# Patient Record
Sex: FEMALE | Race: WHITE | Employment: FULL TIME | URBAN - METROPOLITAN AREA
[De-identification: names, ages, dates, MRNs, and addresses within clinical notes are randomized per-mention and may not be internally consistent; named-entity substitution may affect disease eponyms.]

---

## 2017-01-09 RX ORDER — LANCETS 30 GAUGE
1 EACH MISCELLANEOUS 3 TIMES DAILY
Qty: 100 EACH | Refills: 5 | Status: SHIPPED | OUTPATIENT
Start: 2017-01-09 | End: 2017-03-15 | Stop reason: SDUPTHER

## 2017-02-18 LAB — DIABETIC RETINOPATHY: POSITIVE

## 2017-03-15 ENCOUNTER — OFFICE VISIT (OUTPATIENT)
Dept: FAMILY MEDICINE CLINIC | Age: 47
End: 2017-03-15

## 2017-03-15 VITALS
HEART RATE: 82 BPM | WEIGHT: 293 LBS | TEMPERATURE: 97.8 F | OXYGEN SATURATION: 97 % | BODY MASS INDEX: 54.98 KG/M2 | SYSTOLIC BLOOD PRESSURE: 128 MMHG | DIASTOLIC BLOOD PRESSURE: 67 MMHG | RESPIRATION RATE: 20 BRPM

## 2017-03-15 DIAGNOSIS — J45.20 MILD INTERMITTENT ASTHMA WITHOUT COMPLICATION: ICD-10-CM

## 2017-03-15 DIAGNOSIS — E11.3299 DM TYPE 2 WITH DIABETIC BACKGROUND RETINOPATHY (HCC): ICD-10-CM

## 2017-03-15 DIAGNOSIS — F41.8 DEPRESSION WITH ANXIETY: ICD-10-CM

## 2017-03-15 DIAGNOSIS — E11.3299 TYPE 2 DIABETES MELLITUS WITH MILD NONPROLIFERATIVE RETINOPATHY WITHOUT MACULAR EDEMA, WITHOUT LONG-TERM CURRENT USE OF INSULIN, UNSPECIFIED LATERALITY (HCC): ICD-10-CM

## 2017-03-15 DIAGNOSIS — F41.8 ANXIETY ASSOCIATED WITH DEPRESSION: ICD-10-CM

## 2017-03-15 DIAGNOSIS — E78.00 PURE HYPERCHOLESTEROLEMIA: ICD-10-CM

## 2017-03-15 DIAGNOSIS — R63.2 BINGE EATING: ICD-10-CM

## 2017-03-15 DIAGNOSIS — B00.9 HERPES SIMPLEX: ICD-10-CM

## 2017-03-15 DIAGNOSIS — G47.30 SLEEP APNEA, UNSPECIFIED TYPE: ICD-10-CM

## 2017-03-15 DIAGNOSIS — E11.3299 DM TYPE 2 WITH DIABETIC BACKGROUND RETINOPATHY (HCC): Primary | ICD-10-CM

## 2017-03-15 LAB
A/G RATIO: 1.3 (ref 1.1–2.2)
ALBUMIN SERPL-MCNC: 4 G/DL (ref 3.4–5)
ALP BLD-CCNC: 79 U/L (ref 40–129)
ALT SERPL-CCNC: 20 U/L (ref 10–40)
ANION GAP SERPL CALCULATED.3IONS-SCNC: 17 MMOL/L (ref 3–16)
AST SERPL-CCNC: 14 U/L (ref 15–37)
BILIRUB SERPL-MCNC: <0.2 MG/DL (ref 0–1)
BUN BLDV-MCNC: 16 MG/DL (ref 7–20)
CALCIUM SERPL-MCNC: 9.3 MG/DL (ref 8.3–10.6)
CHLORIDE BLD-SCNC: 104 MMOL/L (ref 99–110)
CHOLESTEROL, TOTAL: 116 MG/DL (ref 0–199)
CO2: 23 MMOL/L (ref 21–32)
CREAT SERPL-MCNC: 0.9 MG/DL (ref 0.6–1.1)
CREATININE URINE POCT: NORMAL
CREATININE URINE: 234 MG/DL (ref 28–259)
GFR AFRICAN AMERICAN: >60
GFR NON-AFRICAN AMERICAN: >60
GLOBULIN: 3.2 G/DL
GLUCOSE BLD-MCNC: 64 MG/DL (ref 70–99)
HDLC SERPL-MCNC: 49 MG/DL (ref 40–60)
LDL CHOLESTEROL CALCULATED: 46 MG/DL
MICROALBUMIN UR-MCNC: <1.2 MG/DL
MICROALBUMIN/CREAT 24H UR: 50 MG/G{CREAT}
MICROALBUMIN/CREAT UR-RTO: NORMAL
MICROALBUMIN/CREAT UR-RTO: NORMAL MG/G (ref 0–30)
POTASSIUM SERPL-SCNC: 3.9 MMOL/L (ref 3.5–5.1)
SODIUM BLD-SCNC: 144 MMOL/L (ref 136–145)
TOTAL PROTEIN: 7.2 G/DL (ref 6.4–8.2)
TRIGL SERPL-MCNC: 105 MG/DL (ref 0–150)
TSH REFLEX: 3.85 UIU/ML (ref 0.27–4.2)
VLDLC SERPL CALC-MCNC: 21 MG/DL

## 2017-03-15 PROCEDURE — 99214 OFFICE O/P EST MOD 30 MIN: CPT | Performed by: FAMILY MEDICINE

## 2017-03-15 PROCEDURE — 82044 UR ALBUMIN SEMIQUANTITATIVE: CPT | Performed by: FAMILY MEDICINE

## 2017-03-15 RX ORDER — LISINOPRIL 10 MG/1
TABLET ORAL
Qty: 30 TABLET | Refills: 5 | Status: SHIPPED | OUTPATIENT
Start: 2017-03-15 | End: 2017-08-17 | Stop reason: SDUPTHER

## 2017-03-15 RX ORDER — NAPROXEN 500 MG/1
500 TABLET ORAL 2 TIMES DAILY WITH MEALS
Qty: 60 TABLET | Refills: 5 | Status: CANCELLED | OUTPATIENT
Start: 2017-03-15

## 2017-03-15 RX ORDER — MIRTAZAPINE 15 MG/1
7.5 TABLET, FILM COATED ORAL NIGHTLY
Qty: 15 TABLET | Refills: 5 | Status: SHIPPED | OUTPATIENT
Start: 2017-03-15 | End: 2017-08-17 | Stop reason: SDUPTHER

## 2017-03-15 RX ORDER — ALPRAZOLAM 0.25 MG/1
TABLET ORAL
Qty: 15 TABLET | Refills: 2 | Status: CANCELLED | OUTPATIENT
Start: 2017-03-15

## 2017-03-15 RX ORDER — ALBUTEROL SULFATE 90 UG/1
1-2 AEROSOL, METERED RESPIRATORY (INHALATION) EVERY 6 HOURS PRN
Qty: 1 INHALER | Refills: 5 | Status: SHIPPED | OUTPATIENT
Start: 2017-03-15 | End: 2017-08-17 | Stop reason: SDUPTHER

## 2017-03-15 RX ORDER — ATORVASTATIN CALCIUM 40 MG/1
TABLET, FILM COATED ORAL
Qty: 30 TABLET | Refills: 5 | Status: SHIPPED | OUTPATIENT
Start: 2017-03-15 | End: 2017-08-17 | Stop reason: SDUPTHER

## 2017-03-15 RX ORDER — CLONAZEPAM 0.5 MG/1
0.5 TABLET ORAL 2 TIMES DAILY PRN
Qty: 30 TABLET | Refills: 2 | Status: SHIPPED | OUTPATIENT
Start: 2017-03-15 | End: 2017-11-02 | Stop reason: SDUPTHER

## 2017-03-15 RX ORDER — FLUOXETINE HYDROCHLORIDE 40 MG/1
CAPSULE ORAL
Qty: 60 CAPSULE | Refills: 5 | Status: SHIPPED | OUTPATIENT
Start: 2017-03-15 | End: 2017-08-17 | Stop reason: SDUPTHER

## 2017-03-15 RX ORDER — BUPROPION HYDROCHLORIDE 150 MG/1
150 TABLET ORAL EVERY MORNING
Qty: 30 TABLET | Refills: 5 | Status: SHIPPED | OUTPATIENT
Start: 2017-03-15 | End: 2017-08-17 | Stop reason: SDUPTHER

## 2017-03-15 RX ORDER — LANCETS 30 GAUGE
1 EACH MISCELLANEOUS 3 TIMES DAILY
Qty: 100 EACH | Refills: 5 | Status: SHIPPED | OUTPATIENT
Start: 2017-03-15 | End: 2017-08-17 | Stop reason: SDUPTHER

## 2017-03-15 RX ORDER — CYCLOBENZAPRINE HCL 10 MG
TABLET ORAL
Qty: 30 TABLET | Refills: 5 | Status: SHIPPED | OUTPATIENT
Start: 2017-03-15 | End: 2017-08-17 | Stop reason: SDUPTHER

## 2017-03-15 RX ORDER — METFORMIN HYDROCHLORIDE 500 MG/1
500 TABLET, EXTENDED RELEASE ORAL 2 TIMES DAILY
Qty: 180 TABLET | Refills: 1 | Status: SHIPPED | OUTPATIENT
Start: 2017-03-15 | End: 2017-08-17 | Stop reason: SDUPTHER

## 2017-03-15 RX ORDER — VALACYCLOVIR HYDROCHLORIDE 500 MG/1
TABLET, FILM COATED ORAL
Qty: 12 TABLET | Refills: 2 | Status: SHIPPED | OUTPATIENT
Start: 2017-03-15 | End: 2019-09-16 | Stop reason: ALTCHOICE

## 2017-03-16 ENCOUNTER — TELEPHONE (OUTPATIENT)
Dept: FAMILY MEDICINE CLINIC | Age: 47
End: 2017-03-16

## 2017-03-16 LAB
ESTIMATED AVERAGE GLUCOSE: 168.6 MG/DL
HBA1C MFR BLD: 7.5 %

## 2017-03-22 ENCOUNTER — PATIENT MESSAGE (OUTPATIENT)
Dept: FAMILY MEDICINE CLINIC | Age: 47
End: 2017-03-22

## 2017-03-22 DIAGNOSIS — E11.3299 DM TYPE 2 WITH DIABETIC BACKGROUND RETINOPATHY (HCC): ICD-10-CM

## 2017-04-19 ENCOUNTER — OFFICE VISIT (OUTPATIENT)
Dept: FAMILY MEDICINE CLINIC | Age: 47
End: 2017-04-19

## 2017-04-19 VITALS
HEART RATE: 93 BPM | WEIGHT: 293 LBS | TEMPERATURE: 97.8 F | DIASTOLIC BLOOD PRESSURE: 69 MMHG | RESPIRATION RATE: 16 BRPM | BODY MASS INDEX: 55.51 KG/M2 | SYSTOLIC BLOOD PRESSURE: 126 MMHG | OXYGEN SATURATION: 96 %

## 2017-04-19 DIAGNOSIS — S39.012A LUMBAR STRAIN, INITIAL ENCOUNTER: ICD-10-CM

## 2017-04-19 DIAGNOSIS — R63.2 BINGE EATING: ICD-10-CM

## 2017-04-19 DIAGNOSIS — E11.3299 DM TYPE 2 WITH DIABETIC BACKGROUND RETINOPATHY (HCC): Primary | ICD-10-CM

## 2017-04-19 PROCEDURE — 99214 OFFICE O/P EST MOD 30 MIN: CPT | Performed by: FAMILY MEDICINE

## 2017-04-19 RX ORDER — LANCETS
EACH MISCELLANEOUS
COMMUNITY
End: 2018-08-07 | Stop reason: ALTCHOICE

## 2017-04-19 ASSESSMENT — PATIENT HEALTH QUESTIONNAIRE - PHQ9
SUM OF ALL RESPONSES TO PHQ9 QUESTIONS 1 & 2: 0
SUM OF ALL RESPONSES TO PHQ QUESTIONS 1-9: 0
2. FEELING DOWN, DEPRESSED OR HOPELESS: 0
1. LITTLE INTEREST OR PLEASURE IN DOING THINGS: 0

## 2017-05-17 ENCOUNTER — OFFICE VISIT (OUTPATIENT)
Dept: FAMILY MEDICINE CLINIC | Age: 47
End: 2017-05-17

## 2017-05-17 VITALS
DIASTOLIC BLOOD PRESSURE: 76 MMHG | HEART RATE: 90 BPM | WEIGHT: 293 LBS | TEMPERATURE: 98 F | SYSTOLIC BLOOD PRESSURE: 128 MMHG | RESPIRATION RATE: 18 BRPM | BODY MASS INDEX: 54.91 KG/M2

## 2017-05-17 DIAGNOSIS — R60.0 PEDAL EDEMA: ICD-10-CM

## 2017-05-17 DIAGNOSIS — E11.3299 DM TYPE 2 WITH DIABETIC BACKGROUND RETINOPATHY (HCC): ICD-10-CM

## 2017-05-17 DIAGNOSIS — R63.2 BINGE EATING: Primary | ICD-10-CM

## 2017-05-17 PROCEDURE — 99214 OFFICE O/P EST MOD 30 MIN: CPT | Performed by: FAMILY MEDICINE

## 2017-05-17 RX ORDER — TRIAMTERENE AND HYDROCHLOROTHIAZIDE 37.5; 25 MG/1; MG/1
1 CAPSULE ORAL DAILY PRN
Qty: 30 CAPSULE | Refills: 5 | Status: SHIPPED | OUTPATIENT
Start: 2017-05-17 | End: 2017-11-17 | Stop reason: SDUPTHER

## 2017-06-27 ENCOUNTER — TELEPHONE (OUTPATIENT)
Dept: SLEEP MEDICINE | Age: 47
End: 2017-06-27

## 2017-08-17 ENCOUNTER — TELEPHONE (OUTPATIENT)
Dept: FAMILY MEDICINE CLINIC | Age: 47
End: 2017-08-17

## 2017-08-17 ENCOUNTER — OFFICE VISIT (OUTPATIENT)
Dept: FAMILY MEDICINE CLINIC | Age: 47
End: 2017-08-17

## 2017-08-17 VITALS
BODY MASS INDEX: 48.82 KG/M2 | DIASTOLIC BLOOD PRESSURE: 54 MMHG | SYSTOLIC BLOOD PRESSURE: 122 MMHG | TEMPERATURE: 98.2 F | WEIGHT: 293 LBS | RESPIRATION RATE: 16 BRPM | HEIGHT: 65 IN | HEART RATE: 93 BPM

## 2017-08-17 DIAGNOSIS — R63.2 BINGE EATING: Primary | ICD-10-CM

## 2017-08-17 DIAGNOSIS — F41.8 ANXIETY ASSOCIATED WITH DEPRESSION: ICD-10-CM

## 2017-08-17 DIAGNOSIS — E11.3299 TYPE 2 DIABETES MELLITUS WITH MILD NONPROLIFERATIVE RETINOPATHY WITHOUT MACULAR EDEMA, WITHOUT LONG-TERM CURRENT USE OF INSULIN, UNSPECIFIED LATERALITY (HCC): ICD-10-CM

## 2017-08-17 DIAGNOSIS — F41.8 DEPRESSION WITH ANXIETY: ICD-10-CM

## 2017-08-17 DIAGNOSIS — E78.00 PURE HYPERCHOLESTEROLEMIA: ICD-10-CM

## 2017-08-17 DIAGNOSIS — J45.20 MILD INTERMITTENT ASTHMA WITHOUT COMPLICATION: ICD-10-CM

## 2017-08-17 DIAGNOSIS — M54.30 SCIATIC LEG PAIN: ICD-10-CM

## 2017-08-17 PROBLEM — S39.012A LUMBAR STRAIN: Status: RESOLVED | Noted: 2017-04-19 | Resolved: 2017-08-17

## 2017-08-17 LAB
A/G RATIO: 1.5 (ref 1.1–2.2)
ALBUMIN SERPL-MCNC: 4.1 G/DL (ref 3.4–5)
ALP BLD-CCNC: 71 U/L (ref 40–129)
ALT SERPL-CCNC: 16 U/L (ref 10–40)
ANION GAP SERPL CALCULATED.3IONS-SCNC: 15 MMOL/L (ref 3–16)
AST SERPL-CCNC: 11 U/L (ref 15–37)
BILIRUB SERPL-MCNC: 0.4 MG/DL (ref 0–1)
BUN BLDV-MCNC: 28 MG/DL (ref 7–20)
CALCIUM SERPL-MCNC: 9.2 MG/DL (ref 8.3–10.6)
CHLORIDE BLD-SCNC: 100 MMOL/L (ref 99–110)
CHOLESTEROL, TOTAL: 108 MG/DL (ref 0–199)
CO2: 21 MMOL/L (ref 21–32)
CREAT SERPL-MCNC: 1.4 MG/DL (ref 0.6–1.1)
ESTIMATED AVERAGE GLUCOSE: 165.7 MG/DL
GFR AFRICAN AMERICAN: 48
GFR NON-AFRICAN AMERICAN: 40
GLOBULIN: 2.7 G/DL
GLUCOSE BLD-MCNC: 90 MG/DL (ref 70–99)
HBA1C MFR BLD: 7.4 %
HDLC SERPL-MCNC: 48 MG/DL (ref 40–60)
LDL CHOLESTEROL CALCULATED: 43 MG/DL
POTASSIUM SERPL-SCNC: 4.3 MMOL/L (ref 3.5–5.1)
SODIUM BLD-SCNC: 136 MMOL/L (ref 136–145)
TOTAL PROTEIN: 6.8 G/DL (ref 6.4–8.2)
TRIGL SERPL-MCNC: 87 MG/DL (ref 0–150)
VLDLC SERPL CALC-MCNC: 17 MG/DL

## 2017-08-17 PROCEDURE — 99214 OFFICE O/P EST MOD 30 MIN: CPT | Performed by: FAMILY MEDICINE

## 2017-08-17 RX ORDER — LANCETS 30 GAUGE
1 EACH MISCELLANEOUS 3 TIMES DAILY
Qty: 100 EACH | Refills: 5 | Status: SHIPPED | OUTPATIENT
Start: 2017-08-17 | End: 2018-02-16 | Stop reason: SDUPTHER

## 2017-08-17 RX ORDER — ATORVASTATIN CALCIUM 40 MG/1
TABLET, FILM COATED ORAL
Qty: 30 TABLET | Refills: 5 | Status: SHIPPED | OUTPATIENT
Start: 2017-08-17 | End: 2018-02-16 | Stop reason: SDUPTHER

## 2017-08-17 RX ORDER — CYCLOBENZAPRINE HCL 10 MG
TABLET ORAL
Qty: 30 TABLET | Refills: 5 | Status: SHIPPED | OUTPATIENT
Start: 2017-08-17 | End: 2018-02-16 | Stop reason: SDUPTHER

## 2017-08-17 RX ORDER — METFORMIN HYDROCHLORIDE 500 MG/1
500 TABLET, EXTENDED RELEASE ORAL 2 TIMES DAILY
Qty: 180 TABLET | Refills: 1 | Status: SHIPPED | OUTPATIENT
Start: 2017-08-17 | End: 2018-02-16 | Stop reason: SDUPTHER

## 2017-08-17 RX ORDER — MIRTAZAPINE 15 MG/1
7.5 TABLET, FILM COATED ORAL NIGHTLY
Qty: 15 TABLET | Refills: 5 | Status: SHIPPED | OUTPATIENT
Start: 2017-08-17 | End: 2018-02-16 | Stop reason: SDUPTHER

## 2017-08-17 RX ORDER — METHYLPREDNISOLONE 4 MG/1
TABLET ORAL
Qty: 1 KIT | Refills: 0 | Status: SHIPPED | OUTPATIENT
Start: 2017-08-17 | End: 2017-08-23

## 2017-08-17 RX ORDER — LISINOPRIL 10 MG/1
TABLET ORAL
Qty: 30 TABLET | Refills: 5 | Status: SHIPPED | OUTPATIENT
Start: 2017-08-17 | End: 2018-02-16 | Stop reason: SDUPTHER

## 2017-08-17 RX ORDER — ALBUTEROL SULFATE 90 UG/1
1-2 AEROSOL, METERED RESPIRATORY (INHALATION) EVERY 6 HOURS PRN
Qty: 1 INHALER | Refills: 5 | Status: SHIPPED | OUTPATIENT
Start: 2017-08-17 | End: 2018-02-16 | Stop reason: SDUPTHER

## 2017-08-17 RX ORDER — FLUOXETINE HYDROCHLORIDE 40 MG/1
CAPSULE ORAL
Qty: 60 CAPSULE | Refills: 5 | Status: SHIPPED | OUTPATIENT
Start: 2017-08-17 | End: 2018-02-16 | Stop reason: SDUPTHER

## 2017-08-17 RX ORDER — BUPROPION HYDROCHLORIDE 150 MG/1
150 TABLET ORAL EVERY MORNING
Qty: 30 TABLET | Refills: 5 | Status: SHIPPED | OUTPATIENT
Start: 2017-08-17 | End: 2018-02-16 | Stop reason: SDUPTHER

## 2017-08-17 RX ORDER — CLONAZEPAM 0.5 MG/1
0.5 TABLET ORAL 2 TIMES DAILY PRN
Qty: 30 TABLET | Refills: 2 | Status: CANCELLED | OUTPATIENT
Start: 2017-08-17

## 2017-08-23 ENCOUNTER — OFFICE VISIT (OUTPATIENT)
Dept: PULMONOLOGY | Age: 47
End: 2017-08-23

## 2017-08-23 ENCOUNTER — TELEPHONE (OUTPATIENT)
Dept: FAMILY MEDICINE CLINIC | Age: 47
End: 2017-08-23

## 2017-08-23 VITALS
OXYGEN SATURATION: 97 % | HEART RATE: 96 BPM | HEIGHT: 66 IN | DIASTOLIC BLOOD PRESSURE: 60 MMHG | WEIGHT: 293 LBS | SYSTOLIC BLOOD PRESSURE: 112 MMHG | BODY MASS INDEX: 47.09 KG/M2

## 2017-08-23 DIAGNOSIS — G43.009 MIGRAINE WITHOUT AURA AND WITHOUT STATUS MIGRAINOSUS, NOT INTRACTABLE: Chronic | ICD-10-CM

## 2017-08-23 DIAGNOSIS — R06.83 SNORING: ICD-10-CM

## 2017-08-23 DIAGNOSIS — E11.3299 DM TYPE 2 WITH DIABETIC BACKGROUND RETINOPATHY (HCC): Chronic | ICD-10-CM

## 2017-08-23 DIAGNOSIS — E66.01 MORBID OBESITY, UNSPECIFIED OBESITY TYPE (HCC): Chronic | ICD-10-CM

## 2017-08-23 DIAGNOSIS — G47.10 HYPERSOMNIA: Primary | ICD-10-CM

## 2017-08-23 DIAGNOSIS — E28.2 PCOS (POLYCYSTIC OVARIAN SYNDROME): Chronic | ICD-10-CM

## 2017-08-23 DIAGNOSIS — F41.8 DEPRESSION WITH ANXIETY: Chronic | ICD-10-CM

## 2017-08-23 PROBLEM — R63.2 BINGE EATING: Chronic | Status: ACTIVE | Noted: 2017-03-15

## 2017-08-23 PROCEDURE — 99244 OFF/OP CNSLTJ NEW/EST MOD 40: CPT | Performed by: INTERNAL MEDICINE

## 2017-08-23 ASSESSMENT — SLEEP AND FATIGUE QUESTIONNAIRES
HOW LIKELY ARE YOU TO NOD OFF OR FALL ASLEEP WHILE SITTING QUIETLY AFTER LUNCH WITHOUT ALCOHOL: 2
HOW LIKELY ARE YOU TO NOD OFF OR FALL ASLEEP WHILE SITTING AND READING: 3
HOW LIKELY ARE YOU TO NOD OFF OR FALL ASLEEP WHILE SITTING INACTIVE IN A PUBLIC PLACE: 2
HOW LIKELY ARE YOU TO NOD OFF OR FALL ASLEEP IN A CAR, WHILE STOPPED FOR A FEW MINUTES IN TRAFFIC: 0
ESS TOTAL SCORE: 16
NECK CIRCUMFERENCE (INCHES): 18
HOW LIKELY ARE YOU TO NOD OFF OR FALL ASLEEP WHILE WATCHING TV: 3
HOW LIKELY ARE YOU TO NOD OFF OR FALL ASLEEP WHILE LYING DOWN TO REST IN THE AFTERNOON WHEN CIRCUMSTANCES PERMIT: 3
HOW LIKELY ARE YOU TO NOD OFF OR FALL ASLEEP WHILE SITTING AND TALKING TO SOMEONE: 0
HOW LIKELY ARE YOU TO NOD OFF OR FALL ASLEEP WHEN YOU ARE A PASSENGER IN A CAR FOR AN HOUR WITHOUT A BREAK: 3

## 2017-08-23 ASSESSMENT — ENCOUNTER SYMPTOMS
CHOKING: 0
ALLERGIC/IMMUNOLOGIC NEGATIVE: 1
APNEA: 1
RHINORRHEA: 0
VOMITING: 0
ABDOMINAL PAIN: 0
EYE PAIN: 0
PHOTOPHOBIA: 0
ABDOMINAL DISTENTION: 0
SHORTNESS OF BREATH: 0
NAUSEA: 0
CHEST TIGHTNESS: 0

## 2017-09-27 PROCEDURE — 95806 SLEEP STUDY UNATT&RESP EFFT: CPT | Performed by: INTERNAL MEDICINE

## 2017-09-28 ENCOUNTER — HOSPITAL ENCOUNTER (OUTPATIENT)
Dept: SLEEP MEDICINE | Age: 47
Discharge: HOME | End: 2017-09-30
Admitting: INTERNAL MEDICINE

## 2017-09-28 DIAGNOSIS — R06.83 SNORING: ICD-10-CM

## 2017-09-28 DIAGNOSIS — G47.10 HYPERSOMNIA: ICD-10-CM

## 2017-10-09 ENCOUNTER — TELEPHONE (OUTPATIENT)
Dept: SLEEP MEDICINE | Age: 47
End: 2017-10-09

## 2017-10-10 ENCOUNTER — TELEPHONE (OUTPATIENT)
Dept: SLEEP MEDICINE | Age: 47
End: 2017-10-10

## 2017-10-10 NOTE — TELEPHONE ENCOUNTER
Pt calling to review HST results. Pt is aware that her insurance does not allow for in lab study and agrees to trial APAP. Pt does not have DME preference and order to be sent to [address]. F/U scheduled and pr asked to bring unit.

## 2017-10-10 NOTE — TELEPHONE ENCOUNTER
Trying to return pt call with # left on messages -left message at that # and mobile # asking pt to call back

## 2017-11-02 DIAGNOSIS — F41.8 DEPRESSION WITH ANXIETY: ICD-10-CM

## 2017-11-02 DIAGNOSIS — F41.8 ANXIETY ASSOCIATED WITH DEPRESSION: ICD-10-CM

## 2017-11-03 RX ORDER — CLONAZEPAM 0.5 MG/1
TABLET ORAL
Qty: 30 TABLET | Refills: 0 | Status: SHIPPED | OUTPATIENT
Start: 2017-11-03 | End: 2018-08-07 | Stop reason: SDUPTHER

## 2017-11-17 ENCOUNTER — OFFICE VISIT (OUTPATIENT)
Dept: FAMILY MEDICINE CLINIC | Age: 47
End: 2017-11-17

## 2017-11-17 VITALS
DIASTOLIC BLOOD PRESSURE: 71 MMHG | SYSTOLIC BLOOD PRESSURE: 129 MMHG | HEART RATE: 76 BPM | BODY MASS INDEX: 52.13 KG/M2 | WEIGHT: 293 LBS | OXYGEN SATURATION: 99 % | TEMPERATURE: 97.4 F

## 2017-11-17 DIAGNOSIS — E78.00 PURE HYPERCHOLESTEROLEMIA: Chronic | ICD-10-CM

## 2017-11-17 DIAGNOSIS — E11.3293 TYPE 2 DIABETES MELLITUS WITH BOTH EYES AFFECTED BY MILD NONPROLIFERATIVE RETINOPATHY WITHOUT MACULAR EDEMA, WITHOUT LONG-TERM CURRENT USE OF INSULIN (HCC): Chronic | ICD-10-CM

## 2017-11-17 DIAGNOSIS — R63.2 BINGE EATING: Primary | Chronic | ICD-10-CM

## 2017-11-17 DIAGNOSIS — R60.0 PEDAL EDEMA: ICD-10-CM

## 2017-11-17 DIAGNOSIS — G47.33 OBSTRUCTIVE SLEEP APNEA SYNDROME: ICD-10-CM

## 2017-11-17 PROCEDURE — 99214 OFFICE O/P EST MOD 30 MIN: CPT | Performed by: FAMILY MEDICINE

## 2017-11-17 RX ORDER — TRIAMTERENE AND HYDROCHLOROTHIAZIDE 37.5; 25 MG/1; MG/1
1 CAPSULE ORAL DAILY
Qty: 30 CAPSULE | Refills: 5 | Status: ON HOLD | OUTPATIENT
Start: 2017-11-17 | End: 2018-08-02

## 2017-11-17 NOTE — PROGRESS NOTES
Subjective:      Patient ID: Preeti Cedeno is a 50 y.o. female. CELIA Umaña is here for follow up on DM<, binge eating, HTN, hyperlipidemia and HTN    She is compliant with C-PAP. Sleeping 6 hours. Tolerating Vyvanse. Is effective in helping her to eat less; thinks less about food and is binging much less. Does not interfere with sleep. No irritability or anxiety. Treatment Adherence:   Medication compliance:  compliant most of the time  Diet compliance:  improved. making better choices  Weight trend: decreasing  Current exercise: walks 3 time(s) per week  Barriers: none    Diabetes Mellitus Type 2: Current symptoms/problems include none. Home blood sugar records: fasting range: 100 to 110, postprandial range: 130s  Any episodes of hypoglycemia? no  Eye exam current (within one year): yes  Tobacco history: She  reports that she has never smoked. She has never used smokeless tobacco.   Daily Aspirin? Yes    Hypertension:  Home blood pressure monitoring: No.  She is adherent to a low sodium diet. Patient denies chest pain, shortness of breath, blurred vision, peripheral edema, palpitations and dry cough. Antihypertensive medication side effects: no medication side effects noted. Use of agents associated with hypertension: none. Hyperlipidemia:  No new myalgias or GI upset on atorvastatin (Lipitor).        Lab Results   Component Value Date    LABA1C 7.4 08/17/2017    LABA1C 7.5 03/15/2017    LABA1C 6.1 09/15/2016     Lab Results   Component Value Date    LABMICR <1.20 03/15/2017    CREATININE 1.4 (H) 08/17/2017     Lab Results   Component Value Date    ALT 16 08/17/2017    AST 11 (L) 08/17/2017     Lab Results   Component Value Date    CHOL 108 08/17/2017    TRIG 87 08/17/2017    HDL 48 08/17/2017    LDLCALC 43 08/17/2017             Patient Active Problem List   Diagnosis    Diabetes mellitus, type II ([facility]    Anxiety associated with depression    PCOS (polycystic ovarian syndrome)    Pure hypercholesterolemia    Common migraine    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 ([facility] [address]    Depression with anxiety    Obstructive sleep apnea syndrome    Herpes simplex    Binge eating    Pedal edema      Outpatient Prescriptions Marked as Taking for the 11/17/17 encounter (Office Visit) with Karn Mohs, MD   Medication Sig Dispense Refill    clonazePAM (KLONOPIN) 0.5 MG tablet TAKE ONE TABLET BY MOUTH TWICE A DAY AS NEEDED FOR ANXIETY 30 tablet 0    insulin glargine (LANTUS SOLOSTAR) 100 UNIT/ML injection pen INJECT 124 UNITS UNDER THE SKIN EVERY MORNING AND 80 UNITS EVERY EVENING 25 Pen 5    SURE COMFORT LANCETS 30G MISC 1 Units by Does not apply route 3 times daily 100 each 5    metFORMIN (GLUCOPHAGE XR) 500 MG extended release tablet Take 1 tablet by mouth 2 times daily 180 tablet 1    FLUoxetine (PROZAC) 40 MG capsule TAKE TWO CAPSULES BY MOUTH DAILY 60 capsule 5    lisinopril (PRINIVIL;ZESTRIL) 10 MG tablet TAKE ONE TABLET BY MOUTH DAILY 30 tablet 5    mirtazapine (REMERON) 15 MG tablet Take 0.5 tablets by mouth nightly 15 tablet 5    atorvastatin (LIPITOR) 40 MG tablet TAKE ONE TABLET BY MOUTH DAILY 30 tablet 5    buPROPion (WELLBUTRIN XL) 150 MG extended release tablet Take 1 tablet by mouth every morning 30 tablet 5    cyclobenzaprine (FLEXERIL) 10 MG tablet TAKE ONE TABLET BY MOUTH EVERY EVENING AS NEEDED FOR MUSCLE SPASMS 30 tablet 5    albuterol sulfate HFA (PROAIR HFA) 108 (90 Base) MCG/ACT inhaler Inhale 1-2 puffs into the lungs every 6 hours as needed for Wheezing or Shortness of Breath 1 Inhaler 5    Liraglutide (VICTOZA) 18 MG/3ML SOPN SC injection Inject 1.8 mg into the skin daily 3 Pen 5    triamterene-hydrochlorothiazide (DYAZIDE) 37.5-25 MG per capsule Take 1 capsule by mouth daily as needed (edema) 30 capsule 5    Accu-Chek Multiclix Lancets MISC by Does not apply route      valACYclovir (VALTREX) 500 MG tablet TAKE ONE TABLET BY MOUTH TWICE A DAY 12 tablet 2    glucose blood VI test strips (ACCU-CHEK RUIZ) strip 1 each by In Vitro route 3 times daily As needed. 300 each 3    Blood Glucose Monitoring Suppl [place]RIA 1 kit by Does not apply route 3 times daily 1 Device 0    fluticasone (FLONASE) 50 MCG/ACT nasal spray PLACE TWO SPRAYS IN EACH NOSTRIL ONCE DAILY 1 Bottle 12    Insulin Pen Needle (B-D UF III MINI PEN NEEDLES) 31G X 5 MM MISC 1 each by Does not apply route 2 times daily 100 each 12    Cholecalciferol (VITAMIN D) 2000 UNITS CAPS capsule Take 1 capsule by mouth daily. 90 capsule 1    aspirin 81 MG EC tablet Take 81 mg by mouth daily. PMH, PSH, SH, Problem list reviewed. Social History   Substance Use Topics    Smoking status: Never Smoker    Smokeless tobacco: Never Used    Alcohol use Yes      Comment: infrequent, few times a month     Allergies   Allergen Reactions    Effexor Xr [Venlafaxine Hcl Er]       Review of Systems    Objective:   Physical Exam  NAD   Skin is warm and dry. Well hydrated. No rash. Mood +, affect is normal.   The neck is supple and free of adenopathy or masses, the thyroid is normal without enlargement or nodules. Chest: clear with no wheezes or rales. No retractions, or use of accessory muscles noted. Cardiovascular: PMI is not displaced, and no thrill noted. Regular rate and rhythm with no rub, murmur or gallop. No peripheral edema. The abdomen is soft without tenderness, guarding, mass, rebound or organomegaly. Aorta, femoral, DP and PT pulses intact. Sensation normal to monofilament feet bilaterally. Feet in good repair, without significant callouses and without ulceration or deformity. Assessment:      1.  Binge eating  Lisdexamfetamine Dimesylate (VYVANSE) 60 MG CAPS    Lisdexamfetamine Dimesylate (VYVANSE) 60 MG CAPS    Lisdexamfetamine Dimesylate (VYVANSE) 60 MG CAPS  Controlled Substances Monitoring:     Documentation: Possible medication side effects, risk of tolerance and/or dependence, and alternative treatments discussed., No signs of potential drug abuse or diversion identified. Aj Alston MD)          2. 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 Pure hypercholesterolemia  At goal.    4. Pedal edema  triamterene-hydrochlorothiazide (DYAZIDE) 37.5-25 MG per capsule   5. Obstructive sleep apnea syndrome  Tolerating C-PAP. Encouraged to increase hours of sleep. Plan:      Side effects of current medications reviewed and questions answered. Follow up in 3 months or prn.

## 2017-12-14 DIAGNOSIS — E11.3299 DM TYPE 2 WITH DIABETIC BACKGROUND RETINOPATHY (HCC): ICD-10-CM

## 2017-12-14 RX ORDER — INSULIN GLARGINE 100 [IU]/ML
INJECTION, SOLUTION SUBCUTANEOUS
Qty: 60 PEN | Refills: 5 | Status: SHIPPED | OUTPATIENT
Start: 2017-12-14 | End: 2018-09-28 | Stop reason: ALTCHOICE

## 2018-01-03 ENCOUNTER — TELEPHONE (OUTPATIENT)
Dept: FAMILY MEDICINE CLINIC | Age: 48
End: 2018-01-03

## 2018-01-12 ENCOUNTER — OFFICE VISIT (OUTPATIENT)
Dept: SLEEP MEDICINE | Age: 48
End: 2018-01-12

## 2018-01-12 VITALS
WEIGHT: 293 LBS | OXYGEN SATURATION: 98 % | DIASTOLIC BLOOD PRESSURE: 60 MMHG | HEIGHT: 66 IN | HEART RATE: 98 BPM | BODY MASS INDEX: 47.09 KG/M2 | SYSTOLIC BLOOD PRESSURE: 107 MMHG

## 2018-01-12 DIAGNOSIS — G47.33 OBSTRUCTIVE SLEEP APNEA SYNDROME: Primary | ICD-10-CM

## 2018-01-12 DIAGNOSIS — G43.009 MIGRAINE WITHOUT AURA AND WITHOUT STATUS MIGRAINOSUS, NOT INTRACTABLE: Chronic | ICD-10-CM

## 2018-01-12 DIAGNOSIS — E28.2 PCOS (POLYCYSTIC OVARIAN SYNDROME): Chronic | ICD-10-CM

## 2018-01-12 DIAGNOSIS — E66.01 MORBID OBESITY, UNSPECIFIED OBESITY TYPE (HCC): Chronic | ICD-10-CM

## 2018-01-12 DIAGNOSIS — E11.3299 DM TYPE 2 WITH DIABETIC BACKGROUND RETINOPATHY (HCC): Chronic | ICD-10-CM

## 2018-01-12 DIAGNOSIS — F41.8 DEPRESSION WITH ANXIETY: Chronic | ICD-10-CM

## 2018-01-12 PROCEDURE — 99214 OFFICE O/P EST MOD 30 MIN: CPT | Performed by: NURSE PRACTITIONER

## 2018-01-12 ASSESSMENT — ENCOUNTER SYMPTOMS
SHORTNESS OF BREATH: 0
RHINORRHEA: 0
ABDOMINAL DISTENTION: 0
COUGH: 0
APNEA: 0
SINUS PRESSURE: 0
ABDOMINAL PAIN: 0

## 2018-01-12 ASSESSMENT — SLEEP AND FATIGUE QUESTIONNAIRES
HOW LIKELY ARE YOU TO NOD OFF OR FALL ASLEEP WHILE LYING DOWN TO REST IN THE AFTERNOON WHEN CIRCUMSTANCES PERMIT: 3
HOW LIKELY ARE YOU TO NOD OFF OR FALL ASLEEP WHILE SITTING AND TALKING TO SOMEONE: 1
ESS TOTAL SCORE: 17
HOW LIKELY ARE YOU TO NOD OFF OR FALL ASLEEP WHEN YOU ARE A PASSENGER IN A CAR FOR AN HOUR WITHOUT A BREAK: 3
HOW LIKELY ARE YOU TO NOD OFF OR FALL ASLEEP WHILE SITTING AND READING: 3
HOW LIKELY ARE YOU TO NOD OFF OR FALL ASLEEP IN A CAR, WHILE STOPPED FOR A FEW MINUTES IN TRAFFIC: 0
HOW LIKELY ARE YOU TO NOD OFF OR FALL ASLEEP WHILE WATCHING TV: 3
HOW LIKELY ARE YOU TO NOD OFF OR FALL ASLEEP WHILE SITTING QUIETLY AFTER LUNCH WITHOUT ALCOHOL: 2
HOW LIKELY ARE YOU TO NOD OFF OR FALL ASLEEP WHILE SITTING INACTIVE IN A PUBLIC PLACE: 2

## 2018-01-12 NOTE — PROGRESS NOTES
mouth every morning . 11/17/17  Yes Tod Ray MD   clonazePAM (KLONOPIN) 0.5 MG tablet TAKE ONE TABLET BY MOUTH TWICE A DAY AS NEEDED FOR ANXIETY 11/3/17  Yes Tod Ray MD   SURE COMFORT LANCETS 30G MISC 1 Units by Does not apply route 3 times daily 8/17/17  Yes Tod Ray MD   metFORMIN (GLUCOPHAGE XR) 500 MG extended release tablet Take 1 tablet by mouth 2 times daily 8/17/17  Yes Tod Ray MD   FLUoxetine (PROZAC) 40 MG capsule TAKE TWO CAPSULES BY MOUTH DAILY 8/17/17  Yes Tod Ray MD   lisinopril (PRINIVIL;ZESTRIL) 10 MG tablet TAKE ONE TABLET BY MOUTH DAILY 8/17/17  Yes Tod Ray MD   mirtazapine (REMERON) 15 MG tablet Take 0.5 tablets by mouth nightly 8/17/17 8/17/18 Yes Tod Ray MD   atorvastatin (LIPITOR) 40 MG tablet TAKE ONE TABLET BY MOUTH DAILY 8/17/17  Yes Tod Ray MD   buPROPion (WELLBUTRIN XL) 150 MG extended release tablet Take 1 tablet by mouth every morning 8/17/17  Yes Tod Ray MD   cyclobenzaprine (FLEXERIL) 10 MG tablet TAKE ONE TABLET BY MOUTH EVERY EVENING AS NEEDED FOR MUSCLE SPASMS 8/17/17  Yes Tod Ray MD   albuterol sulfate HFA (PROAIR HFA) 108 (90 Base) MCG/ACT inhaler Inhale 1-2 puffs into the lungs every 6 hours as needed for Wheezing or Shortness of Breath 8/17/17  Yes Tod Ray MD   Liraglutide (VICTOZA) 18 MG/3ML SOPN SC injection Inject 1.8 mg into the skin daily 8/17/17  Yes Tod Ray MD   Accu-Chek Multiclix Lancets MISC by Does not apply route   Yes Historical Provider, MD   valACYclovir (VALTREX) 500 MG tablet TAKE ONE TABLET BY MOUTH TWICE A DAY 3/15/17  Yes Tod Ray MD   glucose blood VI test strips (ACCU-CHEK RUIZ) strip 1 each by In Vitro route 3 times daily As needed.  3/15/17  Yes Tod Ray MD   Blood Glucose Monitoring Suppl ANDRIA 1 kit by Does not apply route 3 times daily 1/9/17  Yes Tod Ray MD   fluticasone Ramesh Moe) 50 MCG/ACT

## 2018-01-16 ENCOUNTER — OFFICE VISIT (OUTPATIENT)
Dept: FAMILY MEDICINE CLINIC | Age: 48
End: 2018-01-16

## 2018-01-16 VITALS
DIASTOLIC BLOOD PRESSURE: 68 MMHG | SYSTOLIC BLOOD PRESSURE: 109 MMHG | OXYGEN SATURATION: 96 % | HEART RATE: 87 BPM | RESPIRATION RATE: 20 BRPM

## 2018-01-16 DIAGNOSIS — J06.9 VIRAL URI: Primary | ICD-10-CM

## 2018-01-16 PROCEDURE — 99213 OFFICE O/P EST LOW 20 MIN: CPT | Performed by: FAMILY MEDICINE

## 2018-01-16 RX ORDER — GUAIFENESIN AND CODEINE PHOSPHATE 100; 10 MG/5ML; MG/5ML
5-10 SOLUTION ORAL 4 TIMES DAILY PRN
Qty: 118 ML | Refills: 0 | Status: SHIPPED | OUTPATIENT
Start: 2018-01-16 | End: 2018-01-23

## 2018-01-16 RX ORDER — AMOXICILLIN AND CLAVULANATE POTASSIUM 875; 125 MG/1; MG/1
1 TABLET, FILM COATED ORAL 2 TIMES DAILY
Qty: 20 TABLET | Refills: 0 | Status: SHIPPED | OUTPATIENT
Start: 2018-01-16 | End: 2018-01-26

## 2018-01-16 NOTE — PROGRESS NOTES
Subjective:      Patient ID: Orlando Rosado is a 50 y.o. female. HPI   Pradeep Mccain is here for evaluation for a URI    She complains of cough and congestion x one week. Clear nasal discharge. Cough is productive. No chest pain, dyspnea, fever, ST.  + left ear pain, sneezing.malaise, hoarse voice. No nausea, vomiting or diarrhea. Cough is getting worse. Rx; Sudafed helps a bit. FSBS: 100 - 110 fasting. Lunchtime 120s. Outpatient Prescriptions Marked as Taking for the 1/16/18 encounter (Office Visit) with Sydnie Paiz MD   Medication Sig Dispense Refill    LANTUS SOLOSTAR 100 UNIT/ML injection pen INJECT 120 UNITS UNDER THE SKIN EVERY MORNING AND 90 UNITS UNDER THE SKIN EVERY EVENING 60 pen 5    triamterene-hydrochlorothiazide (DYAZIDE) 37.5-25 MG per capsule Take 1 capsule by mouth daily 30 capsule 5    Lisdexamfetamine Dimesylate (VYVANSE) 60 MG CAPS Take 60 mg by mouth every morning . 30 capsule 0    Lisdexamfetamine Dimesylate (VYVANSE) 60 MG CAPS Take 60 mg by mouth every morning . 30 capsule 0    Lisdexamfetamine Dimesylate (VYVANSE) 60 MG CAPS Take 60 mg by mouth every morning .  30 capsule 0    clonazePAM (KLONOPIN) 0.5 MG tablet TAKE ONE TABLET BY MOUTH TWICE A DAY AS NEEDED FOR ANXIETY 30 tablet 0    SURE COMFORT LANCETS 30G MISC 1 Units by Does not apply route 3 times daily 100 each 5    metFORMIN (GLUCOPHAGE XR) 500 MG extended release tablet Take 1 tablet by mouth 2 times daily 180 tablet 1    FLUoxetine (PROZAC) 40 MG capsule TAKE TWO CAPSULES BY MOUTH DAILY 60 capsule 5    lisinopril (PRINIVIL;ZESTRIL) 10 MG tablet TAKE ONE TABLET BY MOUTH DAILY 30 tablet 5    mirtazapine (REMERON) 15 MG tablet Take 0.5 tablets by mouth nightly 15 tablet 5    atorvastatin (LIPITOR) 40 MG tablet TAKE ONE TABLET BY MOUTH DAILY 30 tablet 5    buPROPion (WELLBUTRIN XL) 150 MG extended release tablet Take 1 tablet by mouth every morning 30 tablet 5    albuterol sulfate HFA (PROAIR HFA) 108

## 2018-02-05 ENCOUNTER — TELEPHONE (OUTPATIENT)
Dept: SLEEP MEDICINE | Age: 48
End: 2018-02-05

## 2018-02-16 ENCOUNTER — OFFICE VISIT (OUTPATIENT)
Dept: FAMILY MEDICINE CLINIC | Age: 48
End: 2018-02-16

## 2018-02-16 VITALS
HEART RATE: 83 BPM | DIASTOLIC BLOOD PRESSURE: 46 MMHG | SYSTOLIC BLOOD PRESSURE: 115 MMHG | WEIGHT: 293 LBS | BODY MASS INDEX: 48.82 KG/M2 | TEMPERATURE: 97.4 F | HEIGHT: 65 IN

## 2018-02-16 DIAGNOSIS — E11.3299 TYPE 2 DIABETES MELLITUS WITH MILD NONPROLIFERATIVE RETINOPATHY WITHOUT MACULAR EDEMA, WITHOUT LONG-TERM CURRENT USE OF INSULIN, UNSPECIFIED LATERALITY (HCC): Primary | ICD-10-CM

## 2018-02-16 DIAGNOSIS — E11.3299 TYPE 2 DIABETES MELLITUS WITH MILD NONPROLIFERATIVE RETINOPATHY WITHOUT MACULAR EDEMA, WITHOUT LONG-TERM CURRENT USE OF INSULIN, UNSPECIFIED LATERALITY (HCC): ICD-10-CM

## 2018-02-16 DIAGNOSIS — R63.2 BINGE EATING: Chronic | ICD-10-CM

## 2018-02-16 DIAGNOSIS — G47.33 OBSTRUCTIVE SLEEP APNEA SYNDROME: ICD-10-CM

## 2018-02-16 DIAGNOSIS — E78.00 PURE HYPERCHOLESTEROLEMIA: ICD-10-CM

## 2018-02-16 DIAGNOSIS — E66.01 MORBID OBESITY WITH BMI OF 50.0-59.9, ADULT (HCC): ICD-10-CM

## 2018-02-16 DIAGNOSIS — Z80.8 FH: MELANOMA: ICD-10-CM

## 2018-02-16 DIAGNOSIS — J45.20 MILD INTERMITTENT ASTHMA WITHOUT COMPLICATION: ICD-10-CM

## 2018-02-16 DIAGNOSIS — F41.8 DEPRESSION WITH ANXIETY: ICD-10-CM

## 2018-02-16 DIAGNOSIS — F41.8 ANXIETY ASSOCIATED WITH DEPRESSION: ICD-10-CM

## 2018-02-16 LAB
A/G RATIO: 1.3 (ref 1.1–2.2)
ALBUMIN SERPL-MCNC: 4 G/DL (ref 3.4–5)
ALP BLD-CCNC: 80 U/L (ref 40–129)
ALT SERPL-CCNC: 20 U/L (ref 10–40)
ANION GAP SERPL CALCULATED.3IONS-SCNC: 16 MMOL/L (ref 3–16)
AST SERPL-CCNC: 13 U/L (ref 15–37)
BILIRUB SERPL-MCNC: 0.3 MG/DL (ref 0–1)
BUN BLDV-MCNC: 22 MG/DL (ref 7–20)
CALCIUM SERPL-MCNC: 9.5 MG/DL (ref 8.3–10.6)
CHLORIDE BLD-SCNC: 101 MMOL/L (ref 99–110)
CHOLESTEROL, TOTAL: 159 MG/DL (ref 0–199)
CO2: 24 MMOL/L (ref 21–32)
CREAT SERPL-MCNC: 1.3 MG/DL (ref 0.6–1.1)
ESTIMATED AVERAGE GLUCOSE: 159.9 MG/DL
GFR AFRICAN AMERICAN: 53
GFR NON-AFRICAN AMERICAN: 43
GLOBULIN: 3.1 G/DL
GLUCOSE BLD-MCNC: 111 MG/DL (ref 70–99)
HBA1C MFR BLD: 7.2 %
HDLC SERPL-MCNC: 49 MG/DL (ref 40–60)
LDL CHOLESTEROL CALCULATED: 84 MG/DL
POTASSIUM SERPL-SCNC: 4.1 MMOL/L (ref 3.5–5.1)
SODIUM BLD-SCNC: 141 MMOL/L (ref 136–145)
T4 FREE: 1.3 NG/DL (ref 0.9–1.8)
TOTAL PROTEIN: 7.1 G/DL (ref 6.4–8.2)
TRIGL SERPL-MCNC: 132 MG/DL (ref 0–150)
TSH REFLEX: 4.23 UIU/ML (ref 0.27–4.2)
VLDLC SERPL CALC-MCNC: 26 MG/DL

## 2018-02-16 PROCEDURE — 99214 OFFICE O/P EST MOD 30 MIN: CPT | Performed by: FAMILY MEDICINE

## 2018-02-16 RX ORDER — METFORMIN HYDROCHLORIDE 500 MG/1
500 TABLET, EXTENDED RELEASE ORAL 2 TIMES DAILY
Qty: 180 TABLET | Refills: 1 | Status: SHIPPED | OUTPATIENT
Start: 2018-02-16 | End: 2018-08-07 | Stop reason: ALTCHOICE

## 2018-02-16 RX ORDER — CYCLOBENZAPRINE HCL 10 MG
TABLET ORAL
Qty: 30 TABLET | Refills: 5 | Status: SHIPPED | OUTPATIENT
Start: 2018-02-16 | End: 2018-08-07 | Stop reason: ALTCHOICE

## 2018-02-16 RX ORDER — FLUOXETINE HYDROCHLORIDE 40 MG/1
CAPSULE ORAL
Qty: 60 CAPSULE | Refills: 5 | Status: SHIPPED | OUTPATIENT
Start: 2018-02-16 | End: 2018-10-29 | Stop reason: SDUPTHER

## 2018-02-16 RX ORDER — LISINOPRIL 10 MG/1
TABLET ORAL
Qty: 30 TABLET | Refills: 5 | Status: ON HOLD | OUTPATIENT
Start: 2018-02-16 | End: 2018-08-02

## 2018-02-16 RX ORDER — MIRTAZAPINE 15 MG/1
7.5 TABLET, FILM COATED ORAL NIGHTLY
Qty: 15 TABLET | Refills: 5 | Status: SHIPPED | OUTPATIENT
Start: 2018-02-16 | End: 2018-04-25 | Stop reason: ALTCHOICE

## 2018-02-16 RX ORDER — ALBUTEROL SULFATE 90 UG/1
1-2 AEROSOL, METERED RESPIRATORY (INHALATION) EVERY 6 HOURS PRN
Qty: 1 INHALER | Refills: 5 | Status: SHIPPED | OUTPATIENT
Start: 2018-02-16 | End: 2019-09-16

## 2018-02-16 RX ORDER — LANCETS 30 GAUGE
1 EACH MISCELLANEOUS 3 TIMES DAILY
Qty: 100 EACH | Refills: 5 | Status: SHIPPED | OUTPATIENT
Start: 2018-02-16 | End: 2020-06-08

## 2018-02-16 RX ORDER — BUPROPION HYDROCHLORIDE 150 MG/1
150 TABLET ORAL EVERY MORNING
Qty: 30 TABLET | Refills: 5 | Status: SHIPPED | OUTPATIENT
Start: 2018-02-16 | End: 2018-06-27 | Stop reason: ALTCHOICE

## 2018-02-16 RX ORDER — ATORVASTATIN CALCIUM 40 MG/1
TABLET, FILM COATED ORAL
Qty: 30 TABLET | Refills: 5 | Status: SHIPPED | OUTPATIENT
Start: 2018-02-16 | End: 2018-08-07 | Stop reason: SDUPTHER

## 2018-02-16 ASSESSMENT — ENCOUNTER SYMPTOMS
WHEEZING: 0
BACK PAIN: 1
CONSTIPATION: 0
SHORTNESS OF BREATH: 0
DIARRHEA: 0
COUGH: 0

## 2018-02-16 NOTE — PATIENT INSTRUCTIONS
Patient Education   Patient Education   Patient Education        Low Back Pain: Exercises  Your Care Instructions  Here are some examples of typical rehabilitation exercises for your condition. Start each exercise slowly. Ease off the exercise if you start to have pain. Your doctor or physical therapist will tell you when you can start these exercises and which ones will work best for you. How to do the exercises  Press-up    1. Lie on your stomach, supporting your body with your forearms. 2. Press your elbows down into the floor to raise your upper back. As you do this, relax your stomach muscles and allow your back to arch without using your back muscles. As your press up, do not let your hips or pelvis come off the floor. 3. Hold for 15 to 30 seconds, then relax. 4. Repeat 2 to 4 times. Alternate arm and leg (bird dog) exercise    Do this exercise slowly. Try to keep your body straight at all times, and do not let one hip drop lower than the other. 1. Start on the floor, on your hands and knees. 2. Tighten your belly muscles. 3. Raise one leg off the floor, and hold it straight out behind you. Be careful not to let your hip drop down, because that will twist your trunk. 4. Hold for about 6 seconds, then lower your leg and switch to the other leg. 5. Repeat 8 to 12 times on each leg. 6. Over time, work up to holding for 10 to 30 seconds each time. 7. If you feel stable and secure with your leg raised, try raising the opposite arm straight out in front of you at the same time. Knee-to-chest exercise    1. Lie on your back with your knees bent and your feet flat on the floor. 2. Bring one knee to your chest, keeping the other foot flat on the floor (or keeping the other leg straight, whichever feels better on your lower back). 3. Keep your lower back pressed to the floor. Hold for at least 15 to 30 seconds. 4. Relax, and lower the knee to the starting position. 5. Repeat with the other leg.  Repeat

## 2018-02-16 NOTE — PROGRESS NOTES
Subjective:      Patient ID: Juanita Richter is a 50 y.o. female. [facility]  Sedrick Sue is here for follow up for DM, HTN, hyperlipidemia, binge eating. Vyvanse has helped her be less hungry but is not making good food choices. She is still over eating in the evening. Treatment Adherence:   Medication compliance:  compliant most of the time  Diet compliance:  compliant most of the time  Weight trend: stable  Current exercise: no regular exercise  Barriers: lack of motivation    Diabetes Mellitus Type 2: Current symptoms/problems include none. Home blood sugar records: fasting range: 90 to 100. was 300 this am.  she thinks she forgot to take insulin last night when she set up her mediset, postprandial range: typically 125 to 130, occ 240  Any episodes of hypoglycemia? yes - 50 in the afternoon once when skipped a meal.  Carries snacks  Eye exam current (within one year): no  Tobacco history: She  reports that she has never smoked. She has never used smokeless tobacco.   Daily Aspirin? Yes    Hypertension:  Home blood pressure monitoring: No.  She is adherent to a low sodium diet. Patient denies chest pain, lightheadedness, peripheral edema, palpitations and dry cough. Antihypertensive medication side effects: no medication side effects noted. Use of agents associated with hypertension: none. Mild stable dyspnea on exertion. Is compliant with C-PAP. Hyperlipidemia:  No new myalgias or GI upset on atorvastatin (Lipitor).        Lab Results   Component Value Date    LABA1C 7.4 08/17/2017    LABA1C 7.5 03/15/2017    LABA1C 6.1 09/15/2016     Lab Results   Component Value Date    LABMICR <1.20 03/15/2017    CREATININE 1.4 (H) 08/17/2017     Lab Results   Component Value Date    ALT 16 08/17/2017    AST 11 (L) 08/17/2017     Lab Results   Component Value Date    CHOL 108 08/17/2017    TRIG 87 08/17/2017    HDL 48 08/17/2017    LDLCALC 43 08/17/2017            Health Maintenance   Topic Date Due    Diabetic retinal exam  02/18/2018    Diabetic foot exam  03/15/2018    Diabetic microalbuminuria test  03/15/2018    A1C test (Diabetic or Prediabetic)  08/17/2018    Lipid screen  08/17/2018    Potassium monitoring  08/17/2018    Creatinine monitoring  08/17/2018    Cervical cancer screen  12/19/2019    DTaP/Tdap/Td vaccine (2 - Td) 01/29/2023    Flu vaccine  Completed    Pneumococcal med risk  Completed    HIV screen  Completed     Social History   Substance Use Topics    Smoking status: Never Smoker    Smokeless tobacco: Never Used    Alcohol use Yes      Comment: infrequent, few times a month      Allergies   Allergen Reactions    Effexor Xr [Venlafaxine Hcl Er]       Patient Active Problem List   Diagnosis    Diabetes mellitus, type II ([address]    Anxiety associated with depression    PCOS (polycystic ovarian syndrome)    Pure hypercholesterolemia    Common migraine    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 ([address]    Depression with anxiety    Obstructive sleep apnea syndrome    Herpes simplex    Binge eating    Pedal edema      Past Medical History:   Diagnosis Date    Anxiety     CTS (carpal tunnel syndrome) 2/10/2015    Depression     Diabetes mellitus ([address]     ETD (eustachian tube dysfunction) 5/16/2014    Hyperlipidemia     Lumbar strain 4/19/2017    PCOS (polycystic ovarian syndrome)     TMJ syndrome      No past surgical history on file. Family History   Problem Relation Age of Onset    Cervical Cancer Mother     Diabetes Father     Stroke Father      25s    Hypertension Father     Coronary Art Dis Father     Other Father      CHIVO    Diabetes Sister     Diabetes Maternal Grandmother     Diabetes Paternal Grandmother         Review of Systems   Constitutional: Negative for fatigue and unexpected weight change. Eyes: Negative for visual disturbance. Respiratory: Negative for cough, shortness of breath and wheezing. Asthma asymptomatic. Comprehensive Metabolic Panel    Hemoglobin A1C     DIABETES FOOT EXAM    Ambulatory referral to Ophthalmology  Discussed diet, exercise and weight loss strategies    2. Binge eating  Lisdexamfetamine Dimesylate (VYVANSE) 60 MG CAPS    Lisdexamfetamine Dimesylate (VYVANSE) 60 MG CAPS    Lisdexamfetamine Dimesylate (VYVANSE) 60 MG CAPS'  Strategies addressed. 3. Depression with anxiety  FLUoxetine (PROZAC) 40 MG capsule    mirtazapine (REMERON) 15 MG tablet    buPROPion (WELLBUTRIN XL) 150 MG extended release tablet  Well controlled. 4. Anxiety associated with depression  lisinopril (PRINIVIL;ZESTRIL) 10 MG tablet  At goal    5. Pure hypercholesterolemia  atorvastatin (LIPITOR) 40 MG tablet    Comprehensive Metabolic Panel    TSH with Reflex    Lipid Panel  At goal   6. Mild intermittent asthma without complication  albuterol sulfate HFA (PROAIR HFA) 108 (90 Base) MCG/ACT inhaler   7. Morbid obesity with BMI of 50.0-59.9, adult [facility])  Lisa Mckeon MD   8. Obstructive sleep apnea syndrome     9. FH: melanoma  Kari Ortiz MD          Plan:      Side effects of current medications reviewed and questions answered. Follow up in 3 months or prn.

## 2018-02-16 NOTE — PROGRESS NOTES
BP Readings from Last 2 Encounters:   02/16/18 (!) 115/46   01/16/18 109/68     Hemoglobin A1C (%)   Date Value   08/17/2017 7.4     Microalbumin, Random Urine (mg/dL)   Date Value   03/15/2017 <1.20     LDL Calculated (mg/dL)   Date Value   08/17/2017 43              Tobacco use:  Patient  reports that she has never smoked. She has never used smokeless tobacco.    If Smoker - Cessation materials given? NA    Is Daily aspirin on medication list?:  Yes    Diabetic retinal exam done? Yes   If yes, document in Health Maintenance. Monofilament placed on counter? Yes    Shoes and socks removed? No    BP taken with correct size cuff? Yes   Repeated if > 130/80 NA     Microalbumin performed if applicable?   NA

## 2018-03-06 ENCOUNTER — TELEPHONE (OUTPATIENT)
Dept: BARIATRICS/WEIGHT MGMT | Age: 48
End: 2018-03-06

## 2018-03-06 NOTE — TELEPHONE ENCOUNTER
KOBE for patient to call back. Patient attended a seminar 2/22/18 with Dr Raul Hill. She DOES have BWLS coverage with [employer] ( No req diet)  Please schedule with Dr. Raul Hill. BMI Form scanned in media.  Thanks

## 2018-03-14 ENCOUNTER — TELEPHONE (OUTPATIENT)
Dept: BARIATRICS/WEIGHT MGMT | Age: 48
End: 2018-03-14

## 2018-03-14 NOTE — TELEPHONE ENCOUNTER
Called as a new pt courtesy call - spoke w patient. Did receive paperwork - told patient to have new pt paperwork completely filled out, insurance card, and id and to arrive 30min prior to appt time.  If they didn't have the paperwork filled out and arrive on time may be rescheduled

## 2018-03-19 LAB — DIABETIC RETINOPATHY: POSITIVE

## 2018-03-28 ENCOUNTER — OFFICE VISIT (OUTPATIENT)
Dept: BARIATRICS/WEIGHT MGMT | Age: 48
End: 2018-03-28

## 2018-03-28 VITALS
BODY MASS INDEX: 47.09 KG/M2 | HEART RATE: 84 BPM | HEIGHT: 66 IN | WEIGHT: 293 LBS | SYSTOLIC BLOOD PRESSURE: 114 MMHG | DIASTOLIC BLOOD PRESSURE: 61 MMHG

## 2018-03-28 DIAGNOSIS — I10 ESSENTIAL HYPERTENSION: ICD-10-CM

## 2018-03-28 DIAGNOSIS — E11.3299 DM TYPE 2 WITH DIABETIC BACKGROUND RETINOPATHY (HCC): ICD-10-CM

## 2018-03-28 DIAGNOSIS — E28.2 PCOS (POLYCYSTIC OVARIAN SYNDROME): ICD-10-CM

## 2018-03-28 DIAGNOSIS — E66.01 MORBID OBESITY WITH BMI OF 50.0-59.9, ADULT (HCC): Primary | ICD-10-CM

## 2018-03-28 DIAGNOSIS — R63.2 BINGE EATING: ICD-10-CM

## 2018-03-28 DIAGNOSIS — G47.33 OBSTRUCTIVE SLEEP APNEA SYNDROME: ICD-10-CM

## 2018-03-28 PROCEDURE — 99205 OFFICE O/P NEW HI 60 MIN: CPT | Performed by: SURGERY

## 2018-03-28 NOTE — PROGRESS NOTES
area.   Musculoskeletal: Normal range of motion. Patient exhibits no edema or tenderness. Lymphadenopathy:        Head (right side): No submental, no submandibular, no preauricular, no posterior auricular and no occipital adenopathy present. Head (left side): No submental, no submandibular, no preauricular, no posterior auricular and no occipital adenopathy present. Patient  has no cervical adenopathy. Right: No supraclavicular adenopathy present. Left: No supraclavicular adenopathy present. Neurological: Patient is alert and oriented to person, place, and time. Patient has normal strength. Coordination and gait normal. GCS eye subscore is 4. GCS verbal subscore is 5. GCS motor subscore is 6. Skin: Skin is warm and dry. No abrasion and no rash noted. Patient  is not diaphoretic. No cyanosis or erythema. Psychiatric: Patient has a normal mood and affect. speech is normal and behavior is normal. Cognition and memory are normal.             A/P  Michelet Cochran is a very pleasant 50 y.o. female with Obesity,  Body mass index is 53.39 kg/m². and multiple obesity related co-morbidities. Michelet Cochran is very motivated to lose weight and being more healthy. We discussed how her weight affects her overall health including:  Patient Active Problem List   Diagnosis    Diabetes mellitus, type II ([address]    Anxiety associated with depression    PCOS (polycystic ovarian syndrome)    Pure hypercholesterolemia    Common migraine    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 ([address]    Depression with anxiety    Obstructive sleep apnea syndrome    Herpes simplex    Binge eating    Pedal edema    Morbid obesity with BMI of 50.0-59.9, adult ([address]    FH: melanoma    Essential hypertension      The patient underwent 30 minutes of dietary counseling. I have reviewed, discussed and agree with the dietary plan.   Medical weight loss and different surgical options were discussed in details

## 2018-03-28 NOTE — PROGRESS NOTES
Harini Arreola is a 50 y.o. female with a date of birth of 1970. Vitals:    03/28/18 1006   BP: 114/61   Pulse: 84    BMI: Body mass index is 53.39 kg/m². Obesity Classification: Class III    Weight History: Wt Readings from Last 3 Encounters:   03/28/18 (!) 325 lb 12.8 oz (147.8 kg)   03/13/18 (!) 325 lb (147.4 kg)   02/16/18 (!) 325 lb (147.4 kg)       Patient's lowest adult weight was 200 lbs at age 21. Patient's highest adult weight was 325.8 lbs at age 50. Patient has participated in the following weight loss programs: Weight Watcher Anonymous, nutritional counseling with RD, and increased exercise. Patient has not participated in meal replacement/liquid diets. Patient has participated in weight loss medications. Patient is currently on Vyvanse for binge eating. Patient is not lactose intolerant. Patient does not have Advent/cultural food concerns. Patient does not have food allergies. Patient is able to tolerate artificial sweeteners. Patient dines out to a sit down restaurant 4 times per week. Patient dines out to a fast food restaurant 5 times per week. 24 hour recall/food frequency chart:  Breakfast: yes. McDonalds or Arbys or Frisch[employer] - Arbys sour dough sausage and egg sandwich; McDonalds 2 brkst burritos; [employer] grilled cheese sandwich. Drinks large diet soda. Snack: yes. Granola bar or fruit or nuts  Lunch: yes. Salad with lots of veggies with eggs, cheese or burger with no bun; sometimes doesn't eat lunch  Snack: yes. Cheese and crax or granola bar  Dinner: yes. No real organized dinner; she is in school. Pt orders Alfredo méndez - fried chix with salad and veg. [place] - stuffed mushrooms, ziti, salad. Snack: yes. Peanut butter sandwich or cheese and crax, chips  Drinks throughout the day: diet mtn dew - \"way too much\", diet coke, uns tea, water - 1 bottle  Do you drink alcohol? No.    Patient does meet the criteria for binge eating disorder.  Patient does activity as tolerated    PES Statement:  Overweight/Obesity related to lack of exercise, sedentary lifestyle, unhealthy eating habits, and unsuccessful diet attempts as evidenced by BMI. Body mass index is 53.39 kg/m². .    Will follow up as necessary.     Lou Bhakta

## 2018-04-11 DIAGNOSIS — E66.01 MORBID OBESITY WITH BMI OF 50.0-59.9, ADULT (HCC): ICD-10-CM

## 2018-04-11 LAB
FOLATE: 10.94 NG/ML (ref 4.78–24.2)
IRON SATURATION: 17 % (ref 15–50)
IRON: 55 UG/DL (ref 37–145)
TOTAL IRON BINDING CAPACITY: 331 UG/DL (ref 260–445)
VITAMIN B-12: 658 PG/ML (ref 211–911)
VITAMIN D 25-HYDROXY: 28.1 NG/ML

## 2018-04-13 ENCOUNTER — OFFICE VISIT (OUTPATIENT)
Dept: SLEEP MEDICINE | Age: 48
End: 2018-04-13

## 2018-04-13 VITALS
HEIGHT: 66 IN | DIASTOLIC BLOOD PRESSURE: 68 MMHG | OXYGEN SATURATION: 98 % | HEART RATE: 72 BPM | WEIGHT: 293 LBS | SYSTOLIC BLOOD PRESSURE: 110 MMHG | BODY MASS INDEX: 47.09 KG/M2

## 2018-04-13 DIAGNOSIS — G47.33 OBSTRUCTIVE SLEEP APNEA SYNDROME: Primary | Chronic | ICD-10-CM

## 2018-04-13 DIAGNOSIS — G43.009 MIGRAINE WITHOUT AURA AND WITHOUT STATUS MIGRAINOSUS, NOT INTRACTABLE: Chronic | ICD-10-CM

## 2018-04-13 DIAGNOSIS — F41.8 DEPRESSION WITH ANXIETY: Chronic | ICD-10-CM

## 2018-04-13 DIAGNOSIS — E66.01 MORBID OBESITY, UNSPECIFIED OBESITY TYPE (HCC): Chronic | ICD-10-CM

## 2018-04-13 DIAGNOSIS — E11.3299 DM TYPE 2 WITH DIABETIC BACKGROUND RETINOPATHY (HCC): Chronic | ICD-10-CM

## 2018-04-13 DIAGNOSIS — E28.2 PCOS (POLYCYSTIC OVARIAN SYNDROME): Chronic | ICD-10-CM

## 2018-04-13 PROCEDURE — 99214 OFFICE O/P EST MOD 30 MIN: CPT | Performed by: NURSE PRACTITIONER

## 2018-04-13 ASSESSMENT — SLEEP AND FATIGUE QUESTIONNAIRES
HOW LIKELY ARE YOU TO NOD OFF OR FALL ASLEEP WHILE SITTING AND READING: 3
HOW LIKELY ARE YOU TO NOD OFF OR FALL ASLEEP IN A CAR, WHILE STOPPED FOR A FEW MINUTES IN TRAFFIC: 0
HOW LIKELY ARE YOU TO NOD OFF OR FALL ASLEEP WHILE SITTING QUIETLY AFTER LUNCH WITHOUT ALCOHOL: 0
HOW LIKELY ARE YOU TO NOD OFF OR FALL ASLEEP WHILE SITTING AND TALKING TO SOMEONE: 0
HOW LIKELY ARE YOU TO NOD OFF OR FALL ASLEEP WHILE SITTING INACTIVE IN A PUBLIC PLACE: 0
HOW LIKELY ARE YOU TO NOD OFF OR FALL ASLEEP WHILE LYING DOWN TO REST IN THE AFTERNOON WHEN CIRCUMSTANCES PERMIT: 3
HOW LIKELY ARE YOU TO NOD OFF OR FALL ASLEEP WHILE WATCHING TV: 2
HOW LIKELY ARE YOU TO NOD OFF OR FALL ASLEEP WHEN YOU ARE A PASSENGER IN A CAR FOR AN HOUR WITHOUT A BREAK: 2
ESS TOTAL SCORE: 10

## 2018-04-13 ASSESSMENT — ENCOUNTER SYMPTOMS
SHORTNESS OF BREATH: 0
ABDOMINAL PAIN: 0
COUGH: 0
ABDOMINAL DISTENTION: 0
RHINORRHEA: 0
SINUS PRESSURE: 0
APNEA: 0

## 2018-04-22 ENCOUNTER — PATIENT MESSAGE (OUTPATIENT)
Dept: FAMILY MEDICINE CLINIC | Age: 48
End: 2018-04-22

## 2018-04-23 ENCOUNTER — TELEPHONE (OUTPATIENT)
Dept: BARIATRICS/WEIGHT MGMT | Age: 48
End: 2018-04-23

## 2018-04-23 ENCOUNTER — OFFICE VISIT (OUTPATIENT)
Dept: CARDIOLOGY CLINIC | Age: 48
End: 2018-04-23

## 2018-04-23 ENCOUNTER — HOSPITAL ENCOUNTER (OUTPATIENT)
Dept: NON INVASIVE DIAGNOSTICS | Age: 48
Discharge: HOME | End: 2018-04-23
Attending: INTERNAL MEDICINE | Admitting: INTERNAL MEDICINE

## 2018-04-23 VITALS
DIASTOLIC BLOOD PRESSURE: 74 MMHG | HEART RATE: 74 BPM | WEIGHT: 293 LBS | BODY MASS INDEX: 47.09 KG/M2 | HEIGHT: 66 IN | SYSTOLIC BLOOD PRESSURE: 124 MMHG

## 2018-04-23 DIAGNOSIS — Z01.810 ENCOUNTER FOR PREPROCEDURAL CARDIOVASCULAR EXAMINATION: ICD-10-CM

## 2018-04-23 DIAGNOSIS — R06.02 SOB (SHORTNESS OF BREATH) ON EXERTION: ICD-10-CM

## 2018-04-23 DIAGNOSIS — Z01.810 PRE-OPERATIVE CARDIOVASCULAR EXAMINATION: Primary | ICD-10-CM

## 2018-04-23 PROCEDURE — 99244 OFF/OP CNSLTJ NEW/EST MOD 40: CPT | Performed by: INTERNAL MEDICINE

## 2018-04-23 PROCEDURE — 93000 ELECTROCARDIOGRAM COMPLETE: CPT | Performed by: INTERNAL MEDICINE

## 2018-04-25 ENCOUNTER — OFFICE VISIT (OUTPATIENT)
Dept: BARIATRICS/WEIGHT MGMT | Age: 48
End: 2018-04-25

## 2018-04-25 VITALS
HEIGHT: 66 IN | DIASTOLIC BLOOD PRESSURE: 69 MMHG | BODY MASS INDEX: 47.09 KG/M2 | HEART RATE: 85 BPM | SYSTOLIC BLOOD PRESSURE: 121 MMHG | WEIGHT: 293 LBS

## 2018-04-25 DIAGNOSIS — E66.01 MORBID OBESITY WITH BMI OF 50.0-59.9, ADULT (HCC): Primary | ICD-10-CM

## 2018-04-25 DIAGNOSIS — R63.2 BINGE EATING: ICD-10-CM

## 2018-04-25 DIAGNOSIS — G47.33 OBSTRUCTIVE SLEEP APNEA SYNDROME: ICD-10-CM

## 2018-04-25 DIAGNOSIS — E28.2 PCOS (POLYCYSTIC OVARIAN SYNDROME): ICD-10-CM

## 2018-04-25 DIAGNOSIS — E11.3299 DM TYPE 2 WITH DIABETIC BACKGROUND RETINOPATHY (HCC): ICD-10-CM

## 2018-04-25 DIAGNOSIS — I10 ESSENTIAL HYPERTENSION: ICD-10-CM

## 2018-04-25 PROCEDURE — 99214 OFFICE O/P EST MOD 30 MIN: CPT | Performed by: SURGERY

## 2018-05-07 ENCOUNTER — TELEPHONE (OUTPATIENT)
Dept: SLEEP MEDICINE | Age: 48
End: 2018-05-07

## 2018-05-07 DIAGNOSIS — E66.01 MORBID OBESITY WITH BMI OF 50.0-59.9, ADULT (HCC): Primary | ICD-10-CM

## 2018-05-09 ENCOUNTER — TELEPHONE (OUTPATIENT)
Dept: OTHER | Facility: CLINIC | Age: 48
End: 2018-05-09

## 2018-05-11 DIAGNOSIS — E11.3299 DM TYPE 2 WITH DIABETIC BACKGROUND RETINOPATHY (HCC): ICD-10-CM

## 2018-05-14 ENCOUNTER — TELEPHONE (OUTPATIENT)
Dept: FAMILY MEDICINE CLINIC | Age: 48
End: 2018-05-14

## 2018-05-14 DIAGNOSIS — E11.3293 TYPE 2 DIABETES MELLITUS WITH BOTH EYES AFFECTED BY MILD NONPROLIFERATIVE RETINOPATHY WITHOUT MACULAR EDEMA, WITHOUT LONG-TERM CURRENT USE OF INSULIN (HCC): Primary | Chronic | ICD-10-CM

## 2018-05-14 RX ORDER — LANCETS 30 GAUGE
1 EACH MISCELLANEOUS 3 TIMES DAILY
Qty: 100 EACH | Refills: 12 | Status: SHIPPED | OUTPATIENT
Start: 2018-05-14 | End: 2018-08-07 | Stop reason: ALTCHOICE

## 2018-05-16 ENCOUNTER — OFFICE VISIT (OUTPATIENT)
Dept: FAMILY MEDICINE CLINIC | Age: 48
End: 2018-05-16

## 2018-05-16 VITALS
HEART RATE: 71 BPM | BODY MASS INDEX: 50.7 KG/M2 | TEMPERATURE: 96.9 F | SYSTOLIC BLOOD PRESSURE: 106 MMHG | DIASTOLIC BLOOD PRESSURE: 96 MMHG | RESPIRATION RATE: 16 BRPM | OXYGEN SATURATION: 100 % | WEIGHT: 293 LBS

## 2018-05-16 DIAGNOSIS — R63.2 BINGE EATING: Chronic | ICD-10-CM

## 2018-05-16 DIAGNOSIS — E11.3293 TYPE 2 DIABETES MELLITUS WITH BOTH EYES AFFECTED BY MILD NONPROLIFERATIVE RETINOPATHY WITHOUT MACULAR EDEMA, WITHOUT LONG-TERM CURRENT USE OF INSULIN (HCC): Primary | Chronic | ICD-10-CM

## 2018-05-16 DIAGNOSIS — E78.00 PURE HYPERCHOLESTEROLEMIA: Chronic | ICD-10-CM

## 2018-05-16 PROCEDURE — 99214 OFFICE O/P EST MOD 30 MIN: CPT | Performed by: FAMILY MEDICINE

## 2018-05-18 ENCOUNTER — OFFICE VISIT (OUTPATIENT)
Dept: BARIATRICS/WEIGHT MGMT | Age: 48
End: 2018-05-18

## 2018-05-18 DIAGNOSIS — Z71.89 INDIVIDUAL COUNSELING ENCOUNTER: Primary | ICD-10-CM

## 2018-05-18 PROCEDURE — 90834 PSYTX W PT 45 MINUTES: CPT | Performed by: SOCIAL WORKER

## 2018-05-22 ENCOUNTER — TELEPHONE (OUTPATIENT)
Dept: BARIATRICS/WEIGHT MGMT | Age: 48
End: 2018-05-22

## 2018-05-23 PROBLEM — Z01.810 PRE-OPERATIVE CARDIOVASCULAR EXAMINATION: Status: RESOLVED | Noted: 2018-04-23 | Resolved: 2018-05-23

## 2018-05-25 ENCOUNTER — HOSPITAL ENCOUNTER (OUTPATIENT)
Dept: ENDOSCOPY | Age: 48
Discharge: HOME | End: 2018-05-25
Attending: SURGERY | Admitting: SURGERY

## 2018-05-25 VITALS
HEIGHT: 66 IN | DIASTOLIC BLOOD PRESSURE: 74 MMHG | TEMPERATURE: 97.8 F | BODY MASS INDEX: 47.09 KG/M2 | OXYGEN SATURATION: 98 % | HEART RATE: 72 BPM | SYSTOLIC BLOOD PRESSURE: 124 MMHG | WEIGHT: 293 LBS | RESPIRATION RATE: 16 BRPM

## 2018-05-25 LAB
GLUCOSE BLD-MCNC: 91 MG/DL (ref 70–99)
GLUCOSE BLD-MCNC: 98 MG/DL (ref 70–99)
PERFORMED ON: NORMAL
PERFORMED ON: NORMAL
PREGNANCY, URINE: NEGATIVE

## 2018-05-25 PROCEDURE — 43239 EGD BIOPSY SINGLE/MULTIPLE: CPT | Performed by: SURGERY

## 2018-05-25 RX ORDER — OMEPRAZOLE 20 MG/1
20 CAPSULE, DELAYED RELEASE ORAL DAILY
Qty: 30 CAPSULE | Refills: 3 | Status: SHIPPED | OUTPATIENT
Start: 2018-05-25 | End: 2018-08-03 | Stop reason: SDUPTHER

## 2018-05-25 ASSESSMENT — PAIN - FUNCTIONAL ASSESSMENT: PAIN_FUNCTIONAL_ASSESSMENT: 0-10

## 2018-05-25 ASSESSMENT — PAIN SCALES - GENERAL
PAINLEVEL_OUTOF10: 0

## 2018-05-25 ASSESSMENT — LIFESTYLE VARIABLES: SMOKING_STATUS: 0

## 2018-05-30 ENCOUNTER — OFFICE VISIT (OUTPATIENT)
Dept: BARIATRICS/WEIGHT MGMT | Age: 48
End: 2018-05-30

## 2018-05-30 VITALS
SYSTOLIC BLOOD PRESSURE: 103 MMHG | WEIGHT: 293 LBS | HEART RATE: 76 BPM | DIASTOLIC BLOOD PRESSURE: 57 MMHG | BODY MASS INDEX: 47.09 KG/M2 | HEIGHT: 66 IN

## 2018-05-30 DIAGNOSIS — G47.33 OBSTRUCTIVE SLEEP APNEA SYNDROME: ICD-10-CM

## 2018-05-30 DIAGNOSIS — I10 ESSENTIAL HYPERTENSION: ICD-10-CM

## 2018-05-30 DIAGNOSIS — E66.01 MORBID OBESITY WITH BMI OF 50.0-59.9, ADULT (HCC): Primary | ICD-10-CM

## 2018-05-30 PROCEDURE — 99213 OFFICE O/P EST LOW 20 MIN: CPT | Performed by: SURGERY

## 2018-06-27 ENCOUNTER — OFFICE VISIT (OUTPATIENT)
Dept: BARIATRICS/WEIGHT MGMT | Age: 48
End: 2018-06-27

## 2018-06-27 VITALS
SYSTOLIC BLOOD PRESSURE: 104 MMHG | HEART RATE: 76 BPM | HEIGHT: 66 IN | DIASTOLIC BLOOD PRESSURE: 66 MMHG | BODY MASS INDEX: 47.09 KG/M2 | WEIGHT: 293 LBS

## 2018-06-27 DIAGNOSIS — G47.33 OBSTRUCTIVE SLEEP APNEA SYNDROME: ICD-10-CM

## 2018-06-27 DIAGNOSIS — I10 ESSENTIAL HYPERTENSION: ICD-10-CM

## 2018-06-27 DIAGNOSIS — E28.2 PCOS (POLYCYSTIC OVARIAN SYNDROME): ICD-10-CM

## 2018-06-27 DIAGNOSIS — E66.01 MORBID OBESITY WITH BMI OF 50.0-59.9, ADULT (HCC): Primary | ICD-10-CM

## 2018-06-27 DIAGNOSIS — E11.3299 DM TYPE 2 WITH DIABETIC BACKGROUND RETINOPATHY (HCC): ICD-10-CM

## 2018-06-27 PROCEDURE — 99213 OFFICE O/P EST LOW 20 MIN: CPT | Performed by: SURGERY

## 2018-06-27 NOTE — PROGRESS NOTES
Divina Hope gained 1.2 lbs over the past month. Pt is tracking intake on [employer]ingu[employer].  She is getting about 1500calories or less  Breakfast: pb and banana smoothie - 1 scoop whey protein, 1/4cup Jif protein powder, 1c silk yogurt, almond milk - 400 calories    Snack: piece of cheese and popcorn sometimes    Lunch: chix with peas and carrots OR Lean [employer]isine    Snack: cheese or protein bar - NV or Goodness Knows; May have smoothie with Alive Protein powder with 2 servings veg/fruit added    Dinner: chix or beef, veggie, baked potato with light butter    Snack: pb toast if blood sugar is low    Fluids: water and crystal lite    Is pt consuming smaller portions? yes she is mindful of portions    Is pt consuming at least 64 oz of fluids per day? yes crystal lite 60-80oz    Is pt consuming carbonated, caffeinated, or sugary beverages? no    Has pt sampled Unjury and/or Nectar protein? Yes she has    Exercise: [employer] working with  1x week; 45min weights, 30min cardio, walk in pool and stretch 30min - 2 to 3x week    Pt is still on Vyvanse.     Plan/Recommendations: cut down on brkst smoothie size by half    Handouts: none    Janna Ma

## 2018-07-02 ENCOUNTER — TELEPHONE (OUTPATIENT)
Dept: BARIATRICS/WEIGHT MGMT | Age: 48
End: 2018-07-02

## 2018-07-10 NOTE — TELEPHONE ENCOUNTER
[facility] approved sleeve and hh repair   auth # [id number]  Cpt [id number], [id number]  DOS 7/31/18 @ [facility] with Dr. Belkys Rome

## 2018-07-12 ENCOUNTER — OFFICE VISIT (OUTPATIENT)
Dept: FAMILY MEDICINE CLINIC | Age: 48
End: 2018-07-12

## 2018-07-12 ENCOUNTER — OFFICE VISIT (OUTPATIENT)
Dept: BARIATRICS/WEIGHT MGMT | Age: 48
End: 2018-07-12

## 2018-07-12 ENCOUNTER — HOSPITAL ENCOUNTER (OUTPATIENT)
Dept: MAMMOGRAPHY | Age: 48
Discharge: HOME | End: 2018-07-12
Attending: FAMILY MEDICINE | Admitting: FAMILY MEDICINE

## 2018-07-12 VITALS
SYSTOLIC BLOOD PRESSURE: 116 MMHG | WEIGHT: 292 LBS | HEIGHT: 66 IN | DIASTOLIC BLOOD PRESSURE: 66 MMHG | OXYGEN SATURATION: 98 % | RESPIRATION RATE: 16 BRPM | BODY MASS INDEX: 46.93 KG/M2 | HEART RATE: 76 BPM

## 2018-07-12 VITALS
TEMPERATURE: 97.6 F | DIASTOLIC BLOOD PRESSURE: 59 MMHG | HEART RATE: 73 BPM | RESPIRATION RATE: 14 BRPM | SYSTOLIC BLOOD PRESSURE: 117 MMHG | BODY MASS INDEX: 48.48 KG/M2 | HEIGHT: 65 IN | OXYGEN SATURATION: 98 % | WEIGHT: 291 LBS

## 2018-07-12 DIAGNOSIS — Z12.31 VISIT FOR SCREENING MAMMOGRAM: ICD-10-CM

## 2018-07-12 DIAGNOSIS — E11.3293 TYPE 2 DIABETES MELLITUS WITH BOTH EYES AFFECTED BY MILD NONPROLIFERATIVE RETINOPATHY WITHOUT MACULAR EDEMA, WITHOUT LONG-TERM CURRENT USE OF INSULIN (HCC): Chronic | ICD-10-CM

## 2018-07-12 DIAGNOSIS — E28.2 PCOS (POLYCYSTIC OVARIAN SYNDROME): Chronic | ICD-10-CM

## 2018-07-12 DIAGNOSIS — Z01.818 PREOP EXAMINATION: ICD-10-CM

## 2018-07-12 DIAGNOSIS — E66.01 MORBID OBESITY WITH BMI OF 50.0-59.9, ADULT (HCC): ICD-10-CM

## 2018-07-12 DIAGNOSIS — E66.01 MORBID OBESITY WITH BMI OF 45.0-49.9, ADULT (HCC): ICD-10-CM

## 2018-07-12 DIAGNOSIS — E11.3299 DM TYPE 2 WITH DIABETIC BACKGROUND RETINOPATHY (HCC): ICD-10-CM

## 2018-07-12 DIAGNOSIS — Z01.818 PREOP EXAMINATION: Primary | ICD-10-CM

## 2018-07-12 DIAGNOSIS — I10 ESSENTIAL HYPERTENSION: Primary | ICD-10-CM

## 2018-07-12 DIAGNOSIS — Z12.39 BREAST CANCER SCREENING: ICD-10-CM

## 2018-07-12 DIAGNOSIS — K21.00 HIATAL HERNIA WITH GERD AND ESOPHAGITIS: ICD-10-CM

## 2018-07-12 DIAGNOSIS — E78.00 PURE HYPERCHOLESTEROLEMIA: Chronic | ICD-10-CM

## 2018-07-12 DIAGNOSIS — I10 ESSENTIAL HYPERTENSION: ICD-10-CM

## 2018-07-12 DIAGNOSIS — Z01.818 PRE-OP EXAM: ICD-10-CM

## 2018-07-12 DIAGNOSIS — G47.33 OBSTRUCTIVE SLEEP APNEA SYNDROME: ICD-10-CM

## 2018-07-12 DIAGNOSIS — K44.9 HIATAL HERNIA WITH GERD AND ESOPHAGITIS: ICD-10-CM

## 2018-07-12 LAB
A/G RATIO: 1.5 (ref 1.1–2.2)
ALBUMIN SERPL-MCNC: 4.3 G/DL (ref 3.4–5)
ALP BLD-CCNC: 70 U/L (ref 40–129)
ALT SERPL-CCNC: 17 U/L (ref 10–40)
ANION GAP SERPL CALCULATED.3IONS-SCNC: 12 MMOL/L (ref 3–16)
AST SERPL-CCNC: 13 U/L (ref 15–37)
BASOPHILS ABSOLUTE: 0.1 K/UL (ref 0–0.2)
BASOPHILS RELATIVE PERCENT: 0.5 %
BILIRUB SERPL-MCNC: 0.5 MG/DL (ref 0–1)
BUN BLDV-MCNC: 26 MG/DL (ref 7–20)
CALCIUM SERPL-MCNC: 9.3 MG/DL (ref 8.3–10.6)
CHLORIDE BLD-SCNC: 102 MMOL/L (ref 99–110)
CHOLESTEROL, TOTAL: 87 MG/DL (ref 0–199)
CO2: 24 MMOL/L (ref 21–32)
CREAT SERPL-MCNC: 1.4 MG/DL (ref 0.6–1.1)
CREATININE URINE: 292.5 MG/DL (ref 28–259)
EOSINOPHILS ABSOLUTE: 0 K/UL (ref 0–0.6)
EOSINOPHILS RELATIVE PERCENT: 0.2 %
GFR AFRICAN AMERICAN: 48
GFR NON-AFRICAN AMERICAN: 40
GLOBULIN: 2.9 G/DL
GLUCOSE BLD-MCNC: 64 MG/DL (ref 70–99)
HCT VFR BLD CALC: 37.5 % (ref 36–48)
HDLC SERPL-MCNC: 45 MG/DL (ref 40–60)
HEMOGLOBIN: 12.7 G/DL (ref 12–16)
LDL CHOLESTEROL CALCULATED: 27 MG/DL
LYMPHOCYTES ABSOLUTE: 1.8 K/UL (ref 1–5.1)
LYMPHOCYTES RELATIVE PERCENT: 16.5 %
MCH RBC QN AUTO: 29.3 PG (ref 26–34)
MCHC RBC AUTO-ENTMCNC: 33.9 G/DL (ref 31–36)
MCV RBC AUTO: 86.5 FL (ref 80–100)
MICROALBUMIN UR-MCNC: <1.2 MG/DL
MICROALBUMIN/CREAT UR-RTO: ABNORMAL MG/G (ref 0–30)
MONOCYTES ABSOLUTE: 0.7 K/UL (ref 0–1.3)
MONOCYTES RELATIVE PERCENT: 6.3 %
NEUTROPHILS ABSOLUTE: 8.3 K/UL (ref 1.7–7.7)
NEUTROPHILS RELATIVE PERCENT: 76.5 %
PDW BLD-RTO: 15.9 % (ref 12.4–15.4)
PLATELET # BLD: 333 K/UL (ref 135–450)
PMV BLD AUTO: 8 FL (ref 5–10.5)
POTASSIUM SERPL-SCNC: 4 MMOL/L (ref 3.5–5.1)
RBC # BLD: 4.34 M/UL (ref 4–5.2)
SODIUM BLD-SCNC: 138 MMOL/L (ref 136–145)
TOTAL PROTEIN: 7.2 G/DL (ref 6.4–8.2)
TRIGL SERPL-MCNC: 76 MG/DL (ref 0–150)
TSH REFLEX: 2.31 UIU/ML (ref 0.27–4.2)
VLDLC SERPL CALC-MCNC: 15 MG/DL
WBC # BLD: 10.8 K/UL (ref 4–11)

## 2018-07-12 PROCEDURE — 93000 ELECTROCARDIOGRAM COMPLETE: CPT | Performed by: FAMILY MEDICINE

## 2018-07-12 PROCEDURE — 99215 OFFICE O/P EST HI 40 MIN: CPT | Performed by: FAMILY MEDICINE

## 2018-07-12 PROCEDURE — 99213 OFFICE O/P EST LOW 20 MIN: CPT | Performed by: NURSE PRACTITIONER

## 2018-07-12 ASSESSMENT — ENCOUNTER SYMPTOMS
COUGH: 0
ALLERGIC/IMMUNOLOGIC NEGATIVE: 1
DIARRHEA: 0
EYES NEGATIVE: 1
SHORTNESS OF BREATH: 0
GASTROINTESTINAL NEGATIVE: 1
NAUSEA: 0
RESPIRATORY NEGATIVE: 1
ABDOMINAL PAIN: 0

## 2018-07-12 ASSESSMENT — PATIENT HEALTH QUESTIONNAIRE - PHQ9
1. LITTLE INTEREST OR PLEASURE IN DOING THINGS: 0
SUM OF ALL RESPONSES TO PHQ9 QUESTIONS 1 & 2: 0
2. FEELING DOWN, DEPRESSED OR HOPELESS: 0
SUM OF ALL RESPONSES TO PHQ QUESTIONS 1-9: 0

## 2018-07-12 NOTE — PROGRESS NOTES
[facility]) Physicians   [employer]    Subjective:      Patient ID: Millie Miller is a 46 y.o. female    HPI    The patient is here for their bariatric surgery preoperative education group visit. She has made several attempts at weight loss in the past without success and now has been scheduled to have bariatric surgery on July 31, 2018. She is working to change her dietary behaviors and lose weight to improve comorbid conditions such as hypertension, diabetes mellitus, hyperlipidemia, obstructive sleep apnea, PCOS and GERD. Millie Miller is a very pleasant 46 y.o. female with Body mass index is 47.85 kg/m². Past Medical History:   Diagnosis Date    Anxiety     CTS (carpal tunnel syndrome) 2/10/2015    Depression     Diabetes mellitus ([address]     ETD (eustachian tube dysfunction) 5/16/2014    Herpes simplex     Hyperlipidemia     Lumbar strain 4/19/2017    PCOS (polycystic ovarian syndrome)     Sleep apnea     TMJ syndrome      Past Surgical History:   Procedure Laterality Date    OTHER SURGICAL HISTORY  05/25/2018    EGD     Family History   Problem Relation Age of Onset    Cervical Cancer Mother     Cancer Mother         cervical    Diabetes Father     Stroke Father         25s    Hypertension Father     Coronary Art Dis Father     Other Father         CHIVO    Cancer Father         melanoma    Glaucoma Father     Diabetes Sister     Diabetes Maternal Grandmother     Cancer Maternal Grandmother         melanoma    Arthritis Maternal Grandmother     Diabetes Paternal Grandmother     Stroke Paternal Grandmother      Social History   Substance Use Topics    Smoking status: Never Smoker    Smokeless tobacco: Never Used    Alcohol use Yes      Comment: infrequent, few times a month     I counseled the patient on the importance of not smoking and risks of ETOH.    Allergies   Allergen Reactions    Effexor Xr [Venlafaxine Hcl Er]      Vitals:    07/12/18 1305   BP: 116/66 Pulse: 76   Resp: 16   SpO2: 98%   Weight: 292 lb (132.5 kg)   Height: 5' 5.5\" (1.664 m)     Body mass index is 47.85 kg/m². Current Outpatient Prescriptions:     Multiple Vitamins-Iron (MULTI-VITAMIN/IRON) TABS, Take by mouth, Disp: , Rfl:     omeprazole (PRILOSEC) 20 MG delayed release capsule, Take 1 capsule by mouth daily, Disp: 30 capsule, Rfl: 3    [START ON 7/22/2018] Lisdexamfetamine Dimesylate (VYVANSE) 60 MG CAPS, Take 60 mg by mouth every morning for 30 days. ., Disp: 30 capsule, Rfl: 0    glucose blood VI test strips (ASCENSIA AUTODISC VI;ONE TOUCH ULTRA TEST VI) strip, 1 each by Does not apply route 3 times daily, Disp: 100 strip, Rfl: 12    Blood Glucose Monitoring Suppl (ACURA BLOOD GLUCOSE METER) w/Device KIT, Test TID Dispense One Touch per insurance ( I cannot order this brand on Epic). Thanks, Disp: 1 kit, Rfl: 0    Lancets MISC, 1 each by Does not apply route 3 times daily Test qd, Disp: 100 each, Rfl: 12    glucose blood VI test strips (ACCU-CHEK RUIZ) strip, 1 each by In Vitro route 3 times daily As needed. , Disp: 300 each, Rfl: 3    SURE COMFORT LANCETS 30G MISC, 1 Units by Does not apply route 3 times daily, Disp: 100 each, Rfl: 5    metFORMIN (GLUCOPHAGE XR) 500 MG extended release tablet, Take 1 tablet by mouth 2 times daily (Patient taking differently: Take 500 mg by mouth daily (with breakfast) ), Disp: 180 tablet, Rfl: 1    FLUoxetine (PROZAC) 40 MG capsule, TAKE TWO CAPSULES BY MOUTH DAILY, Disp: 60 capsule, Rfl: 5    lisinopril (PRINIVIL;ZESTRIL) 10 MG tablet, TAKE ONE TABLET BY MOUTH DAILY, Disp: 30 tablet, Rfl: 5    atorvastatin (LIPITOR) 40 MG tablet, TAKE ONE TABLET BY MOUTH DAILY, Disp: 30 tablet, Rfl: 5    cyclobenzaprine (FLEXERIL) 10 MG tablet, TAKE ONE TABLET BY MOUTH EVERY EVENING AS NEEDED FOR MUSCLE SPASMS, Disp: 30 tablet, Rfl: 5    albuterol sulfate HFA (PROAIR HFA) 108 (90 Base) MCG/ACT inhaler, Inhale 1-2 puffs into the lungs every 6 hours as needed reviewed all preoperative and postoperative diet instructions. Patient was given the opportunity to ask questions during the group visit and these questions were answered by myself and/or the dietitian.

## 2018-07-12 NOTE — PROGRESS NOTES
Preoperative Consultation      Bhaskar Reyes  YOB: 1970    Date of Service:  7/12/2018    There were no vitals filed for this visit. Wt Readings from Last 2 Encounters:   07/12/18 291 lb (132 kg)   06/27/18 (!) 308 lb (139.7 kg)     BP Readings from Last 3 Encounters:   07/12/18 (!) 117/59   06/27/18 104/66   05/30/18 (!) 103/57        Chief Complaint   Patient presents with   Royetta Perches Pre-op Exam     gastric sleeve, Dr. Maricarmen Hernández, [facility]     Allergies   Allergen Reactions    Effexor Xr [Venlafaxine Hcl Er]      Outpatient Prescriptions Marked as Taking for the 7/12/18 encounter (Office Visit) with Татьяна Mendes MD   Medication Sig Dispense Refill    Multiple Vitamins-Iron (MULTI-VITAMIN/IRON) TABS Take by mouth      omeprazole (PRILOSEC) 20 MG delayed release capsule Take 1 capsule by mouth daily 30 capsule 3    [START ON 7/22/2018] Lisdexamfetamine Dimesylate (VYVANSE) 60 MG CAPS Take 60 mg by mouth every morning for 30 days. . 30 capsule 0    Blood Glucose Monitoring Suppl (ACURA BLOOD GLUCOSE METER) w/Device KIT Test TID Dispense One Touch per insurance ( I cannot order this brand on Epic).   Thanks 1 kit 0    metFORMIN (GLUCOPHAGE XR) 500 MG extended release tablet Take 1 tablet by mouth 2 times daily (Patient taking differently: Take 500 mg by mouth daily (with breakfast) ) 180 tablet 1    FLUoxetine (PROZAC) 40 MG capsule TAKE TWO CAPSULES BY MOUTH DAILY 60 capsule 5    lisinopril (PRINIVIL;ZESTRIL) 10 MG tablet TAKE ONE TABLET BY MOUTH DAILY 30 tablet 5    atorvastatin (LIPITOR) 40 MG tablet TAKE ONE TABLET BY MOUTH DAILY 30 tablet 5    cyclobenzaprine (FLEXERIL) 10 MG tablet TAKE ONE TABLET BY MOUTH EVERY EVENING AS NEEDED FOR MUSCLE SPASMS 30 tablet 5    Liraglutide (VICTOZA) 18 MG/3ML SOPN SC injection Inject 1.8 mg into the skin daily 3 pen 5    triamterene-hydrochlorothiazide (DYAZIDE) 37.5-25 MG per capsule Take 1 capsule by mouth daily 30 capsule 5    clonazePAM (KLONOPIN) 0.5 MG  ETD (eustachian tube dysfunction) 5/16/2014    Herpes simplex     Hyperlipidemia     Lumbar strain 4/19/2017    PCOS (polycystic ovarian syndrome)     Sleep apnea     TMJ syndrome      Past Surgical History:   Procedure Laterality Date    OTHER SURGICAL HISTORY  05/25/2018    EGD     Family History   Problem Relation Age of Onset    Cervical Cancer Mother     Cancer Mother         cervical    Diabetes Father     Stroke Father         25s    Hypertension Father     Coronary Art Dis Father     Other Father         CHIVO    Cancer Father         melanoma    Glaucoma Father     Diabetes Sister     Diabetes Maternal Grandmother     Cancer Maternal Grandmother         melanoma    Arthritis Maternal Grandmother     Diabetes Paternal Grandmother     Stroke Paternal Grandmother      Social History     Social History    Marital status:      Spouse name: N/A    Number of children: N/A    Years of education: N/A     Occupational History    Not on file. Social History Main Topics    Smoking status: Never Smoker    Smokeless tobacco: Never Used    Alcohol use Yes      Comment: infrequent, few times a month    Drug use: No    Sexual activity: Not on file     Other Topics Concern    Not on file     Social History Narrative    No narrative on file       Review of Systems  A comprehensive review of systems was negative except for what was noted in the HPI. Patient denies any exertional chest pain, dyspnea, palpitations, syncope, orthopnea, edema or paroxysmal nocturnal dyspnea. FSBS 90 to 100 fasting and 130 pp. She decreased dose of lantus for hypoglycemia. No hypoglycemia since lowered dose. Physical Exam   Vitals:    07/12/18 0854   BP: (!) 117/59   Pulse: 73   Resp: 14   Temp: 97.6 °F (36.4 °C)   TempSrc: Oral   SpO2: 98%   Weight: 291 lb (132 kg)   Height: 5' 4.9\" (1.648 m)      Constitutional: She is oriented to person, place, and time.  She appears well-developed and

## 2018-07-12 NOTE — PROGRESS NOTES
Eugenia lost 16 lbs over 2 weeks. After dietary evaluation and education, patient is considered to be a good surgical candidate from a dietary perspective. Patient was educated on sleeve dietary protocol. Pre-operative and post-operative diet guidelines were discussed and written information was provided. Nectar and Unjury protein supplements were purchased. Vitamin regimen with Bariatric Fusion vitamins was explained, and patient purchased a 1 month supply at this visit. Importance of vitamin compliance was discussed and potential of vitamin deficiencies was reviewed. Encouraged continued physical activity. Patient signed agreement stating they are committed to lifelong follow up, smoking and alcohol cessation, vitamin compliance, and 2 week pre-operative dietary protocol.

## 2018-07-13 LAB
ESTIMATED AVERAGE GLUCOSE: 102.5 MG/DL
HBA1C MFR BLD: 5.2 %

## 2018-07-23 NOTE — PROGRESS NOTES
pen, INJECT 120 UNITS UNDER THE SKIN EVERY MORNING AND 90 UNITS UNDER THE SKIN EVERY EVENING (Patient taking differently: inject 120 units SQ QAM and 80 units QPM), Disp: 60 pen, Rfl: 5    triamterene-hydrochlorothiazide (DYAZIDE) 37.5-25 MG per capsule, Take 1 capsule by mouth daily, Disp: 30 capsule, Rfl: 5    clonazePAM (KLONOPIN) 0.5 MG tablet, TAKE ONE TABLET BY MOUTH TWICE A DAY AS NEEDED FOR ANXIETY, Disp: 30 tablet, Rfl: 0    Accu-Chek Multiclix Lancets MISC, by Does not apply route, Disp: , Rfl:     valACYclovir (VALTREX) 500 MG tablet, TAKE ONE TABLET BY MOUTH TWICE A DAY, Disp: 12 tablet, Rfl: 2    Blood Glucose Monitoring Suppl ANDRIA, 1 kit by Does not apply route 3 times daily, Disp: 1 Device, Rfl: 0    fluticasone (FLONASE) 50 MCG/ACT nasal spray, PLACE TWO SPRAYS IN EACH NOSTRIL ONCE DAILY, Disp: 1 Bottle, Rfl: 12    Insulin Pen Needle (B-D UF III MINI PEN NEEDLES) 31G X 5 MM MISC, 1 each by Does not apply route 2 times daily, Disp: 100 each, Rfl: 12    Cholecalciferol (VITAMIN D) 2000 UNITS CAPS capsule, Take 1 capsule by mouth daily. , Disp: 90 capsule, Rfl: 1    aspirin 81 MG EC tablet, Take 81 mg by mouth daily. , Disp: , Rfl:     naratriptan (AMERGE) 2.5 MG tablet, Take 1 tablet by mouth once as needed for Migraine 2.5 mg at onset of headache, may repeat in 4 hours if needed, Disp: 9 tablet, Rfl: 5      Review of Systems - History obtained from the patient  General ROS: negative  Psychological ROS: negative  Respiratory ROS: negative  Cardiovascular ROS: negative  Gastrointestinal ROS:negative  Genito-Urinary ROS: negative  Musculoskeletal ROS: negative   Skin ROS: negative    Physical Exam   Vitals Reviewed   Constitutional: Patient is oriented to person, place, and time. Patient appears well-developed and well-nourished. Patient is active and cooperative. Non-toxic appearance. No distress. Neck: Trachea normal and normal range of motion. No JVD present.    Pulmonary/Chest: Effort normal. No accessory muscle usage or stridor. No apnea. No respiratory distress. Cardiovascular: Normal rate and no JVD. Abdominal: Normal appearance. Patient exhibits no distension. Abdomen is soft, obese, non tender. Musculoskeletal: Normal range of motion. Patient exhibits no edema. Neurological: Patient is alert and oriented to person, place, and time. Patient has normal strength. GCS eye subscore is 4. GCS verbal subscore is 5. GCS motor subscore is 6. Skin: Skin is warm and dry. No abrasion and no rash noted. Patient is not diaphoretic. No cyanosis or erythema. Psychiatric: Patient has a normal mood and affect. Speech is normal and behavior is normal. Cognition and memory are normal.       A/P    Hu Signs is 46 y.o. female, Body mass index is 50.47 kg/m². pre surgery, has gained 1# since last visit. The patient underwent dietary counseling with registered dietician. I have reviewed, discussed and agree with the dietary plan. Patient is trying hard to keep good dietary and behavior modifications. Patient is monitoring portion sizes, food choices and liquid calories. Patient is trying to exercise regularly as much as possible.   We discussed how her weight affects her overall health including:  Patient Active Problem List   Diagnosis    Diabetes mellitus, type II ([address]    Anxiety associated with depression    PCOS (polycystic ovarian syndrome)    Pure hypercholesterolemia    Common migraine    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 ([address]    Depression with anxiety    Obstructive sleep apnea syndrome    Herpes simplex    Binge eating    Pedal edema    Morbid obesity with BMI of 50.0-59.9, adult ([address]    FH: melanoma    Essential hypertension    SOB (shortness of breath) on exertion    Chronic gastric ulcer without hemorrhage and without perforation    Hiatal hernia with GERD and esophagitis    Preop examination    Morbid obesity with BMI of 45.0-49.9, adult ([address]

## 2018-07-30 ENCOUNTER — ANESTHESIA EVENT (OUTPATIENT)
Dept: OPERATING ROOM | Age: 48
DRG: 621 | End: 2018-07-30
Payer: COMMERCIAL

## 2018-07-31 ENCOUNTER — ANESTHESIA (OUTPATIENT)
Dept: OPERATING ROOM | Age: 48
DRG: 621 | End: 2018-07-31
Payer: COMMERCIAL

## 2018-07-31 ENCOUNTER — HOSPITAL ENCOUNTER (INPATIENT)
Age: 48
LOS: 2 days | Discharge: HOME | DRG: 621 | End: 2018-08-02
Attending: SURGERY | Admitting: SURGERY
Payer: COMMERCIAL

## 2018-07-31 VITALS — TEMPERATURE: 97.7 F | DIASTOLIC BLOOD PRESSURE: 69 MMHG | OXYGEN SATURATION: 95 % | SYSTOLIC BLOOD PRESSURE: 134 MMHG

## 2018-07-31 DIAGNOSIS — G89.18 ACUTE POSTOPERATIVE PAIN: Primary | ICD-10-CM

## 2018-07-31 DIAGNOSIS — R60.0 PEDAL EDEMA: ICD-10-CM

## 2018-07-31 DIAGNOSIS — F41.8 ANXIETY ASSOCIATED WITH DEPRESSION: ICD-10-CM

## 2018-07-31 LAB
ABO/RH: NORMAL
ANTIBODY SCREEN: NORMAL
GLUCOSE BLD-MCNC: 105 MG/DL (ref 70–99)
GLUCOSE BLD-MCNC: 146 MG/DL (ref 70–99)
GLUCOSE BLD-MCNC: 196 MG/DL (ref 70–99)
GLUCOSE BLD-MCNC: 77 MG/DL (ref 70–99)
PERFORMED ON: ABNORMAL
PERFORMED ON: NORMAL
PREGNANCY, URINE: NEGATIVE

## 2018-07-31 PROCEDURE — 6360000002 HC RX W HCPCS: Performed by: SURGERY

## 2018-07-31 PROCEDURE — 0DB64Z3 EXCISION OF STOMACH, PERCUTANEOUS ENDOSCOPIC APPROACH, VERTICAL: ICD-10-PCS | Performed by: SURGERY

## 2018-07-31 PROCEDURE — 2580000003 HC RX 258: Performed by: ANESTHESIOLOGY

## 2018-07-31 PROCEDURE — 2500000003 HC RX 250 WO HCPCS: Performed by: SURGERY

## 2018-07-31 PROCEDURE — 2580000003 HC RX 258: Performed by: NURSE ANESTHETIST, CERTIFIED REGISTERED

## 2018-07-31 PROCEDURE — 94664 DEMO&/EVAL PT USE INHALER: CPT

## 2018-07-31 PROCEDURE — 86850 RBC ANTIBODY SCREEN: CPT

## 2018-07-31 PROCEDURE — 3600000019 HC SURGERY ROBOT ADDTL 15MIN: Performed by: SURGERY

## 2018-07-31 PROCEDURE — 94761 N-INVAS EAR/PLS OXIMETRY MLT: CPT

## 2018-07-31 PROCEDURE — 3700000001 HC ADD 15 MINUTES (ANESTHESIA): Performed by: SURGERY

## 2018-07-31 PROCEDURE — 2780000010 HC IMPLANT OTHER: Performed by: SURGERY

## 2018-07-31 PROCEDURE — 94770 HC ETCO2 MONITOR DAILY: CPT

## 2018-07-31 PROCEDURE — 86900 BLOOD TYPING SEROLOGIC ABO: CPT

## 2018-07-31 PROCEDURE — 6370000000 HC RX 637 (ALT 250 FOR IP): Performed by: SURGERY

## 2018-07-31 PROCEDURE — C9113 INJ PANTOPRAZOLE SODIUM, VIA: HCPCS | Performed by: SURGERY

## 2018-07-31 PROCEDURE — 43775 LAP SLEEVE GASTRECTOMY: CPT | Performed by: SURGERY

## 2018-07-31 PROCEDURE — 2580000003 HC RX 258: Performed by: SURGERY

## 2018-07-31 PROCEDURE — 6360000002 HC RX W HCPCS: Performed by: NURSE ANESTHETIST, CERTIFIED REGISTERED

## 2018-07-31 PROCEDURE — 86901 BLOOD TYPING SEROLOGIC RH(D): CPT

## 2018-07-31 PROCEDURE — 88307 TISSUE EXAM BY PATHOLOGIST: CPT

## 2018-07-31 PROCEDURE — 3600000009 HC SURGERY ROBOT BASE: Performed by: SURGERY

## 2018-07-31 PROCEDURE — 1200000000 HC SEMI PRIVATE

## 2018-07-31 PROCEDURE — L0625 LO FLEX L1-BELOW L5 PRE OTS: HCPCS | Performed by: SURGERY

## 2018-07-31 PROCEDURE — 2500000003 HC RX 250 WO HCPCS: Performed by: NURSE ANESTHETIST, CERTIFIED REGISTERED

## 2018-07-31 PROCEDURE — 3700000000 HC ANESTHESIA ATTENDED CARE: Performed by: SURGERY

## 2018-07-31 PROCEDURE — 7100000000 HC PACU RECOVERY - FIRST 15 MIN: Performed by: SURGERY

## 2018-07-31 PROCEDURE — 8E0W4CZ ROBOTIC ASSISTED PROCEDURE OF TRUNK REGION, PERCUTANEOUS ENDOSCOPIC APPROACH: ICD-10-PCS | Performed by: SURGERY

## 2018-07-31 PROCEDURE — 6370000000 HC RX 637 (ALT 250 FOR IP): Performed by: NURSE PRACTITIONER

## 2018-07-31 PROCEDURE — 2720000010 HC SURG SUPPLY STERILE: Performed by: SURGERY

## 2018-07-31 PROCEDURE — 2709999900 HC NON-CHARGEABLE SUPPLY: Performed by: SURGERY

## 2018-07-31 PROCEDURE — 7100000001 HC PACU RECOVERY - ADDTL 15 MIN: Performed by: SURGERY

## 2018-07-31 PROCEDURE — 2700000000 HC OXYGEN THERAPY PER DAY

## 2018-07-31 PROCEDURE — 84703 CHORIONIC GONADOTROPIN ASSAY: CPT

## 2018-07-31 PROCEDURE — S2900 ROBOTIC SURGICAL SYSTEM: HCPCS | Performed by: SURGERY

## 2018-07-31 PROCEDURE — 6360000002 HC RX W HCPCS: Performed by: ANESTHESIOLOGY

## 2018-07-31 DEVICE — RELOAD STPL L60MM H1.5-3.6MM REG TISS BLU GRIPPING SURF B: Type: IMPLANTABLE DEVICE | Site: ABDOMEN | Status: FUNCTIONAL

## 2018-07-31 DEVICE — RELOAD STPL H1.8-3.8MM REG THCK TISS G 6 ROW GRIPPING SURF: Type: IMPLANTABLE DEVICE | Site: ABDOMEN | Status: FUNCTIONAL

## 2018-07-31 DEVICE — RELOAD STPL H4.1X2MM DIA60MM THCK TISS GRN 6 ROW PWR GST B: Type: IMPLANTABLE DEVICE | Site: ABDOMEN | Status: FUNCTIONAL

## 2018-07-31 RX ORDER — SODIUM CHLORIDE, SODIUM LACTATE, POTASSIUM CHLORIDE, CALCIUM CHLORIDE 600; 310; 30; 20 MG/100ML; MG/100ML; MG/100ML; MG/100ML
INJECTION, SOLUTION INTRAVENOUS CONTINUOUS
Status: DISCONTINUED | OUTPATIENT
Start: 2018-07-31 | End: 2018-07-31

## 2018-07-31 RX ORDER — OXYCODONE HYDROCHLORIDE AND ACETAMINOPHEN 5; 325 MG/1; MG/1
1 TABLET ORAL ONCE
Status: DISCONTINUED | OUTPATIENT
Start: 2018-07-31 | End: 2018-07-31 | Stop reason: HOSPADM

## 2018-07-31 RX ORDER — MEPERIDINE HYDROCHLORIDE 25 MG/ML
12.5 INJECTION INTRAMUSCULAR; INTRAVENOUS; SUBCUTANEOUS EVERY 5 MIN PRN
Status: DISCONTINUED | OUTPATIENT
Start: 2018-07-31 | End: 2018-07-31 | Stop reason: HOSPADM

## 2018-07-31 RX ORDER — PROPOFOL 10 MG/ML
INJECTION, EMULSION INTRAVENOUS PRN
Status: DISCONTINUED | OUTPATIENT
Start: 2018-07-31 | End: 2018-07-31 | Stop reason: SDUPTHER

## 2018-07-31 RX ORDER — LORAZEPAM 2 MG/ML
INJECTION INTRAMUSCULAR PRN
Status: DISCONTINUED | OUTPATIENT
Start: 2018-07-31 | End: 2018-07-31 | Stop reason: SDUPTHER

## 2018-07-31 RX ORDER — LABETALOL HYDROCHLORIDE 5 MG/ML
5 INJECTION, SOLUTION INTRAVENOUS EVERY 10 MIN PRN
Status: DISCONTINUED | OUTPATIENT
Start: 2018-07-31 | End: 2018-07-31 | Stop reason: HOSPADM

## 2018-07-31 RX ORDER — HYDROMORPHONE HCL 110MG/55ML
PATIENT CONTROLLED ANALGESIA SYRINGE INTRAVENOUS PRN
Status: DISCONTINUED | OUTPATIENT
Start: 2018-07-31 | End: 2018-07-31 | Stop reason: SDUPTHER

## 2018-07-31 RX ORDER — GLYCOPYRROLATE 1 MG/5 ML
SYRINGE (ML) INTRAVENOUS PRN
Status: DISCONTINUED | OUTPATIENT
Start: 2018-07-31 | End: 2018-07-31 | Stop reason: SDUPTHER

## 2018-07-31 RX ORDER — ONDANSETRON 2 MG/ML
4 INJECTION INTRAMUSCULAR; INTRAVENOUS
Status: COMPLETED | OUTPATIENT
Start: 2018-07-31 | End: 2018-07-31

## 2018-07-31 RX ORDER — SODIUM CHLORIDE, SODIUM LACTATE, POTASSIUM CHLORIDE, CALCIUM CHLORIDE 600; 310; 30; 20 MG/100ML; MG/100ML; MG/100ML; MG/100ML
INJECTION, SOLUTION INTRAVENOUS CONTINUOUS
Status: DISCONTINUED | OUTPATIENT
Start: 2018-07-31 | End: 2018-08-02 | Stop reason: HOSPADM

## 2018-07-31 RX ORDER — PROMETHAZINE HYDROCHLORIDE 25 MG/ML
6.25 INJECTION, SOLUTION INTRAMUSCULAR; INTRAVENOUS
Status: DISCONTINUED | OUTPATIENT
Start: 2018-07-31 | End: 2018-07-31 | Stop reason: HOSPADM

## 2018-07-31 RX ORDER — DEXTROSE MONOHYDRATE 25 G/50ML
12.5 INJECTION, SOLUTION INTRAVENOUS PRN
Status: DISCONTINUED | OUTPATIENT
Start: 2018-07-31 | End: 2018-08-02 | Stop reason: HOSPADM

## 2018-07-31 RX ORDER — APREPITANT 40 MG/1
80 CAPSULE ORAL ONCE
Status: COMPLETED | OUTPATIENT
Start: 2018-07-31 | End: 2018-07-31

## 2018-07-31 RX ORDER — 0.9 % SODIUM CHLORIDE 0.9 %
10 VIAL (ML) INJECTION DAILY
Status: DISCONTINUED | OUTPATIENT
Start: 2018-07-31 | End: 2018-08-02 | Stop reason: HOSPADM

## 2018-07-31 RX ORDER — ONDANSETRON 2 MG/ML
4 INJECTION INTRAMUSCULAR; INTRAVENOUS EVERY 6 HOURS PRN
Status: DISCONTINUED | OUTPATIENT
Start: 2018-07-31 | End: 2018-08-02 | Stop reason: HOSPADM

## 2018-07-31 RX ORDER — LIDOCAINE HYDROCHLORIDE 20 MG/ML
INJECTION, SOLUTION EPIDURAL; INFILTRATION; INTRACAUDAL; PERINEURAL PRN
Status: DISCONTINUED | OUTPATIENT
Start: 2018-07-31 | End: 2018-07-31 | Stop reason: SDUPTHER

## 2018-07-31 RX ORDER — SUCCINYLCHOLINE/SOD CL,ISO/PF 100 MG/5ML
SYRINGE (ML) INTRAVENOUS PRN
Status: DISCONTINUED | OUTPATIENT
Start: 2018-07-31 | End: 2018-07-31 | Stop reason: SDUPTHER

## 2018-07-31 RX ORDER — SCOLOPAMINE TRANSDERMAL SYSTEM 1 MG/1
1 PATCH, EXTENDED RELEASE TRANSDERMAL ONCE
Status: DISCONTINUED | OUTPATIENT
Start: 2018-07-31 | End: 2018-08-02 | Stop reason: HOSPADM

## 2018-07-31 RX ORDER — HYDRALAZINE HYDROCHLORIDE 20 MG/ML
5 INJECTION INTRAMUSCULAR; INTRAVENOUS EVERY 10 MIN PRN
Status: DISCONTINUED | OUTPATIENT
Start: 2018-07-31 | End: 2018-07-31 | Stop reason: HOSPADM

## 2018-07-31 RX ORDER — METOCLOPRAMIDE HYDROCHLORIDE 5 MG/ML
10 INJECTION INTRAMUSCULAR; INTRAVENOUS EVERY 6 HOURS PRN
Status: DISCONTINUED | OUTPATIENT
Start: 2018-07-31 | End: 2018-08-02 | Stop reason: HOSPADM

## 2018-07-31 RX ORDER — FENTANYL CITRATE 50 UG/ML
INJECTION, SOLUTION INTRAMUSCULAR; INTRAVENOUS PRN
Status: DISCONTINUED | OUTPATIENT
Start: 2018-07-31 | End: 2018-07-31 | Stop reason: SDUPTHER

## 2018-07-31 RX ORDER — LIDOCAINE HYDROCHLORIDE 10 MG/ML
INJECTION, SOLUTION EPIDURAL; INFILTRATION; INTRACAUDAL; PERINEURAL PRN
Status: DISCONTINUED | OUTPATIENT
Start: 2018-07-31 | End: 2018-07-31 | Stop reason: HOSPADM

## 2018-07-31 RX ORDER — DIPHENHYDRAMINE HYDROCHLORIDE 50 MG/ML
12.5 INJECTION INTRAMUSCULAR; INTRAVENOUS
Status: DISCONTINUED | OUTPATIENT
Start: 2018-07-31 | End: 2018-07-31 | Stop reason: HOSPADM

## 2018-07-31 RX ORDER — DEXTROSE MONOHYDRATE 50 MG/ML
100 INJECTION, SOLUTION INTRAVENOUS PRN
Status: DISCONTINUED | OUTPATIENT
Start: 2018-07-31 | End: 2018-08-02 | Stop reason: HOSPADM

## 2018-07-31 RX ORDER — ONDANSETRON 2 MG/ML
INJECTION INTRAMUSCULAR; INTRAVENOUS PRN
Status: DISCONTINUED | OUTPATIENT
Start: 2018-07-31 | End: 2018-07-31 | Stop reason: SDUPTHER

## 2018-07-31 RX ORDER — SCOLOPAMINE TRANSDERMAL SYSTEM 1 MG/1
1 PATCH, EXTENDED RELEASE TRANSDERMAL
Status: DISCONTINUED | OUTPATIENT
Start: 2018-07-31 | End: 2018-08-02 | Stop reason: HOSPADM

## 2018-07-31 RX ORDER — SODIUM CHLORIDE, SODIUM LACTATE, POTASSIUM CHLORIDE, CALCIUM CHLORIDE 600; 310; 30; 20 MG/100ML; MG/100ML; MG/100ML; MG/100ML
INJECTION, SOLUTION INTRAVENOUS CONTINUOUS PRN
Status: DISCONTINUED | OUTPATIENT
Start: 2018-07-31 | End: 2018-07-31 | Stop reason: SDUPTHER

## 2018-07-31 RX ORDER — NALOXONE HYDROCHLORIDE 0.4 MG/ML
0.4 INJECTION, SOLUTION INTRAMUSCULAR; INTRAVENOUS; SUBCUTANEOUS PRN
Status: DISCONTINUED | OUTPATIENT
Start: 2018-07-31 | End: 2018-08-01

## 2018-07-31 RX ORDER — PANTOPRAZOLE SODIUM 40 MG/10ML
40 INJECTION, POWDER, LYOPHILIZED, FOR SOLUTION INTRAVENOUS DAILY
Status: DISCONTINUED | OUTPATIENT
Start: 2018-07-31 | End: 2018-08-02 | Stop reason: HOSPADM

## 2018-07-31 RX ORDER — FENTANYL CITRATE 50 UG/ML
25 INJECTION, SOLUTION INTRAMUSCULAR; INTRAVENOUS EVERY 5 MIN PRN
Status: DISCONTINUED | OUTPATIENT
Start: 2018-07-31 | End: 2018-07-31 | Stop reason: HOSPADM

## 2018-07-31 RX ORDER — ROCURONIUM BROMIDE 10 MG/ML
INJECTION, SOLUTION INTRAVENOUS PRN
Status: DISCONTINUED | OUTPATIENT
Start: 2018-07-31 | End: 2018-07-31 | Stop reason: SDUPTHER

## 2018-07-31 RX ORDER — NEOSTIGMINE METHYLSULFATE 5 MG/5 ML
SYRINGE (ML) INTRAVENOUS PRN
Status: DISCONTINUED | OUTPATIENT
Start: 2018-07-31 | End: 2018-07-31 | Stop reason: SDUPTHER

## 2018-07-31 RX ORDER — NICOTINE POLACRILEX 4 MG
15 LOZENGE BUCCAL PRN
Status: DISCONTINUED | OUTPATIENT
Start: 2018-07-31 | End: 2018-08-02 | Stop reason: HOSPADM

## 2018-07-31 RX ADMIN — SODIUM CHLORIDE, POTASSIUM CHLORIDE, SODIUM LACTATE AND CALCIUM CHLORIDE: 600; 310; 30; 20 INJECTION, SOLUTION INTRAVENOUS at 09:40

## 2018-07-31 RX ADMIN — APREPITANT 80 MG: 40 CAPSULE ORAL at 09:10

## 2018-07-31 RX ADMIN — LORAZEPAM 2 MG: 2 INJECTION INTRAMUSCULAR; INTRAVENOUS at 10:12

## 2018-07-31 RX ADMIN — HYDROMORPHONE HYDROCHLORIDE 0.5 MG: 2 INJECTION, SOLUTION INTRAMUSCULAR; INTRAVENOUS; SUBCUTANEOUS at 12:02

## 2018-07-31 RX ADMIN — ROCURONIUM BROMIDE 5 MG: 10 INJECTION, SOLUTION INTRAVENOUS at 10:21

## 2018-07-31 RX ADMIN — PANTOPRAZOLE SODIUM 40 MG: 40 INJECTION, POWDER, FOR SOLUTION INTRAVENOUS at 16:41

## 2018-07-31 RX ADMIN — ENOXAPARIN SODIUM 30 MG: 30 INJECTION SUBCUTANEOUS at 21:46

## 2018-07-31 RX ADMIN — FENTANYL CITRATE 100 MCG: 50 INJECTION INTRAMUSCULAR; INTRAVENOUS at 10:20

## 2018-07-31 RX ADMIN — Medication 4 MG: at 12:13

## 2018-07-31 RX ADMIN — ONDANSETRON 4 MG: 2 INJECTION INTRAMUSCULAR; INTRAVENOUS at 14:30

## 2018-07-31 RX ADMIN — LIDOCAINE HYDROCHLORIDE 50 MG: 20 INJECTION, SOLUTION EPIDURAL; INFILTRATION; INTRACAUDAL; PERINEURAL at 10:20

## 2018-07-31 RX ADMIN — SODIUM CHLORIDE, SODIUM LACTATE, POTASSIUM CHLORIDE, AND CALCIUM CHLORIDE: 600; 310; 30; 20 INJECTION, SOLUTION INTRAVENOUS at 10:20

## 2018-07-31 RX ADMIN — HYDROMORPHONE HYDROCHLORIDE 0.5 MG: 2 INJECTION, SOLUTION INTRAMUSCULAR; INTRAVENOUS; SUBCUTANEOUS at 12:12

## 2018-07-31 RX ADMIN — ROCURONIUM BROMIDE 10 MG: 10 INJECTION, SOLUTION INTRAVENOUS at 11:25

## 2018-07-31 RX ADMIN — FENTANYL CITRATE 50 MCG: 50 INJECTION INTRAMUSCULAR; INTRAVENOUS at 11:00

## 2018-07-31 RX ADMIN — Medication 10 ML: at 16:41

## 2018-07-31 RX ADMIN — PROPOFOL 250 MG: 10 INJECTION, EMULSION INTRAVENOUS at 10:20

## 2018-07-31 RX ADMIN — Medication 0.8 MG: at 12:13

## 2018-07-31 RX ADMIN — ENOXAPARIN SODIUM 40 MG: 40 INJECTION SUBCUTANEOUS at 09:08

## 2018-07-31 RX ADMIN — SODIUM CHLORIDE, POTASSIUM CHLORIDE, SODIUM LACTATE AND CALCIUM CHLORIDE: 600; 310; 30; 20 INJECTION, SOLUTION INTRAVENOUS at 16:40

## 2018-07-31 RX ADMIN — ONDANSETRON 4 MG: 2 INJECTION INTRAMUSCULAR; INTRAVENOUS at 12:07

## 2018-07-31 RX ADMIN — ROCURONIUM BROMIDE 45 MG: 10 INJECTION, SOLUTION INTRAVENOUS at 10:25

## 2018-07-31 RX ADMIN — INSULIN LISPRO 2 UNITS: 100 INJECTION, SOLUTION INTRAVENOUS; SUBCUTANEOUS at 17:50

## 2018-07-31 RX ADMIN — INSULIN LISPRO 1 UNITS: 100 INJECTION, SOLUTION INTRAVENOUS; SUBCUTANEOUS at 21:47

## 2018-07-31 RX ADMIN — SODIUM CHLORIDE, SODIUM LACTATE, POTASSIUM CHLORIDE, AND CALCIUM CHLORIDE: 600; 310; 30; 20 INJECTION, SOLUTION INTRAVENOUS at 10:45

## 2018-07-31 RX ADMIN — SODIUM CHLORIDE, POTASSIUM CHLORIDE, SODIUM LACTATE AND CALCIUM CHLORIDE: 600; 310; 30; 20 INJECTION, SOLUTION INTRAVENOUS at 09:28

## 2018-07-31 RX ADMIN — ONDANSETRON 4 MG: 2 INJECTION INTRAMUSCULAR; INTRAVENOUS at 20:24

## 2018-07-31 RX ADMIN — Medication 100 MG: at 10:21

## 2018-07-31 RX ADMIN — HYDROMORPHONE HYDROCHLORIDE 0.5 MG: 2 INJECTION, SOLUTION INTRAMUSCULAR; INTRAVENOUS; SUBCUTANEOUS at 11:57

## 2018-07-31 RX ADMIN — DEXTROSE MONOHYDRATE 3 G: 50 INJECTION, SOLUTION INTRAVENOUS at 10:31

## 2018-07-31 RX ADMIN — HYDROMORPHONE HYDROCHLORIDE: 10 INJECTION INTRAMUSCULAR; INTRAVENOUS; SUBCUTANEOUS at 13:23

## 2018-07-31 RX ADMIN — FENTANYL CITRATE 50 MCG: 50 INJECTION INTRAMUSCULAR; INTRAVENOUS at 10:30

## 2018-07-31 ASSESSMENT — PULMONARY FUNCTION TESTS
PIF_VALUE: 21
PIF_VALUE: 27
PIF_VALUE: 26
PIF_VALUE: 29
PIF_VALUE: 27
PIF_VALUE: 20
PIF_VALUE: 1
PIF_VALUE: 21
PIF_VALUE: 1
PIF_VALUE: 28
PIF_VALUE: 26
PIF_VALUE: 27
PIF_VALUE: 35
PIF_VALUE: 27
PIF_VALUE: 17
PIF_VALUE: 27
PIF_VALUE: 26
PIF_VALUE: 32
PIF_VALUE: 27
PIF_VALUE: 28
PIF_VALUE: 27
PIF_VALUE: 29
PIF_VALUE: 28
PIF_VALUE: 21
PIF_VALUE: 25
PIF_VALUE: 19
PIF_VALUE: 25
PIF_VALUE: 28
PIF_VALUE: 27
PIF_VALUE: 19
PIF_VALUE: 27
PIF_VALUE: 15
PIF_VALUE: 27
PIF_VALUE: 26
PIF_VALUE: 15
PIF_VALUE: 22
PIF_VALUE: 29
PIF_VALUE: 28
PIF_VALUE: 28
PIF_VALUE: 26
PIF_VALUE: 27
PIF_VALUE: 8
PIF_VALUE: 18
PIF_VALUE: 27
PIF_VALUE: 26
PIF_VALUE: 17
PIF_VALUE: 17
PIF_VALUE: 27
PIF_VALUE: 14
PIF_VALUE: 0
PIF_VALUE: 16
PIF_VALUE: 31
PIF_VALUE: 28
PIF_VALUE: 1
PIF_VALUE: 29
PIF_VALUE: 1
PIF_VALUE: 27
PIF_VALUE: 23
PIF_VALUE: 27
PIF_VALUE: 21
PIF_VALUE: 22
PIF_VALUE: 27
PIF_VALUE: 21
PIF_VALUE: 29
PIF_VALUE: 28
PIF_VALUE: 27
PIF_VALUE: 23
PIF_VALUE: 27
PIF_VALUE: 24
PIF_VALUE: 21
PIF_VALUE: 26
PIF_VALUE: 28
PIF_VALUE: 28
PIF_VALUE: 27
PIF_VALUE: 18
PIF_VALUE: 27
PIF_VALUE: 24
PIF_VALUE: 21
PIF_VALUE: 24
PIF_VALUE: 28
PIF_VALUE: 28
PIF_VALUE: 26
PIF_VALUE: 28
PIF_VALUE: 29
PIF_VALUE: 21
PIF_VALUE: 28
PIF_VALUE: 25
PIF_VALUE: 22
PIF_VALUE: 34
PIF_VALUE: 17
PIF_VALUE: 26
PIF_VALUE: 28
PIF_VALUE: 28
PIF_VALUE: 27
PIF_VALUE: 21
PIF_VALUE: 25
PIF_VALUE: 26
PIF_VALUE: 28
PIF_VALUE: 27
PIF_VALUE: 28
PIF_VALUE: 26
PIF_VALUE: 21
PIF_VALUE: 16
PIF_VALUE: 26
PIF_VALUE: 27
PIF_VALUE: 25
PIF_VALUE: 28
PIF_VALUE: 21
PIF_VALUE: 28
PIF_VALUE: 27
PIF_VALUE: 21
PIF_VALUE: 27
PIF_VALUE: 21
PIF_VALUE: 1
PIF_VALUE: 21
PIF_VALUE: 26
PIF_VALUE: 26
PIF_VALUE: 21
PIF_VALUE: 27
PIF_VALUE: 21
PIF_VALUE: 27
PIF_VALUE: 14
PIF_VALUE: 0
PIF_VALUE: 26
PIF_VALUE: 29
PIF_VALUE: 21
PIF_VALUE: 27

## 2018-07-31 ASSESSMENT — PAIN SCALES - GENERAL
PAINLEVEL_OUTOF10: 2
PAINLEVEL_OUTOF10: 3
PAINLEVEL_OUTOF10: 4
PAINLEVEL_OUTOF10: 0
PAINLEVEL_OUTOF10: 3
PAINLEVEL_OUTOF10: 2

## 2018-07-31 ASSESSMENT — PAIN DESCRIPTION - DESCRIPTORS
DESCRIPTORS: SORE
DESCRIPTORS: SORE

## 2018-07-31 ASSESSMENT — PAIN DESCRIPTION - ONSET
ONSET: ON-GOING
ONSET: ON-GOING

## 2018-07-31 ASSESSMENT — PAIN DESCRIPTION - PAIN TYPE
TYPE: SURGICAL PAIN
TYPE: SURGICAL PAIN

## 2018-07-31 ASSESSMENT — ENCOUNTER SYMPTOMS: SHORTNESS OF BREATH: 1

## 2018-07-31 ASSESSMENT — PAIN DESCRIPTION - LOCATION
LOCATION: ABDOMEN
LOCATION: ABDOMEN

## 2018-07-31 ASSESSMENT — PAIN DESCRIPTION - PROGRESSION
CLINICAL_PROGRESSION: NOT CHANGED
CLINICAL_PROGRESSION: NOT CHANGED

## 2018-07-31 ASSESSMENT — PAIN - FUNCTIONAL ASSESSMENT: PAIN_FUNCTIONAL_ASSESSMENT: 0-10

## 2018-07-31 NOTE — ANESTHESIA POSTPROCEDURE EVALUATION
Department of Anesthesiology  Postprocedure Note    Patient: Millie Miller  MRN: [id number]  YOB: 1970  Date of evaluation: 7/31/2018  Time:  1:36 PM     Procedure Summary     Date:  07/31/18 Room / Location:  [facility] OR 07 / [facility] OR    Anesthesia Start:  1012 Anesthesia Stop:  3839    Procedure:  ROBOTIC ASSISTED LAPAROSCOPIC SLEEVE GASTRECTOMY  (N/A ) Diagnosis:  (MORBID OBESITY)    Surgeon: Peyman Ewing DO Responsible Provider:  Dimas Muniz MD    Anesthesia Type:  general ASA Status:  3          Anesthesia Type: general    Mariposa Phase I: Mariposa Score: 8    Mariposa Phase II:      Last vitals: Reviewed and per EMR flowsheets. Anesthesia Post Evaluation    Patient location during evaluation: bedside  Patient participation: complete - patient participated  Level of consciousness: awake and alert  Pain scale: please refer to nursing notes.   Airway patency: patent  Nausea & Vomiting: no nausea and no vomiting  Complications: no  Cardiovascular status: hemodynamically unstable  Respiratory status: spontaneous ventilation  Hydration status: stable

## 2018-07-31 NOTE — ANESTHESIA PRE PROCEDURE
(LIPITOR) 40 MG tablet TAKE ONE TABLET BY MOUTH DAILY 2/16/18   Kathia Sandoval MD   cyclobenzaprine (FLEXERIL) 10 MG tablet TAKE ONE TABLET BY MOUTH EVERY EVENING AS NEEDED FOR MUSCLE SPASMS 2/16/18   Kathia Sandoval MD   albuterol sulfate HFA (PROAIR HFA) 108 (90 Base) MCG/ACT inhaler Inhale 1-2 puffs into the lungs every 6 hours as needed for Wheezing or Shortness of Breath 2/16/18   Kathia Sandoval MD   Liraglutide (VICTOZA) 18 MG/3ML SOPN SC injection Inject 1.8 mg into the skin daily 2/16/18   Kathia Sandoval MD   LANTUS SOLOSTAR 100 UNIT/ML injection pen INJECT 120 UNITS UNDER THE SKIN EVERY MORNING AND 90 Cali Angelina 57 EVENING  Patient taking differently: inject 120 units SQ QAM and 80 units QPM 12/14/17   Kathia Sandoval MD   triamterene-hydrochlorothiazide (DYAZIDE) 37.5-25 MG per capsule Take 1 capsule by mouth daily 11/17/17   Kathia Sandoval MD   clonazePAM (KLONOPIN) 0.5 MG tablet TAKE ONE TABLET BY MOUTH TWICE A DAY AS NEEDED FOR ANXIETY 11/3/17   Kathia Sandoval MD   Accu-Chek Multiclix Lancets MISC by Does not apply route    Historical Provider, MD   valACYclovir (VALTREX) 500 MG tablet TAKE ONE TABLET BY MOUTH TWICE A DAY 3/15/17   Kathia Sandoval MD   Blood Glucose Monitoring Suppl ANDRIA 1 kit by Does not apply route 3 times daily 1/9/17   Kathia Sandoval MD   naratriptan (AMERGE) 2.5 MG tablet Take 1 tablet by mouth once as needed for Migraine 2.5 mg at onset of headache, may repeat in 4 hours if needed 5/13/16 4/23/18  Kathia Sandoval MD   fluticasone [facility]) 50 MCG/ACT nasal spray PLACE TWO SPRAYS IN [facility] NOSTRIL ONCE DAILY 1/24/16   Kathia Sandoval MD   Insulin Pen Needle (B-D UF III MINI PEN NEEDLES) 31G X 5 MM MISC 1 each by Does not apply route 2 times daily 1/13/16   Kathia Sandoval MD   Cholecalciferol (VITAMIN D) 2000 UNITS CAPS capsule Take 1 capsule by mouth daily.  5/30/14   Kathia Sandoval MD   aspirin 81 MG EC tablet Take 81 mg by mouth daily. Historical Provider, MD       Current medications:    Current Facility-Administered Medications   Medication Dose Route Frequency Provider Last Rate Last Dose    lactated ringers infusion   Intravenous Continuous Rondal Lay, DO        scopolamine (TRANSDERM-SCOP) transdermal patch 1 patch  1 patch Transdermal Once Rondal Lay, DO        enoxaparin (LOVENOX) injection 40 mg  40 mg Subcutaneous Once Rondal Lay, DO        aprepitant (EMEND) capsule 80 mg  80 mg Oral Once Rondal Lay, DO        ceFAZolin (ANCEF) 3 g in dextrose 5 % 100 mL IVPB  3 g Intravenous Once Rondal Lay, DO        lactated ringers infusion   Intravenous Continuous Judith Jackson MD           Allergies:     Allergies   Allergen Reactions    Effexor Xr [Venlafaxine Hcl Er]        Problem List:    Patient Active Problem List   Diagnosis Code    Diabetes mellitus, type II ([facility] E11.9    Anxiety associated with depression F41.8    PCOS (polycystic ovarian syndrome) E28.2    Pure hypercholesterolemia E78.00    Common migraine G43.009    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 ([facility] Z30.1009    Depression with anxiety F41.8    Obstructive sleep apnea syndrome G47.33    Herpes simplex B00.9    Binge eating R63.2    Pedal edema R60.0    Morbid obesity with BMI of 50.0-59.9, adult ([facility]) E66.01, Z68.43    FH: melanoma Z80.8    Essential hypertension I10    SOB (shortness of breath) on exertion R06.02    Chronic gastric ulcer without hemorrhage and without perforation K25.7    Hiatal hernia with GERD and esophagitis K44.9, K21.0    Preop examination Z01.818    Morbid obesity with BMI of 45.0-49.9, adult ([facility] E66.01, Z68.42       Past Medical History:        Diagnosis Date    Anxiety     CTS (carpal tunnel syndrome) 2/10/2015    Depression     Diabetes mellitus ([facility]     ETD (eustachian tube dysfunction) 5/16/2014    Herpes simplex     Hyperlipidemia     Lumbar strain 4/19/2017    PCOS (polycystic

## 2018-07-31 NOTE — OP NOTE
Procedure Note    Indications: The patient was evaluated by our multidisciplinary team and was found to be a good candidate for weight loss surgery. Body mass index is 46.21 kg/m². Pre-operative Diagnosis:   Patient Active Problem List   Diagnosis    Diabetes mellitus, type II ([facility]    Anxiety associated with depression    PCOS (polycystic ovarian syndrome)    Pure hypercholesterolemia    Common migraine    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 ([facility]    Depression with anxiety    Obstructive sleep apnea syndrome    Herpes simplex    Binge eating    Pedal edema    Morbid obesity with BMI of 50.0-59.9, adult ([facility]    FH: melanoma    Essential hypertension    SOB (shortness of breath) on exertion    Chronic gastric ulcer without hemorrhage and without perforation    Hiatal hernia with GERD and esophagitis    Preop examination    Morbid obesity with BMI of 45.0-49.9, adult ([facility])         Post-operative Diagnosis:   Same      Procedure:    - Robotic assisted Laparoscopic Sleeve Gastrectomy. Surgeon: Dot Garcia DO    Anesthesia: General endotracheal anesthesia        Procedure Details   The patient was seen again in the Holding Room. The risks, benefits, complications, treatment options, and expected outcomes were discussed with the patient and/or family. The possibilities of reaction to medication, pulmonary aspiration, perforation of viscus, bleeding, recurrent infection, strictures, leaks, failure to lose weight, regaining weight,  the need for additional procedures, failure to diagnose a condition, and creating a complication requiring transfusion or operation were discussed. There was concurrence with the proposed plan and informed consent was obtained. The site of surgery was properly noted/marked. The patient was taken to Operating Room, identified as Divina Hope and the procedure verified as Laparoscopic Sleeve Gastrectomy.  A Time Out was held and the above information confirmed. The patient was placed in the supine position and general anesthesia was induced, along with placement of orogastric tube, Venodyne boots, and a Sanchez catheter. Lovenox SQ given pre-operatively. IV Antibiotics given pre-operatively. All pressure points were padded properly. A left upper quadrant incision was made and a veres needle was inserted after confirming with saline drop test.  After adequate pneumoperitoneum was obtained, a 5 mm trocar was inserted. This was followed by a endoscope which confirmed intra-abdominal placement and there was no injury on initial trocar placement. Additional ports were placed in the standard positions under direct vision. The abdomen was initially explored and noted no abnormalities were identified. A liver retractor was inserted through a small incision in the upper midline, lifted the liver upward, and was then secured to the OR table. The pylorus was identified and measurement was taken approximately 4 cm from the pylorus along the greater curvature of the stomach. The vessel sealer was used to take down the attachments and short gastric vessels along the greater curve of the stomach. This was continued until all attachments were taken down and continued to the gastro-esophageal junction. A 36 Burkinan dilator was placed along the lesser curvature and into the pylorus. A stapler was fired along the dialator to create an appropriate sized gastric sleeve pouch. Green/Gold firing followed by blue firings were used to create the sleeve. The staple line looked very healthy and no bleeding from staple line. The stomach was confirmed to be completely divided with uniform shape,  no twist in the sleeve and wide patent incisura. A 2-0 vicryl suture was used to over-sew and imbricate the sleeve staple line. The staple line was completely over-sewn over dilator.  The stomach distal to the staple line was clamped and methylene blue saline was

## 2018-07-31 NOTE — H&P
Beata Jayjay    [id number]    [facility] Same Day Surgery Update H & P  Department of General Surgery   Surgical Service   Physician Assistant Pre-operative History and Physical  Last H & P within the last 30 days. DIAGNOSIS:   MORBID OBESITY    PROCEDURE:  Robotic Assisted Laparoscopic Sleeve Gastrectomy. Laparoscopic Hiatal Hernia Repair-NA      HISTORY OF PRESENT ILLNESS:   Patient is morbid obese despite conventional weight loss attempts. Past Medical History:        Diagnosis Date    Anxiety     CTS (carpal tunnel syndrome) 2/10/2015    Depression     Diabetes mellitus ([address]     ETD (eustachian tube dysfunction) 5/16/2014    Herpes simplex     Hyperlipidemia     Lumbar strain 4/19/2017    PCOS (polycystic ovarian syndrome)     Sleep apnea     TMJ syndrome      Past Surgical History:        Procedure Laterality Date    ENDOSCOPY, COLON, DIAGNOSTIC      OTHER SURGICAL HISTORY  05/25/2018    EGD     Past Social History:  Social History     Social History    Marital status:      Spouse name: N/A    Number of children: N/A    Years of education: N/A     Social History Main Topics    Smoking status: Never Smoker    Smokeless tobacco: Never Used    Alcohol use Yes      Comment: infrequent, few times a month    Drug use: No    Sexual activity: Not Asked     Other Topics Concern    None     Social History Narrative    None         Medications Prior to Admission:      Prior to Admission medications    Medication Sig Start Date End Date Taking? Authorizing Provider   Multiple Vitamins-Iron (MULTI-VITAMIN/IRON) TABS Take by mouth    Historical Provider, MD   omeprazole (PRILOSEC) 20 MG delayed release capsule Take 1 capsule by mouth daily 5/25/18   Timo Kim DO   Lisdexamfetamine Dimesylate (VYVANSE) 60 MG CAPS Take 60 mg by mouth every morning for 30 days. . 7/22/18 8/21/18  Annemarie Bolton MD   glucose blood VI test strips (ASCENSIA AUTODISC VI;ONE TOUCH ULTRA TEST VI)

## 2018-08-01 ENCOUNTER — APPOINTMENT (OUTPATIENT)
Dept: GENERAL RADIOLOGY | Age: 48
DRG: 621 | End: 2018-08-01
Attending: SURGERY
Payer: COMMERCIAL

## 2018-08-01 ENCOUNTER — PREP FOR PROCEDURE (OUTPATIENT)
Dept: BARIATRICS/WEIGHT MGMT | Age: 48
End: 2018-08-01

## 2018-08-01 LAB
ANION GAP SERPL CALCULATED.3IONS-SCNC: 12 MMOL/L (ref 3–16)
BUN BLDV-MCNC: 12 MG/DL (ref 7–20)
CALCIUM SERPL-MCNC: 8.8 MG/DL (ref 8.3–10.6)
CHLORIDE BLD-SCNC: 102 MMOL/L (ref 99–110)
CO2: 22 MMOL/L (ref 21–32)
CREAT SERPL-MCNC: 1 MG/DL (ref 0.6–1.1)
GFR AFRICAN AMERICAN: >60
GFR NON-AFRICAN AMERICAN: 59
GLUCOSE BLD-MCNC: 134 MG/DL (ref 70–99)
GLUCOSE BLD-MCNC: 146 MG/DL (ref 70–99)
GLUCOSE BLD-MCNC: 150 MG/DL (ref 70–99)
GLUCOSE BLD-MCNC: 153 MG/DL (ref 70–99)
GLUCOSE BLD-MCNC: 155 MG/DL (ref 70–99)
HCT VFR BLD CALC: 33.2 % (ref 36–48)
HEMOGLOBIN: 10.7 G/DL (ref 12–16)
MCH RBC QN AUTO: 28.7 PG (ref 26–34)
MCHC RBC AUTO-ENTMCNC: 32.3 G/DL (ref 31–36)
MCV RBC AUTO: 88.9 FL (ref 80–100)
PDW BLD-RTO: 15.8 % (ref 12.4–15.4)
PERFORMED ON: ABNORMAL
PLATELET # BLD: 190 K/UL (ref 135–450)
PMV BLD AUTO: 8.4 FL (ref 5–10.5)
POTASSIUM REFLEX MAGNESIUM: 4.1 MMOL/L (ref 3.5–5.1)
RBC # BLD: 3.73 M/UL (ref 4–5.2)
SODIUM BLD-SCNC: 136 MMOL/L (ref 136–145)
WBC # BLD: 15.6 K/UL (ref 4–11)

## 2018-08-01 PROCEDURE — 2580000003 HC RX 258: Performed by: SURGERY

## 2018-08-01 PROCEDURE — C9113 INJ PANTOPRAZOLE SODIUM, VIA: HCPCS | Performed by: SURGERY

## 2018-08-01 PROCEDURE — 36415 COLL VENOUS BLD VENIPUNCTURE: CPT

## 2018-08-01 PROCEDURE — 94761 N-INVAS EAR/PLS OXIMETRY MLT: CPT

## 2018-08-01 PROCEDURE — 1200000000 HC SEMI PRIVATE

## 2018-08-01 PROCEDURE — 99024 POSTOP FOLLOW-UP VISIT: CPT | Performed by: SURGERY

## 2018-08-01 PROCEDURE — 80048 BASIC METABOLIC PNL TOTAL CA: CPT

## 2018-08-01 PROCEDURE — 2700000000 HC OXYGEN THERAPY PER DAY

## 2018-08-01 PROCEDURE — 74241 FL UGI W KUB: CPT

## 2018-08-01 PROCEDURE — 85027 COMPLETE CBC AUTOMATED: CPT

## 2018-08-01 PROCEDURE — 6360000002 HC RX W HCPCS: Performed by: SURGERY

## 2018-08-01 PROCEDURE — 94770 HC ETCO2 MONITOR DAILY: CPT

## 2018-08-01 RX ORDER — OXYCODONE HCL 5 MG/5 ML
5 SOLUTION, ORAL ORAL EVERY 4 HOURS PRN
Status: DISCONTINUED | OUTPATIENT
Start: 2018-08-01 | End: 2018-08-02 | Stop reason: HOSPADM

## 2018-08-01 RX ORDER — OXYCODONE HCL 5 MG/5 ML
10 SOLUTION, ORAL ORAL EVERY 4 HOURS PRN
Status: DISCONTINUED | OUTPATIENT
Start: 2018-08-01 | End: 2018-08-02 | Stop reason: HOSPADM

## 2018-08-01 RX ADMIN — ENOXAPARIN SODIUM 30 MG: 30 INJECTION SUBCUTANEOUS at 09:56

## 2018-08-01 RX ADMIN — SODIUM CHLORIDE, POTASSIUM CHLORIDE, SODIUM LACTATE AND CALCIUM CHLORIDE: 600; 310; 30; 20 INJECTION, SOLUTION INTRAVENOUS at 09:56

## 2018-08-01 RX ADMIN — Medication 10 ML: at 09:57

## 2018-08-01 RX ADMIN — ENOXAPARIN SODIUM 30 MG: 30 INJECTION SUBCUTANEOUS at 23:06

## 2018-08-01 RX ADMIN — PANTOPRAZOLE SODIUM 40 MG: 40 INJECTION, POWDER, FOR SOLUTION INTRAVENOUS at 09:57

## 2018-08-01 RX ADMIN — ONDANSETRON 4 MG: 2 INJECTION INTRAMUSCULAR; INTRAVENOUS at 06:31

## 2018-08-01 ASSESSMENT — PAIN DESCRIPTION - PAIN TYPE: TYPE: SURGICAL PAIN

## 2018-08-01 ASSESSMENT — PAIN DESCRIPTION - LOCATION: LOCATION: ABDOMEN

## 2018-08-01 ASSESSMENT — PAIN DESCRIPTION - DESCRIPTORS: DESCRIPTORS: ACHING;SORE

## 2018-08-01 ASSESSMENT — PAIN SCALES - GENERAL: PAINLEVEL_OUTOF10: 2

## 2018-08-01 ASSESSMENT — PAIN DESCRIPTION - PROGRESSION: CLINICAL_PROGRESSION: NOT CHANGED

## 2018-08-01 ASSESSMENT — PAIN DESCRIPTION - ORIENTATION: ORIENTATION: UPPER;MID

## 2018-08-01 ASSESSMENT — PAIN DESCRIPTION - ONSET: ONSET: ON-GOING

## 2018-08-02 VITALS
BODY MASS INDEX: 45.32 KG/M2 | TEMPERATURE: 98.9 F | DIASTOLIC BLOOD PRESSURE: 80 MMHG | WEIGHT: 282 LBS | OXYGEN SATURATION: 95 % | SYSTOLIC BLOOD PRESSURE: 148 MMHG | RESPIRATION RATE: 16 BRPM | HEIGHT: 66 IN | HEART RATE: 66 BPM

## 2018-08-02 LAB
GLUCOSE BLD-MCNC: 156 MG/DL (ref 70–99)
PERFORMED ON: ABNORMAL

## 2018-08-02 PROCEDURE — 94761 N-INVAS EAR/PLS OXIMETRY MLT: CPT

## 2018-08-02 PROCEDURE — 2700000000 HC OXYGEN THERAPY PER DAY

## 2018-08-02 PROCEDURE — 6360000002 HC RX W HCPCS: Performed by: SURGERY

## 2018-08-02 PROCEDURE — 94660 CPAP INITIATION&MGMT: CPT

## 2018-08-02 PROCEDURE — C9113 INJ PANTOPRAZOLE SODIUM, VIA: HCPCS | Performed by: SURGERY

## 2018-08-02 PROCEDURE — 2580000003 HC RX 258: Performed by: SURGERY

## 2018-08-02 RX ORDER — ONDANSETRON 4 MG/1
4 TABLET, FILM COATED ORAL EVERY 6 HOURS PRN
Qty: 30 TABLET | Refills: 0 | Status: SHIPPED | OUTPATIENT
Start: 2018-08-02 | End: 2018-08-07 | Stop reason: ALTCHOICE

## 2018-08-02 RX ORDER — PROCHLORPERAZINE MALEATE 5 MG/1
5 TABLET ORAL EVERY 6 HOURS PRN
Qty: 30 TABLET | Refills: 0 | Status: SHIPPED | OUTPATIENT
Start: 2018-08-02 | End: 2018-08-07 | Stop reason: ALTCHOICE

## 2018-08-02 RX ORDER — LISINOPRIL 5 MG/1
TABLET ORAL
Qty: 30 TABLET | Refills: 0 | Status: SHIPPED | OUTPATIENT
Start: 2018-08-02 | End: 2018-09-12

## 2018-08-02 RX ORDER — TRIAMTERENE AND HYDROCHLOROTHIAZIDE 37.5; 25 MG/1; MG/1
1 CAPSULE ORAL DAILY
Qty: 30 CAPSULE | Refills: 5 | Status: SHIPPED | OUTPATIENT
Start: 2018-08-02 | End: 2018-08-15

## 2018-08-02 RX ORDER — OXYCODONE HYDROCHLORIDE AND ACETAMINOPHEN 5; 325 MG/1; MG/1
1 TABLET ORAL EVERY 6 HOURS PRN
Qty: 28 TABLET | Refills: 0 | Status: SHIPPED | OUTPATIENT
Start: 2018-08-02 | End: 2018-08-09

## 2018-08-02 RX ORDER — HYOSCYAMINE SULFATE 0.12 MG/1
125 TABLET SUBLINGUAL EVERY 4 HOURS PRN
Qty: 30 EACH | Refills: 0 | Status: SHIPPED | OUTPATIENT
Start: 2018-08-02 | End: 2018-08-15

## 2018-08-02 RX ADMIN — ENOXAPARIN SODIUM 30 MG: 30 INJECTION SUBCUTANEOUS at 08:26

## 2018-08-02 RX ADMIN — PANTOPRAZOLE SODIUM 40 MG: 40 INJECTION, POWDER, FOR SOLUTION INTRAVENOUS at 08:26

## 2018-08-02 RX ADMIN — Medication 10 ML: at 08:27

## 2018-08-02 RX ADMIN — ONDANSETRON 4 MG: 2 INJECTION INTRAMUSCULAR; INTRAVENOUS at 08:34

## 2018-08-02 RX ADMIN — INSULIN LISPRO 2 UNITS: 100 INJECTION, SOLUTION INTRAVENOUS; SUBCUTANEOUS at 11:01

## 2018-08-02 NOTE — PROGRESS NOTES
PACU Transfer Note    Vitals:    07/31/18 1421   BP:    Pulse: 73   Resp: 14   Temp:    SpO2: 98%       In: 2384 [I.V.:2384]  Out: 330 [Urine:300]    Pain assessment:  VS stable. Report given to Chair RN at bedside- PCA handoff completed. Patient to room #5721 - no family in waiting area.  IS given with instruction  Pain Level: 3    Report given to Receiving unit RN.    7/31/2018 2:32 PM
Patient up in chair watching television. Up as tolerated, up to bathroom. Return to bed. Patient tolerated fine. VSS except elevated BP. Assessment complete. Call light in reach and all needs met at this time. Will continue to monitor.
Pt a/o x4 VSS. Pt on dilaudid pca pump but has not used it much. Pt rated pain a 2. Pt drowsy from anesthesia but able to respond. Pt walked in halls and tolerate it well and sat up to chair. Pt c/o some nausea after walking and PRN Zofran given with good relief. Sanchez in place and putting out adequate urine Will continue to monitor.
Pt arrived to floor, VSS. Surgical sites CDI. Pt drowsy and easily awakened. Sanchez draining clear/yellow urine. Bed is in low/locked position with wheels locked. Call light within reach. Will continue to monitor.
Sanchez removed at 8521.
[facility] / [facility] ([facility]) [address], [address]    Acknowledgment of Informed Consent for Surgical or Medical Procedure and Sedation  I agree to allow doctor(s) Inez Hall and his/her associates or assistants, including residents and/or other qualified medical practitioner to perform the following medical treatment or procedure and to administer or direct the administration of sedation as necessary:  Procedure(s): ROBOTIC ASSISTED LAPAROSCOPIC SLEEVE GASTRECTOMY , LAPAROSCOPIC [facility]  My doctor has explained the following regarding the proposed procedure:   the explanation of the procedure   the benefits of the procedure   the potential problems that might occur during recuperation   the risks and side effects of the procedure which could include but are not limited to severe blood loss, infection, stroke or death   the benefits, risks and side effect of alternative procedures including the consequences of declining this procedure or any alternative procedures   the likelihood of achieving satisfactory results. I acknowledge no guarantee or assurance has been made to me regarding the results. I understand that during the course of this treatment/procedure, unforeseen conditions can occur which require an additional or different procedure. I agree to allow my physician or assistants to perform such extension of the original procedure as they may find necessary. I understand that sedation will often result in temporary impairment of memory and fine motor skills and that sedation can occasionally progress to a state of deep sedation or general anesthesia. I understand the risks of anesthesia for surgery include, but are not limited to, sore throat, hoarseness, injury to face, mouth, or teeth; nausea; headache; injury to blood vessels or nerves; death, brain damage, or paralysis.     I understand that if I have a Limitation of Treatment order in effect
during my hospitalization, the order may or may not be in effect during this procedure. I give my doctor permission to give me blood or blood products. I understand that there are risks with receiving blood such as hepatitis, AIDS, fever, or allergic reaction. I acknowledge that the risks, benefits, and alternatives of this treatment have been explained to me and that no express or implied warranty has been given by the hospital, any blood bank, or any person or entity as to the blood or blood components transfused. At the discretion of my doctor, I agree to allow observers, equipment/product representatives and allow photographing, and/or televising of the procedure, provided my name or identity is maintained confidentially. I agree the hospital may dispose of or use for scientific or educational purposes any tissue, fluid, or body parts which may be removed.     ________________________________Date________Time______ am/pm  (Roby One)  Patient or Signature of Closest Relative or Legal Guardian    ________________________________Date________Time______am/pm      Page 1 of  1  Witness
may need brought in by your family after your arrival to your hospital room. 15. If you have a Living Will or Durable Power of , please bring a copy on the day of your procedure. 15. With your permission, one family member may accompany you while you are being prepared for surgery. Once you are ready, additional family members may join you. HOW WE KEEP YOU SAFE and WORK TO PREVENT SURGICAL SITE INFECTIONS:  1. Health care workers should always check your ID bracelet to verify your name and birth date. You will be asked many times to state your name, date of birth, and allergies. 2. Health care workers should always clean their hands with soap or alcohol gel before providing care to you. It is okay to ask anyone if they cleaned their hands before they touch you. 3. You will be actively involved in verifying the type of procedure you are having and ensuring the correct surgical site. This will be confirmed multiple times prior to your procedure. Do NOT barbara your surgery site UNLESS instructed to by your surgeon. 4. Do not shave or wax for 72 hours prior to procedure near your operative site. Shaving with a razor can irritate your skin and make it easier to develop an infection. On the day of your procedure, any hair that needs to be removed near the surgical site will be clipped by a healthcare worker using a special clippers designed to avoid skin irritation. 5. When you are in the operating room, your surgical site will be cleansed with a special soap, and in most cases, you will be given an antibiotic before the surgery begins. What to expect AFTER YOUR PROCEDURE:  1. Immediately following your procedure, your will be taken to the PACU for the first phase of your recovery. Your nurse will help you recover from any potential side effects of anesthesia, such as extreme drowsiness, changes in your vital signs or breathing patterns.  Nausea, headache, muscle aches, or sore throat

## 2018-08-02 NOTE — CARE COORDINATION
discharging home and is agreeable with CT calls. Follow Up  Future Appointments  Date Time Provider Vlad Fontaine   8/10/2018 7:30 AM MD RADHA Luna FP [facility]   8/15/2018 1:30 PM DO DI Worthington WT [facility]   8/20/2018 8:20 AM LAI Sorto CNP FF SLEEP MED [facility]       Health Maintenance  There are no preventive care reminders to display for this patient.     Day Addison RN

## 2018-08-03 ENCOUNTER — CARE COORDINATION (OUTPATIENT)
Dept: CASE MANAGEMENT | Age: 48
End: 2018-08-03

## 2018-08-03 DIAGNOSIS — G43.009 MIGRAINE WITHOUT AURA AND WITHOUT STATUS MIGRAINOSUS, NOT INTRACTABLE: Primary | Chronic | ICD-10-CM

## 2018-08-03 RX ORDER — OMEPRAZOLE 20 MG/1
20 CAPSULE, DELAYED RELEASE ORAL DAILY
Qty: 30 CAPSULE | Refills: 3 | Status: SHIPPED | OUTPATIENT
Start: 2018-08-03 | End: 2019-07-08 | Stop reason: ALTCHOICE

## 2018-08-03 NOTE — CARE COORDINATION
[facility] Transitions Initial Follow Up Call    Call within 2 business days of discharge: Yes    Patient: Stefanie Crook Patient : 1970   MRN: [id number]   Reason for Admission: GASTRECTOMY   Discharge Date: 18 RARS: Readmission Risk Score: 14     Spoke with: Noble Stewart 106: [facility] [employer] [employer]     Non-face-to-face services provided:  Obtained and reviewed discharge summary and/or continuity of care documents  Education of patient/family/caregiver/guardian to support self-management-   Assessment and support for treatment adherence and medication management-        Care Transitions 24 Hour Call    Do you have any ongoing symptoms?:  Yes  Patient-reported symptoms:  Nausea, Pain  Interventions for patient-reported symptoms:  Other (Comment: Continue taking meds as prescribed, keep / schedule appts, s/s that require med attn)  Do you have a copy of your discharge instructions?:  Yes  Do you have all of your prescriptions and are they filled?:  Yes  Have you been contacted by a [facility] Pharmacist?:  No  Have you scheduled your follow up appointment?:  Yes  Were you discharged with any Home Care or Post Acute Services:  No  Patient Home Equipment:  CPAP  Do you have support at home?:  Alone  Do you feel like you have everything you need to keep you well at home?:  Yes  Are you an active caregiver in your home?:  No  Care Transitions Interventions  No Identified Needs       Summary  CTC spoke to the Pt who denies fever, chills, vomiting, chest pain, SOB, and cough. Pt reports having nausea this morning and rates pain 2/10. Pt reports LBM was today and denies LE edema. Pt reports BS was 140 this morning. Pt states that abdominal wounds are not hot to the touch, red, swollen, or have any drainage. CTC and Pt reviewed meds and CTC completed the 1111f and Pt denies any questions or concerns.   CTC advised Pt of use urgent care or physicians 24 hr access line if assistance is needed after hours or

## 2018-08-06 PROBLEM — Z01.818 PREOP EXAMINATION: Status: RESOLVED | Noted: 2018-07-12 | Resolved: 2018-08-06

## 2018-08-06 PROBLEM — E66.01 MORBID OBESITY WITH BMI OF 50.0-59.9, ADULT (HCC): Status: RESOLVED | Noted: 2018-02-16 | Resolved: 2018-08-06

## 2018-08-07 ENCOUNTER — TELEPHONE (OUTPATIENT)
Dept: BARIATRICS/WEIGHT MGMT | Age: 48
End: 2018-08-07

## 2018-08-07 ENCOUNTER — OFFICE VISIT (OUTPATIENT)
Dept: FAMILY MEDICINE CLINIC | Age: 48
End: 2018-08-07

## 2018-08-07 VITALS
RESPIRATION RATE: 20 BRPM | TEMPERATURE: 97.1 F | SYSTOLIC BLOOD PRESSURE: 124 MMHG | WEIGHT: 280.6 LBS | HEART RATE: 57 BPM | BODY MASS INDEX: 45.98 KG/M2 | OXYGEN SATURATION: 98 % | DIASTOLIC BLOOD PRESSURE: 56 MMHG

## 2018-08-07 DIAGNOSIS — F41.8 DEPRESSION WITH ANXIETY: Chronic | ICD-10-CM

## 2018-08-07 DIAGNOSIS — I10 ESSENTIAL HYPERTENSION: ICD-10-CM

## 2018-08-07 DIAGNOSIS — E78.00 PURE HYPERCHOLESTEROLEMIA: Chronic | ICD-10-CM

## 2018-08-07 DIAGNOSIS — E66.01 MORBID OBESITY WITH BMI OF 45.0-49.9, ADULT (HCC): ICD-10-CM

## 2018-08-07 DIAGNOSIS — E11.3293 TYPE 2 DIABETES MELLITUS WITH BOTH EYES AFFECTED BY MILD NONPROLIFERATIVE RETINOPATHY WITHOUT MACULAR EDEMA, WITHOUT LONG-TERM CURRENT USE OF INSULIN (HCC): Chronic | ICD-10-CM

## 2018-08-07 DIAGNOSIS — Z98.84 S/P LAPAROSCOPIC SLEEVE GASTRECTOMY: Primary | ICD-10-CM

## 2018-08-07 DIAGNOSIS — E11.3299 DM TYPE 2 WITH DIABETIC BACKGROUND RETINOPATHY (HCC): Primary | ICD-10-CM

## 2018-08-07 PROCEDURE — 99214 OFFICE O/P EST MOD 30 MIN: CPT | Performed by: FAMILY MEDICINE

## 2018-08-07 RX ORDER — CLONAZEPAM 0.5 MG/1
TABLET ORAL
Qty: 10 TABLET | Refills: 1 | Status: SHIPPED | OUTPATIENT
Start: 2018-08-07 | End: 2018-09-28 | Stop reason: SDUPTHER

## 2018-08-07 RX ORDER — ATORVASTATIN CALCIUM 10 MG/1
TABLET, FILM COATED ORAL
Qty: 30 TABLET | Refills: 2
Start: 2018-08-07 | End: 2018-09-12

## 2018-08-07 NOTE — PROGRESS NOTES
Subjective:      Patient ID: Hermelindo Sellers 46 y.o. HPI  Yolanda Pean The primary encounter diagnosis was Herpes simplex. Diagnoses of Obstructive sleep apnea syndrome, Essential hypertension, Morbid obesity with BMI of 45.0-49.9, adult ([address] Depression with anxiety, 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 ([facility] Pure hypercholesterolemia, and Type 2 diabetes mellitus with both eyes affected by mild nonproliferative retinopathy without macular edema, without long-term current use of insulin ([facility] were also pertinent to this visit. She is one week s/p gastric sleeve. Recovering well. Tolerating diet. No nausea or vomiting    Not sleeping well the past week, sister passed away. Requests something to help for a week or so. Treatment Adherence:   Medication compliance:  compliant most of the time  Diet compliance:  compliant most of the time  Weight trend: decreasing  Current exercise: no regular exercise  Barriers: post-op. Will resume    Diabetes Mellitus Type 2: Current symptoms/problems include none. Home blood sugar records: fasting range: 120, postprandial range: 80 -90  Any episodes of hypoglycemia? yes - better with decreased dose of insulin. Eye exam current (within one year): yes  Tobacco history: She  reports that she has never smoked. She has never used smokeless tobacco.   Daily Aspirin? Yes    Hypertension:  Home blood pressure monitoring: No.  She is adherent to a low sodium diet. Patient denies chest pain, shortness of breath, blurred vision, peripheral edema and palpitations. Antihypertensive medication side effects: no medication side effects noted. Use of agents associated with hypertension: none. Hyperlipidemia:  No new myalgias or GI upset on atorvastatin (Lipitor).        Lab Results   Component Value Date    LABA1C 5.2 07/12/2018    LABA1C 7.2 02/16/2018    LABA1C 7.4 08/17/2017     Lab Results   Component Value Date    LABMICR <1.20 07/12/2018 TABLET BY MOUTH TWICE A DAY 12 tablet 2    fluticasone (FLONASE) 50 MCG/ACT nasal spray PLACE TWO SPRAYS IN EACH NOSTRIL ONCE DAILY 1 Bottle 12    Insulin Pen Needle (B-D UF III MINI PEN NEEDLES) 31G X 5 MM MISC 1 each by Does not apply route 2 times daily 100 each 12    aspirin 81 MG EC tablet Take 81 mg by mouth daily.             Allergies   Allergen Reactions    Effexor Xr [Venlafaxine Hcl Er]         Patient Active Problem List   Diagnosis    Diabetes mellitus, type II ([facility]    Anxiety associated with depression    PCOS (polycystic ovarian syndrome)    Pure hypercholesterolemia    Common migraine    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    Depression with anxiety    Obstructive sleep apnea syndrome    Herpes simplex    Binge eating    Pedal edema    FH: melanoma    Essential hypertension    SOB (shortness of breath) on exertion    Chronic gastric ulcer without hemorrhage and without perforation    Hiatal hernia with GERD and esophagitis    Morbid obesity with BMI of 45.0-49.9, adult ([facility])        Past Medical History:   Diagnosis Date    Anxiety     CTS (carpal tunnel syndrome) 2/10/2015    Depression     Diabetes mellitus ([facility]     ETD (eustachian tube dysfunction) 5/16/2014    Herpes simplex     Hyperlipidemia     Lumbar strain 4/19/2017    PCOS (polycystic ovarian syndrome)     Sleep apnea     TMJ syndrome        Past Surgical History:   Procedure Laterality Date    ENDOSCOPY, COLON, DIAGNOSTIC      OTHER SURGICAL HISTORY  05/25/2018    EGD    CT LAP,STOMACH,OTHER,W/O TUBE N/A 7/31/2018    ROBOTIC ASSISTED LAPAROSCOPIC SLEEVE GASTRECTOMY  performed by Jasson Booker DO at [facility] OR        Social History   Substance Use Topics    Smoking status: Never Smoker    Smokeless tobacco: Never Used    Alcohol use Yes      Comment: infrequent, few times a month        Family History   Problem Relation Age of Onset    Cervical Cancer Mother     Cancer Mother cervical    Diabetes Father     Stroke Father         25s    Hypertension Father     Coronary Art Dis Father     Other Father         CHIVO    Cancer Father         melanoma    Glaucoma Father     Diabetes Sister     Diabetes Maternal Grandmother     Cancer Maternal Grandmother         melanoma    Arthritis Maternal Grandmother     Diabetes Paternal Grandmother     Stroke Paternal Grandmother         Review of Systems  Review of Systems    Objective:   Physical Exam  NAD   Skin is warm and dry. Well hydrated. No rash. Mood +, affect is normal.   The neck is supple and free of adenopathy or masses, the thyroid is normal without enlargement or nodules. Chest: clear with no wheezes or rales. No retractions, or use of accessory muscles noted. Cardiovascular: PMI is not displaced, and no thrill noted. Regular rate and rhythm with no rub, murmur or gallop. No peripheral edema. The abdomen is soft without tenderness, guarding, mass, rebound or organomegaly. Aorta, femoral, DP and PT pulses intact. Sensation normal to monofilament feet bilaterally. Feet in good repair, without significant callouses and without ulceration or deformity. Assessment / Plan:        Diagnosis Orders   1. 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 ([facility]  Decrease insulin to 18 units, discussed gradual decrease as the sugars drop. She can send [employer] message if questions. 2. Essential hypertension  At goal.  Discussed stopping diuretic of orthostatic changes. 3. Morbid obesity with BMI of 45.0-49.9, adult ([address]  Improved. S/p gastric sleeve. Discussed diet, exercise and weight loss strategies    4. Depression with anxiety  Well controlled. PRN Klonopin - addressed short term use only. Controlled Substances Monitoring:     RX Monitoring 8/7/2018   Attestation The Prescription Monitoring Report for this patient was reviewed today.    Documentation Possible medication side effects, risk of tolerance/dependence & alternative treatments discussed. ;No signs of potential drug abuse or diversion identified. 5. Pure hypercholesterolemia  atorvastatin (LIPITOR) 10 MG tablet   6. Type 2 diabetes mellitus with both eyes affected by mild nonproliferative retinopathy without macular edema, without long-term current use of insulin ([facility])        Side effects of current medications reviewed and questions answered. Follow up in 3 months or prn.

## 2018-08-15 ENCOUNTER — CARE COORDINATION (OUTPATIENT)
Dept: CASE MANAGEMENT | Age: 48
End: 2018-08-15

## 2018-08-15 ENCOUNTER — OFFICE VISIT (OUTPATIENT)
Dept: BARIATRICS/WEIGHT MGMT | Age: 48
End: 2018-08-15

## 2018-08-15 VITALS
BODY MASS INDEX: 42.84 KG/M2 | WEIGHT: 261.4 LBS | SYSTOLIC BLOOD PRESSURE: 101 MMHG | DIASTOLIC BLOOD PRESSURE: 71 MMHG | HEART RATE: 92 BPM

## 2018-08-15 DIAGNOSIS — Z98.84 S/P LAPAROSCOPIC SLEEVE GASTRECTOMY: Primary | ICD-10-CM

## 2018-08-15 PROCEDURE — 99024 POSTOP FOLLOW-UP VISIT: CPT | Performed by: SURGERY

## 2018-08-15 NOTE — PROGRESS NOTES
Patient doing well  No N/V/F/C  Tolerating phase 2  Comfortable with phase 3  Incisions C/D/I    Return to work Monday     F/U in 6 weeks     Heather Ahumada, DO

## 2018-08-15 NOTE — PROGRESS NOTES
Dietary Assessment Note    Vitals:   Vitals:    08/15/18 1306   BP: 101/71   Pulse: 92   Weight: 261 lb 6.4 oz (118.6 kg)    Patient lost 30.6 lbs over the past 4 weeks. Total Weight Loss: 64.1lbs    Labs reviewed:  no lab studies available for review at time of visit    Protein intake: 60-80 grams/day     Fluid intake: <48 oz/day; getting about 40-48oz    Multivitamin/mineral intake: 3 Fusion per day    Calcium intake: none    Other: none    Exercise: No. How much: none organized yet. What kind: none organized yet    Nutrition Assessment: Pt's sister passed away 2 days after surgery; she has been travelling and relying more on protein shakes. Pt has been using yogurt, pudding, applesauce, peas and carrots pureed, chix and mashed potatoes. She has been getting 2-3 protein shakes per day and 2-3 2oz pureed food servings. Pt denies n/v, reflux. She does get a sense of fullness and is  liquids and solids. Food Intolerances/issues: none    Client Concerns: no real concern other than being not in a routine. Pt is aware of what she needs to do to get on track.     Goals: advance to soft and mushy at 3 weeks postop    Plan: follow up as needed      Himanshu Shoemaker

## 2018-09-10 ENCOUNTER — TELEPHONE (OUTPATIENT)
Dept: SLEEP MEDICINE | Age: 48
End: 2018-09-10

## 2018-09-10 NOTE — TELEPHONE ENCOUNTER
Returned call to patient, also had following note in Mychart from last week     . Joseluis Ybarra I need an appointment either very early  before 8:30 or late in the afternoon after 4:00. My machine is acting weird, it shuts off and makes really loud noise sometimes,  and two mornings I woke up with my gums bleeding on the top. I have stopped using the machine. .. Scheduled appt for Wednesday @ THE [facility] [place]. Not sure you need to call her back before appt to discuss the fact has stopped using machine. She can be reached @ [phone].

## 2018-09-11 ENCOUNTER — PATIENT MESSAGE (OUTPATIENT)
Dept: FAMILY MEDICINE CLINIC | Age: 48
End: 2018-09-11

## 2018-09-11 DIAGNOSIS — F41.8 DEPRESSION WITH ANXIETY: Primary | Chronic | ICD-10-CM

## 2018-09-12 ENCOUNTER — OFFICE VISIT (OUTPATIENT)
Dept: BARIATRICS/WEIGHT MGMT | Age: 48
End: 2018-09-12

## 2018-09-12 ENCOUNTER — PATIENT MESSAGE (OUTPATIENT)
Dept: FAMILY MEDICINE CLINIC | Age: 48
End: 2018-09-12

## 2018-09-12 ENCOUNTER — OFFICE VISIT (OUTPATIENT)
Dept: PULMONOLOGY | Age: 48
End: 2018-09-12

## 2018-09-12 VITALS
SYSTOLIC BLOOD PRESSURE: 110 MMHG | DIASTOLIC BLOOD PRESSURE: 72 MMHG | HEART RATE: 69 BPM | WEIGHT: 256 LBS | BODY MASS INDEX: 41.14 KG/M2 | OXYGEN SATURATION: 98 % | HEIGHT: 66 IN

## 2018-09-12 VITALS
HEART RATE: 62 BPM | HEIGHT: 66 IN | BODY MASS INDEX: 41.17 KG/M2 | SYSTOLIC BLOOD PRESSURE: 115 MMHG | DIASTOLIC BLOOD PRESSURE: 67 MMHG | WEIGHT: 256.2 LBS

## 2018-09-12 DIAGNOSIS — F41.8 DEPRESSION WITH ANXIETY: Chronic | ICD-10-CM

## 2018-09-12 DIAGNOSIS — Z98.84 S/P LAPAROSCOPIC SLEEVE GASTRECTOMY: Primary | ICD-10-CM

## 2018-09-12 DIAGNOSIS — E11.3299 DM TYPE 2 WITH DIABETIC BACKGROUND RETINOPATHY (HCC): Chronic | ICD-10-CM

## 2018-09-12 DIAGNOSIS — E66.01 MORBID OBESITY, UNSPECIFIED OBESITY TYPE (HCC): Chronic | ICD-10-CM

## 2018-09-12 DIAGNOSIS — G47.33 OBSTRUCTIVE SLEEP APNEA SYNDROME: Primary | Chronic | ICD-10-CM

## 2018-09-12 DIAGNOSIS — G43.009 MIGRAINE WITHOUT AURA AND WITHOUT STATUS MIGRAINOSUS, NOT INTRACTABLE: Chronic | ICD-10-CM

## 2018-09-12 PROCEDURE — 99214 OFFICE O/P EST MOD 30 MIN: CPT | Performed by: NURSE PRACTITIONER

## 2018-09-12 PROCEDURE — 99024 POSTOP FOLLOW-UP VISIT: CPT | Performed by: SURGERY

## 2018-09-12 RX ORDER — BUPROPION HYDROCHLORIDE 150 MG/1
150 TABLET ORAL EVERY MORNING
Qty: 30 TABLET | Refills: 3 | Status: SHIPPED | OUTPATIENT
Start: 2018-09-12 | End: 2019-04-01 | Stop reason: SDUPTHER

## 2018-09-12 ASSESSMENT — ENCOUNTER SYMPTOMS
RHINORRHEA: 0
SHORTNESS OF BREATH: 0
ABDOMINAL PAIN: 0
COUGH: 0
ABDOMINAL DISTENTION: 0
APNEA: 0
SINUS PRESSURE: 0

## 2018-09-12 ASSESSMENT — SLEEP AND FATIGUE QUESTIONNAIRES
HOW LIKELY ARE YOU TO NOD OFF OR FALL ASLEEP WHILE WATCHING TV: 2
HOW LIKELY ARE YOU TO NOD OFF OR FALL ASLEEP WHILE SITTING INACTIVE IN A PUBLIC PLACE: 0
HOW LIKELY ARE YOU TO NOD OFF OR FALL ASLEEP WHILE SITTING AND TALKING TO SOMEONE: 0
HOW LIKELY ARE YOU TO NOD OFF OR FALL ASLEEP WHILE LYING DOWN TO REST IN THE AFTERNOON WHEN CIRCUMSTANCES PERMIT: 3
HOW LIKELY ARE YOU TO NOD OFF OR FALL ASLEEP WHILE SITTING QUIETLY AFTER LUNCH WITHOUT ALCOHOL: 1
HOW LIKELY ARE YOU TO NOD OFF OR FALL ASLEEP WHILE SITTING AND READING: 1
ESS TOTAL SCORE: 7
HOW LIKELY ARE YOU TO NOD OFF OR FALL ASLEEP IN A CAR, WHILE STOPPED FOR A FEW MINUTES IN TRAFFIC: 0
HOW LIKELY ARE YOU TO NOD OFF OR FALL ASLEEP WHEN YOU ARE A PASSENGER IN A CAR FOR AN HOUR WITHOUT A BREAK: 0

## 2018-09-12 NOTE — PROGRESS NOTES
Anthony Mar MD, FAASM, [facility]  Alpesh Vega, MSN, RN, CNP     [facility]  [address] [facility] SLEEP [facility]  [address]  [address]  [place] [address]  Dept: [phone]  Dept Fax: [phone]      [facility]    Subjective:     Patient ID: Millie Miller is a 46 y.o. female. Chief Complaint   Patient presents with    Sleep Apnea       HPI:      Machine Present today:  Yes    Machine Modem/Download Info:  Compliance (hours/night): 6.5 hrs/night  Download AHI (/hour): 5.1 /HR  Average CPAP Pressure : 16.6 cmH2O           APAP - Settings  Pressure Min: 10 cmH2O  Pressure Max: 20 cmH2O                 Comfort Settings  Humidity Level (0-8): 3  Heated Tubing (Yes/No): No  Flex/EPR (0-3): 3 PAP Mask  Mask Type: Nasal mask  Mask Model: Wisp  Mask Size: SM     Eugene - Total score: 7    Follow-up :     Last Visit : April 2018      Patient reports the listed Co-morbidities are well controlled and stable at this time. Subjective Health Changes: Weight loss surgery with 75#'s weight loss     Follow-up :      Over Night Oximetry: [] Yes  [] No  [x] NA   Using O2: [] Yes  [] No  [x] NA   Patient is compliant with the machine  [x] Yes  [] No   Feeling rested when using the machine   [x] Yes  [] No     Pressure is comfortable with inspiration and expiration  [x] Yes  [] No     Noticed changes in pressure   [] Yes  [] No  [x] NA   Mask is fitting well  [x] Yes  [] No   Noting Mask Air Leak  [x] Yes  [] No   Having painful Aerophagia  [] Yes  [x] No   Nocturia   0  per night.    Having  HA upon waking  [] Yes  [x] No   Dry mouth upon waking   [] Yes  [x] No   Congestion upon waking   [] Yes  [x] No    Nose Bleeds  [] Yes  [x] No   Using Sleep Aides  Clonazepam 0.5mg PRN per PMD   Understands how to change humidification and/or tubing temperature for comfort while at home  [x] Yes  [] No     Difficulties falling asleep  [] Yes  [x] No Date Taking? Authorizing Provider   Multiple Vitamin (MVI, BARIATRIC ADVANTAGE MULTI-FORMULA, CHEW TAB) Take 1 tablet by mouth daily   Yes Historical Provider, MD   atorvastatin (LIPITOR) 10 MG tablet TAKE ONE TABLET BY MOUTH DAILY 8/7/18  Yes Arturo Easley MD   omeprazole (PRILOSEC) 20 MG delayed release capsule Take 1 capsule by mouth daily 8/3/18  Yes Peyman Ewing,    glucose blood VI test strips (ASCENSIA AUTODISC VI;ONE TOUCH ULTRA TEST VI) strip 1 each by Does not apply route 3 times daily 5/14/18  Yes Arturo Easley MD   Blood Glucose Monitoring Suppl [facility] BLOOD GLUCOSE METER) w/Device KIT Test TID Dispense One Touch per insurance ( I cannot order this brand on Epic). Thanks 5/14/18  Yes Arturo Easley MD   SURE COMFORT LANCETS 30G MISC 1 Units by Does not apply route 3 times daily 2/16/18  Yes Arturo Easley MD   FLUoxetine (PROZAC) 40 MG capsule TAKE TWO CAPSULES BY MOUTH DAILY 2/16/18  Yes Arturo Easley MD   albuterol sulfate HFA (PROAIR HFA) 108 (90 Base) MCG/ACT inhaler Inhale 1-2 puffs into the lungs every 6 hours as needed for Wheezing or Shortness of Breath 2/16/18  Yes Arturo Easley MD   Liraglutide (VICTOZA) 18 MG/3ML SOPN SC injection Inject 1.8 mg into the skin daily 2/16/18  Yes Arturo Easley MD   LANTUS SOLOSTAR 100 UNIT/ML injection pen INJECT 120 UNITS UNDER THE SKIN EVERY MORNING AND 90 Cali Angelina 57 EVENING  Patient taking differently: inject 20 units QAM 12/14/17  Yes Arturo Easley MD   valACYclovir (VALTREX) 500 MG tablet TAKE ONE TABLET BY MOUTH TWICE A DAY 3/15/17  Yes Arturo Easley MD   fluticasone (FLONASE) 50 MCG/ACT nasal spray PLACE TWO SPRAYS IN EACH NOSTRIL ONCE DAILY 1/24/16  Yes Arturo Easley MD   Insulin Pen Needle (B-D UF III MINI PEN NEEDLES) 31G X 5 MM MISC 1 each by Does not apply route 2 times daily 1/13/16  Yes Arturo Easley MD   aspirin 81 MG EC tablet Take 81 mg by mouth daily.      Yes Historical types were placed in this encounter. No orders of the defined types were placed in this encounter.       Robert Rodriguez MSN, RN, CNP

## 2018-09-12 NOTE — TELEPHONE ENCOUNTER
From: Stefanie Crook  To: Chandan Voss MD  Sent: 9/11/2018 10:15 PM EDT  Subject: Prescription Question    My depression has been worse and Im still not sleeping . I think I may have quit the bupropion and the miratrapizan to early.

## 2018-09-27 DIAGNOSIS — E11.3299 DM TYPE 2 WITH DIABETIC BACKGROUND RETINOPATHY (HCC): ICD-10-CM

## 2018-09-27 DIAGNOSIS — E78.00 PURE HYPERCHOLESTEROLEMIA: Chronic | ICD-10-CM

## 2018-09-27 LAB
A/G RATIO: 1.5 (ref 1.1–2.2)
ALBUMIN SERPL-MCNC: 3.8 G/DL (ref 3.4–5)
ALP BLD-CCNC: 70 U/L (ref 40–129)
ALT SERPL-CCNC: 13 U/L (ref 10–40)
ANION GAP SERPL CALCULATED.3IONS-SCNC: 12 MMOL/L (ref 3–16)
AST SERPL-CCNC: 10 U/L (ref 15–37)
BILIRUB SERPL-MCNC: 0.4 MG/DL (ref 0–1)
BUN BLDV-MCNC: 13 MG/DL (ref 7–20)
CALCIUM SERPL-MCNC: 9.1 MG/DL (ref 8.3–10.6)
CHLORIDE BLD-SCNC: 106 MMOL/L (ref 99–110)
CHOLESTEROL, TOTAL: 129 MG/DL (ref 0–199)
CO2: 25 MMOL/L (ref 21–32)
CREAT SERPL-MCNC: 1 MG/DL (ref 0.6–1.1)
ESTIMATED AVERAGE GLUCOSE: 114 MG/DL
GFR AFRICAN AMERICAN: >60
GFR NON-AFRICAN AMERICAN: 59
GLOBULIN: 2.6 G/DL
GLUCOSE BLD-MCNC: 106 MG/DL (ref 70–99)
HBA1C MFR BLD: 5.6 %
HDLC SERPL-MCNC: 36 MG/DL (ref 40–60)
LDL CHOLESTEROL CALCULATED: 71 MG/DL
POTASSIUM SERPL-SCNC: 4 MMOL/L (ref 3.5–5.1)
SODIUM BLD-SCNC: 143 MMOL/L (ref 136–145)
TOTAL PROTEIN: 6.4 G/DL (ref 6.4–8.2)
TRIGL SERPL-MCNC: 111 MG/DL (ref 0–150)
VLDLC SERPL CALC-MCNC: 22 MG/DL

## 2018-09-27 NOTE — PROGRESS NOTES
Exam  Vitals:    09/28/18 0814 09/28/18 0854   BP: (!) 155/79 132/78   Pulse: 64    Resp: 14    Temp: 97.2 °F (36.2 °C)    SpO2: 100%    Weight: 250 lb 3.2 oz (113.5 kg)         Wt Readings from Last 3 Encounters:   09/28/18 250 lb 3.2 oz (113.5 kg)   09/12/18 256 lb 3.2 oz (116.2 kg)   09/12/18 256 lb (116.1 kg)      NAD   Skin is warm and dry. Well hydrated. No rash. Mood +, affect is normal.   The neck is supple and free of adenopathy or masses, the thyroid is normal without enlargement or nodules. Chest: clear with no wheezes or rales. No retractions, or use of accessory muscles noted. Cardiovascular: PMI is not displaced, and no thrill noted. Regular rate and rhythm with no rub, murmur or gallop. No peripheral edema. The abdomen is soft without tenderness, guarding, mass, rebound or organomegaly. Aorta, femoral, DP and PT pulses intact. Feet in good repair, without significant callouses and without ulceration or deformity. Assessment / Plan:        Diagnosis Orders   1. Anxiety associated with depression  Well controlled. 2. Morbid obesity with BMI of 45.0-49.9, adult [facility])  Doing very well. Resume exercise as planned   3. Essential hypertension  At [facility]   4. 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 Depression with anxiety  clonazePAM (KLONOPIN) 0.5 MG tablet  Controlled Substances Monitoring:     RX Monitoring 9/28/2018   Attestation The Prescription Monitoring Report for this patient was reviewed today. Documentation Possible medication side effects, risk of tolerance/dependence & alternative treatments discussed. ;No signs of potential drug abuse or diversion identified. 6. Binge eating  Lisdexamfetamine Dimesylate (VYVANSE) 60 MG CAPS    Lisdexamfetamine Dimesylate (VYVANSE) 60 MG CAPS    Lisdexamfetamine Dimesylate (VYVANSE) 60 MG CAPS  Effective and well controlled.           Side effects of current medications reviewed and questions

## 2018-09-28 ENCOUNTER — OFFICE VISIT (OUTPATIENT)
Dept: FAMILY MEDICINE CLINIC | Age: 48
End: 2018-09-28
Payer: COMMERCIAL

## 2018-09-28 VITALS
OXYGEN SATURATION: 100 % | BODY MASS INDEX: 40.38 KG/M2 | RESPIRATION RATE: 14 BRPM | TEMPERATURE: 97.2 F | WEIGHT: 250.2 LBS | HEART RATE: 64 BPM | SYSTOLIC BLOOD PRESSURE: 132 MMHG | DIASTOLIC BLOOD PRESSURE: 78 MMHG

## 2018-09-28 DIAGNOSIS — E66.01 MORBID OBESITY WITH BMI OF 45.0-49.9, ADULT (HCC): ICD-10-CM

## 2018-09-28 DIAGNOSIS — F41.8 ANXIETY ASSOCIATED WITH DEPRESSION: Primary | Chronic | ICD-10-CM

## 2018-09-28 DIAGNOSIS — R63.2 BINGE EATING: Chronic | ICD-10-CM

## 2018-09-28 DIAGNOSIS — I10 ESSENTIAL HYPERTENSION: ICD-10-CM

## 2018-09-28 DIAGNOSIS — Z23 NEED FOR IMMUNIZATION AGAINST INFLUENZA: ICD-10-CM

## 2018-09-28 DIAGNOSIS — F41.8 DEPRESSION WITH ANXIETY: Chronic | ICD-10-CM

## 2018-09-28 DIAGNOSIS — E11.3299 DM TYPE 2 WITH DIABETIC BACKGROUND RETINOPATHY (HCC): ICD-10-CM

## 2018-09-28 PROCEDURE — 90471 IMMUNIZATION ADMIN: CPT | Performed by: FAMILY MEDICINE

## 2018-09-28 PROCEDURE — 99214 OFFICE O/P EST MOD 30 MIN: CPT | Performed by: FAMILY MEDICINE

## 2018-09-28 PROCEDURE — 90674 CCIIV4 VAC NO PRSV 0.5 ML IM: CPT | Performed by: FAMILY MEDICINE

## 2018-09-28 RX ORDER — CLONAZEPAM 0.5 MG/1
TABLET ORAL
Qty: 10 TABLET | Refills: 1 | Status: SHIPPED | OUTPATIENT
Start: 2018-09-28 | End: 2019-03-12 | Stop reason: SDUPTHER

## 2018-10-24 ENCOUNTER — OFFICE VISIT (OUTPATIENT)
Dept: BARIATRICS/WEIGHT MGMT | Age: 48
End: 2018-10-24

## 2018-10-24 VITALS
SYSTOLIC BLOOD PRESSURE: 119 MMHG | BODY MASS INDEX: 39.18 KG/M2 | WEIGHT: 243.8 LBS | HEART RATE: 65 BPM | DIASTOLIC BLOOD PRESSURE: 72 MMHG | HEIGHT: 66 IN

## 2018-10-24 DIAGNOSIS — Z98.84 S/P LAPAROSCOPIC SLEEVE GASTRECTOMY: Primary | ICD-10-CM

## 2018-10-24 PROCEDURE — 99024 POSTOP FOLLOW-UP VISIT: CPT | Performed by: SURGERY

## 2018-12-10 ENCOUNTER — HOSPITAL ENCOUNTER (EMERGENCY)
Age: 48
Discharge: HOME | End: 2018-12-10
Attending: EMERGENCY MEDICINE
Payer: COMMERCIAL

## 2018-12-10 VITALS
OXYGEN SATURATION: 100 % | BODY MASS INDEX: 37.35 KG/M2 | RESPIRATION RATE: 16 BRPM | WEIGHT: 232.4 LBS | HEART RATE: 72 BPM | DIASTOLIC BLOOD PRESSURE: 70 MMHG | TEMPERATURE: 97.6 F | HEIGHT: 66 IN | SYSTOLIC BLOOD PRESSURE: 128 MMHG

## 2018-12-10 DIAGNOSIS — L03.115 CELLULITIS OF RIGHT FOOT: Primary | ICD-10-CM

## 2018-12-10 PROCEDURE — 6370000000 HC RX 637 (ALT 250 FOR IP): Performed by: EMERGENCY MEDICINE

## 2018-12-10 PROCEDURE — 99283 EMERGENCY DEPT VISIT LOW MDM: CPT

## 2018-12-10 RX ORDER — SULFAMETHOXAZOLE AND TRIMETHOPRIM 800; 160 MG/1; MG/1
1 TABLET ORAL ONCE
Status: COMPLETED | OUTPATIENT
Start: 2018-12-10 | End: 2018-12-10

## 2018-12-10 RX ORDER — CEPHALEXIN 500 MG/1
500 CAPSULE ORAL ONCE
Status: COMPLETED | OUTPATIENT
Start: 2018-12-10 | End: 2018-12-10

## 2018-12-10 RX ORDER — SULFAMETHOXAZOLE AND TRIMETHOPRIM 800; 160 MG/1; MG/1
1 TABLET ORAL 2 TIMES DAILY
Qty: 14 TABLET | Refills: 0 | Status: SHIPPED | OUTPATIENT
Start: 2018-12-10 | End: 2018-12-17

## 2018-12-10 RX ORDER — CEPHALEXIN 250 MG/1
250 CAPSULE ORAL 4 TIMES DAILY
Qty: 28 CAPSULE | Refills: 0 | Status: SHIPPED | OUTPATIENT
Start: 2018-12-10 | End: 2018-12-17

## 2018-12-10 RX ADMIN — SULFAMETHOXAZOLE AND TRIMETHOPRIM 1 TABLET: 800; 160 TABLET ORAL at 17:23

## 2018-12-10 RX ADMIN — CEPHALEXIN 500 MG: 500 CAPSULE ORAL at 17:23

## 2018-12-10 ASSESSMENT — PAIN DESCRIPTION - ORIENTATION: ORIENTATION: RIGHT

## 2018-12-10 ASSESSMENT — PAIN SCALES - GENERAL: PAINLEVEL_OUTOF10: 6

## 2018-12-10 ASSESSMENT — PAIN DESCRIPTION - PAIN TYPE: TYPE: ACUTE PAIN

## 2018-12-10 ASSESSMENT — PAIN DESCRIPTION - LOCATION: LOCATION: FOOT

## 2018-12-11 ENCOUNTER — OFFICE VISIT (OUTPATIENT)
Dept: FAMILY MEDICINE CLINIC | Age: 48
End: 2018-12-11
Payer: COMMERCIAL

## 2018-12-11 VITALS
RESPIRATION RATE: 20 BRPM | DIASTOLIC BLOOD PRESSURE: 77 MMHG | TEMPERATURE: 96.8 F | OXYGEN SATURATION: 98 % | BODY MASS INDEX: 37.04 KG/M2 | WEIGHT: 229.5 LBS | SYSTOLIC BLOOD PRESSURE: 108 MMHG | HEART RATE: 75 BPM

## 2018-12-11 DIAGNOSIS — E11.3293 TYPE 2 DIABETES MELLITUS WITH BOTH EYES AFFECTED BY MILD NONPROLIFERATIVE RETINOPATHY WITHOUT MACULAR EDEMA, WITHOUT LONG-TERM CURRENT USE OF INSULIN (HCC): Chronic | ICD-10-CM

## 2018-12-11 DIAGNOSIS — Z12.11 COLON CANCER SCREENING: ICD-10-CM

## 2018-12-11 DIAGNOSIS — E78.00 PURE HYPERCHOLESTEROLEMIA: Chronic | ICD-10-CM

## 2018-12-11 DIAGNOSIS — L03.119 CELLULITIS OF FOOT: ICD-10-CM

## 2018-12-11 DIAGNOSIS — I10 ESSENTIAL HYPERTENSION: ICD-10-CM

## 2018-12-11 DIAGNOSIS — R63.2 BINGE EATING: Chronic | ICD-10-CM

## 2018-12-11 DIAGNOSIS — E74.39 GLUCOSE INTOLERANCE: ICD-10-CM

## 2018-12-11 DIAGNOSIS — E11.3299 DM TYPE 2 WITH DIABETIC BACKGROUND RETINOPATHY (HCC): Primary | ICD-10-CM

## 2018-12-11 PROCEDURE — 99214 OFFICE O/P EST MOD 30 MIN: CPT | Performed by: FAMILY MEDICINE

## 2018-12-11 NOTE — PROGRESS NOTES
Subjective:      Patient ID: Gretchen Roberts 46 y.o. 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 Diagnoses of Pure hypercholesterolemia, Binge eating, Type 2 diabetes mellitus with both eyes affected by mild nonproliferative retinopathy without macular edema, without long-term current use of insulin ([address] Essential hypertension, Colon cancer screening, and Glucose intolerance were also pertinent to this visit. Stress with sister dying from infection. She has not been eating as well, doing a bit of stress eating. No binging; the Vyvanse has been effective. Keeps medication secure. She was in the ER last night with swelling and redness in the right foot. Started Sunday am.  Is better this am; started on Keflex and Bactrim. Pain in the food is moderate. She took an OxyCodone left over from surgery at [facility] and helped her to sleep. Pain in the right trapezius muscle for a few months. Stretching helps. Changing positions helps. Would like a stand up desk. Treatment Adherence:   Medication compliance:  compliant most of the time  Diet compliance:  compliant most of the time  Weight trend: decreasing  Current exercise: walks 5 time(s) per week. Gym twice a week most weeks. Barriers: none    Diabetes Mellitus Type 2: Current symptoms/problems include none. Home blood sugar records: fasting range: 130 to 140, postprandial range: not testing  Any episodes of hypoglycemia? no  Eye exam current (within one year): yes  Tobacco history: She  reports that she has never smoked. She has never used smokeless tobacco.   Daily Aspirin? Yes    Hypertension:  Home blood pressure monitoring: No.  She is adherent to a low sodium diet. Patient denies chest pain, shortness of breath, blurred vision, peripheral edema and palpitations. Antihypertensive medication side effects: no medication side effects noted.   Use of agents associated 1 kit 0    SURE COMFORT LANCETS 30G MISC 1 Units by Does not apply route 3 times daily 100 each 5    albuterol sulfate HFA (PROAIR HFA) 108 (90 Base) MCG/ACT inhaler Inhale 1-2 puffs into the lungs every 6 hours as needed for Wheezing or Shortness of Breath 1 Inhaler 5    valACYclovir (VALTREX) 500 MG tablet TAKE ONE TABLET BY MOUTH TWICE A DAY 12 tablet 2    fluticasone (FLONASE) 50 MCG/ACT nasal spray PLACE TWO SPRAYS IN EACH NOSTRIL ONCE DAILY 1 Bottle 12    Insulin Pen Needle (B-D UF III MINI PEN NEEDLES) 31G X 5 MM MISC 1 each by Does not apply route 2 times daily 100 each 12    aspirin 81 MG EC tablet Take 81 mg by mouth daily.             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 ([facility]    Depression with anxiety    Obstructive sleep apnea syndrome    Herpes simplex    Binge eating    Pedal edema    FH: melanoma    Essential hypertension    SOB (shortness of breath) on exertion    Chronic gastric ulcer without hemorrhage and without perforation    Hiatal hernia with GERD and esophagitis    Morbid obesity with BMI of 45.0-49.9, adult ([facility] [address] [facility]        Past Medical History:   Diagnosis Date    Anxiety     CTS (carpal tunnel syndrome) 2/10/2015    Depression     Diabetes mellitus ([facility] [address] [facility]     ETD (eustachian tube dysfunction) 5/16/2014    Herpes simplex     Hyperlipidemia     Lumbar strain 4/19/2017    PCOS (polycystic ovarian syndrome)     Sleep apnea     TMJ syndrome        Past Surgical History:   Procedure Laterality Date    ENDOSCOPY, COLON, DIAGNOSTIC      OTHER SURGICAL HISTORY  05/25/2018    EGD    OR LAP,STOMACH,OTHER,W/O TUBE N/A 7/31/2018    ROBOTIC ASSISTED LAPAROSCOPIC SLEEVE GASTRECTOMY performed by Lynnann Kocher, DO at [facility] History   Substance Use Topics    Smoking status: Never Smoker    Smokeless tobacco: Never Used    Alcohol use Yes      Comment: infrequent, few times a month        Family History   Problem Relation Age of Onset    Cervical Cancer Mother     Cancer Mother         cervical    Diabetes Father     Stroke Father         25s    Hypertension Father     Coronary Art Dis Father     Other Father         CHIVO    Cancer Father         melanoma    Glaucoma Father     Diabetes Sister     Diabetes Maternal Grandmother     Cancer Maternal Grandmother         melanoma    Arthritis Maternal Grandmother     Diabetes Paternal Grandmother     Stroke Paternal Grandmother         Review of Systems  Review of Systems    Objective:   Physical Exam  NAD   Skin is warm and dry. Well hydrated. No rash. Mood +, affect is normal.   The neck is supple and free of adenopathy or masses, the thyroid is normal without enlargement or nodules. Chest: clear with no wheezes or rales. No retractions, or use of accessory muscles noted. Cardiovascular: PMI is not displaced, and no thrill noted. Regular rate and rhythm with no rub, murmur or gallop. No peripheral edema. The abdomen is soft without tenderness, guarding, mass, rebound or organomegaly. Aorta, femoral, DP and PT pulses intact. Feet in good repair, without significant callouses and without ulceration or deformity. Erythema and induration right lateral foot. Decreased in size from line drawn by ER. Assessment / Plan:        Diagnosis Orders   1. 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 Pure hypercholesterolemia     3. Binge eating  Lisdexamfetamine Dimesylate (VYVANSE) 60 MG CAPS    Lisdexamfetamine Dimesylate (VYVANSE) 60 MG CAPS  Continues to loose weight. Discussed diet, exercise and weight loss strategies    4.  Type 2 diabetes mellitus with both eyes

## 2019-01-29 ENCOUNTER — PATIENT MESSAGE (OUTPATIENT)
Dept: FAMILY MEDICINE CLINIC | Age: 49
End: 2019-01-29

## 2019-01-30 RX ORDER — LEVONORGESTREL AND ETHINYL ESTRADIOL 0.1-0.02MG
1 KIT ORAL DAILY
Qty: 1 PACKET | Refills: 2 | Status: SHIPPED | OUTPATIENT
Start: 2019-01-30 | End: 2019-03-12 | Stop reason: SDUPTHER

## 2019-02-14 ENCOUNTER — TELEPHONE (OUTPATIENT)
Dept: BARIATRICS/WEIGHT MGMT | Age: 49
End: 2019-02-14

## 2019-02-27 ENCOUNTER — OFFICE VISIT (OUTPATIENT)
Dept: BARIATRICS/WEIGHT MGMT | Age: 49
End: 2019-02-27
Payer: COMMERCIAL

## 2019-02-27 VITALS
SYSTOLIC BLOOD PRESSURE: 159 MMHG | HEIGHT: 66 IN | HEART RATE: 66 BPM | BODY MASS INDEX: 36.22 KG/M2 | WEIGHT: 225.4 LBS | DIASTOLIC BLOOD PRESSURE: 80 MMHG

## 2019-02-27 DIAGNOSIS — Z98.84 S/P LAPAROSCOPIC SLEEVE GASTRECTOMY: Primary | ICD-10-CM

## 2019-02-27 DIAGNOSIS — I10 ESSENTIAL HYPERTENSION: ICD-10-CM

## 2019-02-27 DIAGNOSIS — E66.01 SEVERE OBESITY (BMI 35.0-35.9 WITH COMORBIDITY) (HCC): ICD-10-CM

## 2019-02-27 PROCEDURE — 99213 OFFICE O/P EST LOW 20 MIN: CPT | Performed by: SURGERY

## 2019-03-12 DIAGNOSIS — F41.8 DEPRESSION WITH ANXIETY: Chronic | ICD-10-CM

## 2019-03-13 RX ORDER — LEVONORGESTREL AND ETHINYL ESTRADIOL 0.1-0.02MG
1 KIT ORAL DAILY
Qty: 1 PACKET | Refills: 3 | Status: SHIPPED | OUTPATIENT
Start: 2019-03-13 | End: 2019-04-01 | Stop reason: SINTOL

## 2019-03-13 RX ORDER — LEVONORGESTREL AND ETHINYL ESTRADIOL 0.1-0.02MG
1 KIT ORAL DAILY
Qty: 1 PACKET | Refills: 2 | OUTPATIENT
Start: 2019-03-13

## 2019-03-18 RX ORDER — CLONAZEPAM 0.5 MG/1
TABLET ORAL
Qty: 10 TABLET | Refills: 0 | Status: SHIPPED | OUTPATIENT
Start: 2019-03-18 | End: 2019-07-08 | Stop reason: SDUPTHER

## 2019-03-25 ENCOUNTER — PATIENT MESSAGE (OUTPATIENT)
Dept: FAMILY MEDICINE CLINIC | Age: 49
End: 2019-03-25

## 2019-03-25 DIAGNOSIS — E78.00 PURE HYPERCHOLESTEROLEMIA: Chronic | ICD-10-CM

## 2019-03-25 DIAGNOSIS — Z00.00 ROUTINE GENERAL MEDICAL EXAMINATION AT A HEALTH CARE FACILITY: Primary | ICD-10-CM

## 2019-03-25 DIAGNOSIS — Z20.2 POSSIBLE EXPOSURE TO STD: ICD-10-CM

## 2019-03-25 DIAGNOSIS — E74.39 GLUCOSE INTOLERANCE: ICD-10-CM

## 2019-03-27 DIAGNOSIS — Z00.00 ROUTINE GENERAL MEDICAL EXAMINATION AT A HEALTH CARE FACILITY: ICD-10-CM

## 2019-03-27 DIAGNOSIS — E78.00 PURE HYPERCHOLESTEROLEMIA: Chronic | ICD-10-CM

## 2019-03-27 DIAGNOSIS — Z20.2 POSSIBLE EXPOSURE TO STD: ICD-10-CM

## 2019-03-27 LAB
A/G RATIO: 1.5 (ref 1.1–2.2)
ALBUMIN SERPL-MCNC: 4.3 G/DL (ref 3.4–5)
ALP BLD-CCNC: 58 U/L (ref 40–129)
ALT SERPL-CCNC: 14 U/L (ref 10–40)
ANION GAP SERPL CALCULATED.3IONS-SCNC: 11 MMOL/L (ref 3–16)
AST SERPL-CCNC: 11 U/L (ref 15–37)
BILIRUB SERPL-MCNC: 0.6 MG/DL (ref 0–1)
BUN BLDV-MCNC: 20 MG/DL (ref 7–20)
CALCIUM SERPL-MCNC: 9 MG/DL (ref 8.3–10.6)
CHLORIDE BLD-SCNC: 104 MMOL/L (ref 99–110)
CHOLESTEROL, FASTING: 140 MG/DL (ref 0–199)
CO2: 26 MMOL/L (ref 21–32)
CREAT SERPL-MCNC: 1 MG/DL (ref 0.6–1.1)
ESTIMATED AVERAGE GLUCOSE: 162.8 MG/DL
GFR AFRICAN AMERICAN: >60
GFR NON-AFRICAN AMERICAN: 59
GLOBULIN: 2.9 G/DL
GLUCOSE FASTING: 175 MG/DL (ref 70–99)
HBA1C MFR BLD: 7.3 %
HDLC SERPL-MCNC: 47 MG/DL (ref 40–60)
LDL CHOLESTEROL CALCULATED: 71 MG/DL
POTASSIUM SERPL-SCNC: 4 MMOL/L (ref 3.5–5.1)
SODIUM BLD-SCNC: 141 MMOL/L (ref 136–145)
TOTAL PROTEIN: 7.2 G/DL (ref 6.4–8.2)
TRIGLYCERIDE, FASTING: 112 MG/DL (ref 0–150)
VLDLC SERPL CALC-MCNC: 22 MG/DL

## 2019-03-28 LAB
HIV AG/AB: NORMAL
HIV ANTIGEN: NORMAL
HIV-1 ANTIBODY: NORMAL
HIV-2 AB: NORMAL
TOTAL SYPHILLIS IGG/IGM: NORMAL

## 2019-03-29 LAB
C. TRACHOMATIS DNA ,URINE: NEGATIVE
N. GONORRHOEAE DNA, URINE: NEGATIVE

## 2019-03-31 LAB
HERPES TYPE 1/2 IGM COMBINED: 0.36 IV
HERPES TYPE I/II IGG COMBINED: >22.4 IV
HSV 1 GLYCOPROTEIN G AB IGG: 57.7 IV
HSV 2 GLYCOPROTEIN G AB IGG: 0.13 IV

## 2019-04-01 ENCOUNTER — OFFICE VISIT (OUTPATIENT)
Dept: FAMILY MEDICINE CLINIC | Age: 49
End: 2019-04-01
Payer: COMMERCIAL

## 2019-04-01 ENCOUNTER — TELEPHONE (OUTPATIENT)
Dept: FAMILY MEDICINE CLINIC | Age: 49
End: 2019-04-01

## 2019-04-01 VITALS
RESPIRATION RATE: 12 BRPM | SYSTOLIC BLOOD PRESSURE: 124 MMHG | TEMPERATURE: 98.4 F | BODY MASS INDEX: 36.79 KG/M2 | OXYGEN SATURATION: 100 % | WEIGHT: 224.5 LBS | DIASTOLIC BLOOD PRESSURE: 76 MMHG | HEART RATE: 63 BPM

## 2019-04-01 DIAGNOSIS — G47.61 PERIODIC LIMB MOVEMENT DISORDER: ICD-10-CM

## 2019-04-01 DIAGNOSIS — I10 ESSENTIAL HYPERTENSION: ICD-10-CM

## 2019-04-01 DIAGNOSIS — N92.0 MENORRHAGIA WITH REGULAR CYCLE: ICD-10-CM

## 2019-04-01 DIAGNOSIS — R63.2 BINGE EATING: Chronic | ICD-10-CM

## 2019-04-01 DIAGNOSIS — E78.00 PURE HYPERCHOLESTEROLEMIA: Chronic | ICD-10-CM

## 2019-04-01 DIAGNOSIS — F41.8 DEPRESSION WITH ANXIETY: ICD-10-CM

## 2019-04-01 DIAGNOSIS — E11.9 TYPE 2 DIABETES MELLITUS WITHOUT COMPLICATION, WITHOUT LONG-TERM CURRENT USE OF INSULIN (HCC): Primary | ICD-10-CM

## 2019-04-01 DIAGNOSIS — E66.01 MORBID OBESITY WITH BMI OF 45.0-49.9, ADULT (HCC): ICD-10-CM

## 2019-04-01 PROCEDURE — 99214 OFFICE O/P EST MOD 30 MIN: CPT | Performed by: FAMILY MEDICINE

## 2019-04-01 RX ORDER — FLUOXETINE HYDROCHLORIDE 40 MG/1
CAPSULE ORAL
Qty: 60 CAPSULE | Refills: 5 | Status: CANCELLED | OUTPATIENT
Start: 2019-04-01

## 2019-04-01 RX ORDER — LISDEXAMFETAMINE DIMESYLATE 60 MG/1
CAPSULE ORAL
COMMUNITY
End: 2019-10-08 | Stop reason: SDUPTHER

## 2019-04-01 RX ORDER — PRAMIPEXOLE DIHYDROCHLORIDE 0.12 MG/1
.125-.25 TABLET ORAL NIGHTLY
Qty: 60 TABLET | Refills: 5 | Status: SHIPPED | OUTPATIENT
Start: 2019-04-01 | End: 2019-05-22

## 2019-04-01 RX ORDER — FLUOXETINE HYDROCHLORIDE 20 MG/1
60 CAPSULE ORAL DAILY
Qty: 90 CAPSULE | Refills: 5 | Status: SHIPPED | OUTPATIENT
Start: 2019-04-01 | End: 2019-11-13 | Stop reason: SDUPTHER

## 2019-04-01 RX ORDER — VITAMIN B COMPLEX
1 CAPSULE ORAL DAILY
COMMUNITY

## 2019-04-01 RX ORDER — BUPROPION HYDROCHLORIDE 150 MG/1
150 TABLET ORAL EVERY MORNING
Qty: 30 TABLET | Refills: 5 | Status: SHIPPED | OUTPATIENT
Start: 2019-04-01 | End: 2019-10-08 | Stop reason: SDUPTHER

## 2019-04-01 ASSESSMENT — PATIENT HEALTH QUESTIONNAIRE - PHQ9
1. LITTLE INTEREST OR PLEASURE IN DOING THINGS: 0
SUM OF ALL RESPONSES TO PHQ QUESTIONS 1-9: 0
SUM OF ALL RESPONSES TO PHQ QUESTIONS 1-9: 0
SUM OF ALL RESPONSES TO PHQ9 QUESTIONS 1 & 2: 0
2. FEELING DOWN, DEPRESSED OR HOPELESS: 0

## 2019-04-01 NOTE — PROGRESS NOTES
Date    LABA1C 7.3 03/27/2019    LABA1C 5.6 09/27/2018    LABA1C 5.2 07/12/2018     Lab Results   Component Value Date    LABMICR <1.20 07/12/2018    CREATININE 1.0 03/27/2019     Lab Results   Component Value Date    ALT 14 03/27/2019    AST 11 (L) 03/27/2019     Lab Results   Component Value Date    CHOL 129 09/27/2018    TRIG 111 09/27/2018    HDL 47 03/27/2019    LDLCALC 71 03/27/2019         Outpatient Medications Marked as Taking for the 4/1/19 encounter (Office Visit) with Spring Robledo MD   Medication Sig Dispense Refill    b complex vitamins capsule Take 1 capsule by mouth daily      Lisdexamfetamine Dimesylate (VYVANSE) 60 MG CAPS Take by mouth.  clonazePAM (KLONOPIN) 0.5 MG tablet Take 1 tab po QHS prn insomnia. 10 tablet 0    levonorgestrel-ethinyl estradiol (AVIANE;ALESSE;LESSINA) 0.1-20 MG-MCG per tablet Take 1 tablet by mouth daily 1 packet 3    FLUoxetine (PROZAC) 40 MG capsule TAKE 2 CAPSULES BY MOUTH ONE TIME A DAY  60 capsule 5    buPROPion (WELLBUTRIN XL) 150 MG extended release tablet Take 1 tablet by mouth every morning 30 tablet 3    Multiple Vitamin (MVI, BARIATRIC ADVANTAGE MULTI-FORMULA, CHEW TAB) Take 1 tablet by mouth daily      glucose blood VI test strips (ASCENSIA AUTODISC VI;ONE TOUCH ULTRA TEST VI) strip 1 each by Does not apply route 3 times daily 100 strip 12    Blood Glucose Monitoring Suppl (ACURA BLOOD GLUCOSE METER) w/Device KIT Test TID Dispense One Touch per insurance ( I cannot order this brand on Epic). Thanks 1 kit 0    SURE COMFORT LANCETS 30G MISC 1 Units by Does not apply route 3 times daily 100 each 5    Insulin Pen Needle (B-D UF III MINI PEN NEEDLES) 31G X 5 MM MISC 1 each by Does not apply route 2 times daily 100 each 12    aspirin 81 MG EC tablet Take 81 mg by mouth daily.             Allergies   Allergen Reactions    Effexor Xr [Venlafaxine Hcl Er]         Patient Active Problem List   Diagnosis    Anxiety associated with depression    PCOS (polycystic ovarian syndrome)    Pure hypercholesterolemia    Common migraine    Type 2 diabetes mellitus without complication, without long-term current use of insulin (HCC)    Depression with anxiety    Obstructive sleep apnea syndrome    Herpes simplex    Binge eating    Pedal edema    FH: melanoma    Essential hypertension    SOB (shortness of breath) on exertion    Hiatal hernia with GERD and esophagitis    Morbid obesity with BMI of 45.0-49.9, adult (HCC)    Glucose intolerance        Past Medical History:   Diagnosis Date    Anxiety     Chronic gastric ulcer without hemorrhage and without perforation     CTS (carpal tunnel syndrome) 2/10/2015    Depression     Diabetes mellitus [facility]     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 ([facility] 1/29/2016    ETD (eustachian tube dysfunction) 5/16/2014    Herpes simplex     Hyperlipidemia     Lumbar strain 4/19/2017    PCOS (polycystic ovarian syndrome)     Sleep apnea     TMJ syndrome        Past Surgical History:   Procedure Laterality Date    ENDOSCOPY, COLON, DIAGNOSTIC      OTHER SURGICAL HISTORY  05/25/2018    EGD    [place] LAP,STOMACH,OTHER,W/O TUBE N/A 7/31/2018    ROBOTIC ASSISTED LAPAROSCOPIC SLEEVE GASTRECTOMY  performed by Avi Waite DO at [facility] OR        Social History     Tobacco Use    Smoking status: Never Smoker    Smokeless tobacco: Never Used   Substance Use Topics    Alcohol use: Yes     Comment: infrequent, few times a month    Drug use: No        Family History   Problem Relation Age of Onset    Cervical Cancer Mother     Cancer Mother         cervical    Diabetes Father     Stroke Father         25s    Hypertension Father     Coronary Art Dis Father     Other Father         CHIVO    Cancer Father         melanoma    Glaucoma Father     Diabetes Sister     Diabetes Maternal Grandmother     Cancer Maternal Grandmother         melanoma    Arthritis Maternal Grandmother     Diabetes Paternal Grandmother Side effects of current medications reviewed and questions answered. Follow up in 3 months or prn.

## 2019-04-02 ENCOUNTER — TELEPHONE (OUTPATIENT)
Dept: FAMILY MEDICINE CLINIC | Age: 49
End: 2019-04-02

## 2019-04-02 NOTE — TELEPHONE ENCOUNTER
[facility] [address] called to clarify the script that was sent over for fluoxetine 20 mg. The pt was previously taking it qid for a total of 80 mg daily and the script they received yesterday is tid for a total of 60 mg daily. They just want to make sure the correct dose was sent over. Please advise. Thanks.           [address], [address] [place] [address] [phone]

## 2019-04-09 NOTE — TELEPHONE ENCOUNTER
Received APPROVAL for Trulicity 5.11ZT/8.2XA pen-injectors through 04/09/2020. Please advise patient. Thank you.

## 2019-04-18 ENCOUNTER — PATIENT MESSAGE (OUTPATIENT)
Dept: FAMILY MEDICINE CLINIC | Age: 49
End: 2019-04-18

## 2019-04-18 LAB — DIABETIC RETINOPATHY: POSITIVE

## 2019-04-19 RX ORDER — CYCLOBENZAPRINE HCL 10 MG
5-10 TABLET ORAL NIGHTLY PRN
Qty: 30 TABLET | Refills: 5 | Status: SHIPPED | OUTPATIENT
Start: 2019-04-19 | End: 2019-04-29

## 2019-04-19 NOTE — TELEPHONE ENCOUNTER
From: Byron Mills  To: Jaguar Ayers MD  Sent: 4/18/2019 6:41 PM EDT  Subject: Prescription Question     it feels like I started grinding my teeth again. Could I get a prescription for the flexeril?

## 2019-05-07 ENCOUNTER — PATIENT MESSAGE (OUTPATIENT)
Dept: FAMILY MEDICINE CLINIC | Age: 49
End: 2019-05-07

## 2019-05-07 DIAGNOSIS — B37.9 YEAST INFECTION: Primary | ICD-10-CM

## 2019-05-07 RX ORDER — FLUCONAZOLE 150 MG/1
150 TABLET ORAL ONCE
Qty: 1 TABLET | Refills: 0 | Status: SHIPPED | OUTPATIENT
Start: 2019-05-07 | End: 2019-05-07

## 2019-05-07 NOTE — TELEPHONE ENCOUNTER
From: Leonidas Montgomery  To: Rocky Child MD  Sent: 5/7/2019 9:10 AM EDT  Subject: Visit Follow-Up Question    Those yeast infections you told me to watch for, happened. Can I get a prescription for whatever I need to take care of it.      Thanks,   Anthony Lino

## 2019-05-07 NOTE — TELEPHONE ENCOUNTER
Last ov 4. 1.19  Pt developed a possible yeast infection  White discharge, on the thicker side, itching  No pain or odor  Diflucan pending

## 2019-05-21 ENCOUNTER — PATIENT MESSAGE (OUTPATIENT)
Dept: FAMILY MEDICINE CLINIC | Age: 49
End: 2019-05-21

## 2019-05-21 DIAGNOSIS — G47.61 PERIODIC LIMB MOVEMENT DISORDER: ICD-10-CM

## 2019-05-21 NOTE — TELEPHONE ENCOUNTER
Last ov 4. 1.19  Pt states she is still jerking in sleep, but not kicking her boyfriend anymore  Asking if you should increase dose

## 2019-05-22 RX ORDER — PRAMIPEXOLE DIHYDROCHLORIDE 0.5 MG/1
.5-1 TABLET ORAL NIGHTLY
Qty: 60 TABLET | Refills: 5 | Status: SHIPPED | OUTPATIENT
Start: 2019-05-22 | End: 2020-01-09

## 2019-05-29 ENCOUNTER — PATIENT MESSAGE (OUTPATIENT)
Dept: FAMILY MEDICINE CLINIC | Age: 49
End: 2019-05-29

## 2019-05-29 DIAGNOSIS — E11.9 TYPE 2 DIABETES MELLITUS WITHOUT COMPLICATION, WITHOUT LONG-TERM CURRENT USE OF INSULIN (HCC): Primary | ICD-10-CM

## 2019-05-29 RX ORDER — BLOOD-GLUCOSE METER
1 EACH MISCELLANEOUS DAILY
Qty: 1 KIT | Refills: 0 | Status: SHIPPED | OUTPATIENT
Start: 2019-05-29 | End: 2020-06-08

## 2019-05-29 NOTE — TELEPHONE ENCOUNTER
From: Gorden Dubin  To: Kodi Gardner MD  Sent: 5/29/2019 10:25 AM EDT  Subject: Prescription Question    I lost my diabetes monitor, could you send a prescription for a OneTouch Verio Flex® meter to my pharmacy. I have a coupon and its covered on my insurance.        Thanks,   Kaleb Hatfield

## 2019-05-31 ENCOUNTER — PATIENT MESSAGE (OUTPATIENT)
Dept: FAMILY MEDICINE CLINIC | Age: 49
End: 2019-05-31

## 2019-05-31 NOTE — TELEPHONE ENCOUNTER
From: Johnna Garibay  To: Miya Espino MD  Sent: 5/31/2019 9:34 AM EDT  Subject: Prescription Question    Los Bentley, I got the monitor but not the strips, can you also send in a script for the OneTouch Verio Strips?  ----- Message -----  From: PURVI Coello Res  Sent: 5/29/2019 1:57 PM EDT  To: Johnna Garibay  Subject: RE: Prescription Question  Neisha Alford,    I forwarded your request to Dr. Geo Garza. You can call your pharmacy tomorrow to see when it is ready for . Los Bentley LPN      ----- Message -----   From: Johnna Garibay   Sent: 5/29/2019 10:25 AM EDT   To: Miya Espino MD  Subject: Prescription Question    I lost my diabetes monitor, could you send a prescription for a OneTouch Verio Flex® meter to my pharmacy. I have a coupon and its covered on my insurance.        Thanks,   Neisha Alford

## 2019-06-20 ENCOUNTER — OFFICE VISIT (OUTPATIENT)
Dept: FAMILY MEDICINE CLINIC | Age: 49
End: 2019-06-20
Payer: COMMERCIAL

## 2019-06-20 VITALS
BODY MASS INDEX: 37.2 KG/M2 | OXYGEN SATURATION: 99 % | SYSTOLIC BLOOD PRESSURE: 131 MMHG | TEMPERATURE: 98.9 F | DIASTOLIC BLOOD PRESSURE: 72 MMHG | RESPIRATION RATE: 14 BRPM | HEART RATE: 72 BPM | WEIGHT: 227 LBS

## 2019-06-20 DIAGNOSIS — E78.00 PURE HYPERCHOLESTEROLEMIA: Chronic | ICD-10-CM

## 2019-06-20 DIAGNOSIS — E11.9 TYPE 2 DIABETES MELLITUS WITHOUT COMPLICATION, WITHOUT LONG-TERM CURRENT USE OF INSULIN (HCC): ICD-10-CM

## 2019-06-20 DIAGNOSIS — R10.11 RUQ PAIN: ICD-10-CM

## 2019-06-20 DIAGNOSIS — R10.11 RUQ PAIN: Primary | ICD-10-CM

## 2019-06-20 DIAGNOSIS — E61.1 IRON DEFICIENCY: ICD-10-CM

## 2019-06-20 LAB
A/G RATIO: 1.6 (ref 1.1–2.2)
ALBUMIN SERPL-MCNC: 4.4 G/DL (ref 3.4–5)
ALP BLD-CCNC: 64 U/L (ref 40–129)
ALT SERPL-CCNC: 20 U/L (ref 10–40)
ANION GAP SERPL CALCULATED.3IONS-SCNC: 13 MMOL/L (ref 3–16)
AST SERPL-CCNC: 13 U/L (ref 15–37)
BASOPHILS ABSOLUTE: 0.1 K/UL (ref 0–0.2)
BASOPHILS RELATIVE PERCENT: 1 %
BILIRUB SERPL-MCNC: 0.3 MG/DL (ref 0–1)
BILIRUBIN, POC: NEGATIVE
BLOOD URINE, POC: NEGATIVE
BUN BLDV-MCNC: 20 MG/DL (ref 7–20)
CALCIUM SERPL-MCNC: 9.3 MG/DL (ref 8.3–10.6)
CHLORIDE BLD-SCNC: 104 MMOL/L (ref 99–110)
CHOLESTEROL, TOTAL: 135 MG/DL (ref 0–199)
CLARITY, POC: CLEAR
CO2: 26 MMOL/L (ref 21–32)
COLOR, POC: YELLOW
CREAT SERPL-MCNC: 1 MG/DL (ref 0.6–1.1)
EOSINOPHILS ABSOLUTE: 0 K/UL (ref 0–0.6)
EOSINOPHILS RELATIVE PERCENT: 0.4 %
FERRITIN: 58.7 NG/ML (ref 15–150)
GFR AFRICAN AMERICAN: >60
GFR NON-AFRICAN AMERICAN: 58
GLOBULIN: 2.8 G/DL
GLUCOSE BLD-MCNC: 151 MG/DL (ref 70–99)
GLUCOSE URINE, POC: NEGATIVE
HCT VFR BLD CALC: 41.3 % (ref 36–48)
HDLC SERPL-MCNC: 51 MG/DL (ref 40–60)
HEMOGLOBIN: 13.9 G/DL (ref 12–16)
IRON SATURATION: 23 % (ref 15–50)
IRON: 86 UG/DL (ref 37–145)
KETONES, POC: NEGATIVE
LDL CHOLESTEROL CALCULATED: 64 MG/DL
LEUKOCYTE EST, POC: NEGATIVE
LYMPHOCYTES ABSOLUTE: 2.4 K/UL (ref 1–5.1)
LYMPHOCYTES RELATIVE PERCENT: 27.6 %
MCH RBC QN AUTO: 30.7 PG (ref 26–34)
MCHC RBC AUTO-ENTMCNC: 33.7 G/DL (ref 31–36)
MCV RBC AUTO: 90.9 FL (ref 80–100)
MONOCYTES ABSOLUTE: 0.6 K/UL (ref 0–1.3)
MONOCYTES RELATIVE PERCENT: 7 %
NEUTROPHILS ABSOLUTE: 5.5 K/UL (ref 1.7–7.7)
NEUTROPHILS RELATIVE PERCENT: 64 %
NITRITE, POC: NEGATIVE
PDW BLD-RTO: 13.5 % (ref 12.4–15.4)
PH, POC: 6
PLATELET # BLD: 241 K/UL (ref 135–450)
PMV BLD AUTO: 8.4 FL (ref 5–10.5)
POTASSIUM SERPL-SCNC: 4.5 MMOL/L (ref 3.5–5.1)
PROTEIN, POC: NEGATIVE
RBC # BLD: 4.55 M/UL (ref 4–5.2)
SODIUM BLD-SCNC: 143 MMOL/L (ref 136–145)
SPECIFIC GRAVITY, POC: 1.01
TOTAL IRON BINDING CAPACITY: 374 UG/DL (ref 260–445)
TOTAL PROTEIN: 7.2 G/DL (ref 6.4–8.2)
TRIGL SERPL-MCNC: 102 MG/DL (ref 0–150)
TSH REFLEX: 2.24 UIU/ML (ref 0.27–4.2)
UROBILINOGEN, POC: 0.2
VLDLC SERPL CALC-MCNC: 20 MG/DL
WBC # BLD: 8.6 K/UL (ref 4–11)

## 2019-06-20 PROCEDURE — 99214 OFFICE O/P EST MOD 30 MIN: CPT | Performed by: FAMILY MEDICINE

## 2019-06-20 PROCEDURE — 81002 URINALYSIS NONAUTO W/O SCOPE: CPT | Performed by: FAMILY MEDICINE

## 2019-06-20 NOTE — PROGRESS NOTES
Subjective:      Patient ID: Rock Sorenson 46 y.o. female. is here for evaluation for RUQ pain. HPI    Eugenia complains of RUQ pain one week. Constant ache, sharp if lays over on her right side or if takes a deep breath. Radiates to the back at times. No fever, cough, dyspnea. She had diarrhea 2 xs a day a few days in the past week; is on her period and has diarrhea sometimes with menses. She denies melena, hematochezia, dysuria, hematuria. Appetite is find and she has not nausea. The pain is not worse with eating. No trauma or injury. She tried exercising in a warm pool, thinking that would work it out - did not help or hurt. Pain is 6/10  Has a known gall stone. Rx; Asprin helps a bit. FSBS - 107 - 120 fasting. Right after eating spaghetti it was 200    Would like to consider IUID. Menses are regular. Outpatient Medications Marked as Taking for the 6/20/19 encounter (Office Visit) with Pedro Ray MD   Medication Sig Dispense Refill    blood glucose test strips (ASCENSIA AUTODISC VI;ONE TOUCH ULTRA TEST VI) strip 1 each by In Vitro route daily As needed. 100 each 3    Blood Glucose Monitoring Suppl (ONETOUCH VERIO FLEX SYSTEM) w/Device KIT 1 Device by Does not apply route daily 1 kit 0    pramipexole (MIRAPEX) 0.5 MG tablet Take 1-2 tablets by mouth nightly 60 tablet 5    b complex vitamins capsule Take 1 capsule by mouth daily      Lisdexamfetamine Dimesylate (VYVANSE) 60 MG CAPS Take by mouth.  buPROPion (WELLBUTRIN XL) 150 MG extended release tablet Take 1 tablet by mouth every morning 30 tablet 5    Dulaglutide (TRULICITY) 7.32 HR/0.4VC SOPN Inject 0.5 mLs into the skin every 7 days 4 pen 5    norgestrel-ethinyl estradiol (LO/OVRAL) 0.3-30 MG-MCG per tablet Take 1 tablet by mouth daily 1 packet 12    FLUoxetine (PROZAC) 20 MG capsule Take 3 capsules by mouth daily 90 capsule 5    clonazePAM (KLONOPIN) 0.5 MG tablet Take 1 tab po QHS prn insomnia.  10 tablet 0    Multiple Vitamin (MVI, BARIATRIC ADVANTAGE MULTI-FORMULA, CHEW TAB) Take 1 tablet by mouth daily      glucose blood VI test strips (ASCENSIA AUTODISC VI;ONE TOUCH ULTRA TEST VI) strip 1 each by Does not apply route 3 times daily 100 strip 12    Blood Glucose Monitoring Suppl (ACURA BLOOD GLUCOSE METER) w/Device KIT Test TID Dispense One Touch per insurance ( I cannot order this brand on Epic). Thanks 1 kit 0    SURE COMFORT LANCETS 30G MISC 1 Units by Does not apply route 3 times daily 100 each 5    fluticasone (FLONASE) 50 MCG/ACT nasal spray PLACE TWO SPRAYS IN EACH NOSTRIL ONCE DAILY 1 Bottle 12    Insulin Pen Needle (B-D UF III MINI PEN NEEDLES) 31G X 5 MM MISC 1 each by Does not apply route 2 times daily 100 each 12    aspirin 81 MG EC tablet Take 81 mg by mouth daily.             Allergies   Allergen Reactions    Effexor Xr [Venlafaxine Hcl Er]        Patient Active Problem List   Diagnosis    Anxiety associated with depression    PCOS (polycystic ovarian syndrome)    Pure hypercholesterolemia    Common migraine    Type 2 diabetes mellitus without complication, without long-term current use of insulin (HCC)    Depression with anxiety    Obstructive sleep apnea syndrome    Herpes simplex    Binge eating    Pedal edema    FH: melanoma    Essential hypertension    SOB (shortness of breath) on exertion    Hiatal hernia with GERD and esophagitis    Morbid obesity with BMI of 45.0-49.9, adult (HCC)    Glucose intolerance       Past Medical History:   Diagnosis Date    Anxiety     Chronic gastric ulcer without hemorrhage and without perforation     CTS (carpal tunnel syndrome) 2/10/2015    Depression     Diabetes mellitus ([address]     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 ([facility] 1/29/2016    ETD (eustachian tube dysfunction) 5/16/2014    Herpes simplex     Hyperlipidemia     Lumbar strain 4/19/2017    PCOS (polycystic ovarian syndrome)     Sleep apnea     TMJ syndrome Past Surgical History:   Procedure Laterality Date    ENDOSCOPY, COLON, DIAGNOSTIC      OTHER SURGICAL HISTORY  05/25/2018    EGD    [place] LAP,STOMACH,OTHER,W/O TUBE N/A 7/31/2018    ROBOTIC ASSISTED LAPAROSCOPIC SLEEVE GASTRECTOMY  performed by Lucille Sandoval DO at [address] [place] [address] History   Problem Relation Age of Onset    Cervical Cancer Mother     Cancer Mother         cervical    Diabetes Father     Stroke Father         25s    Hypertension Father     Coronary Art Dis Father     Other Father         CHIVO    Cancer Father         melanoma    Glaucoma Father     Diabetes Sister     Diabetes Maternal Grandmother     Cancer Maternal Grandmother         melanoma    Arthritis Maternal Grandmother     Diabetes Paternal Grandmother     Stroke Paternal Grandmother        Social History     Tobacco Use    Smoking status: Never Smoker    Smokeless tobacco: Never Used   Substance Use Topics    Alcohol use: Yes     Comment: infrequent, few times a month    Drug use: No            Review of Systems  Review of Systems    Objective:   Physical Exam  Vitals:    06/20/19 1110   BP: 131/72   Pulse: 72   Resp: 14   Temp: 98.9 °F (37.2 °C)   TempSrc: Oral   SpO2: 99%   Weight: 227 lb (103 kg)       Physical Exam   NAD  No respiratory distress. Skin is warm and dry. Well hydrated, no rash. The neck is supple and free of adenopathy or masses, the thyroid is normal without enlargement or nodules. Chest is clear, no wheezing or rales. Normal symmetric air entry throughout both lung fields. No chest wall tenderness or deformity  Heart regular with normal rate, no murmer or gallop  The abdomen is soft without tenderness, guarding, mass, rebound or organomegaly. Bowel sounds are normal. No CVA tenderness or inguinal adenopathy noted. Aorta, femoral, DP and PT pulses intact. Assessment:       Diagnosis Orders   1.  RUQ pain  Comprehensive Metabolic Panel    CBC Auto Differential    US Abdomen Complete POCT Urinalysis no Micro  Clinically sounds muscular but no tenderness. Rule out liver or renal pathology, less likely gastric/colon  Can use heat, ice, tylenol 650 mg TID PRN. Follow up if worsening/new sxs pending evaluation. Due colonoscopy. She has information and agrees to schedule   2. Birth control  Shirley Mendosa MD, Gynecology, [facility]   3. Type 2 diabetes mellitus without complication, without long-term current use of insulin (HCC)  Hemoglobin A1C    POCT Urinalysis no Micro  Discussed diet, exercise and weight loss strategies    4. Pure hypercholesterolemia  TSH with Reflex    Lipid Panel  Tolerating statin. LDL goal < 100   5. Iron deficiency  Iron and TIBC    Ferritin          Plan:      Side effects of current medications reviewed and questions answered. Call or return to clinic prn if these symptoms worsen or fail to improve as anticipated.

## 2019-06-21 LAB
ESTIMATED AVERAGE GLUCOSE: 171.4 MG/DL
HBA1C MFR BLD: 7.6 %

## 2019-06-24 ENCOUNTER — HOSPITAL ENCOUNTER (OUTPATIENT)
Dept: ULTRASOUND IMAGING | Age: 49
Discharge: HOME | End: 2019-06-24
Payer: COMMERCIAL

## 2019-06-24 DIAGNOSIS — R10.11 RUQ PAIN: ICD-10-CM

## 2019-06-24 PROCEDURE — 76705 ECHO EXAM OF ABDOMEN: CPT

## 2019-07-06 PROBLEM — E74.39 GLUCOSE INTOLERANCE: Status: RESOLVED | Noted: 2018-12-11 | Resolved: 2019-07-06

## 2019-07-06 PROBLEM — R06.02 SOB (SHORTNESS OF BREATH) ON EXERTION: Status: RESOLVED | Noted: 2018-04-23 | Resolved: 2019-07-06

## 2019-07-06 NOTE — PROGRESS NOTES
Subjective:      Patient ID: Saray Hillman 44 y.o. HPI  Migdalia Mccray is here for follow up for DM, HTN and hyperlipidemia. The primary encounter diagnosis was Type 2 diabetes mellitus without complication, without long-term current use of insulin ([facility] [address] Diagnoses of Essential hypertension, Pure hypercholesterolemia, Obstructive sleep apnea syndrome, Binge eating, and Depression with anxiety were also pertinent to this visit.    vyvanse has been effective; has not been binging. Well tolerated. No palpitations, irritability or insomnia. Depression is well controlled. Takes Klonopin rarely if is overwhelmed. Takes it once or twice a month. Effective and well tolerated. Keeps medication secure. Was hiking over the weekend; slipped and fell on muddy rocks. .  Has pain in the left neck. Strained the neck. Tight. No radicular pain. Hurts to more head - asiya to extend. Aleve helps a bit. Also has abrasion right knee. Sore but no trouble walking. Treatment Adherence:   Medication compliance:  compliant most of the time  Diet compliance:  compliant most of the time  Weight trend: fluctuating  Current exercise: going to the gym once a week plus hiking and camping. Barriers: none    Diabetes Mellitus Type 2: Current s  ymptoms/problems include none. Home blood sugar records: fasting range: 130-140, postprandial range: 117 - 200. typical 120s     Any episodes of hypoglycemia? no  Eye exam current (within one year): yes  January. Will request report  Tobacco history: She  reports that she has never smoked. She has never used smokeless tobacco.   Daily Aspirin? Yes    Hypertension:  Home blood pressure monitoring: No.  She is adherent to a low sodium diet. Patient denies chest pain, shortness of breath, peripheral edema, palpitations and dry cough. Antihypertensive medication side effects: no medication side effects noted. Use of agents associated with hypertension: none. strips (ASCENSIA AUTODISC VI;ONE TOUCH ULTRA TEST VI) strip 1 each by Does not apply route 3 times daily 100 strip 12    Blood Glucose Monitoring Suppl (ACURA BLOOD GLUCOSE METER) w/Device KIT Test TID Dispense One Touch per insurance ( I cannot order this brand on Epic). Thanks 1 kit 0    SURE COMFORT LANCETS 30G MISC 1 Units by Does not apply route 3 times daily 100 each 5    albuterol sulfate HFA (PROAIR HFA) 108 (90 Base) MCG/ACT inhaler Inhale 1-2 puffs into the lungs every 6 hours as needed for Wheezing or Shortness of Breath 1 Inhaler 5    fluticasone (FLONASE) 50 MCG/ACT nasal spray PLACE TWO SPRAYS IN EACH NOSTRIL ONCE DAILY 1 Bottle 12    Insulin Pen Needle (B-D UF III MINI PEN NEEDLES) 31G X 5 MM MISC 1 each by Does not apply route 2 times daily 100 each 12    aspirin 81 MG EC tablet Take 81 mg by mouth daily.             Allergies   Allergen Reactions    Effexor Xr [Venlafaxine Hcl Er]         Patient Active Problem List   Diagnosis    Anxiety associated with depression    PCOS (polycystic ovarian syndrome)    Pure hypercholesterolemia    Common migraine    Type 2 diabetes mellitus without complication, without long-term current use of insulin (HCC)    Depression with anxiety    Obstructive sleep apnea syndrome    Herpes simplex    Binge eating    Pedal edema    FH: melanoma    Essential hypertension    Hiatal hernia with GERD and esophagitis    Morbid obesity with BMI of 45.0-49.9, adult [facility])        Past Medical History:   Diagnosis Date    Anxiety     Chronic gastric ulcer without hemorrhage and without perforation     CTS (carpal tunnel syndrome) 2/10/2015    Depression     Diabetes mellitus ([address]     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 ([address] 1/29/2016    ETD (eustachian tube dysfunction) 5/16/2014    Herpes simplex     Hyperlipidemia     Lumbar strain 4/19/2017    PCOS (polycystic ovarian syndrome)     Sleep apnea     TMJ syndrome        Past Surgical

## 2019-07-08 ENCOUNTER — OFFICE VISIT (OUTPATIENT)
Dept: FAMILY MEDICINE CLINIC | Age: 49
End: 2019-07-08
Payer: COMMERCIAL

## 2019-07-08 VITALS
BODY MASS INDEX: 37.12 KG/M2 | WEIGHT: 231 LBS | HEIGHT: 66 IN | TEMPERATURE: 96.7 F | RESPIRATION RATE: 12 BRPM | OXYGEN SATURATION: 99 % | DIASTOLIC BLOOD PRESSURE: 78 MMHG | SYSTOLIC BLOOD PRESSURE: 132 MMHG | HEART RATE: 86 BPM

## 2019-07-08 DIAGNOSIS — E78.00 PURE HYPERCHOLESTEROLEMIA: Chronic | ICD-10-CM

## 2019-07-08 DIAGNOSIS — E11.9 TYPE 2 DIABETES MELLITUS WITHOUT COMPLICATION, WITHOUT LONG-TERM CURRENT USE OF INSULIN (HCC): Primary | ICD-10-CM

## 2019-07-08 DIAGNOSIS — R63.2 BINGE EATING: Chronic | ICD-10-CM

## 2019-07-08 DIAGNOSIS — S80.02XA CONTUSION OF LEFT KNEE, INITIAL ENCOUNTER: ICD-10-CM

## 2019-07-08 DIAGNOSIS — G47.33 OBSTRUCTIVE SLEEP APNEA SYNDROME: ICD-10-CM

## 2019-07-08 DIAGNOSIS — F41.8 DEPRESSION WITH ANXIETY: Chronic | ICD-10-CM

## 2019-07-08 DIAGNOSIS — S16.1XXA STRAIN OF NECK MUSCLE, INITIAL ENCOUNTER: ICD-10-CM

## 2019-07-08 DIAGNOSIS — I10 ESSENTIAL HYPERTENSION: ICD-10-CM

## 2019-07-08 PROCEDURE — 99214 OFFICE O/P EST MOD 30 MIN: CPT | Performed by: FAMILY MEDICINE

## 2019-07-08 RX ORDER — ATORVASTATIN CALCIUM 40 MG/1
40 TABLET, FILM COATED ORAL DAILY
Qty: 90 TABLET | Refills: 1 | Status: SHIPPED | OUTPATIENT
Start: 2019-07-08 | End: 2020-01-21 | Stop reason: SDUPTHER

## 2019-07-08 RX ORDER — CLONAZEPAM 0.5 MG/1
TABLET ORAL
Qty: 10 TABLET | Refills: 0 | Status: SHIPPED | OUTPATIENT
Start: 2019-07-08 | End: 2020-01-21 | Stop reason: SDUPTHER

## 2019-07-15 ENCOUNTER — PATIENT MESSAGE (OUTPATIENT)
Dept: FAMILY MEDICINE CLINIC | Age: 49
End: 2019-07-15

## 2019-07-15 RX ORDER — FLUCONAZOLE 150 MG/1
150 TABLET ORAL ONCE
Qty: 1 TABLET | Refills: 1 | Status: SHIPPED | OUTPATIENT
Start: 2019-07-15 | End: 2019-07-15

## 2019-07-24 ENCOUNTER — OFFICE VISIT (OUTPATIENT)
Dept: BARIATRICS/WEIGHT MGMT | Age: 49
End: 2019-07-24
Payer: COMMERCIAL

## 2019-07-24 VITALS
DIASTOLIC BLOOD PRESSURE: 72 MMHG | HEART RATE: 84 BPM | BODY MASS INDEX: 37.48 KG/M2 | SYSTOLIC BLOOD PRESSURE: 134 MMHG | WEIGHT: 233.2 LBS | HEIGHT: 66 IN

## 2019-07-24 DIAGNOSIS — E66.01 SEVERE OBESITY (BMI 35.0-35.9 WITH COMORBIDITY) (HCC): Primary | ICD-10-CM

## 2019-07-24 DIAGNOSIS — Z98.84 S/P LAPAROSCOPIC SLEEVE GASTRECTOMY: ICD-10-CM

## 2019-07-24 PROCEDURE — 99213 OFFICE O/P EST LOW 20 MIN: CPT | Performed by: SURGERY

## 2019-07-24 NOTE — PROGRESS NOTES
Patient is oriented to person, place, and time. Patient appears well-developed and well-nourished. Patient is active and cooperative. Non-toxic appearance. No distress. Neck: Trachea normal and normal range of motion. No JVD present. Pulmonary/Chest: Effort normal. No accessory muscle usage or stridor. No apnea. No respiratory distress. Cardiovascular: Normal rate and no JVD. Abdominal: Normal appearance. Patient exhibits no distension. Abdomen is soft, obese, non tender. Musculoskeletal: Normal range of motion. Patient exhibits no edema. Neurological: Patient is alert and oriented to person, place, and time. Patient has normal strength. GCS eye subscore is 4. GCS verbal subscore is 5. GCS motor subscore is 6. Skin: Skin is warm and dry. No abrasion and no rash noted. Patient is not diaphoretic. No cyanosis or erythema. Psychiatric: Patient has a normal mood and affect. Speech is normal and behavior is normal. Cognition and memory are normal.         Roderick Morris is 44 y.o. female , now with Body mass index is 38.22 kg/m². s/p Sleeve gastrectomy, has gained 8 lbs since last visit, total of 92 lbs weight loss. The patient underwent dietary counseling with registered dietician. I have reviewed, discussed and agree with the dietary plan. Patient is trying hard to keep good dietary and behavior modifications. Patient is monitoring portion sizes, food choices and liquid calories. Patient is trying to exercise regularly. Patient pleased with the surgery outcomes. We discussed how her weight affects her overall health including:  Ivonne Page was seen today for bariatrics post op follow up. Diagnoses and all orders for this visit:    Severe obesity (BMI 35.0-35.9 with comorbidity) ([facility] [address]    S/P laparoscopic sleeve gastrectomy       and importance of weight loss to alleviate those co morbid conditions. I encouraged the patient to continue exercise and keeping healthy eating habits.  Also

## 2019-08-20 ENCOUNTER — PATIENT MESSAGE (OUTPATIENT)
Dept: FAMILY MEDICINE CLINIC | Age: 49
End: 2019-08-20

## 2019-08-20 DIAGNOSIS — N92.4 EXCESSIVE BLEEDING IN PREMENOPAUSAL PERIOD: Primary | ICD-10-CM

## 2019-08-22 ENCOUNTER — PATIENT MESSAGE (OUTPATIENT)
Dept: FAMILY MEDICINE CLINIC | Age: 49
End: 2019-08-22

## 2019-08-22 PROBLEM — N93.8 DUB (DYSFUNCTIONAL UTERINE BLEEDING): Status: ACTIVE | Noted: 2019-08-22

## 2019-08-23 RX ORDER — TRANEXAMIC ACID 650 1/1
1300 TABLET ORAL 3 TIMES DAILY
Qty: 30 TABLET | Refills: 0 | Status: ON HOLD | OUTPATIENT
Start: 2019-08-23 | End: 2020-06-12

## 2019-09-11 ENCOUNTER — OFFICE VISIT (OUTPATIENT)
Dept: GYNECOLOGY | Age: 49
End: 2019-09-11
Payer: COMMERCIAL

## 2019-09-11 VITALS
WEIGHT: 232.5 LBS | HEART RATE: 90 BPM | RESPIRATION RATE: 16 BRPM | HEIGHT: 66 IN | DIASTOLIC BLOOD PRESSURE: 75 MMHG | SYSTOLIC BLOOD PRESSURE: 137 MMHG | BODY MASS INDEX: 37.37 KG/M2 | OXYGEN SATURATION: 98 %

## 2019-09-11 DIAGNOSIS — Z01.419 WELL WOMAN EXAM WITH ROUTINE GYNECOLOGICAL EXAM: Primary | ICD-10-CM

## 2019-09-11 DIAGNOSIS — N92.1 MENOMETRORRHAGIA: ICD-10-CM

## 2019-09-11 DIAGNOSIS — N89.8 VAGINAL DISCHARGE: ICD-10-CM

## 2019-09-11 LAB
BACTERIA WET PREP: NORMAL
CLUE CELLS: NORMAL
EPITHELIAL CELLS WET PREP: NORMAL
RBC WET PREP: NORMAL
SOURCE WET PREP: NORMAL
TRICHOMONAS PREP: NORMAL
WBC WET PREP: NORMAL
YEAST WET PREP: NORMAL

## 2019-09-11 PROCEDURE — 99386 PREV VISIT NEW AGE 40-64: CPT | Performed by: OBSTETRICS & GYNECOLOGY

## 2019-09-11 RX ORDER — NORGESTREL AND ETHINYL ESTRADIOL 0.3-0.03MG
1 KIT ORAL DAILY
Status: ON HOLD | COMMUNITY
Start: 2019-08-29 | End: 2020-06-12

## 2019-09-11 RX ORDER — CYCLOBENZAPRINE HCL 10 MG
1 TABLET ORAL DAILY
COMMUNITY
Start: 2019-08-22 | End: 2019-11-15 | Stop reason: SDUPTHER

## 2019-09-11 ASSESSMENT — ENCOUNTER SYMPTOMS
WHEEZING: 0
COLOR CHANGE: 0
BLOOD IN STOOL: 0
CHEST TIGHTNESS: 0
PHOTOPHOBIA: 0
SHORTNESS OF BREATH: 0
VOMITING: 0
DIARRHEA: 0
TROUBLE SWALLOWING: 0
COUGH: 0
SORE THROAT: 0
BACK PAIN: 0
RECTAL PAIN: 0
ABDOMINAL DISTENTION: 0
ANAL BLEEDING: 0
ABDOMINAL PAIN: 0
NAUSEA: 0
APNEA: 0
CONSTIPATION: 0

## 2019-09-11 NOTE — PROGRESS NOTES
Narrative    Not on file     Allergies   Allergen Reactions    Effexor Xr [Venlafaxine Hcl Er]      Outpatient Medications Marked as Taking for the 9/11/19 encounter (Office Visit) with Leo Holliday MD   Medication Sig Dispense Refill    cyclobenzaprine (FLEXERIL) 10 MG tablet Take 1 tablet by mouth daily      ELINEST 0.3-30 MG-MCG per tablet Take 1 tablet by mouth daily      Multiple Vitamins-Minerals (BARIATRIC FUSION) CHEW Take 2 tablets by mouth daily      clonazePAM (KLONOPIN) 0.5 MG tablet Take 1 tab po QHS prn insomnia. 10 tablet 0    Lisdexamfetamine Dimesylate (VYVANSE) 60 MG CAPS Take 60 mg by mouth every morning for 30 days. 30 capsule 0    atorvastatin (LIPITOR) 40 MG tablet Take 1 tablet by mouth daily 90 tablet 1    Dulaglutide (TRULICITY) 1.5 IM/9.7CP SOPN Inject 1.5 mg into the skin once a week 12 pen 1    blood glucose test strips (ASCENSIA AUTODISC VI;ONE TOUCH ULTRA TEST VI) strip 1 each by In Vitro route daily As needed. 100 each 3    Blood Glucose Monitoring Suppl ([address]) w/Device KIT 1 Device by Does not apply route daily 1 kit 0    pramipexole (MIRAPEX) 0.5 MG tablet Take 1-2 tablets by mouth nightly 60 tablet 5    b complex vitamins capsule Take 1 capsule by mouth daily      buPROPion (WELLBUTRIN XL) 150 MG extended release tablet Take 1 tablet by mouth every morning 30 tablet 5    FLUoxetine (PROZAC) 20 MG capsule Take 3 capsules by mouth daily 90 capsule 5    Multiple Vitamin (MVI, BARIATRIC ADVANTAGE MULTI-FORMULA, CHEW TAB) Take 1 tablet by mouth daily      glucose blood VI test strips (ASCENSIA AUTODISC VI;ONE TOUCH ULTRA TEST VI) strip 1 each by Does not apply route 3 times daily 100 strip 12    Blood Glucose Monitoring Suppl (ACURA BLOOD GLUCOSE METER) w/Device KIT Test TID Dispense One Touch per insurance ( I cannot order this brand on Epic).   Thanks 1 kit 0    SURE COMFORT LANCETS 30G MISC 1 Units by Does not apply route 3 times daily Musculoskeletal: Negative for arthralgias, back pain, joint swelling and myalgias. Skin: Negative for color change and rash. Neurological: Negative for dizziness, seizures, syncope, light-headedness and headaches. Psychiatric/Behavioral: Negative for agitation, behavioral problems, confusion, decreased concentration, dysphoric mood, hallucinations, self-injury, sleep disturbance and suicidal ideas. The patient is not nervous/anxious and is not hyperactive. Physical Exam:  /75 (Site: Right Upper Arm, Position: Sitting, Cuff Size: Large Adult)   Pulse 90   Resp 16   Ht 5' 5.5\" (1.664 m)   Wt 232 lb 8 oz (105.5 kg)   LMP 09/04/2019 (Within Days)   SpO2 98%   Breastfeeding? No   BMI 38.10 kg/m²   BP Readings from Last 3 Encounters:   09/11/19 137/75   07/24/19 134/72   07/08/19 132/78     Body mass index is 38.1 kg/m². Physical Exam   Constitutional: She appears well-developed and well-nourished. She is cooperative. No distress. HENT:   Head: Normocephalic and atraumatic. Mouth/Throat: Oropharynx is clear and moist.   Eyes: Conjunctivae are normal. Right eye exhibits no discharge. Left eye exhibits no discharge. No scleral icterus. Neck: No thyromegaly present. Cardiovascular: Normal rate, regular rhythm and normal heart sounds. Pulmonary/Chest: Effort normal and breath sounds normal. No breast tenderness, discharge or bleeding. Abdominal: Soft. Bowel sounds are normal. She exhibits no distension and no mass. There is no tenderness. There is no rebound and no guarding. Genitourinary: Uterus normal. Rectal exam shows no external hemorrhoid and no mass. No breast tenderness, discharge or bleeding. There is no rash, tenderness, lesion or injury on the right labia. There is no rash, tenderness, lesion or injury on the left labia. Uterus is not deviated, not enlarged, not fixed and not tender. Cervix exhibits no motion tenderness, no discharge and no friability.  Right adnexum

## 2019-09-12 LAB
C TRACH DNA GENITAL QL NAA+PROBE: NEGATIVE
N. GONORRHOEAE DNA: NEGATIVE

## 2019-09-13 LAB
HPV COMMENT: ABNORMAL
HPV TYPE 16: NOT DETECTED
HPV TYPE 18: NOT DETECTED
HPVOH (OTHER TYPES): DETECTED

## 2019-09-16 ENCOUNTER — OFFICE VISIT (OUTPATIENT)
Dept: PULMONOLOGY | Age: 49
End: 2019-09-16
Payer: COMMERCIAL

## 2019-09-16 VITALS
WEIGHT: 232 LBS | HEIGHT: 66 IN | BODY MASS INDEX: 37.28 KG/M2 | SYSTOLIC BLOOD PRESSURE: 128 MMHG | OXYGEN SATURATION: 99 % | HEART RATE: 76 BPM | DIASTOLIC BLOOD PRESSURE: 78 MMHG

## 2019-09-16 DIAGNOSIS — E66.9 CLASS 2 OBESITY WITHOUT SERIOUS COMORBIDITY WITH BODY MASS INDEX (BMI) OF 38.0 TO 38.9 IN ADULT, UNSPECIFIED OBESITY TYPE: ICD-10-CM

## 2019-09-16 DIAGNOSIS — G47.33 OBSTRUCTIVE SLEEP APNEA SYNDROME: Primary | ICD-10-CM

## 2019-09-16 DIAGNOSIS — E11.9 TYPE 2 DIABETES MELLITUS WITHOUT COMPLICATION, WITHOUT LONG-TERM CURRENT USE OF INSULIN (HCC): ICD-10-CM

## 2019-09-16 DIAGNOSIS — K21.00 HIATAL HERNIA WITH GERD AND ESOPHAGITIS: ICD-10-CM

## 2019-09-16 DIAGNOSIS — I10 ESSENTIAL HYPERTENSION: ICD-10-CM

## 2019-09-16 DIAGNOSIS — K44.9 HIATAL HERNIA WITH GERD AND ESOPHAGITIS: ICD-10-CM

## 2019-09-16 PROBLEM — E66.812 CLASS 2 OBESITY WITHOUT SERIOUS COMORBIDITY WITH BODY MASS INDEX (BMI) OF 38.0 TO 38.9 IN ADULT: Status: ACTIVE | Noted: 2019-09-16

## 2019-09-16 PROCEDURE — 99214 OFFICE O/P EST MOD 30 MIN: CPT | Performed by: NURSE PRACTITIONER

## 2019-09-16 ASSESSMENT — ENCOUNTER SYMPTOMS
EYE PAIN: 0
ABDOMINAL PAIN: 0
COUGH: 0
SINUS PRESSURE: 0
ABDOMINAL DISTENTION: 0
RHINORRHEA: 0
EYE REDNESS: 0
SHORTNESS OF BREATH: 0
APNEA: 0

## 2019-09-16 ASSESSMENT — SLEEP AND FATIGUE QUESTIONNAIRES
HOW LIKELY ARE YOU TO NOD OFF OR FALL ASLEEP WHILE SITTING QUIETLY AFTER LUNCH WITHOUT ALCOHOL: 0
HOW LIKELY ARE YOU TO NOD OFF OR FALL ASLEEP WHILE SITTING AND READING: 2
HOW LIKELY ARE YOU TO NOD OFF OR FALL ASLEEP WHILE SITTING AND TALKING TO SOMEONE: 0
HOW LIKELY ARE YOU TO NOD OFF OR FALL ASLEEP IN A CAR, WHILE STOPPED FOR A FEW MINUTES IN TRAFFIC: 0
HOW LIKELY ARE YOU TO NOD OFF OR FALL ASLEEP WHILE SITTING INACTIVE IN A PUBLIC PLACE: 0
ESS TOTAL SCORE: 8
HOW LIKELY ARE YOU TO NOD OFF OR FALL ASLEEP WHILE LYING DOWN TO REST IN THE AFTERNOON WHEN CIRCUMSTANCES PERMIT: 2
HOW LIKELY ARE YOU TO NOD OFF OR FALL ASLEEP WHILE WATCHING TV: 2
HOW LIKELY ARE YOU TO NOD OFF OR FALL ASLEEP WHEN YOU ARE A PASSENGER IN A CAR FOR AN HOUR WITHOUT A BREAK: 2

## 2019-09-16 NOTE — PROGRESS NOTES
for comfort while at home  [x] Yes  [] No     Difficulties falling asleep  [] Yes  [x] No   Difficulties staying asleep  [] Yes  [x] No   Approximate time to bed  12am   Approximate wake time  6-9am   Taking Naps  no   If taking naps usual length    [x] NA   If taking naps using the machine  [] Yes  [] No  [x] NA   Drowsy when driving  [] Yes  [x] No     Does patient carry a DOT/CDL  [] Yes  [x] No     Does patient carry FAA/[employer] License   [] Yes  [x] No      Any concerns noted with the machine at this time  [] Yes  [x] No        Diagnosis Orders   1. Obstructive sleep apnea syndrome     2. Essential hypertension     3. Type 2 diabetes mellitus without complication, without long-term current use of insulin ([facility] [address]     4. Hiatal hernia with GERD and esophagitis     5. Class 2 obesity without serious comorbidity with body mass index (BMI) of 38.0 to 38.9 in adult, unspecified obesity type         The chronic medical conditions listed are directly related to the primary diagnosis listed above. The management of the primary diagnosis affects the secondary diagnosis and vice versa. Review of Systems   Constitutional: Negative for appetite change, chills, fatigue and fever. HENT: Negative for congestion, nosebleeds, rhinorrhea and sinus pressure. Eyes: Negative for pain and redness. Respiratory: Negative for apnea, cough and shortness of breath. Cardiovascular: Negative for chest pain and palpitations. Gastrointestinal: Negative for abdominal distention and abdominal pain. Neurological: Negative for dizziness and headaches. Psychiatric/Behavioral: Negative for sleep disturbance.        Social History     Socioeconomic History    Marital status:      Spouse name: Not on file    Number of children: Not on file    Years of education: Not on file    Highest education level: Not on file   Occupational History    Not on file   Social Needs    Financial resource strain: Not on file   Stupe[employer] 09/16/19 232 lb (105.2 kg)   09/11/19 232 lb 8 oz (105.5 kg)   07/24/19 233 lb 3.2 oz (105.8 kg)    Body mass index is 38.02 kg/m². BP HR SaO2   BP Readings from Last 3 Encounters:   09/16/19 128/78   09/11/19 137/75   07/24/19 134/72    Pulse Readings from Last 3 Encounters:   09/16/19 76   09/11/19 90   07/24/19 84    SpO2 Readings from Last 3 Encounters:   09/16/19 99%   09/11/19 98%   07/08/19 99%        Assessment/Plan:     Essential hypertension  Chronic- Stable. Cont meds per PCP and other physicians. Type 2 diabetes mellitus without complication, without long-term current use of insulin ([facility])  Chronic- Stable. Cont meds per PCP and other physicians. Hiatal hernia with GERD and esophagitis  Chronic- Stable. Cont meds per PCP and other physicians. Class 2 obesity without serious comorbidity with body mass index (BMI) of 38.0 to 38.9 in adult  Chronic-Stable. Encouraged her to work on weight loss through diet and exercise. Obstructive sleep apnea syndrome  Reviewed compliance download with pt. Supplies and parts as needed for her machine. These are medically necessary. Continue medications per her PCP and other physicians. Limit caffeine use after 3pm.  Encouraged her to work on weight loss through diet and exercise. Diagnoses of Essential hypertension, Type 2 diabetes mellitus without complication, without long-term current use of insulin ([address] Hiatal hernia with GERD and esophagitis, and Class 2 obesity without serious comorbidity with body mass index (BMI) of 38.0 to 38.9 in adult, unspecified obesity type were pertinent to this visit. The chronic medical conditions listed are directly related to the primary diagnosis listed above. The management of the primary diagnosis affects the secondary diagnosis and vice versa. The primary encounter diagnosis was Obstructive sleep apnea syndrome.  Diagnoses of Essential hypertension, Type 2 diabetes mellitus without

## 2019-09-18 ENCOUNTER — TELEPHONE (OUTPATIENT)
Dept: PRIMARY CARE CLINIC | Age: 49
End: 2019-09-18

## 2019-09-19 ENCOUNTER — HOSPITAL ENCOUNTER (OUTPATIENT)
Dept: ULTRASOUND IMAGING | Age: 49
Discharge: HOME | End: 2019-09-19
Payer: COMMERCIAL

## 2019-09-19 ENCOUNTER — TELEPHONE (OUTPATIENT)
Dept: GYNECOLOGY | Age: 49
End: 2019-09-19

## 2019-09-19 DIAGNOSIS — N92.1 MENOMETRORRHAGIA: ICD-10-CM

## 2019-09-19 PROCEDURE — 76830 TRANSVAGINAL US NON-OB: CPT

## 2019-09-19 PROCEDURE — 76856 US EXAM PELVIC COMPLETE: CPT

## 2019-09-20 ENCOUNTER — OFFICE VISIT (OUTPATIENT)
Dept: GYNECOLOGY | Age: 49
End: 2019-09-20
Payer: COMMERCIAL

## 2019-09-20 VITALS
BODY MASS INDEX: 36.96 KG/M2 | WEIGHT: 230 LBS | HEART RATE: 78 BPM | SYSTOLIC BLOOD PRESSURE: 143 MMHG | DIASTOLIC BLOOD PRESSURE: 78 MMHG | HEIGHT: 66 IN

## 2019-09-20 DIAGNOSIS — R87.810 ASCUS WITH POSITIVE HIGH RISK HPV CERVICAL: Primary | ICD-10-CM

## 2019-09-20 DIAGNOSIS — N88.8 CERVICAL MASS: ICD-10-CM

## 2019-09-20 DIAGNOSIS — R87.610 ASCUS WITH POSITIVE HIGH RISK HPV CERVICAL: Primary | ICD-10-CM

## 2019-09-20 LAB
CONTROL: NORMAL
PREGNANCY TEST URINE, POC: NEGATIVE

## 2019-09-20 PROCEDURE — 57454 BX/CURETT OF CERVIX W/SCOPE: CPT | Performed by: OBSTETRICS & GYNECOLOGY

## 2019-09-20 PROCEDURE — 99213 OFFICE O/P EST LOW 20 MIN: CPT | Performed by: OBSTETRICS & GYNECOLOGY

## 2019-09-20 PROCEDURE — 81025 URINE PREGNANCY TEST: CPT | Performed by: OBSTETRICS & GYNECOLOGY

## 2019-09-20 ASSESSMENT — ENCOUNTER SYMPTOMS
BLOOD IN STOOL: 0
NAUSEA: 0
VOMITING: 0
COLOR CHANGE: 0
APNEA: 0
ABDOMINAL PAIN: 0
COUGH: 0
CHEST TIGHTNESS: 0
PHOTOPHOBIA: 0
CONSTIPATION: 0
SHORTNESS OF BREATH: 0
RECTAL PAIN: 0
BACK PAIN: 0
ANAL BLEEDING: 0
DIARRHEA: 0
ABDOMINAL DISTENTION: 0
SORE THROAT: 0
TROUBLE SWALLOWING: 0
WHEEZING: 0

## 2019-09-20 NOTE — PROGRESS NOTES
for agitation, behavioral problems, confusion, decreased concentration, dysphoric mood, hallucinations, self-injury, sleep disturbance and suicidal ideas. The patient is not nervous/anxious and is not hyperactive. Physical Exam:  BP (!) 143/78   Pulse 78   Ht 5' 5.5\" (1.664 m)   Wt 230 lb (104.3 kg)   LMP 09/04/2019 (Within Days)   BMI 37.69 kg/m²   BP Readings from Last 3 Encounters:   09/20/19 (!) 143/78   09/16/19 128/78   09/11/19 137/75      Body mass index is 37.69 kg/m². Physical Exam   Constitutional: She is oriented to person, place, and time. She appears well-developed and well-nourished. HENT:   Head: Normocephalic and atraumatic. Neck: Normal range of motion. Neck supple. Cardiovascular: Normal rate. Pulmonary/Chest: No respiratory distress. Abdominal: Soft. Bowel sounds are normal. She exhibits no distension. There is no rebound and no guarding. Genitourinary: Vagina normal and uterus normal. There is no rash, tenderness or lesion on the right labia. There is no rash, tenderness or lesion on the left labia. Cervix exhibits no motion tenderness and no discharge. Right adnexum displays no mass and no tenderness. Left adnexum displays no mass and no tenderness. Neurological: She is alert and oriented to person, place, and time. Skin: Skin is warm. Psychiatric: She has a normal mood and affect. Her behavior is normal.     Colposcopy Procedure Note    Indications: Pap smear 0 months ago showed: ASCUS with POSITIVE high risk HPV. The prior pap showed no abnormalities. Procedure Details   The risks and benefits of the procedure and Verbal informed consent obtained. Speculum placed in vagina and excellent visualization of cervix achieved, cervix swabbed x 3 with acetic acid solution.  A probable endocervical mass seen     Findings:  Cervix: visible lesion(s) at 4 and 12  o'clock  Vaginal inspection: vaginal colposcopy performed   Vulvar colposcopy: vulvar colposcopy not

## 2019-09-27 ENCOUNTER — TELEPHONE (OUTPATIENT)
Dept: PRIMARY CARE CLINIC | Age: 49
End: 2019-09-27

## 2019-10-04 ENCOUNTER — OFFICE VISIT (OUTPATIENT)
Dept: GYNECOLOGY | Age: 49
End: 2019-10-04
Payer: COMMERCIAL

## 2019-10-04 VITALS
HEIGHT: 66 IN | DIASTOLIC BLOOD PRESSURE: 80 MMHG | HEART RATE: 72 BPM | BODY MASS INDEX: 38.06 KG/M2 | WEIGHT: 236.8 LBS | SYSTOLIC BLOOD PRESSURE: 142 MMHG

## 2019-10-04 DIAGNOSIS — N88.8 CERVICAL MASS: Primary | ICD-10-CM

## 2019-10-04 DIAGNOSIS — N83.201 CYST OF RIGHT OVARY: ICD-10-CM

## 2019-10-04 DIAGNOSIS — N92.1 MENOMETRORRHAGIA: ICD-10-CM

## 2019-10-04 DIAGNOSIS — E11.9 TYPE 2 DIABETES MELLITUS WITHOUT COMPLICATION, WITHOUT LONG-TERM CURRENT USE OF INSULIN (HCC): ICD-10-CM

## 2019-10-04 DIAGNOSIS — N87.0 DYSPLASIA OF CERVIX, LOW GRADE (CIN 1): ICD-10-CM

## 2019-10-04 DIAGNOSIS — E78.00 PURE HYPERCHOLESTEROLEMIA: Chronic | ICD-10-CM

## 2019-10-04 LAB
A/G RATIO: 1.8 (ref 1.1–2.2)
ALBUMIN SERPL-MCNC: 4.2 G/DL (ref 3.4–5)
ALP BLD-CCNC: 68 U/L (ref 40–129)
ALT SERPL-CCNC: 38 U/L (ref 10–40)
ANION GAP SERPL CALCULATED.3IONS-SCNC: 16 MMOL/L (ref 3–16)
AST SERPL-CCNC: 18 U/L (ref 15–37)
BILIRUB SERPL-MCNC: 0.4 MG/DL (ref 0–1)
BUN BLDV-MCNC: 21 MG/DL (ref 7–20)
CALCIUM SERPL-MCNC: 8.9 MG/DL (ref 8.3–10.6)
CHLORIDE BLD-SCNC: 102 MMOL/L (ref 99–110)
CHOLESTEROL, TOTAL: 98 MG/DL (ref 0–199)
CO2: 24 MMOL/L (ref 21–32)
CREAT SERPL-MCNC: 1 MG/DL (ref 0.6–1.1)
ESTIMATED AVERAGE GLUCOSE: 223.1 MG/DL
GFR AFRICAN AMERICAN: >60
GFR NON-AFRICAN AMERICAN: 58
GLOBULIN: 2.4 G/DL
GLUCOSE BLD-MCNC: 273 MG/DL (ref 70–99)
HBA1C MFR BLD: 9.4 %
HDLC SERPL-MCNC: 53 MG/DL (ref 40–60)
LDL CHOLESTEROL CALCULATED: 24 MG/DL
POTASSIUM SERPL-SCNC: 4.3 MMOL/L (ref 3.5–5.1)
SODIUM BLD-SCNC: 142 MMOL/L (ref 136–145)
TOTAL PROTEIN: 6.6 G/DL (ref 6.4–8.2)
TRIGL SERPL-MCNC: 103 MG/DL (ref 0–150)
TSH REFLEX: 3.29 UIU/ML (ref 0.27–4.2)
VLDLC SERPL CALC-MCNC: 21 MG/DL

## 2019-10-04 PROCEDURE — 99214 OFFICE O/P EST MOD 30 MIN: CPT | Performed by: OBSTETRICS & GYNECOLOGY

## 2019-10-04 ASSESSMENT — ENCOUNTER SYMPTOMS
DIARRHEA: 0
PHOTOPHOBIA: 0
TROUBLE SWALLOWING: 0
ANAL BLEEDING: 0
RECTAL PAIN: 0
ABDOMINAL PAIN: 0
NAUSEA: 0
WHEEZING: 0
SORE THROAT: 0
COLOR CHANGE: 0
BLOOD IN STOOL: 0
ABDOMINAL DISTENTION: 0
COUGH: 0
VOMITING: 0
APNEA: 0
BACK PAIN: 0
SHORTNESS OF BREATH: 0
CONSTIPATION: 0
CHEST TIGHTNESS: 0

## 2019-10-08 ENCOUNTER — TELEPHONE (OUTPATIENT)
Dept: FAMILY MEDICINE CLINIC | Age: 49
End: 2019-10-08

## 2019-10-08 ENCOUNTER — OFFICE VISIT (OUTPATIENT)
Dept: FAMILY MEDICINE CLINIC | Age: 49
End: 2019-10-08
Payer: COMMERCIAL

## 2019-10-08 VITALS
HEART RATE: 75 BPM | SYSTOLIC BLOOD PRESSURE: 128 MMHG | DIASTOLIC BLOOD PRESSURE: 74 MMHG | BODY MASS INDEX: 38.61 KG/M2 | WEIGHT: 235.6 LBS | RESPIRATION RATE: 12 BRPM | OXYGEN SATURATION: 99 %

## 2019-10-08 DIAGNOSIS — Z12.11 COLON CANCER SCREENING: ICD-10-CM

## 2019-10-08 DIAGNOSIS — E78.00 PURE HYPERCHOLESTEROLEMIA: Chronic | ICD-10-CM

## 2019-10-08 DIAGNOSIS — E66.9 CLASS 2 OBESITY WITHOUT SERIOUS COMORBIDITY WITH BODY MASS INDEX (BMI) OF 38.0 TO 38.9 IN ADULT, UNSPECIFIED OBESITY TYPE: ICD-10-CM

## 2019-10-08 DIAGNOSIS — I10 ESSENTIAL HYPERTENSION: ICD-10-CM

## 2019-10-08 DIAGNOSIS — F41.8 DEPRESSION WITH ANXIETY: ICD-10-CM

## 2019-10-08 DIAGNOSIS — Z23 NEED FOR INFLUENZA VACCINATION: Primary | ICD-10-CM

## 2019-10-08 DIAGNOSIS — R63.2 BINGE EATING: Chronic | ICD-10-CM

## 2019-10-08 DIAGNOSIS — E11.9 TYPE 2 DIABETES MELLITUS WITHOUT COMPLICATION, WITHOUT LONG-TERM CURRENT USE OF INSULIN (HCC): ICD-10-CM

## 2019-10-08 PROCEDURE — 90471 IMMUNIZATION ADMIN: CPT | Performed by: FAMILY MEDICINE

## 2019-10-08 PROCEDURE — 99214 OFFICE O/P EST MOD 30 MIN: CPT | Performed by: FAMILY MEDICINE

## 2019-10-08 PROCEDURE — 90686 IIV4 VACC NO PRSV 0.5 ML IM: CPT | Performed by: FAMILY MEDICINE

## 2019-10-08 RX ORDER — BUPROPION HYDROCHLORIDE 300 MG/1
300 TABLET ORAL EVERY MORNING
Qty: 90 TABLET | Refills: 1 | Status: SHIPPED | OUTPATIENT
Start: 2019-10-08 | End: 2020-04-08

## 2019-10-08 RX ORDER — FLUCONAZOLE 150 MG/1
150 TABLET ORAL ONCE
Qty: 1 TABLET | Refills: 2 | Status: SHIPPED | OUTPATIENT
Start: 2019-10-08 | End: 2019-10-08

## 2019-10-10 ENCOUNTER — HOSPITAL ENCOUNTER (OUTPATIENT)
Dept: MRI IMAGING | Age: 49
Discharge: HOME | End: 2019-10-10
Payer: COMMERCIAL

## 2019-10-10 ENCOUNTER — HOSPITAL ENCOUNTER (OUTPATIENT)
Dept: GENERAL RADIOLOGY | Age: 49
Discharge: HOME | End: 2019-10-10
Payer: COMMERCIAL

## 2019-10-10 DIAGNOSIS — T81.509A FOREIGN OBJECT LEFT IN BODY DURING PROCEDURE, INITIAL ENCOUNTER: ICD-10-CM

## 2019-10-10 DIAGNOSIS — N83.201 CYST OF RIGHT OVARY: ICD-10-CM

## 2019-10-10 DIAGNOSIS — N88.8 CERVICAL MASS: ICD-10-CM

## 2019-10-10 PROCEDURE — 72195 MRI PELVIS W/O DYE: CPT

## 2019-10-10 PROCEDURE — 70030 X-RAY EYE FOR FOREIGN BODY: CPT

## 2019-10-13 ENCOUNTER — PATIENT MESSAGE (OUTPATIENT)
Dept: FAMILY MEDICINE CLINIC | Age: 49
End: 2019-10-13

## 2019-10-13 DIAGNOSIS — R30.0 DYSURIA: Primary | ICD-10-CM

## 2019-10-14 DIAGNOSIS — R30.0 DYSURIA: ICD-10-CM

## 2019-10-14 LAB
BILIRUBIN URINE: NEGATIVE
BLOOD, URINE: NEGATIVE
CLARITY: CLEAR
COLOR: YELLOW
GLUCOSE URINE: >=1000 MG/DL
KETONES, URINE: NEGATIVE MG/DL
LEUKOCYTE ESTERASE, URINE: NEGATIVE
MICROSCOPIC EXAMINATION: ABNORMAL
NITRITE, URINE: NEGATIVE
PH UA: 6 (ref 5–8)
PROTEIN UA: NEGATIVE MG/DL
SPECIFIC GRAVITY UA: >1.03 (ref 1–1.03)
URINE REFLEX TO CULTURE: ABNORMAL
URINE TYPE: ABNORMAL
UROBILINOGEN, URINE: 0.2 E.U./DL

## 2019-10-30 ENCOUNTER — TELEPHONE (OUTPATIENT)
Dept: GYNECOLOGY | Age: 49
End: 2019-10-30

## 2019-11-15 ENCOUNTER — PATIENT MESSAGE (OUTPATIENT)
Dept: FAMILY MEDICINE CLINIC | Age: 49
End: 2019-11-15

## 2019-11-15 RX ORDER — CYCLOBENZAPRINE HCL 10 MG
10 TABLET ORAL DAILY
Qty: 30 TABLET | Refills: 1 | Status: SHIPPED | OUTPATIENT
Start: 2019-11-15 | End: 2020-01-15

## 2019-12-11 ENCOUNTER — TELEPHONE (OUTPATIENT)
Dept: ADMINISTRATIVE | Age: 49
End: 2019-12-11

## 2019-12-13 ENCOUNTER — OFFICE VISIT (OUTPATIENT)
Dept: GYNECOLOGY | Age: 49
End: 2019-12-13
Payer: COMMERCIAL

## 2019-12-13 VITALS
HEIGHT: 66 IN | BODY MASS INDEX: 37.03 KG/M2 | HEART RATE: 69 BPM | WEIGHT: 230.38 LBS | DIASTOLIC BLOOD PRESSURE: 80 MMHG | OXYGEN SATURATION: 99 % | SYSTOLIC BLOOD PRESSURE: 128 MMHG | RESPIRATION RATE: 16 BRPM

## 2019-12-13 DIAGNOSIS — Z30.430 ENCOUNTER FOR INTRAUTERINE DEVICE PLACEMENT: Primary | ICD-10-CM

## 2019-12-13 LAB
CONTROL: NORMAL
PREGNANCY TEST URINE, POC: NEGATIVE

## 2019-12-13 PROCEDURE — 58300 INSERT INTRAUTERINE DEVICE: CPT | Performed by: OBSTETRICS & GYNECOLOGY

## 2019-12-13 PROCEDURE — 81025 URINE PREGNANCY TEST: CPT | Performed by: OBSTETRICS & GYNECOLOGY

## 2019-12-13 ASSESSMENT — ENCOUNTER SYMPTOMS
PHOTOPHOBIA: 0
APNEA: 0
CONSTIPATION: 0
ABDOMINAL DISTENTION: 0
COLOR CHANGE: 0
WHEEZING: 0
BACK PAIN: 0
BLOOD IN STOOL: 0
SHORTNESS OF BREATH: 0
SORE THROAT: 0
NAUSEA: 0
COUGH: 0
ABDOMINAL PAIN: 0
TROUBLE SWALLOWING: 0
VOMITING: 0
RECTAL PAIN: 0
CHEST TIGHTNESS: 0
ANAL BLEEDING: 0
DIARRHEA: 0

## 2020-01-13 ENCOUNTER — OFFICE VISIT (OUTPATIENT)
Dept: GYNECOLOGY | Age: 50
End: 2020-01-13
Payer: COMMERCIAL

## 2020-01-13 VITALS
WEIGHT: 232.13 LBS | HEART RATE: 102 BPM | HEIGHT: 66 IN | OXYGEN SATURATION: 98 % | RESPIRATION RATE: 16 BRPM | SYSTOLIC BLOOD PRESSURE: 139 MMHG | BODY MASS INDEX: 37.31 KG/M2 | DIASTOLIC BLOOD PRESSURE: 77 MMHG

## 2020-01-13 LAB
CONTROL: NORMAL
PREGNANCY TEST URINE, POC: NEGATIVE

## 2020-01-13 PROCEDURE — 99213 OFFICE O/P EST LOW 20 MIN: CPT | Performed by: OBSTETRICS & GYNECOLOGY

## 2020-01-13 PROCEDURE — 81025 URINE PREGNANCY TEST: CPT | Performed by: OBSTETRICS & GYNECOLOGY

## 2020-01-13 ASSESSMENT — ENCOUNTER SYMPTOMS
COLOR CHANGE: 0
BLOOD IN STOOL: 0
NAUSEA: 0
RECTAL PAIN: 0
CONSTIPATION: 0
WHEEZING: 0
COUGH: 0
APNEA: 0
DIARRHEA: 0
ABDOMINAL PAIN: 0
SORE THROAT: 0
PHOTOPHOBIA: 0
ANAL BLEEDING: 0
VOMITING: 0
CHEST TIGHTNESS: 0
BACK PAIN: 0
ABDOMINAL DISTENTION: 0
SHORTNESS OF BREATH: 0
TROUBLE SWALLOWING: 0

## 2020-01-13 NOTE — PROGRESS NOTES
Eugenia Edwards  YOB: 1970    Date of Service:  1/13/2020    Chief Complaint:   David Encarnacion is a 44 y.o. [de-identified] female who presents for IUD check        HPI:  Patient is perimenopausal- Patient's last menstrual period was 01/11/2020 (exact date). .She is here for one month IUD check. Mirena placed 12/13/2019 and she is overall satisfied with it.  Started menses on 1/11/2020 and bleeding is light and brownish, had cramping for one day on 1/11/2020 but now resolved     Health Maintenance   Topic Date Due    Hepatitis B vaccine (1 of 3 - Risk 3-dose series) 06/27/1987    Shingles Vaccine (1 of 2) 06/27/2018    Colon cancer screen colonoscopy  06/27/2018    Diabetic microalbuminuria test  07/12/2019    A1C test (Diabetic or Prediabetic)  01/04/2020    Diabetic retinal exam  04/18/2020    Breast cancer screen  07/12/2020    Lipid screen  10/04/2020    Potassium monitoring  10/04/2020    Creatinine monitoring  10/04/2020    Diabetic foot exam  10/08/2020    DTaP/Tdap/Td vaccine (2 - Td) 01/29/2023    Cervical cancer screen  09/11/2024    Flu vaccine  Completed    Pneumococcal 0-64 years Vaccine  Completed    HIV screen  Completed       Past Medical History:   Diagnosis Date    Anxiety     Chronic gastric ulcer without hemorrhage and without perforation     CTS (carpal tunnel syndrome) 2/10/2015    Depression     Diabetes mellitus ([facility] [address]     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 ([facility] [address] 1/29/2016    ETD (eustachian tube dysfunction) 5/16/2014    Herpes simplex     Hyperlipidemia     Lumbar strain 4/19/2017    PCOS (polycystic ovarian syndrome)     Sleep apnea     TMJ syndrome      Past Surgical History:   Procedure Laterality Date    ENDOSCOPY, COLON, DIAGNOSTIC      OTHER SURGICAL HISTORY  05/25/2018    EGD    VA LAP,STOMACH,OTHER,W/O TUBE N/A 7/31/2018    ROBOTIC ASSISTED LAPAROSCOPIC SLEEVE GASTRECTOMY  performed by Thalia Frances DO at [address] Para Term  AB Living   0 0 0 0 0 0   SAB TAB Ectopic Molar Multiple Live Births   0 0 0 0 0 0     Social History     Socioeconomic History    Marital status:      Spouse name: Not on file    Number of children: Not on file    Years of education: Not on file    Highest education level: Not on file   Occupational History    Not on file   Social Needs    Financial resource strain: Not on file    Food insecurity:     Worry: Not on file     Inability: Not on file    Transportation needs:     Medical: Not on file     Non-medical: Not on file   Tobacco Use    Smoking status: Never Smoker    Smokeless tobacco: Never Used   Substance and Sexual Activity    Alcohol use: Yes     Comment: infrequent, few times a month    Drug use: No    Sexual activity: Yes     Partners: Male   Lifestyle    Physical activity:     Days per week: Not on file     Minutes per session: Not on file    Stress: Not on file   Relationships    Social connections:     Talks on phone: Not on file     Gets together: Not on file     Attends [employer] service: Not on file     Active member of club or organization: Not on file     Attends meetings of clubs or organizations: Not on file     Relationship status: Not on file    Intimate partner violence:     Fear of current or ex partner: Not on file     Emotionally abused: Not on file     Physically abused: Not on file     Forced sexual activity: Not on file   Other Topics Concern    Not on file   Social History Narrative    Not on file     Allergies   Allergen Reactions    Effexor Xr [Venlafaxine Hcl Er] Other (See Comments)     Didn't respond well to it.       Outpatient Medications Marked as Taking for the 20 encounter (Office Visit) with Arelis Bobo MD   Medication Sig Dispense Refill    pramipexole (MIRAPEX) 0.5 MG tablet TAKE ONE TO TWO TABLETS BY MOUTH EVERY EVENING 60 tablet 5    MIRENA, 52 MG, IUD 52 mg 1 Dose by Intrauterine route once      cervical    Diabetes Father     Stroke Father         25s    Hypertension Father     Coronary Art Dis Father     Other Father         CHIVO    Cancer Father         melanoma    Glaucoma Father     Diabetes Sister     Diabetes Maternal Grandmother     Cancer Maternal Grandmother         melanoma    Arthritis Maternal Grandmother     Diabetes Paternal Grandmother     Stroke Paternal Grandmother          Review ofSystems:  Review of Systems   Constitutional: Negative for appetite change, chills, fatigue, fever and unexpected weight change. HENT: Negative for ear pain, hearing loss, mouth sores, sore throat and trouble swallowing. Eyes: Negative for photophobia and visual disturbance. Respiratory: Negative for apnea, cough, chest tightness, shortness of breath and wheezing. Cardiovascular: Negative for chest pain, palpitations and leg swelling. Gastrointestinal: Negative for abdominal distention, abdominal pain, anal bleeding, blood in stool, constipation, diarrhea, nausea, rectal pain and vomiting. Endocrine: Negative for cold intolerance, heat intolerance, polydipsia, polyphagia and polyuria. Genitourinary: Negative for difficulty urinating, dyspareunia, dysuria, enuresis, frequency, genital sores, hematuria, menstrual problem, pelvic pain, urgency, vaginal bleeding, vaginal discharge and vaginal pain. Musculoskeletal: Negative for arthralgias, back pain, joint swelling and myalgias. Skin: Negative for color change and rash. Neurological: Negative for dizziness, seizures, syncope, light-headedness and headaches. Psychiatric/Behavioral: Negative for agitation, behavioral problems, confusion, decreased concentration, dysphoric mood, hallucinations, self-injury, sleep disturbance and suicidal ideas. The patient is not nervous/anxious and is not hyperactive.         Physical Exam:  /77 (Site: Right Upper Arm, Position: Sitting, Cuff Size: Large Adult)   Pulse 102   Resp 16   Ht

## 2020-01-14 ENCOUNTER — TELEPHONE (OUTPATIENT)
Dept: ORTHOPEDIC SURGERY | Age: 50
End: 2020-01-14

## 2020-01-14 NOTE — TELEPHONE ENCOUNTER
PA submitted via CM for Trulicity 2.6WV/9.6IF pen-injectors.   ([facility]) Rx #: [id number]    STATUS: PENDING

## 2020-01-15 DIAGNOSIS — E11.9 TYPE 2 DIABETES MELLITUS WITHOUT COMPLICATION, WITHOUT LONG-TERM CURRENT USE OF INSULIN (HCC): ICD-10-CM

## 2020-01-15 DIAGNOSIS — N92.0 MENORRHAGIA WITH REGULAR CYCLE: ICD-10-CM

## 2020-01-15 LAB
ANION GAP SERPL CALCULATED.3IONS-SCNC: 13 MMOL/L (ref 3–16)
BUN BLDV-MCNC: 20 MG/DL (ref 7–20)
CALCIUM SERPL-MCNC: 8.9 MG/DL (ref 8.3–10.6)
CHLORIDE BLD-SCNC: 104 MMOL/L (ref 99–110)
CO2: 23 MMOL/L (ref 21–32)
CREAT SERPL-MCNC: 1.1 MG/DL (ref 0.6–1.1)
CREATININE URINE: 269.7 MG/DL (ref 28–259)
GFR AFRICAN AMERICAN: >60
GFR NON-AFRICAN AMERICAN: 52
GLUCOSE BLD-MCNC: 146 MG/DL (ref 70–99)
HCT VFR BLD CALC: 41.8 % (ref 36–48)
HEMOGLOBIN: 14.1 G/DL (ref 12–16)
MCH RBC QN AUTO: 31.5 PG (ref 26–34)
MCHC RBC AUTO-ENTMCNC: 33.8 G/DL (ref 31–36)
MCV RBC AUTO: 93.3 FL (ref 80–100)
MICROALBUMIN UR-MCNC: <1.2 MG/DL
MICROALBUMIN/CREAT UR-RTO: ABNORMAL MG/G (ref 0–30)
PDW BLD-RTO: 13.7 % (ref 12.4–15.4)
PLATELET # BLD: 183 K/UL (ref 135–450)
PMV BLD AUTO: 8.7 FL (ref 5–10.5)
POTASSIUM SERPL-SCNC: 4.5 MMOL/L (ref 3.5–5.1)
RBC # BLD: 4.49 M/UL (ref 4–5.2)
SODIUM BLD-SCNC: 140 MMOL/L (ref 136–145)
WBC # BLD: 7.1 K/UL (ref 4–11)

## 2020-01-16 LAB
ESTIMATED AVERAGE GLUCOSE: 180 MG/DL
HBA1C MFR BLD: 7.9 %

## 2020-01-19 NOTE — PROGRESS NOTES
Subjective:      Patient ID: Salty Hillman 44 y.o. HPI  Eugenia Edwards The primary encounter diagnosis was Class 2 obesity without serious comorbidity with body mass index (BMI) of 38.0 to 38.9 in adult, unspecified obesity type. Diagnoses of Essential hypertension, Type 2 diabetes mellitus without complication, without long-term current use of insulin ([address] Binge eating, Depression with anxiety, Pure hypercholesterolemia, and Colon cancer screening were also pertinent to this visit. Had to cancel colonoscopy due to work commitment. Will reschedule. Would like to see derm; no specific concerns. Vyvanse has been effective in stopping binging. It works best if she takes it every other day. Well tolerated. No palpitations, irritability, insomnia. Takes Klonopin rarely - once or twice a month - if very anxious and no pharmaceutical strategies addressed. Keeps secure. No abuse or misuse. Treatment Adherence:   Medication compliance:  compliant most of the time  Diet compliance:  compliant most of the time  Has good days and bad days. Is cooking more. Weight trend: stable  Current exercise: walks 7 time(s) per week  Barriers: none    Diabetes Mellitus Type 2: Current symptoms/problems include none. Home blood sugar records: fasting range: has not checked for a few weeks. 160, postprandial range: 240  Any episodes of hypoglycemia? no  Eye exam current (within one year): yes  Tobacco history: She  reports that she has never smoked. She has never used smokeless tobacco.   Daily Aspirin? Yes    Hypertension:  Home blood pressure monitoring: No.  She is adherent to a low sodium diet. Patient denies chest pain, shortness of breath, blurred vision, peripheral edema, palpitations and dry cough. Antihypertensive medication side effects: no medication side effects noted. Use of agents associated with hypertension: none.      Hyperlipidemia:  No new myalgias or GI upset on atorvastatin (Lipitor). Lab Results   Component Value Date    LABA1C 7.9 01/15/2020    LABA1C 9.4 10/04/2019    LABA1C 7.6 06/20/2019     Lab Results   Component Value Date    LABMICR <1.20 01/15/2020    CREATININE 1.1 01/15/2020     Lab Results   Component Value Date    ALT 38 10/04/2019    AST 18 10/04/2019     Lab Results   Component Value Date    CHOL 98 10/04/2019    TRIG 103 10/04/2019    HDL 53 10/04/2019    LDLCALC 24 10/04/2019           Lab Results   Component Value Date     01/15/2020    K 4.5 01/15/2020    K 4.1 08/01/2018     01/15/2020    CO2 23 01/15/2020    BUN 20 01/15/2020    CREATININE 1.1 01/15/2020    GLUCOSE 146 01/15/2020    CALCIUM 8.9 01/15/2020       TSH   Date Value Ref Range Status   10/04/2019 3.29 0.27 - 4.20 uIU/mL Final   06/20/2019 2.24 0.27 - 4.20 uIU/mL Final   07/12/2018 2.31 0.27 - 4.20 uIU/mL Final   01/23/2013 4.45 0.35 - 5.5 uIU/ml Final   09/30/2011 3.49 0.35 - 5.5 uIU/ml Final        Outpatient Medications Marked as Taking for the 1/21/20 encounter (Office Visit) with Shabana Hernandez MD   Medication Sig Dispense Refill    Lisdexamfetamine Dimesylate (VYVANSE) 60 MG CAPS Take by mouth.  cyclobenzaprine (FLEXERIL) 10 MG tablet TAKE ONE TABLET BY MOUTH DAILY 30 tablet 2    pramipexole (MIRAPEX) 0.5 MG tablet TAKE ONE TO TWO TABLETS BY MOUTH EVERY EVENING 60 tablet 5    MIRENA, 52 MG, IUD 52 mg 1 Dose by Intrauterine route once      FLUoxetine (PROZAC) 20 MG capsule TAKE 3 CAPSULES BY MOUTH ONE TIME A DAY  270 capsule 2    buPROPion (WELLBUTRIN XL) 300 MG extended release tablet Take 1 tablet by mouth every morning 90 tablet 1    dapagliflozin (FARXIGA) 10 MG tablet Take 1 tablet by mouth every morning 90 tablet 1    Multiple Vitamins-Minerals (BARIATRIC FUSION) CHEW Take 2 tablets by mouth daily      clonazePAM (KLONOPIN) 0.5 MG tablet Take 1 tab po QHS prn insomnia.  10 tablet 0    atorvastatin (LIPITOR) 40 MG tablet Take 1 tablet by mouth Past Medical History:   Diagnosis Date    Anxiety     Chronic gastric ulcer without hemorrhage and without perforation     CTS (carpal tunnel syndrome) 2/10/2015    Depression     [facility]     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 ([facility] 1/29/2016    ETD (eustachian tube dysfunction) 5/16/2014    Herpes simplex     Hyperlipidemia     Lumbar strain 4/19/2017    PCOS (polycystic ovarian syndrome)     Sleep apnea     TMJ syndrome        Past Surgical History:   Procedure Laterality Date    ENDOSCOPY, COLON, DIAGNOSTIC      OTHER SURGICAL HISTORY  05/25/2018    EGD    [place] LAP,STOMACH,OTHER,W/O TUBE N/A 7/31/2018    ROBOTIC ASSISTED LAPAROSCOPIC SLEEVE GASTRECTOMY  performed [facility] Mel[facility]ola[facility]        Social History     Tobacco Use    Smoking status: Never Smoker    Smokeless tobacco: Never Used   Substance Use Topics    Alcohol use: Yes     Comment: infrequent, few times a month    Drug use: No        Family History   Problem Relation Age of Onset    Cervical Cancer Mother     Cancer Mother         cervical    Diabetes Father     Stroke Father         25s    Hypertension Father     Coronary Art Dis Father     Other Father         CHIVO    Cancer Father         melanoma    Glaucoma Father     Diabetes Sister     Diabetes Maternal Grandmother     Cancer Maternal Grandmother         melanoma    Arthritis Maternal Grandmother     Diabetes Paternal Grandmother     Stroke Paternal Grandmother         Review of Systems  Review of Systems  Right index finger gets stuck; notes knot in the palm. X few months.    Objective:   Physical Exam    Vitals:    01/21/20 0837 01/21/20 0910   BP: 132/66 124/76   Pulse: 85    Resp: 12    Temp: 97.7 °F (36.5 °C)    TempSrc: Oral    SpO2: 99%    Weight: 233 lb 3.2 oz (105.8 kg)       Wt Readings from Last 3 Encounters:   01/21/20 233 lb 3.2 oz (105.8 kg)   01/13/20 232 lb 2 oz (105.3 kg)   12/13/19 230 lb 6 oz (104.5 kg) NAD   Skin is warm and dry. Well hydrated. No rash. Mood +, affect is normal.   The neck is supple and free of adenopathy or masses, the thyroid is normal without enlargement or nodules. Chest: clear with no wheezes or rales. No retractions, or use of accessory muscles noted. Cardiovascular: PMI is not displaced, and no thrill noted. Regular rate and rhythm with no rub, murmur or gallop. No peripheral edema. The abdomen is soft without tenderness, guarding, mass, rebound or organomegaly. Aorta, femoral, DP and PT pulses intact. Sensation normal to monofilament feet bilaterally. Feet in good repair, without significant callouses and without ulceration or deformity. Mild thickening flex tendon right index. Full arom finger. Normal capillary refill  Assessment / Plan:        Diagnosis Orders   1. Class 2 obesity without serious comorbidity with body mass index (BMI) of 38.0 to 38.9 in adult, unspecified obesity type  Discussed diet, exercise and weight loss strategies    2. Essential hypertension     3. Type 2 diabetes mellitus without complication, without long-term current use of insulin (HCC)  Dulaglutide (TRULICITY) 1.5 XN/5.3LX SOPN    pioglitazone (ACTOS) 15 MG tablet    Hemoglobin J1Q    Basic Metabolic Panel  Add Actos  diffuse esophageal spasm    4. Binge eating  Lisdexamfetamine Dimesylate (VYVANSE) 60 MG CAPS    Lisdexamfetamine Dimesylate (VYVANSE) 60 MG CAPS    Lisdexamfetamine Dimesylate (VYVANSE) 60 MG CAPS  Well controlled. 5. Depression with anxiety  clonazePAM (KLONOPIN) 0.5 MG tablet   6. Pure hypercholesterolemia  atorvastatin (LIPITOR) 40 MG tablet  LDL at goal < 100   7. Colon cancer screening  BLAYNE - Ramiro Johnson MD, Gastroenterology, [facility]         Side effects of current medications reviewed and questions answered. Follow up in 3 months or prn.

## 2020-01-21 ENCOUNTER — PATIENT MESSAGE (OUTPATIENT)
Dept: FAMILY MEDICINE CLINIC | Age: 50
End: 2020-01-21

## 2020-01-21 ENCOUNTER — OFFICE VISIT (OUTPATIENT)
Dept: FAMILY MEDICINE CLINIC | Age: 50
End: 2020-01-21
Payer: COMMERCIAL

## 2020-01-21 VITALS
SYSTOLIC BLOOD PRESSURE: 124 MMHG | WEIGHT: 233.2 LBS | TEMPERATURE: 97.7 F | HEART RATE: 85 BPM | OXYGEN SATURATION: 99 % | BODY MASS INDEX: 38.22 KG/M2 | DIASTOLIC BLOOD PRESSURE: 76 MMHG | RESPIRATION RATE: 12 BRPM

## 2020-01-21 PROCEDURE — 99214 OFFICE O/P EST MOD 30 MIN: CPT | Performed by: FAMILY MEDICINE

## 2020-01-21 RX ORDER — CLONAZEPAM 0.5 MG/1
TABLET ORAL
Qty: 10 TABLET | Refills: 0 | Status: SHIPPED | OUTPATIENT
Start: 2020-01-21 | End: 2020-03-09

## 2020-01-21 RX ORDER — ATORVASTATIN CALCIUM 40 MG/1
40 TABLET, FILM COATED ORAL DAILY
Qty: 90 TABLET | Refills: 1 | Status: SHIPPED | OUTPATIENT
Start: 2020-01-21 | End: 2020-08-11 | Stop reason: SDUPTHER

## 2020-01-21 RX ORDER — PIOGLITAZONEHYDROCHLORIDE 15 MG/1
15 TABLET ORAL DAILY
Qty: 90 TABLET | Refills: 1 | Status: SHIPPED | OUTPATIENT
Start: 2020-01-21 | End: 2020-04-23

## 2020-01-21 NOTE — PATIENT INSTRUCTIONS
Dermatology:   Dr. Isauro Lamb    [address] [facility]  [phone]    Dr. Sara Rapp  [address] [facility]  [phone]

## 2020-01-22 ENCOUNTER — OFFICE VISIT (OUTPATIENT)
Dept: BARIATRICS/WEIGHT MGMT | Age: 50
End: 2020-01-22

## 2020-01-22 VITALS
HEIGHT: 66 IN | WEIGHT: 232.8 LBS | HEART RATE: 88 BPM | BODY MASS INDEX: 37.41 KG/M2 | SYSTOLIC BLOOD PRESSURE: 122 MMHG | DIASTOLIC BLOOD PRESSURE: 68 MMHG

## 2020-01-22 PROCEDURE — 99024 POSTOP FOLLOW-UP VISIT: CPT | Performed by: SURGERY

## 2020-01-22 NOTE — PATIENT INSTRUCTIONS
Patient received dietary handouts and education.     Goals:   - Continue current eating patterns including protein + produce with all meals and snacks  - Keep food journal to ensure all calorie + protein needs are met and carbs are consistent  - Increase structured exercise to 2-3 days/week for 30-60 minutes

## 2020-01-22 NOTE — TELEPHONE ENCOUNTER
From: Vicente Saunders  To: Katerine Siegel MD  Sent: 1/21/2020 6:13 PM EST  Subject: Prescription Question    I forgot to mention I needed a new diabetes monitor. I have a coupon for [employer] Guide or Guide Me. If you could send that to [facility] with a script for strips that would be awesome.

## 2020-01-25 NOTE — PROGRESS NOTES
Dietary Assessment Note    Vitals:   Vitals:    01/22/20 0859   BP: 122/68   Pulse: 88   Weight: 232 lb 12.8 oz (105.6 kg)   Height: 5' 5.5\" (1.664 m)    Patient lost 0.4 lbs over 6 months. Total Weight Loss: 92.47 lbs    Labs reviewed: labs reviewed, I note that Glucose 146 H, GFR 52 L    Protein intake: Patient not tracking - Will check labels when purchasing     Fluid intake: 48-64 oz/day water, green tea (unsweet) + OJ few times/week     Multivitamin/mineral intake: Fusion - how many/day: 4     Calcium intake: None     Other: Vitamin D3    Exercise: Yes. Walking 30 minutes daily     Nutrition Assessment: 1 year 6 months post-op visit. Breakfast: Oatmeal made w/ water (feels like she craves sugar in AM)    Snack: Cheese crackers OR walnuts    Lunch: Meatballs (beef/pork out) w/ sauce + cheese    Snack: Cheese crackers OR walnuts    Dinner: Ham + cheese + apple OR Charlie roast w/ potatoes + carrots    Snack: String cheese    Amount able to eat per sitting: 3-4 oz protein OR 1 cup protein+ side    Following 30/30/30 rule: Eating in 30 minutes. Waits 30 minutes between eating and drinking.     Food Intolerances/issues: broccoli and cauliflower cause gas, generally avoids/limits pasta + rice    Client Concerns: none    Goals:   - Continue current eating patterns including protein + produce with all meals and snacks  - Keep food journal to ensure all calorie + protein needs are met and carbs are consistent  - Increase structured exercise to 2-3 days/week for 30-60 minutes    Plan: Follow up at 2 years post op and as needed    Porch
Patient appears well-developed and well-nourished. Patient is active and cooperative. Non-toxic appearance. No distress. Neck: Trachea normal and normal range of motion. No JVD present. Pulmonary/Chest: Effort normal. No accessory muscle usage or stridor. No apnea. No respiratory distress. Cardiovascular: Normal rate and no JVD. Abdominal: Normal appearance. Patient exhibits no distension. Abdomen is soft, obese, non tender. Musculoskeletal: Normal range of motion. Patient exhibits no edema. Neurological: Patient is alert and oriented to person, place, and time. Patient has normal strength. GCS eye subscore is 4. GCS verbal subscore is 5. GCS motor subscore is 6. Skin: Skin is warm and dry. No abrasion and no rash noted. Patient is not diaphoretic. No cyanosis or erythema. Psychiatric: Patient has a normal mood and affect. Speech is normal and behavior is normal. Cognition and memory are normal.         Bernice Lay is 44 y.o. female , now with Body mass index is 38.15 kg/m². s/p Sleeve gastrectomy, has lost 0.4 lbs since last visit, total of 93 lbs weight loss. The patient underwent dietary counseling with registered dietician. I have reviewed, discussed and agree with the dietary plan. Patient is trying hard to keep good dietary and behavior modifications. Patient is monitoring portion sizes, food choices and liquid calories. Patient is trying to exercise regularly. Patient pleased with the surgery outcomes. We discussed how her weight affects her overall health including:  Shiraz Chen was seen today for bariatrics post op follow up. Diagnoses and all orders for this visit:    Severe obesity (BMI 35.0-35.9 with comorbidity) ([address]    S/P laparoscopic sleeve gastrectomy    Type 2 diabetes mellitus without complication, without long-term current use of insulin ([address]       and importance of weight loss to alleviate those co morbid conditions.    I encouraged the patient to continue

## 2020-01-31 NOTE — TELEPHONE ENCOUNTER
Per CMM>>>Message from Plan  This medication was previously approved on [id number] from 3/10/2019 to 4/9/2020    Please advise pt. Thanks.

## 2020-03-09 RX ORDER — CLONAZEPAM 0.5 MG/1
TABLET ORAL
Qty: 10 TABLET | Refills: 0 | Status: SHIPPED | OUTPATIENT
Start: 2020-03-09 | End: 2020-04-06

## 2020-03-10 ENCOUNTER — E-VISIT (OUTPATIENT)
Dept: FAMILY MEDICINE CLINIC | Age: 50
End: 2020-03-10
Payer: COMMERCIAL

## 2020-03-10 PROCEDURE — 99421 OL DIG E/M SVC 5-10 MIN: CPT | Performed by: FAMILY MEDICINE

## 2020-03-10 ASSESSMENT — LIFESTYLE VARIABLES: SMOKING_STATUS: NO, I'VE NEVER SMOKED

## 2020-03-11 ENCOUNTER — TELEPHONE (OUTPATIENT)
Dept: FAMILY MEDICINE CLINIC | Age: 50
End: 2020-03-11

## 2020-03-12 ENCOUNTER — OFFICE VISIT (OUTPATIENT)
Dept: FAMILY MEDICINE CLINIC | Age: 50
End: 2020-03-12
Payer: COMMERCIAL

## 2020-03-12 VITALS
WEIGHT: 241 LBS | BODY MASS INDEX: 39.49 KG/M2 | TEMPERATURE: 98.3 F | RESPIRATION RATE: 18 BRPM | HEART RATE: 73 BPM | OXYGEN SATURATION: 98 % | SYSTOLIC BLOOD PRESSURE: 114 MMHG | DIASTOLIC BLOOD PRESSURE: 76 MMHG

## 2020-03-12 PROCEDURE — 99213 OFFICE O/P EST LOW 20 MIN: CPT | Performed by: FAMILY MEDICINE

## 2020-03-12 RX ORDER — AMOXICILLIN AND CLAVULANATE POTASSIUM 875; 125 MG/1; MG/1
1 TABLET, FILM COATED ORAL 2 TIMES DAILY
Qty: 20 TABLET | Refills: 0 | Status: SHIPPED | OUTPATIENT
Start: 2020-03-12 | End: 2020-03-22

## 2020-03-12 RX ORDER — BENZONATATE 200 MG/1
200 CAPSULE ORAL 3 TIMES DAILY PRN
Qty: 20 CAPSULE | Refills: 0 | Status: SHIPPED | OUTPATIENT
Start: 2020-03-12 | End: 2020-03-19

## 2020-04-06 RX ORDER — CYCLOBENZAPRINE HCL 10 MG
TABLET ORAL
Qty: 90 TABLET | Refills: 1 | Status: SHIPPED | OUTPATIENT
Start: 2020-04-06 | End: 2020-08-17 | Stop reason: SDUPTHER

## 2020-04-06 RX ORDER — CLONAZEPAM 0.5 MG/1
0.5 TABLET ORAL NIGHTLY PRN
Qty: 10 TABLET | Refills: 0 | Status: SHIPPED | OUTPATIENT
Start: 2020-04-06 | End: 2020-08-17 | Stop reason: SDUPTHER

## 2020-04-21 NOTE — PROGRESS NOTES
diabetes mellitus, dyslipidemia, hypertension and obesity (BMI >= 30 kg/m2). Use of agents associated with hypertension: amphetamines. History of target organ damage: none. Hyperlipidemia:  No new myalgias or GI upset on atorvastatin (Lipitor). Medication compliance: compliant most of the time. Patient is  following a low fat, low cholesterol diet. She is  exercising regularly. Lab Results   Component Value Date    CHOL 98 10/04/2019    TRIG 103 10/04/2019    HDL 53 10/04/2019    LDLCALC 24 10/04/2019     Lab Results   Component Value Date    ALT 38 10/04/2019    AST 18 10/04/2019        Lab Results   Component Value Date     01/15/2020    K 4.5 01/15/2020    K 4.1 08/01/2018     01/15/2020    CO2 23 01/15/2020    BUN 20 01/15/2020    CREATININE 1.1 01/15/2020    GLUCOSE 146 01/15/2020    CALCIUM 8.9 01/15/2020       Lab Results   Component Value Date    LABA1C 7.9 01/15/2020     Lab Results   Component Value Date    .0 01/15/2020      TSH   Date Value Ref Range Status   10/04/2019 3.29 0.27 - 4.20 uIU/mL Final   06/20/2019 2.24 0.27 - 4.20 uIU/mL Final   07/12/2018 2.31 0.27 - 4.20 uIU/mL Final   01/23/2013 4.45 0.35 - 5.5 uIU/ml Final   09/30/2011 3.49 0.35 - 5.5 uIU/ml Final         Review of Systems   Constitutional: Negative for appetite change and unexpected weight change. Respiratory: Negative for cough and chest tightness. Cardiovascular: Negative for chest pain, palpitations and leg swelling. Endocrine: Negative for polydipsia, polyphagia and polyuria. Musculoskeletal:        Trigger finger right index finger getting worse. Neurological: Negative for dizziness and headaches. Psychiatric/Behavioral: Negative for dysphoric mood and sleep disturbance. The patient is not nervous/anxious. Med list reviewed and is accurate  Prior to Visit Medications    Medication Sig Taking?  Authorizing Provider   buPROPion (WELLBUTRIN XL) 300 MG extended release tablet TAKE ONE TABLET BY MOUTH EVERY MORNING  Rey Calderon MD   FARXIGA 10 MG tablet TAKE ONE TABLET BY MOUTH EVERY MORNING  Rey Calderon MD   clonazePAM (KLONOPIN) 0.5 MG tablet Take 1 tablet by mouth nightly as needed for Anxiety for up to 30 days. Rey Calderon MD   cyclobenzaprine (FLEXERIL) 10 MG tablet TAKE ONE TABLET BY MOUTH DAILY  Rey Calderon MD   Blood Glucose Monitoring Suppl [facility] BLOOD GLUCOSE METER) w/Device KIT 1 kit by Does not apply route daily  Rey Calderon MD   blood glucose test strips (ASCENSIA AUTODISC VI;ONE TOUCH ULTRA TEST VI) strip 1 each by Does not apply route daily  Rey Calderon MD   Lisdexamfetamine Dimesylate (VYVANSE) 60 MG CAPS Take by mouth. Historical Provider, MD   Lisdexamfetamine Dimesylate (VYVANSE) 60 MG CAPS Take 60 mg by mouth daily for 30 days. Rey Calderon MD   Lisdexamfetamine Dimesylate (VYVANSE) 60 MG CAPS Take 60 mg by mouth every morning for 30 days. Rey Calderon MD   Lisdexamfetamine Dimesylate (VYVANSE) 60 MG CAPS Take 1 tablet by mouth daily for 30 days.   Rey Calderon MD   atorvastatin (LIPITOR) 40 MG tablet Take 1 tablet by mouth daily  Rey Calderon MD   Dulaglutide (TRULICITY) 1.5 YA/2.8RD SOPN Inject 1.5 mg into the skin once a week  Rey Calderon MD   pioglitazone (ACTOS) 15 MG tablet Take 1 tablet by mouth daily  Rey Calderon MD   pramipexole (MIRAPEX) 0.5 MG tablet TAKE ONE TO TWO TABLETS BY MOUTH EVERY EVENING  Rey Calderon MD   MIRENA, 52 MG, IUD 52 mg 1 Dose by Intrauterine route once  Historical Provider, MD   FLUoxetine (PROZAC) 20 MG capsule TAKE 3 CAPSULES BY MOUTH ONE TIME A DAY   Rey Calderon MD   ELINEST 0.3-30 MG-MCG per tablet Take 1 tablet by mouth daily  Historical Provider, MD   tranexamic acid (LYSTEDA) 650 MG TABS tablet Take 2 tablets by mouth 3 times daily for 5 days  Rey Calderon MD   Multiple Vitamins-Minerals (BARIATRIC FUSION) CHEW Take 4 tablets by mouth daily Historical Provider, MD   blood glucose test strips (ASCENSIA AUTODISC VI;ONE TOUCH ULTRA TEST VI) strip 1 each by In Vitro route daily As needed. Aisha Stevens MD   Blood Glucose Monitoring Suppl ([address]) w/Device KIT 1 Device by Does not apply route daily  Aisha Stevens MD   b complex vitamins capsule Take 1 capsule by mouth daily  Historical Provider, MD   SURE COMFORT LANCETS 30G MISC 1 Units by Does not apply route 3 times daily  Patient not taking: Reported on 3/12/2020  Aihsa Stevens MD   fluticasone (FLONASE) 50 MCG/ACT nasal spray PLACE TWO SPRAYS IN [facility] NOSTRIL ONCE DAILY  Patient not taking: Reported on 3/12/2020  Aisha Stevens MD   Insulin Pen Needle (B-D UF III MINI PEN NEEDLES) 31G X 5 MM MISC 1 each by Does not apply route 2 times daily  Aisha Stevens MD   aspirin 81 MG EC tablet Take 81 mg by mouth daily.     Historical Provider, MD       Social History     Tobacco Use    Smoking status: Never Smoker    Smokeless tobacco: Never Used   Substance Use Topics    Alcohol use: Yes     Comment: infrequent, few times a month    Drug use: No        Allergies   Allergen Reactions    Effexor Xr [Venlafaxine Hcl Er] Other (See Comments)     Didn't respond well to it.    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 ([facility] 1/29/2016    ETD (eustachian tube dysfunction) 5/16/2014    Herpes simplex     Hyperlipidemia     Lumbar strain 4/19/2017    PCOS (polycystic ovarian syndrome)     Sleep apnea     TMJ syndrome    ,   Past Surgical History:   Procedure Laterality Date    ENDOSCOPY, COLON, DIAGNOSTIC      OTHER SURGICAL HISTORY  05/25/2018    EGD    [place] LAP,STOMACH,OTHER,W/O TUBE N/A 7/31/2018    ROBOTIC ASSISTED LAPAROSCOPIC SLEEVE GASTRECTOMY  performed by Nayely Brody DO at [address]   ,   Social History     Tobacco Use    Smoking status: Never Smoker    Smokeless tobacco: Never Used   Substance Use Topics    Alcohol use: Yes     Comment: infrequent, few times a month    Drug use: No   ,   Family History   Problem Relation Age of Onset    Cervical Cancer Mother     Diabetes Father     Stroke Father         25s    Hypertension Father     Coronary Art Dis Father     Other Father         CHIVO    Cancer Father         melanoma    Glaucoma Father     Diabetes Sister     Diabetes Maternal Grandmother     Cancer Maternal Grandmother         melanoma    Arthritis Maternal Grandmother     Diabetes Paternal Grandmother     Stroke Paternal Grandmother        PHYSICAL EXAMINATION:  [ INSTRUCTIONS:  \"[x]\" Indicates a positive item  \"[]\" Indicates a negative item  -- DELETE ALL ITEMS NOT EXAMINED]  Vital Signs: (As obtained by patient/caregiver or practitioner observation)    Blood pressure-  Heart rate-    Respiratory rate-    Temperature-  Pulse oximetry-   Wt Readings from Last 3 Encounters:   04/23/20 231 lb (104.8 kg)   03/12/20 241 lb (109.3 kg)   01/22/20 232 lb 12.8 oz (105.6 kg)     BP Readings from Last 3 Encounters:   03/12/20 114/76   01/22/20 122/68   01/21/20 124/76       Constitutional: [x] Appears well-developed and well-nourished [x] No apparent distress      [] Abnormal-   Mental status  [x] Alert and awake  [x] Oriented to person/place/time []Able to follow commands      Eyes:  EOM    []  Normal  [] Abnormal-  Sclera  [x]  Normal  [] Abnormal -         Discharge []  None visible  [] Abnormal -    HENT:   [x] Normocephalic, atraumatic.   [] Abnormal   [] Mouth/Throat: Mucous membranes are moist.       Neck: [x] No visualized mass     Pulmonary/Chest: [x] Respiratory effort normal.  [x] No visualized signs of difficulty breathing or respiratory distress        [] Abnormal-      Musculoskeletal:   [x] Normal gait with no signs

## 2020-04-23 ENCOUNTER — TELEMEDICINE (OUTPATIENT)
Dept: FAMILY MEDICINE CLINIC | Age: 50
End: 2020-04-23
Payer: COMMERCIAL

## 2020-04-23 VITALS — WEIGHT: 231 LBS | BODY MASS INDEX: 37.12 KG/M2 | HEART RATE: 89 BPM | HEIGHT: 66 IN

## 2020-04-23 PROCEDURE — 99214 OFFICE O/P EST MOD 30 MIN: CPT | Performed by: FAMILY MEDICINE

## 2020-04-23 PROCEDURE — 3051F HG A1C>EQUAL 7.0%<8.0%: CPT | Performed by: FAMILY MEDICINE

## 2020-04-23 RX ORDER — PIOGLITAZONEHYDROCHLORIDE 30 MG/1
30 TABLET ORAL DAILY
Qty: 90 TABLET | Refills: 1 | Status: SHIPPED | OUTPATIENT
Start: 2020-04-23 | End: 2020-06-09 | Stop reason: ALTCHOICE

## 2020-04-23 RX ORDER — LISDEXAMFETAMINE DIMESYLATE 60 MG/1
1 CAPSULE ORAL DAILY
Qty: 30 CAPSULE | Refills: 0 | Status: ON HOLD | OUTPATIENT
Start: 2020-06-22 | End: 2020-06-12

## 2020-04-23 RX ORDER — FLUOXETINE HYDROCHLORIDE 20 MG/1
CAPSULE ORAL
Qty: 270 CAPSULE | Refills: 2 | Status: SHIPPED | OUTPATIENT
Start: 2020-04-23 | End: 2020-08-17 | Stop reason: SDUPTHER

## 2020-04-23 RX ORDER — LISDEXAMFETAMINE DIMESYLATE 60 MG/1
60 CAPSULE ORAL EVERY MORNING
Qty: 30 CAPSULE | Refills: 0 | Status: SHIPPED | OUTPATIENT
Start: 2020-04-23 | End: 2020-10-13

## 2020-04-23 RX ORDER — LISDEXAMFETAMINE DIMESYLATE 60 MG/1
60 CAPSULE ORAL DAILY
Qty: 30 CAPSULE | Refills: 0 | Status: ON HOLD | OUTPATIENT
Start: 2020-05-23 | End: 2020-06-12

## 2020-04-23 ASSESSMENT — ENCOUNTER SYMPTOMS
CHEST TIGHTNESS: 0
COUGH: 0

## 2020-05-07 ENCOUNTER — OFFICE VISIT (OUTPATIENT)
Dept: ORTHOPEDIC SURGERY | Age: 50
End: 2020-05-07
Payer: COMMERCIAL

## 2020-05-07 PROCEDURE — 20550 NJX 1 TENDON SHEATH/LIGAMENT: CPT | Performed by: ORTHOPAEDIC SURGERY

## 2020-05-07 PROCEDURE — 99203 OFFICE O/P NEW LOW 30 MIN: CPT | Performed by: ORTHOPAEDIC SURGERY

## 2020-05-07 PROCEDURE — L3908 WHO COCK-UP NONMOLDE PRE OTS: HCPCS | Performed by: ORTHOPAEDIC SURGERY

## 2020-05-07 RX ORDER — TRIAMCINOLONE ACETONIDE 40 MG/ML
40 INJECTION, SUSPENSION INTRA-ARTICULAR; INTRAMUSCULAR ONCE
Status: COMPLETED | OUTPATIENT
Start: 2020-05-07 | End: 2020-05-07

## 2020-05-07 RX ADMIN — TRIAMCINOLONE ACETONIDE 40 MG: 40 INJECTION, SUSPENSION INTRA-ARTICULAR; INTRAMUSCULAR at 15:44

## 2020-05-07 RX ADMIN — TRIAMCINOLONE ACETONIDE 40 MG: 40 INJECTION, SUSPENSION INTRA-ARTICULAR; INTRAMUSCULAR at 15:43

## 2020-05-07 NOTE — PROGRESS NOTES
baseline range of motion, strength, and stability    Right hand/Wrist and digits:   Satisfactory range of motion bilaterally. There is tenderness over the radial wrist at the                                    right first dorsal extensor compartment and a                                      positive Finkelsteins test which reproduces symptoms. Globally nontender throughout the remainder of the wrist  No obvious discoloration, skin changes or swelling throughout the hand wrist or fingers  Appropriate finger tenodesis and cascade    Focal mild tenderness overlying A1 pulley of right index finger with no tenderness throughout the remainder of A1 pulleys  Moderate triggering with active flexion at right index finger, no triggering of all other digits including thumb      Neurological: neg provocation testing for carpal tunnel occluding direct carpal tunnel compression and Tinel's    Radiology:     X-rays obtained and reviewed in office:  No new images obtained today      DIAGNOSTIC TESTING: No images obtained at today's visit      IMPRESSION AND PLAN: 77-year-old female presenting with history of ongoing right thumb and wrist as well as right index finger discomfort with associated mechanical catching and locking  1. Right wrist and thumb de Quervain's tendinitis. I think this is amenable to conservative care as an initial line of treatment. I have recommended a short arm thumb spica brace and injection. The risks and benefits of steroid injection were discussed thoroughly. Risks discussed included infection, adverse drug reaction, increased pain post-injection, skin de-pigmentation, fatty atrophy, and persistent clinical symptoms despite injection. The injection was given after cleansing the skin with alcohol. The patients right wrist first dorsal compartment was prepped for steroid injection.  Using sterile technique, the right wrist first dorsal compartment was injected with a mixture of 0.8 ml of 40mg/ml Kenalog and 1 mL 1% lidocaine. Appropriate post injection instructions were given. The patient tolerated the injection well and a Band-aid was placed. 2.  Right index finger trigger finger: We discussed the pathology of this problem and treatment options including both conservative and surgical intervention. I would recommend as a first-line treatment corticosteroid injection as well for her trigger finger. She does understand the specific risk of transient hyperglycemia secondary to her diabetes mellitus and monitoring her symptoms closely and also contacting her primary care physician or physician managing her diabetes to discuss any new change in symptoms. The risks and benefits of steroid injection were discussed thoroughly. Risks discussed included infection, adverse drug reaction, increased pain post-injection, skin de-pigmentation, fatty atrophy, and persistent clinical symptoms despite injection. The injection was given after cleansing the skin with alcohol. The patients right index finger A1 pulley was prepped for steroid injection. Using sterile technique, the right index finger A1 was injected with a mixture of 0.5 ml of 40mg/ml Kenalog and 0.5 mL 1% lidocaine. Appropriate post injection instructions were given. The patient tolerated the injection well and a Band-aid was placed. We'll see the patient back in 4 weeks and if at that point she has had failure to improve with the interventions than surgical de Quervain's and or trigger finger release could be discussed.

## 2020-05-07 NOTE — PROGRESS NOTES
Injection administration details:  Date & Time: 5/7/20 3:44 PM  Site & Comments: Right Index Finger administered by Dr Vahid Montalvo 1%   CC# : 0.5  NDC: 8880-5237-78  Lot number: [id number]  EXP: 03/2021    Injection administration details:  Date & Time: 5/7/20 3:45 PM  Site & Comments: Right Wrist administered by Dr Vahid Montalvo 1%   CC# : 1.0  NDC: 2980-6888-84  Lot number: [id number]  EXP: 03/2021

## 2020-05-11 ENCOUNTER — TELEPHONE (OUTPATIENT)
Dept: ORTHOPEDIC SURGERY | Age: 50
End: 2020-05-11

## 2020-05-22 LAB — DIABETIC RETINOPATHY: POSITIVE

## 2020-05-26 ENCOUNTER — TELEMEDICINE (OUTPATIENT)
Dept: FAMILY MEDICINE CLINIC | Age: 50
End: 2020-05-26
Payer: COMMERCIAL

## 2020-05-26 ENCOUNTER — TELEPHONE (OUTPATIENT)
Dept: FAMILY MEDICINE CLINIC | Age: 50
End: 2020-05-26

## 2020-05-26 VITALS — BODY MASS INDEX: 36.8 KG/M2 | HEIGHT: 66 IN | WEIGHT: 229 LBS

## 2020-05-26 PROCEDURE — 99214 OFFICE O/P EST MOD 30 MIN: CPT | Performed by: FAMILY MEDICINE

## 2020-05-26 NOTE — TELEPHONE ENCOUNTER
Per HIPAA it is ok to leave message on patient voicemail. [facility] informing patient to call back to pre-chart before her 3p VV.

## 2020-05-26 NOTE — TELEPHONE ENCOUNTER
Varun Barron called today with a pulled back muscle. Was offered an appointment for ,June 2nd refused appointment. She said it was a little ways out. Patient would like Rey Calderon MD to call her with a sooner appointment time and date. [address] [place] [id number] [phone] - F [phone] pharmacy confirmed in [facility].     Patient was made aware of e-visits via [employer]

## 2020-06-01 ENCOUNTER — PATIENT MESSAGE (OUTPATIENT)
Dept: FAMILY MEDICINE CLINIC | Age: 50
End: 2020-06-01

## 2020-06-02 ENCOUNTER — TELEPHONE (OUTPATIENT)
Dept: FAMILY MEDICINE CLINIC | Age: 50
End: 2020-06-02

## 2020-06-02 NOTE — TELEPHONE ENCOUNTER
[facility] received a call from:    Name of Caller: Pt    Relationship to patient: Self    Organization name: N/A    Best contact number: [phone]      Reason for call: Pt called stating she in unable to work due to being unable to sit for a long period of time and needs a letter to be off work for a couple days until pain is better

## 2020-06-04 ENCOUNTER — TELEMEDICINE (OUTPATIENT)
Dept: FAMILY MEDICINE CLINIC | Age: 50
End: 2020-06-04
Payer: COMMERCIAL

## 2020-06-04 ENCOUNTER — OFFICE VISIT (OUTPATIENT)
Dept: ORTHOPEDIC SURGERY | Age: 50
End: 2020-06-04
Payer: COMMERCIAL

## 2020-06-04 ENCOUNTER — TELEPHONE (OUTPATIENT)
Dept: FAMILY MEDICINE CLINIC | Age: 50
End: 2020-06-04

## 2020-06-04 PROCEDURE — 3051F HG A1C>EQUAL 7.0%<8.0%: CPT | Performed by: FAMILY MEDICINE

## 2020-06-04 PROCEDURE — 99213 OFFICE O/P EST LOW 20 MIN: CPT | Performed by: FAMILY MEDICINE

## 2020-06-04 PROCEDURE — 99213 OFFICE O/P EST LOW 20 MIN: CPT | Performed by: ORTHOPAEDIC SURGERY

## 2020-06-04 NOTE — PROGRESS NOTES
Assessment: 47-year-old female presenting with history of right thumb and wrist pain as well as right index finger triggering  1. Right de Quervain's tenosynovitis  2. Right index finger trigger finger    Treatment Plan: The patient has had excellent benefit after corticosteroid injection and bracing for her de Quervain's tenosynovitis. She has had nearly 100% improvement and at this time I discussed with her monitoring her symptoms but no plans for any additional treatment  With regards to her index finger I discussed with her options for treatment today. We have attempted conservative management with injection and patient has persistent symptoms despite conservative management attempt. For this reason I had a discussion with the patient today including both operative and nonoperative intervention. I think that she is a candidate to consider right index finger trigger finger release  Risk benefits alternatives of surgery were discussed including but not limited to infection bleeding damage to tendon nerve or blood vessel need for additional procedures continued pain as well as risks of anesthesia including stroke heart attack blood clot death, scar sensitivity stiffness and recurrent triggering. After thorough discussion the patient is understanding would like proceed with surgical intervention  We will plan to begin the process of preoperative surgery scheduling and consent obtained in clinic today likely with local wide-awake procedure to be performed for right index finger trigger finger release  No follow-ups on file. History of Present Illness  Raquel Shaikh is a 44 y.o. female here today for a follow-up for her right index finger and thumb and wrist discomfort  She was last seen 1 month ago with symptoms consistent with right thumb and wrist de Quervain's tenosynovitis as well as right index finger trigger finger.   She received corticosteroid injection to her right first dorsal compartment

## 2020-06-04 NOTE — PROGRESS NOTES
30G MISC 1 Units by Does not apply route 3 times daily  Patient not taking: Reported on 3/12/2020  Luz Elena Sifuentes MD   fluticasone [facility]) 50 MCG/ACT nasal spray PLACE TWO SPRAYS IN EACH NOSTRIL ONCE DAILY  Luz Elena Sifuentes MD   Insulin Pen Needle (B-D UF III MINI PEN NEEDLES) 31G X 5 MM MISC 1 each by Does not apply route 2 times daily  Luz Elena Sifuentes MD   aspirin 81 MG EC tablet Take 81 mg by mouth daily. Historical Provider, MD       Allergies   Allergen Reactions    Effexor Xr [Venlafaxine Hcl Er] Other (See Comments)     Didn't respond well to it.         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   Topic Date Due    Hepatitis B vaccine (1 of 3 - Risk 3-dose series) 06/27/1987    Shingles Vaccine (1 of 2) 06/27/2018    Colon cancer screen colonoscopy  06/27/2018    Diabetic retinal exam  04/18/2020    Breast cancer screen  07/12/2020    Lipid screen  10/04/2020    Diabetic foot exam  10/08/2020    A1C test (Diabetic or Prediabetic)  01/15/2021    Diabetic microalbuminuria test  01/15/2021    Potassium monitoring  01/15/2021    Creatinine monitoring  01/15/2021    DTaP/Tdap/Td vaccine (2 - Td) 01/29/2023    Cervical cancer screen  09/11/2024    Flu vaccine  Completed    Pneumococcal 0-64 years Vaccine  Completed    HIV screen  Completed    Hepatitis A vaccine  Aged Out    Hib vaccine  Aged Out    Meningococcal (ACWY) vaccine  Aged Out       Family History   Problem Relation Age of Onset    Cervical Cancer Mother     Diabetes Father     Stroke Father         25s    Hypertension Father     Coronary Art Dis Father     Other Father         CHIVO    Cancer Father         melanoma    Glaucoma Father     Diabetes Sister     Diabetes Maternal Grandmother     Cancer Maternal Grandmother         melanoma    Arthritis Maternal Grandmother     Diabetes Paternal Grandmother     Stroke Paternal Grandmother        PHYSICAL EXAMINATION:    Vital Signs: (As obtained by patient/caregiver or practitioner observation)     Blood pressure=118/75    Heart rate= 80  Respiratory rate= 14 Temperature =98    Constitutional:  Appears well-developed and well-nourished. No apparent distress                              Mental status:  Alert and awake. Oriented to person/place/time. Able to follow commands       Eyes: EOM intact. Sclera-normal. No erythema of conjunctiva. No eye discharge. HENT: Normocephalic, atraumatic. Mouth/Throat: normal. Mucous membranes are moist.      External Ears: Normal       Neck: No visualized mass      Pulmonary/Chest:  Respiratory effort normal.  No visualized signs of difficulty breathing or respiratory distress         Musculoskeletal:   Normal gait with no signs of ataxia. Normal range of motion of neck. Neurological:         No Facial Asymmetry (Cranial nerve 7 motor function) (limited exam to video visit) .   No gaze palsy              Skin:                     No significant exanthematous lesions or discoloration noted on

## 2020-06-05 ENCOUNTER — TELEPHONE (OUTPATIENT)
Dept: ORTHOPEDIC SURGERY | Age: 50
End: 2020-06-05

## 2020-06-05 NOTE — PROGRESS NOTES
Intrauterine route once      Multiple Vitamins-Minerals (BARIATRIC FUSION) CHEW Take 4 tablets by mouth daily       b complex vitamins capsule Take 1 capsule by mouth daily      fluticasone (FLONASE) 50 MCG/ACT nasal spray PLACE TWO SPRAYS IN EACH NOSTRIL ONCE DAILY 1 Bottle 12    aspirin 81 MG EC tablet Take 81 mg by mouth daily. This patient presents to the office today for a preoperative consultation at the request of surgeon, Dr. Marty Seymour, who plans on performing right trigger finger release on June 12 at [place] [address].  The current problem began several  months ago, and symptoms have been worsening with time. Conservative therapy: N/A.     Planned anesthesia: Regional   Known anesthesia problems: None   Bleeding risk: No recent or remote history of abnormal bleeding  Personal or FH of DVT/PE: No    Patient objection to receiving blood products: No    Patient Active Problem List   Diagnosis    PCOS (polycystic ovarian syndrome)    Pure hypercholesterolemia    Common migraine    Type 2 diabetes mellitus without complication, without long-term current use of insulin ([address]    Depression with anxiety    Obstructive sleep apnea syndrome    Herpes simplex    Binge eating    Pedal edema    FH: melanoma    Essential hypertension    Hiatal hernia with GERD and esophagitis    DUB (dysfunctional uterine bleeding)    Class 2 obesity without serious comorbidity with body mass index (BMI) of 38.0 to 38.9 in adult       Past Medical History:   Diagnosis Date    Anxiety     Chronic gastric ulcer without hemorrhage and without perforation     CTS (carpal tunnel syndrome) 2/10/2015    Depression     Diabetes mellitus ([address]     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 Patient denies any exertional chest pain, dyspnea, palpitations, syncope, orthopnea, edema or paroxysmal nocturnal dyspnea. No polyuria, polydipsia, blurred vision. Has not been checking FSBS. Has not been eating well and weight is up with working from home. Physical Exam   Constitutional: She is oriented to person, place, and time. She appears well-developed and well-nourished. No distress. HENT:   Head: Normocephalic and atraumatic. Mouth/Throat: Uvula is midline, oropharynx is clear and moist and mucous membranes are normal.   Eyes: Conjunctivae and EOM are normal. Pupils are equal, round, and reactive to light. Neck: Trachea normal and normal range of motion. Neck supple. No JVD present. Carotid bruit is not present. No mass and no thyromegaly present. Cardiovascular: Normal rate, regular rhythm, normal heart sounds and intact distal pulses. Exam reveals no gallop and no friction rub. No murmur heard. Pulmonary/Chest: Effort normal and breath sounds normal. No respiratory distress. She has no wheezes. She has no rales. Abdominal: Soft. Normal aorta and bowel sounds are normal. She exhibits no distension and no mass. There is no hepatosplenomegaly. No tenderness. Musculoskeletal: She exhibits no edema and no tenderness. Neurological: She is alert and oriented to person, place, and time. She has normal strength. No cranial nerve deficit Coordination and gait normal.   Skin: Skin is warm and dry. No rash noted. No erythema. Psychiatric: She has a normal mood and affect.  Her behavior is normal.       Lab Review   Lab Results   Component Value Date     01/15/2020 renal insufficiency    Routine administration of higher-dose, long-acting metoprolol in beta-blocker-naïve patients on the day of surgery, and in the absence of dose titration is associated with an overall increase in mortality. Beta-blockers should be started days to weeks prior to surgery and titrated to pulse < 70.  4. Deep vein thrombosis prophylaxis: not indicated  5. No contraindications to planned surgery       Diagnosis Orders   1. Preop examination     2. Acquired trigger finger of right index finger     3. Type 2 diabetes mellitus without complication, without long-term current use of insulin (HCC)  Basic Metabolic Panel    Hemoglobin A1C    Dulaglutide (TRULICITY) 1.5 AF/2.0AX SOPN  Strategies to get back on track with diet, exercise and wt loss addressed   4. Obstructive sleep apnea syndrome  Compliant with CPAP   5. Essential hypertension  At goal < 130/80 off anti-hypertensive meds.

## 2020-06-08 ENCOUNTER — TELEPHONE (OUTPATIENT)
Dept: FAMILY MEDICINE CLINIC | Age: 50
End: 2020-06-08

## 2020-06-08 ENCOUNTER — OFFICE VISIT (OUTPATIENT)
Dept: FAMILY MEDICINE CLINIC | Age: 50
End: 2020-06-08
Payer: COMMERCIAL

## 2020-06-08 ENCOUNTER — OFFICE VISIT (OUTPATIENT)
Dept: PRIMARY CARE CLINIC | Age: 50
End: 2020-06-08

## 2020-06-08 VITALS
RESPIRATION RATE: 14 BRPM | WEIGHT: 247 LBS | HEIGHT: 65 IN | OXYGEN SATURATION: 97 % | BODY MASS INDEX: 41.15 KG/M2 | TEMPERATURE: 97.2 F | HEART RATE: 74 BPM | DIASTOLIC BLOOD PRESSURE: 71 MMHG | SYSTOLIC BLOOD PRESSURE: 127 MMHG

## 2020-06-08 DIAGNOSIS — E11.9 TYPE 2 DIABETES MELLITUS WITHOUT COMPLICATION, WITHOUT LONG-TERM CURRENT USE OF INSULIN (HCC): ICD-10-CM

## 2020-06-08 PROBLEM — E11.3293 MILD NONPROLIFERATIVE DIABETIC RETINOPATHY OF BOTH EYES WITHOUT MACULAR EDEMA ASSOCIATED WITH TYPE 2 DIABETES MELLITUS (HCC): Status: ACTIVE | Noted: 2020-06-08

## 2020-06-08 PROCEDURE — 99215 OFFICE O/P EST HI 40 MIN: CPT | Performed by: FAMILY MEDICINE

## 2020-06-08 PROCEDURE — 3051F HG A1C>EQUAL 7.0%<8.0%: CPT | Performed by: FAMILY MEDICINE

## 2020-06-08 RX ORDER — DULAGLUTIDE 1.5 MG/.5ML
1.5 INJECTION, SOLUTION SUBCUTANEOUS WEEKLY
Qty: 12 PEN | Refills: 1 | Status: SHIPPED | OUTPATIENT
Start: 2020-06-08 | End: 2020-08-17 | Stop reason: SDUPTHER

## 2020-06-08 NOTE — TELEPHONE ENCOUNTER
Please ask [facility] for copy of colonoscopy done a few months ago.   Also ask [facility] for copy eye exam.  Thanks

## 2020-06-09 ENCOUNTER — TELEPHONE (OUTPATIENT)
Dept: ORTHOPEDIC SURGERY | Age: 50
End: 2020-06-09

## 2020-06-09 LAB
ANION GAP SERPL CALCULATED.3IONS-SCNC: 11 MMOL/L (ref 3–16)
BUN BLDV-MCNC: 21 MG/DL (ref 7–20)
CALCIUM SERPL-MCNC: 8.5 MG/DL (ref 8.3–10.6)
CHLORIDE BLD-SCNC: 106 MMOL/L (ref 99–110)
CO2: 24 MMOL/L (ref 21–32)
CREAT SERPL-MCNC: 1 MG/DL (ref 0.6–1.1)
ESTIMATED AVERAGE GLUCOSE: 182.9 MG/DL
GFR AFRICAN AMERICAN: >60
GFR NON-AFRICAN AMERICAN: 58
GLUCOSE BLD-MCNC: 207 MG/DL (ref 70–99)
HBA1C MFR BLD: 8 %
POTASSIUM SERPL-SCNC: 4.2 MMOL/L (ref 3.5–5.1)
SODIUM BLD-SCNC: 141 MMOL/L (ref 136–145)

## 2020-06-09 RX ORDER — INSULIN GLARGINE 100 [IU]/ML
24 INJECTION, SOLUTION SUBCUTANEOUS NIGHTLY
Qty: 5 PEN | Refills: 2 | Status: SHIPPED | OUTPATIENT
Start: 2020-06-09 | End: 2020-08-17 | Stop reason: SDUPTHER

## 2020-06-09 RX ORDER — PEN NEEDLE, DIABETIC 31 GX5/16"
1 NEEDLE, DISPOSABLE MISCELLANEOUS DAILY
Qty: 100 EACH | Refills: 12 | Status: SHIPPED | OUTPATIENT
Start: 2020-06-09 | End: 2020-08-17 | Stop reason: SDUPTHER

## 2020-06-09 NOTE — TELEPHONE ENCOUNTER
Faxed medical records request to [address] at [phone].   John SR# [phone]  Will fax copy of last eye exam

## 2020-06-10 LAB
SARS-COV-2: NOT DETECTED
SOURCE: NORMAL

## 2020-06-12 ENCOUNTER — HOSPITAL ENCOUNTER (OUTPATIENT)
Age: 50
Setting detail: OUTPATIENT SURGERY
Discharge: HOME | End: 2020-06-12
Attending: ORTHOPAEDIC SURGERY | Admitting: ORTHOPAEDIC SURGERY
Payer: COMMERCIAL

## 2020-06-12 VITALS
WEIGHT: 245 LBS | TEMPERATURE: 97 F | HEIGHT: 64 IN | BODY MASS INDEX: 41.83 KG/M2 | HEART RATE: 68 BPM | OXYGEN SATURATION: 100 % | SYSTOLIC BLOOD PRESSURE: 128 MMHG | DIASTOLIC BLOOD PRESSURE: 64 MMHG | RESPIRATION RATE: 16 BRPM

## 2020-06-12 LAB — PREGNANCY, URINE: NEGATIVE

## 2020-06-12 PROCEDURE — 7100000010 HC PHASE II RECOVERY - FIRST 15 MIN: Performed by: ORTHOPAEDIC SURGERY

## 2020-06-12 PROCEDURE — 3600000004 HC SURGERY LEVEL 4 BASE: Performed by: ORTHOPAEDIC SURGERY

## 2020-06-12 PROCEDURE — 2580000003 HC RX 258: Performed by: ORTHOPAEDIC SURGERY

## 2020-06-12 PROCEDURE — 2500000003 HC RX 250 WO HCPCS: Performed by: ORTHOPAEDIC SURGERY

## 2020-06-12 PROCEDURE — 2709999900 HC NON-CHARGEABLE SUPPLY: Performed by: ORTHOPAEDIC SURGERY

## 2020-06-12 PROCEDURE — 7100000011 HC PHASE II RECOVERY - ADDTL 15 MIN: Performed by: ORTHOPAEDIC SURGERY

## 2020-06-12 PROCEDURE — 84703 CHORIONIC GONADOTROPIN ASSAY: CPT

## 2020-06-12 PROCEDURE — 3600000014 HC SURGERY LEVEL 4 ADDTL 15MIN: Performed by: ORTHOPAEDIC SURGERY

## 2020-06-12 RX ORDER — MAGNESIUM HYDROXIDE 1200 MG/15ML
LIQUID ORAL CONTINUOUS PRN
Status: COMPLETED | OUTPATIENT
Start: 2020-06-12 | End: 2020-06-12

## 2020-06-12 RX ORDER — HYDROCODONE BITARTRATE AND ACETAMINOPHEN 5; 325 MG/1; MG/1
1 TABLET ORAL EVERY 6 HOURS PRN
Qty: 12 TABLET | Refills: 0 | Status: SHIPPED | OUTPATIENT
Start: 2020-06-12 | End: 2020-06-15

## 2020-06-12 ASSESSMENT — PAIN - FUNCTIONAL ASSESSMENT: PAIN_FUNCTIONAL_ASSESSMENT: 0-10

## 2020-06-12 ASSESSMENT — PAIN SCALES - GENERAL: PAINLEVEL_OUTOF10: 0

## 2020-06-12 NOTE — H&P
I have reviewed the history and physical and examined the patient and find no relevant changes. I have reviewed with the patient and/or family the risks, benefits, and alternatives to the procedure.     Marjorie Macias MD  6/12/2020

## 2020-06-12 NOTE — H&P
America Bean    [id number]    [facility] Same Day Surgery Update H & P  Department of General Surgery   Surgical Service   Pre-operative History and Physical  Last H & P within the last 30 days. DIAGNOSIS:   Trigger finger, right ring finger [M65.341]    PROCEDURE:  NH INCISE FINGER TENDON SHEATH [46891] (RIGHT INDEX FINGER TRIGGER FINGER RELEASE)     HISTORY OF PRESENT ILLNESS:   Patient is a morbidly obese (Body mass index is 42.05 kg/m².), 44 y.o. female with righ tindex finger pain and triggering with extension. The symptoms have been recalcitrant to conservative treatment and the patient presents today for the above procedure. Covid 19:  Patient denies fever, chills, cough or known exposure to Covid-19.   Patient reports they have not been quarantined at home since Covid-19 test.      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 ([facility] [address] 1/29/2016    ETD (eustachian tube dysfunction) 5/16/2014    Herpes simplex     Hyperlipidemia     Lumbar strain 4/19/2017    PCOS (polycystic ovarian syndrome)     Sleep apnea     TMJ syndrome      Past Surgical History:        Procedure Laterality Date    ENDOSCOPY, COLON, DIAGNOSTIC      OTHER SURGICAL HISTORY  05/25/2018    EGD    NH LAP,STOMACH,OTHER,W/O TUBE N/A 7/31/2018    ROBOTIC ASSISTED LAPAROSCOPIC SLEEVE GASTRECTOMY  performed by Jose Newman DO at [facility] OR     Past Social History:  Social History     Socioeconomic History    Marital status:      Spouse name: Not on file    Number of children: Not on file    Years of education: Not on file    Highest education level: Not on file   Occupational History    Not on file   Social Needs    Financial resource strain: Not on file    Food insecurity     Worry: Not on file Inability: Not on file    Transportation needs     Medical: Not on file     Non-medical: Not on file   Tobacco Use    Smoking status: Never Smoker    Smokeless tobacco: Never Used   Substance and Sexual Activity    Alcohol use: Yes     Comment: infrequent, few times a month    Drug use: No    Sexual activity: Yes     Partners: Male   Lifestyle    Physical activity     Days per week: Not on file     Minutes per session: Not on file    Stress: Not on file   Relationships    Social connections     Talks on phone: Not on file     Gets together: Not on file     Attends Sabianist service: Not on file     Active member of club or organization: Not on file     Attends meetings of clubs or organizations: Not on file     Relationship status: Not on file    Intimate partner violence     Fear of current or ex partner: Not on file     Emotionally abused: Not on file     Physically abused: Not on file     Forced sexual activity: Not on file   Other Topics Concern    Not on file   Social History Narrative    Not on file         Medications Prior to Admission:      Prior to Admission medications    Medication Sig Start Date End Date Taking? Authorizing Provider   insulin glargine (LANTUS SOLOSTAR) 100 UNIT/ML injection pen Inject 24 Units into the skin nightly 6/9/20   Sophia Vasquez MD   Insulin Pen Needle (B-D UF III MINI PEN NEEDLES) 31G X 5 MM MISC 1 each by Does not apply route daily 6/9/20   Sophia Vasquez MD   Dulaglutide (TRULICITY) 1.5 XG/9.9NN SOPN Inject 1.5 mg into the skin once a week 6/8/20   Sophia Vasquez MD   Lisdexamfetamine Dimesylate (VYVANSE) 60 MG CAPS Take 1 tablet by mouth daily for 30 days. 6/22/20 7/22/20  Sophia Vasquez MD   FLUoxetine (PROZAC) 20 MG capsule TAKE 3 CAPSULES BY MOUTH ONE TIME A DAY 4/23/20   Sophia Vasquez MD   Lisdexamfetamine Dimesylate (VYVANSE) 60 MG CAPS Take 60 mg by mouth daily for 30 days.  5/23/20 6/22/20  Sophia Vasquez MD   Lisdexamfetamine Dimesylate (VYVANSE) 60 MG CAPS Take 60 mg by mouth every morning for 30 days. 4/23/20 6/8/20  Viky Payne MD   buPROPion (WELLBUTRIN XL) 300 MG extended release tablet TAKE ONE TABLET BY MOUTH EVERY MORNING 4/8/20   Viky Payne MD   FARXIGA 10 MG tablet TAKE ONE TABLET BY MOUTH EVERY MORNING 4/8/20   Viky Payne MD   clonazePAM (KLONOPIN) 0.5 MG tablet Take 1 tablet by mouth nightly as needed for Anxiety for up to 30 days. 4/6/20 6/8/20  Viky Payne MD   cyclobenzaprine (FLEXERIL) 10 MG tablet TAKE ONE TABLET BY MOUTH DAILY 4/6/20   Viky Payne MD   Lisdexamfetamine Dimesylate (VYVANSE) 60 MG CAPS Take by mouth. Historical Provider, MD   atorvastatin (LIPITOR) 40 MG tablet Take 1 tablet by mouth daily 1/21/20   Viky Payne MD   pramipexole (MIRAPEX) 0.5 MG tablet TAKE ONE TO TWO TABLETS BY MOUTH EVERY EVENING 1/9/20   Viky Payne MD   MIRENA, 46 MG, IUD 52 mg 1 Dose by Intrauterine route once 10/29/19   Historical Provider, MD   Multiple Vitamins-Minerals (BARIATRIC FUSION) CHEW Take 4 tablets by mouth daily     Historical Provider, MD   b complex vitamins capsule Take 1 capsule by mouth daily    Historical Provider, MD   fluticasone (FLONASE) 50 MCG/ACT nasal spray PLACE TWO SPRAYS IN [facility] NOSTRIL ONCE DAILY 1/24/16   Viky Payne MD   aspirin 81 MG EC tablet Take 81 mg by mouth daily.       Historical Provider, MD         Allergies:  Effexor xr [venlafaxine hcl er]    PHYSICAL EXAM:      /72   Pulse 69   Temp 97.7 °F (36.5 °C) (Oral)   Resp 14   Ht 5' 4\" (1.626 m)   Wt 245 lb (111.1 kg)   LMP 05/26/2020 (Within Days)   SpO2 97%   BMI 42.05 kg/m²      Airway:  Airway patent with no audible stridor    Heart:  Regular rate and rhythm, No murmur noted    Lungs:  No increased work of breathing, good air exchange, clear to auscultation bilaterally, no crackles or wheezing    Abdomen:  Soft, non-distended, non-tender, normal active bowel sounds, no

## 2020-06-12 NOTE — BRIEF OP NOTE
Brief Postoperative Note      Patient: Melony Jorge  YOB: 1970  MRN: [id number]    Date of Procedure: 6/12/2020    Pre-Op Diagnosis: Trigger index finger, right index finger [M65.341]    Post-Op Diagnosis: Same       Procedure(s):  RIGHT INDEX FINGER TRIGGER FINGER RELEASE    Surgeon(s):  Brad Frazier MD    Assistant:  Surgical Assistant: Zeke Oneill    Anesthesia: local WALANT    Estimated Blood Loss (mL): less than 5ml    Complications: None immediate apparent    Specimens:   none  Implants:  none    Drains: none    Findings: see fully dictated operative report    Electronically signed by Blake Mann MD on 6/12/2020 at 9:31 AM

## 2020-06-12 NOTE — PROGRESS NOTES
OR and Dr Sharron Das called and informed pt wants to be a straight local.  DR Sharron Das stated no need for IV
may not be in effect during this procedure. I give my doctor permission to give me blood or blood products. I understand that there are risks with receiving blood such as hepatitis, AIDS, fever, or allergic reaction. I acknowledge that the risks, benefits, and alternatives of this treatment have been explained to me and that no express or implied warranty has been given by the hospital, any blood bank, or any person or entity as to the blood or blood components transfused. At the discretion of my doctor, I agree to allow observers, equipment/product representatives and allow photographing, and/or televising of the procedure, provided my name or identity is maintained confidentially. I agree the hospital may dispose of or use for scientific or educational purposes any tissue, fluid, or body parts which may be removed.     ________________________________Date________Time______ am/pm  ([place] [facility])  Patient or Signature of Closest Relative or Legal Guardian    ________________________________Date________Time______am/pm      Page 1 of  1  Witness

## 2020-06-12 NOTE — OP NOTE
options and decided to monitor postoperatively the right long finger for now. .  No other abnormality was noted. The wound was copiously irrigated and then the skin was closed with interrupted nylon suture. all fingers were warm and well perfused. Soft sterile dressing was placed. Patient , tolerated the procedure well and was taken to the postanesthesia recovery unit in good condition. No complications during the case. Findings:         Intervention:  1% Lidocaine with epinephrine    Other Notes: Follow-up 7-10 days, wound inspection, likely suture removal.  Home exercise program for ROM, massage, progressive activities. Hand Therapy referral with modalities, if needed, or if patient desires.         Idania Dudley MD    Hand & Upper Extremity Surgery  [address] partner of [facility] ([facility])

## 2020-06-22 ENCOUNTER — OFFICE VISIT (OUTPATIENT)
Dept: ORTHOPEDIC SURGERY | Age: 50
End: 2020-06-22

## 2020-06-22 VITALS — BODY MASS INDEX: 41.82 KG/M2 | WEIGHT: 244.93 LBS | HEIGHT: 64 IN

## 2020-06-22 PROCEDURE — 99024 POSTOP FOLLOW-UP VISIT: CPT | Performed by: ORTHOPAEDIC SURGERY

## 2020-06-22 RX ORDER — CLINDAMYCIN HYDROCHLORIDE 300 MG/1
300 CAPSULE ORAL 3 TIMES DAILY
Qty: 15 CAPSULE | Refills: 0 | Status: SHIPPED | OUTPATIENT
Start: 2020-06-22 | End: 2020-06-27

## 2020-06-22 NOTE — PROGRESS NOTES
Chief Complaint:  Pain (Right Index Finger)      History of Present of Illness:  Ms. Carmel Burton is a 78-year-old female presenting as follow-up for right index finger trigger finger release  Date of procedure: 6/12/2020  Procedure: Right index finger trigger finger release  She is now 10 days status post surgery. She feels that she is doing well with no pain currently. No new numbness or tingling in her finger. She denies any fever chills or other constitutional symptoms. She states that the bandages did fall off in the interval since her postoperative course she has been having some drainage from the wound but no pain described  No other new complaints or symptoms described today    Examination:  Right index finger/right hand:  Mild swelling near the palm and wound that is not associated with any obvious fluctuance or erythema  Slightly macerated wound edges with sutures in place and a small amount of clear drainage from the wound with some superficial wound dehiscence  No pain throughout the flexor tendon sheath with palpation and full composite fist and full extension of all fingers  Gross sensation intact radial and ulnar aspect of fingertip  Brisk capillary refill in the fingertip  Active FDS FDP EDC interossei    Radiology:     X-rays obtained and reviewed in office:  No new images obtained today  No orders of the defined types were placed in this encounter. Impression:  Encounter Diagnosis   Name Primary?  Trigger index finger of right hand Yes         Treatment Plan:  I discussed with the patient today the appearance of her wound. We discussed signs of a possible early superficial wound infection and I will plan to prescribe her a course of oral antibiotics today  I discussed the importance of now keeping her wound protected and dry to see if we can get the remainder of her wound to heal.  We will monitor closely and plan for follow-up in 1 week.   She does have a history of diabetes mellitus and understands that she is a high risk for wound infection and delayed wound healing. Okay for gentle finger range of motion but again I would like her to avoid any gripping or shearing of her wound to maximize her chance of healing.

## 2020-06-29 ENCOUNTER — OFFICE VISIT (OUTPATIENT)
Dept: PRIMARY CARE CLINIC | Age: 50
End: 2020-06-29
Payer: COMMERCIAL

## 2020-06-29 ENCOUNTER — OFFICE VISIT (OUTPATIENT)
Dept: ORTHOPEDIC SURGERY | Age: 50
End: 2020-06-29

## 2020-06-29 VITALS — BODY MASS INDEX: 41.82 KG/M2 | HEIGHT: 64 IN | WEIGHT: 244.93 LBS

## 2020-06-29 PROCEDURE — 99024 POSTOP FOLLOW-UP VISIT: CPT | Performed by: ORTHOPAEDIC SURGERY

## 2020-06-29 PROCEDURE — 99211 OFF/OP EST MAY X REQ PHY/QHP: CPT | Performed by: NURSE PRACTITIONER

## 2020-06-29 NOTE — PATIENT INSTRUCTIONS
bags, handling, and disposing of trash. Wash hands after handling or disposing of trash.  Consider consulting with your local health department about trash disposal guidance if available. Information for Household Members and Caregivers of Someone who is Sick   Call ahead before visiting your doctor   Call ahead: If you have a medical appointment, call the healthcare provider and tell them that you have or may have COVID-19. This will help the healthcare provider's office take steps to keep other people from getting infected or exposed. Wear a facemask if you are sick   ; If you are sick: You should wear a facemask when you are around other people (e.g., sharing a room or vehicle) or pets and before you enter a healthcare provider's office. ; If you are caring for others: If the person who is sick is not able to wear a facemask (for example, because it causes trouble breathing), then people who live with the person who is sick should not stay in the same room with them, or they should wear a facemask if they enter a room with the person who is sick. Cover your coughs and sneezes   ; Cover: Cover your mouth and nose with a tissue when you cough or sneeze.   ; Dispose: Throw used tissues in a lined trash can.   ; Wash hands: Immediately wash your hands with soap and water for at least 20 seconds or, if soap and water are not available, clean your hands with an alcohol-based hand  that contains at least 60% alcohol. Clean your hands often   ;  Wash hands: Wash your hands often with soap and water for at least 20 seconds, especially after blowing your nose, coughing, or sneezing; going to the bathroom; and before eating or preparing food.   ; Hand : If soap and water are not readily available, use an alcohol-based hand  with at least 60% alcohol, covering all surfaces of your hands and rubbing them together until they feel dry.   ; Soap and water: Soap and water are the best option if

## 2020-06-30 ENCOUNTER — TELEPHONE (OUTPATIENT)
Dept: ORTHOPEDIC SURGERY | Age: 50
End: 2020-06-30

## 2020-06-30 NOTE — TELEPHONE ENCOUNTER
Auth: [employer]  Date: 7/1/2020  Reference # [id number]  Spoke with: Ngozi Ortiz  Type of SX: Outpatient  Location: [facility]  CPT 31190   SX area: Rt hand  Insurance: Kami

## 2020-07-01 ENCOUNTER — HOSPITAL ENCOUNTER (OUTPATIENT)
Age: 50
Setting detail: OUTPATIENT SURGERY
Discharge: HOME | End: 2020-07-01
Attending: ORTHOPAEDIC SURGERY | Admitting: ORTHOPAEDIC SURGERY
Payer: COMMERCIAL

## 2020-07-01 VITALS
DIASTOLIC BLOOD PRESSURE: 66 MMHG | SYSTOLIC BLOOD PRESSURE: 116 MMHG | RESPIRATION RATE: 16 BRPM | TEMPERATURE: 97.6 F | HEIGHT: 66 IN | HEART RATE: 69 BPM | BODY MASS INDEX: 39.21 KG/M2 | OXYGEN SATURATION: 98 % | WEIGHT: 244 LBS

## 2020-07-01 LAB
GLUCOSE BLD-MCNC: 112 MG/DL (ref 70–99)
HCG(URINE) PREGNANCY TEST: NEGATIVE
PERFORMED ON: ABNORMAL
SARS-COV-2: NOT DETECTED
SOURCE: NORMAL

## 2020-07-01 PROCEDURE — 87076 CULTURE ANAEROBE IDENT EACH: CPT

## 2020-07-01 PROCEDURE — 7100000011 HC PHASE II RECOVERY - ADDTL 15 MIN: Performed by: ORTHOPAEDIC SURGERY

## 2020-07-01 PROCEDURE — 87070 CULTURE OTHR SPECIMN AEROBIC: CPT

## 2020-07-01 PROCEDURE — 7100000010 HC PHASE II RECOVERY - FIRST 15 MIN: Performed by: ORTHOPAEDIC SURGERY

## 2020-07-01 PROCEDURE — 84703 CHORIONIC GONADOTROPIN ASSAY: CPT

## 2020-07-01 PROCEDURE — 87075 CULTR BACTERIA EXCEPT BLOOD: CPT

## 2020-07-01 PROCEDURE — 2709999900 HC NON-CHARGEABLE SUPPLY: Performed by: ORTHOPAEDIC SURGERY

## 2020-07-01 PROCEDURE — 87186 SC STD MICRODIL/AGAR DIL: CPT

## 2020-07-01 PROCEDURE — 6370000000 HC RX 637 (ALT 250 FOR IP): Performed by: ORTHOPAEDIC SURGERY

## 2020-07-01 PROCEDURE — 3600000015 HC SURGERY LEVEL 5 ADDTL 15MIN: Performed by: ORTHOPAEDIC SURGERY

## 2020-07-01 PROCEDURE — 3600000005 HC SURGERY LEVEL 5 BASE: Performed by: ORTHOPAEDIC SURGERY

## 2020-07-01 PROCEDURE — 87205 SMEAR GRAM STAIN: CPT

## 2020-07-01 PROCEDURE — 87077 CULTURE AEROBIC IDENTIFY: CPT

## 2020-07-01 RX ORDER — GINSENG 100 MG
CAPSULE ORAL
Status: COMPLETED | OUTPATIENT
Start: 2020-07-01 | End: 2020-07-01

## 2020-07-01 RX ORDER — CLINDAMYCIN HYDROCHLORIDE 300 MG/1
300 CAPSULE ORAL 3 TIMES DAILY
Qty: 21 CAPSULE | Refills: 0 | Status: SHIPPED | OUTPATIENT
Start: 2020-07-01 | End: 2020-07-08

## 2020-07-01 ASSESSMENT — PAIN - FUNCTIONAL ASSESSMENT: PAIN_FUNCTIONAL_ASSESSMENT: 0-10

## 2020-07-01 NOTE — OP NOTE
[facility]                     [address] [facility], [address] [facility]                                OPERATIVE REPORT    PATIENT NAME: Jayne Carney                    :        1970  MED REC NO:   [id number]                          ROOM:  ACCOUNT NO:   [de-identified]                           ADMIT DATE: 2020  PROVIDER:     Tonio Goldberg MD    DATE OF PROCEDURE:  2020    PREOPERATIVE DIAGNOSIS:  Surgical wound dehiscence with drainage from  right palm, right hand. POSTOPERATIVE DIAGNOSIS:  Surgical wound dehiscence with drainage from  right palm, right hand. OPERATION PERFORMED:  Incision and drainage with debridement of surgical  site wound with secondary closure of right hand. PRIMARY SURGEON:  Tonio Goldberg MD    ASSISTANT:  [facility] surgical assistant. ANESTHESIA:  Local, 1% lidocaine with epinephrine _____. ESTIMATED BLOOD LOSS:  Less than 5 mL. COMPLICATIONS:  None immediately apparent. SPECIMENS:  Fluid from wound sent for aerobic and anaerobic culture. IMPLANTS:  None. BRIEF HISTORY:  The patient is a 53-year-old female with a history of  trigger finger release of her right index finger performed approximately  three weeks ago. She had persistent drainage and evidence of wound  dehiscence at her first postoperative visit with persistent drainage on  followup visit. She was placed on oral antibiotics and continued to  have some clear drainage with wound dehiscence. She does have a history  of diabetes mellitus and I suspected delayed wound healing with possible  infection or early sinus formation and recommended that we perform a  formal debridement of her wound and exploration obtaining cultures with  irrigation and plan for closure. She understood the risks, benefits and  alternatives and elected to proceed. Consent was obtained prior to  surgery.     DESCRIPTION OF THE PROCEDURE:  The patient was seen in the

## 2020-07-01 NOTE — H&P
I have reviewed the history and physical and examined the patient and find no relevant changes. I have reviewed with the patient and/or family the risks, benefits, and alternatives to the procedure.     Scheduled Meds:  Continuous Infusions:  PRN Meds:.  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

## 2020-07-01 NOTE — PROGRESS NOTES
Teaching/ education completed for home care including pain management, wound care,activity,safety precautions and infection control. Patient  verbalized understanding.

## 2020-07-01 NOTE — PROGRESS NOTES
Received from OR phase 2 - awake,room air,right hand dressing/coban dry and intact ,elevated,ice pack denies Pain circulation good,vs.

## 2020-07-02 NOTE — RESULT ENCOUNTER NOTE
Your test for COVID-19, also known as novel coronavirus, came back negative. No virus was detected from the sample collected. Testing is not 100%. Until your symptoms are fully resolved, you may still be contagious. We recommend that you remain isolated for 7 days minimum or 72 hours after your symptoms have completely resolved, whichever is longer. If you were exposed to a known positive COIVID-19 patient, then you must remain isolated for 14 days. If you were tested for a pre-op, then you remain in isolated until your procedure. Continually monitor symptoms. Contact a medical provider if symptoms are worsening. If you have any additional questions, contact your PCP.     For additional information, please visit the Centers for Disease Control and Prevention [email or web address]cy

## 2020-07-04 LAB
ANAEROBIC CULTURE: ABNORMAL
CULTURE SURGICAL: ABNORMAL
ORGANISM: ABNORMAL

## 2020-07-06 ENCOUNTER — TELEPHONE (OUTPATIENT)
Dept: ORTHOPEDIC SURGERY | Age: 50
End: 2020-07-06

## 2020-07-06 NOTE — TELEPHONE ENCOUNTER
SPOKE WITH PATIENT -STATES SHE'S  BEEN VERY SORE SINCE SURGERY & THE SX SITE IS VERY HARD. STATES SHE REMOVED ALL HER BANDAGES BUT REWRAPPED HER HAND. NO FEVER REPORTED.  SCHEDULED AN APPT FOR TOMORROW 7/7 @ [facility] [place]

## 2020-07-07 ENCOUNTER — OFFICE VISIT (OUTPATIENT)
Dept: ORTHOPEDIC SURGERY | Age: 50
End: 2020-07-07

## 2020-07-07 PROCEDURE — 99024 POSTOP FOLLOW-UP VISIT: CPT | Performed by: ORTHOPAEDIC SURGERY

## 2020-07-07 NOTE — PROGRESS NOTES
Chief Complaint:  Finger Pain (r index )      History of Present of Illness:  Ms. Doron Roa is a 49-year-old female presenting as a repeat incision and drainage of right index finger after trigger finger release surgery and subsequent secondary closure  Date of most recent procedure: 7/1/2020  The patient did remove her dressing several days after the surgery as she was feeling sore and she has been having some more soreness and swelling. She is taking Aleve as needed for pain no fever chills or other constitutional symptoms  She has been taking antibiotic as prescribed    Examination:  General: Pleasant well-appearing no distress  Right hand/palm: Macerated incision with sutures in place and no obvious dehiscence  There is some moderate localized swelling with no active drainage  Mild swelling near the proximal phalanx and tenderness at the P1 segment of the flexor tendon sheath, minimal tenderness within the palm and no tenderness more proximally within the palm  Active finger flexion and extension including active FDS and FDP with no triggering  Gross sensation intact radial and ulnar aspect of fingertip with brisk capillary refill    Radiology:     X-rays obtained and reviewed in office:  No new images obtained today    No orders of the defined types were placed in this encounter. Impression:  Encounter Diagnosis   Name Primary?  Trigger index finger of right hand Yes         Treatment Plan:  Patient appears to have macerated skin around the wound. She did remove her dressing several days after the surgery because she felt that it was coming apart. I stressed to her the importance of keeping her wound clean and dry at this point as I think this will help with her chances of healing. Strict diabetes control also discussed with the patient today.   We will plan to adjust her antibiotic regimen according to culture results and I will discuss with infectious disease specialist to determine her best antibiotic regimen  I did discuss with her working on some gentle range of motion of the finger at this point which I think will be important to prevent adhesions but also protecting her hand avoiding any gripping or lifting or pressure on this area.   We will plan to see her back with close monitoring and follow-up in 1 week for repeat evaluation

## 2020-07-08 ENCOUNTER — TELEPHONE (OUTPATIENT)
Dept: ORTHOPEDIC SURGERY | Age: 50
End: 2020-07-08

## 2020-07-08 ENCOUNTER — TELEPHONE (OUTPATIENT)
Dept: INFECTIOUS DISEASES | Age: 50
End: 2020-07-08

## 2020-07-08 RX ORDER — AMOXICILLIN AND CLAVULANATE POTASSIUM 875; 125 MG/1; MG/1
1 TABLET, FILM COATED ORAL 2 TIMES DAILY
Qty: 28 TABLET | Refills: 0 | Status: SHIPPED | OUTPATIENT
Start: 2020-07-08 | End: 2020-07-22

## 2020-07-08 RX ORDER — CIPROFLOXACIN 500 MG/1
750 TABLET, FILM COATED ORAL 2 TIMES DAILY
Qty: 42 TABLET | Refills: 0 | Status: SHIPPED | OUTPATIENT
Start: 2020-07-08 | End: 2020-07-22

## 2020-07-10 ENCOUNTER — TELEPHONE (OUTPATIENT)
Dept: INFECTIOUS DISEASES | Age: 50
End: 2020-07-10

## 2020-07-10 RX ORDER — PRAMIPEXOLE DIHYDROCHLORIDE 0.5 MG/1
TABLET ORAL
Qty: 60 TABLET | Refills: 4 | Status: SHIPPED | OUTPATIENT
Start: 2020-07-10 | End: 2020-08-17 | Stop reason: SDUPTHER

## 2020-07-14 ENCOUNTER — OFFICE VISIT (OUTPATIENT)
Dept: ORTHOPEDIC SURGERY | Age: 50
End: 2020-07-14

## 2020-07-14 VITALS — HEIGHT: 66 IN | WEIGHT: 244 LBS | BODY MASS INDEX: 39.21 KG/M2

## 2020-07-14 PROCEDURE — 99024 POSTOP FOLLOW-UP VISIT: CPT | Performed by: ORTHOPAEDIC SURGERY

## 2020-07-14 NOTE — PROGRESS NOTES
Chief Complaint:  Pain (Right Index Finger)      History of Present of Illness:  Ms. Margret Srinivasan is a 57-year-old female presenting as a repeat incision and drainage of right index finger after trigger finger release surgery and subsequent secondary closure  Date of most recent procedure: 7/1/2020    2 weeks since her most recent incision and drainage. She did have some swelling and drainage at her last visit and antibiotics were prescribed after speaking with infectious disease specialist Dr. Manohar Thorpe. The patient noticed some increased drainage about 1 or 2 days after her last visit with me. She still has some soreness and swelling but states that she has had slight improvement overall. She has started to new antibiotics per infectious disease and has been taking these antibiotics as prescribed. No fever chills or other constitutional symptoms reported    Examination:  General: Pleasant well-appearing no distress  Right hand/palm:  Incision is dry with a small area of opening after suture removal centrally with some thick clear fluid draining from the area with no purulence  Moderate swelling with no obvious effusion and no erythema throughout the remainder of the finger or palm  Minimal swelling near the proximal phalanx and tenderness at the P1 segment of the flexor tendon sheath, minimal tenderness at the proximal phalanx with no tenderness throughout the remainder of the finger or palm  Active finger flexion and extension including active FDS and FDP with no triggering pulp to palm distance approximately 3 cm  Gross sensation intact radial and ulnar aspect of fingertip with brisk capillary refill    Radiology:     X-rays obtained and reviewed in office:  No new images obtained today    No orders of the defined types were placed in this encounter. Impression:  Encounter Diagnosis   Name Primary?     Trigger index finger of right hand Yes         Treatment Plan:  The patient appears to have some improvement in her wound since her last visit. I recommend that she continues with dry dressings to the area and keeping her wound clean and dry otherwise. We will give her a referral today to therapy to work on some edema control and finger range of motion strategies for tendon gliding. Continue with antibiotics per infectious disease.   Continue with close follow-up and see her back in 1 week for repeat evaluation of her wound

## 2020-07-16 RX ORDER — PIOGLITAZONEHYDROCHLORIDE 15 MG/1
TABLET ORAL
Qty: 90 TABLET | Refills: 0 | Status: SHIPPED | OUTPATIENT
Start: 2020-07-16 | End: 2020-11-23

## 2020-07-17 ENCOUNTER — HOSPITAL ENCOUNTER (OUTPATIENT)
Dept: OCCUPATIONAL THERAPY | Age: 50
Setting detail: THERAPIES SERIES
Discharge: HOME | End: 2020-07-17
Payer: COMMERCIAL

## 2020-07-17 PROCEDURE — 97110 THERAPEUTIC EXERCISES: CPT | Performed by: OCCUPATIONAL THERAPIST

## 2020-07-17 PROCEDURE — 97140 MANUAL THERAPY 1/> REGIONS: CPT | Performed by: OCCUPATIONAL THERAPIST

## 2020-07-17 PROCEDURE — 97165 OT EVAL LOW COMPLEX 30 MIN: CPT | Performed by: OCCUPATIONAL THERAPIST

## 2020-07-17 NOTE — FLOWSHEET NOTE
[facility], [address] [address]  Phone [phone]      Fax [phone]    Occupational Therapy Treatment Note/ Progress Report:     Date:  2020    Patient Name:  Derian Calderon    :  1970  MRN: [id number]    Medical/Treatment Diagnosis Information:  · Diagnosis: R IF trigger finger M65.321   · Treatment Diagnosis: R hand stiffness O18.772     Insurance/Certification information:  OT Insurance Information: BCBS Indiv deduct met   3970/4000 OOp met  $60 copay 60 OT/PT/ST  Physician Information:  Referring Practitioner: Cindi Barnett  Has the plan of care been signed (Y/N):        []  Yes  [x]  No       Visit # Insurance Allowable Auth Required   1 60 OT/PT/ST []  Yes []  No        Is this a Progress Report:     []  Yes  [x]  No      If Yes:  Date Range for reporting period:  Beginning  Ending    Progress report will be due (10 Rx or 30 days whichever is less):     Recertification will be due (POC Duration  / 90 days whichever is less):20    Date of Injury: several months ago  Date of Surgery: 20    Date of Patient follow up with Physician: 3-4 wks    RESTRICTIONS/PRECAUTIONS:     Latex Allergy:  [x]No      []Yes  Pacemaker:  [x] No       [] Yes     Preferred Language for Healthcare:   [x]English       []other:     Functional Scale: *57% (Quick DASH)   Date assessed:  2020      SUBJECTIVE:   Patient reported deficits/history of current problem: Started  catching and gradually got worse. Feels she is worse post surgery in regards to mobility  Had 1 prior injection      Pain Scale:5-6 /10  In evening      OBJECTIVE:       Date:  2020     Objective Measures/Tests:      ROM: See eval                       Strength:            Observations:        Other:                  MODALITIES:      Fluidotherapy (23807)      Estim (39250/26889)      Paraffin [phone] (45099) Iontophoresis (86701)      Hot Pack      Cold Pack            INTERVENTIONS:      Therapeutic Exercise (89499)      A/AAROM   MPflex  DIP flex  PIP flex  Hook'   composite fist  Straight fist  Finger ext   10 x 3 sec hold                       Therapeutic Activity (73335) Finger ext over pen  10x      Finger flex gripping 2\" diameter cylinder  10x                       Manual Therapy (49399) Retrograde massage         XL digi sleeve for edema during the day   XS glove at night           Neuromuscular Reeducation (75886)                  ADL Training (97801)                  HEP Training/Review See sheet(s)      Access Code: [email or web address]   URL: [email or web address]   Date: 07/17/2020   Prepared by:  [facility]     Exercises   Intrinsic+ (MP flexion, IP extension) - 10 reps - 3 sec hold - 4x daily - 7x weekly   Seated Finger DIP AROM - 10 reps - 3 sec hold - 4x daily - 7x weekly   Seated Finger PIP AROM - 10 reps - 3 sec hold - 4x daily - 7x weekly   Seated Single Digit Intrinsic Stretch - 10 reps - 3 sec hold - 4x daily - 7x weekly   Seated Finger Composite Flexion Extension - 10 reps - 1-2 sets - 5 hold - 3-4x daily - 7x weekly   Hand AROM Flat Fist - 10 reps - 3 sets - 1x daily - 7x weekly   Seated Single Finger Extension - 10 reps - 3-5 hold - 4x daily - 7x weekly   Seated Pen Hurdles - 10 reps - 3x daily - 7x weekly              Splinting      Lcode:      Orthotic Mgmt, Subsequent Enc (07655)      Orthotic Mgmt & Training (89773)            Other:                                Therapeutic Exercise & NMR:  [x] (64581) Provided verbal/tactile cueing for activities related to strengthening, flexibility, endurance, ROM  for improvements in scapular, scapulothoracic and UE control with self care, reaching, carrying, lifting, house/yardwork, driving/computer work.    [] (24523) Provided verbal/tactile cueing for activities related to improving balance, coordination, kinesthetic sense, posture, motor skill, proprioception  to assist with  scapular, scapulothoracic and UE control with self care, reaching, carrying, lifting, house/yardwork, driving/computer work.     Therapeutic Activities & NMR:    [x] (13712 or 47309) Provided verbal/tactile cueing for activities related to improving balance, coordination, kinesthetic sense, posture, motor skill, proprioception and motor activation to allow for proper function of scapular, scapulothoracic and UE control with self care, carrying, lifting, driving/computer work    Home Exercise Program:    [x] (22353) Reviewed/Progressed HEP activities related to strengthening, flexibility, endurance, ROM of scapular, scapulothoracic and UE control with self care, reaching, carrying, lifting, house/yardwork, driving/computer work  [] (37706) Reviewed/Progressed HEP activities related to improving balance, coordination, kinesthetic sense, posture, motor skill, proprioception of scapular, scapulothoracic and UE control with self care, reaching, carrying, lifting, house/yardwork, driving/computer work      Manual Treatments:  PROM / STM / Oscillations-Mobs:  G-I, II, III, IV (PA's, Inf., Post.)  [] (86498) Provided manual therapy to mobilize soft tissue/joints of cervical/CT, scapular GHJ and UE for the purpose of modulating pain, promoting relaxation,  increasing ROM, reducing/eliminating soft tissue swelling/inflammation/restriction, improving soft tissue extensibility and allowing for proper ROM for normal function with self care, reaching, carrying, lifting, house/yardwork, driving/computer work    ADL Training:  [] (00651) Provided self-care/home management training related to activities of daily living and compensatory training, and/or use of adaptive equipment        Charges:  Timed Code Treatment Minutes: 30   Total Treatment Minutes: 45        [x] EVAL (LOW) 85201 (typically 20 minutes face-to-face)    [] EVAL (MOD) 62663 (typically 30 minutes face-to-face)  [] EVAL (HIGH) 50402 (typically 45 minutes face-to-face)  [] OT Re-eval (74276)       [x] Paul [phone]) x      [] VUSVY(33559)  [] NMR (81375) x      [] Estim (attended) (39205)   [x] Manual ([phone]) x      [] US (07826)  [] TA (35943) x      [] Paraffin (27959)  [] ADL  (54923) x     [] Splint/L code:    [] Estim (unattended) [phone])  [] Fluidotherapy (24400)  [] Other:      ASSESSMENT:  See eval      Frequency/Duration:  1-2 days per week for 6 weeks 8/28/20        GOALS:  Patient stated goal: regain movement in R IF    []? Progressing: []? Met: []? Not Met: []? Adjusted     Therapist goals for Patient:   Short Term Goals: To be achieved in: 2 weeks  1. Independent in HEP and progression per patient tolerance, in order to prevent re-injury. []? Progressing: []? Met: []? Not Met: []? Adjusted   2. Patient will have a decrease in pain to facilitate improvement in movement, function, and ADLs as indicated by Functional Deficits. []? Progressing: []? Met: []? Not Met: []? Adjusted     Long Term Goals to be achieved in 6 weeks (through 8/28/20), including patient directed goals to address patient identified performance deficits:  1) Pt to be independent in graded HEP progression with a good level of effort and compliance. []? Progressing: []? Met: []? Not Met: []? Adjusted   2) Pt to report a score of </= 25 % on the Quick DASH disability questionnaire for increased performance with carrying, moving, and handling objects. []? Progressing: []? Met: []? Not Met: []? Adjusted   3) Pt will demonstrate increased ROM to R IF  Fingertip to [facility] </= 1.5 cm for improved independence with .writing and opening containers  []? Progressing: []? Met: []? Not Met: []? Adjusted   4) Pt will demonstrate increased strength to R  [id number]# of L for improved independence with opening containers and lifting.  []? Progressing: []? Met: []? Not Met: []?  Adjusted   5) Pt will have a decrease in pain to 2/10 in the evening to facilitate ADL's.  []?

## 2020-07-17 NOTE — PLAN OF CARE
[address] [place] [address]  Valerio Yusuf [address]  Phone [phone]      Fax [phone]          Occupational Therapy/Hand Therapy Certification  Dear Referring Practitioner: Fritz Groves,     We had the pleasure of evaluating the following patient for occupational therapy services at [address] [place] [address]. A summary of our findings can be found in the initial assessment below. This includes our plan of care. If you have any questions or concerns regarding these findings, please do not hesitate to contact me at the office phone number checked above. Thank you for the referral.     Physician Signature:_______________________________Date:__________________  By signing above (or electronic signature), therapists plan is approved by physician      Patient: Bartolome Maradiaga   : 1970   MRN: [id number]  Referring Physician: Referring Practitioner: Fritz Groves      Evaluation Date: 2020      Medical Diagnosis Information:  Diagnosis: R IF trigger finger M65.321    Treatment Diagnosis: R hand stiffness M25.641              Insurance information: OT Insurance Information: [employer]iba Anger deduct met   3970/4000 OOP met  $60 copay 60 OT/PT/ST      Date of Injury: Several months  Date of Surgery: 20    Date of Patient follow up with Physician: &    RESTRICTIONS/PRECAUTIONS:     Latex Allergy:  [x]No      []Yes  Pacemaker:  [x] No       [] Yes     Preferred Language for Healthcare:   [x]English       []other:     Functional Scale: 57% (Quick DASH)   Date assessed:  2020    SUBJECTIVE: Patient reported deficits/history of current problem: Started  catching and gradually got worse.   Feels she is worse post surgery in regards to mobility  Had 1 prior injection  Pain Scale: 1-2/10  [x]Constant      []Intermittent    [x]other:5-6/10 in evening  Pain Location:  R IF and volar palm  Easing factors: IF    Palpation: pain with palpation throughout R IF and prox palm    Functional Mobility/Transfers/Gait:  [x] Independent - no significant gait deviations  [] Assistance needed   [] Assistive device used: Falls Risk Assessment (30 days):   [] Falls Risk assessed and no intervention required. [] Falls Risk assessed and Patient requires intervention due to being higher risk   TUG score (>12s at risk):     [] Falls education provided, including      Review Of Systems (ROS): [x]Performed Review of systems (Integumentary, CardioPulmonary, Neurological) by intake and observation. Intake form has been scanned into medical record. Patient has been instructed to contact their primary care physician regarding ROS issues if not already being addressed at this time. ASSESSMENT:   This patient presents with signs and symptoms consistent with the medical diagnosis provided by the referring physician. Impairments (physical, cognitive and/or psychosocial):  [x] Decreased mobility  [x] Weakness    [] Hypersensitivity   [x] Pain/tenderness   [x] Edema/swelling   [] Decreased coordination (fine/gross motor)   [] Impaired body mechanics  [] Sensory loss  [] Loss of balance   [] Other:      Performance Deficits (to be addressed in plan of care):   [] Bathing    [] Household Tasks    [] Dressing    [] Self Feeding   [] Grooming    [] Work/Education   [] Functional Mobility  [] Sleeping/Rest   [] Toileting/Hygiene   [] Recreational Activities   [] Driving    [] Community/Social Participation   [x] Other: Lifting, opening twist lids , writing    Rehab Potential:   [] Excellent [x] Good [] Fair  [] Poor     Barriers affecting rehab potential:  []Age    []Lack of Motivation   []Co-Morbidities  []Cognitive Function  []Environmental/home/work barriers  []Other:     Tolerance of evaluation/treatment:    [] Excellent [] Good [] Fair  [] Poor    PLAN OF CARE:  Interventions:   [x] Therapeutic Exercise [x] Therapeutic Activity    [x] Activities of Daily Living [x] Neuromuscular Re-education      [x] Patient Education  [x] Manual Therapy      [x] Modalities as needed, and not otherwise contraindicated, including: ultrasound,paraffin,moist heat/cold pack, electrical stimulation, contrast bath, iontophoresis  [] Splinting    Frequency/Duration:  1-2 days per week for 6 weeks 8/28/20      GOALS:  Patient stated goal: regain movement in R IF    [] Progressing: [] Met: [] Not Met: [] Adjusted    Therapist goals for Patient:   Short Term Goals: To be achieved in: 2 weeks  1. Independent in HEP and progression per patient tolerance, in order to prevent re-injury. [] Progressing: [] Met: [] Not Met: [] Adjusted   2. Patient will have a decrease in pain to facilitate improvement in movement, function, and ADLs as indicated by Functional Deficits. [] Progressing: [] Met: [] Not Met: [] Adjusted    Long Term Goals to be achieved in 6 weeks (through 8/28/20), including patient directed goals to address patient identified performance deficits:  1) Pt to be independent in graded HEP progression with a good level of effort and compliance. [] Progressing: [] Met: [] Not Met: [] Adjusted   2) Pt to report a score of </= 25 % on the Quick DASH disability questionnaire for increased performance with carrying, moving, and handling objects. [] Progressing: [] Met: [] Not Met: [] Adjusted   3) Pt will demonstrate increased ROM to R IF  Fingertip to [facility] </= 1.5 cm for improved independence with .writing and opening containers  [] Progressing: [] Met: [] Not Met: [] Adjusted   4) Pt will demonstrate increased strength to R  [id number]# of L for improved independence with opening containers and lifting.   [] Progressing: [] Met: [] Not Met: [] Adjusted   5) Pt will have a decrease in pain to 2/10 in the evening to facilitate ADL's.  [] Progressing: [] Met: [] Not Met: [] Adjusted        OCCUPATIONAL THERAPY EVALUATION COMPLEXITY JUSTIFICATION:    [x] An occupational profile and medical/therapy history, which includes:   [x] a brief history including medical and/or therapy records relating to the     presenting problem   [] an expanded review of medical and/or therapy records and additional review     of physical, cognitive or psychosocial history related to current functional    performance   [] an extensive additional review of review of medical and/or therapy records and physical, cognitive, or psychosocial history related to current    functional performance    [x] An assessment that identifies performance deficits (relating to physical, cognitive, or psychosocial skills) that result in activity limitations and/or participation restrictions:   [x] 1-3 performance deficits   [] 3-5 performance deficits   [] 5 or more performance deficits    [x] Clinical decision making of:   [x] low complexity, including analysis of occupational profile, data analysis from problem focused assessment, and consideration of a limited number of treatment options. No comorbidities affect occupational performance. No task modifications or assistance needed to complete evaluation. [] moderate complexity, including analysis of occupational profile, data analysis from detailed assessment and consideration of several treatment options. Comorbidities that affect occupational performance may be present. Minimal to moderate task modifications or assistance needed to complete assessment. [] high complexity, including analysis of occupational profile, analysis of data from comprehensive assessment and consideration of multiple treatment options. Multiple comorbidities present that affect occupational performance. Significant task modifications or assistance needed to complete assessment.     Evaluation Code:  [x] Low Complexity EVAL 65096 (typically 30 minutes face to face)  [] Mod Complexity EVAL 07502 (typically 45 minutes face to face)  [] High Complexity EVAL 79576 (typically 60 minutes face to face)    Electronically signed by:   Jorge Rubio OTR/L [facility]  OT [id number] (4) completely dependent

## 2020-07-21 ENCOUNTER — OFFICE VISIT (OUTPATIENT)
Dept: ORTHOPEDIC SURGERY | Age: 50
End: 2020-07-21

## 2020-07-21 ENCOUNTER — HOSPITAL ENCOUNTER (OUTPATIENT)
Dept: OCCUPATIONAL THERAPY | Age: 50
Setting detail: THERAPIES SERIES
Discharge: HOME | End: 2020-07-21
Payer: COMMERCIAL

## 2020-07-21 VITALS — TEMPERATURE: 97.5 F | BODY MASS INDEX: 39.21 KG/M2 | HEIGHT: 66 IN | WEIGHT: 244 LBS

## 2020-07-21 PROCEDURE — 97140 MANUAL THERAPY 1/> REGIONS: CPT | Performed by: OCCUPATIONAL THERAPIST

## 2020-07-21 PROCEDURE — G0283 ELEC STIM OTHER THAN WOUND: HCPCS | Performed by: OCCUPATIONAL THERAPIST

## 2020-07-21 PROCEDURE — 99024 POSTOP FOLLOW-UP VISIT: CPT | Performed by: ORTHOPAEDIC SURGERY

## 2020-07-21 PROCEDURE — 97110 THERAPEUTIC EXERCISES: CPT | Performed by: OCCUPATIONAL THERAPIST

## 2020-07-21 NOTE — PROGRESS NOTES
Chief Complaint:  Pain (RIGHT FINGER)      History of Present of Illness:  Ms. Eugenia Parker a 77-year-old female presenting as a repeat incision and drainage of right index finger after trigger finger release surgery and subsequent secondary closure  Date of most recent procedure: 7/1/2020    She is now 3 weeks since her most recent incision and drainage for her finger. The patient feels she is continued to have some burning sensation in her finger and palm. She denies any fever chills or other constitutional symptoms. She has been continuing to take antibiotics as prescribed and recommended by infectious disease. Examination:  General: Pleasant well-appearing no distress  Right hand/palm:  Dry incision, no evidence of drainage, localized surrounding swelling without induration or erythema  Minimal swelling throughout the remainder of the finger and no swelling throughout the remainder of the palm or forearm  Minimal swelling near the proximal phalanx and tenderness at the P1 segment of the flexor tendon sheath, minimal tenderness at the proximal phalanx with no tenderness throughout the remainder of the finger or palm  Active finger flexion and extension including active FDS and FDP with no triggering pulp to palm distance approximately 3 cm  Gross sensation intact radial and ulnar aspect of fingertip with brisk capillary refill    Radiology:     X-rays obtained and reviewed in office:  No new x-rays obtained today    No orders of the defined types were placed in this encounter. Impression:  No diagnosis found. Treatment Plan: We will plan to continue with finger range of motion exercises as tolerated. I would like the patient to continue with therapy to work on this is much as possible. She does have swelling and some scar tissue that is limiting her range of motion overall. It seems that her infection has been controlled with antibiotics and after the incision and drainage for now.   Tendon function remains intact. Strategy of compression and scar management hopefully will help to continue to improve some of the swelling locally.   I will plan to see her back in 2 weeks for repeat evaluation

## 2020-07-21 NOTE — FLOWSHEET NOTE
DP[facility]  IF   5.5 cm   5 cm          Strength:            Observations: Other:      IF  P1  PIP  P2 R IF  8  7.5  6.4   R  IF   8  7.5  6.8          MODALITIES:      Fluidotherapy (47406)      Estim (06323/23660)  Tens  10'    Paraffin (44969)      US (88177)      Iontophoresis (46215)      Hot Pack      Cold Pack            INTERVENTIONS:      Therapeutic Exercise (49069)      A/AAROM   MPflex  DIP flex  PIP flex  Hook'   composite fist  Straight fist  Finger ext   10 x 3 sec hold MPflex  DIP flex  PIP flex  Hook'   composite fist  Straight fist  10x ea                      Therapeutic Activity (05252) Finger ext over pen  10x Grasp /release rice  8'     Finger flex gripping 2\" diameter cylinder  10x Finger flex gripping 2\" diameter cylinder  10x2                      Manual Therapy (17797) Retrograde massage    Retrograde massage      XL digi sleeve for edema during the day   XS glove at night k tape to hand and finger for edema      Edema glove S due to XS too small    Neuromuscular Reeducation (68603)                  ADL Training ([phone])                  HEP Training/Review See sheet(s)      Access Code: [email or web address]   URL: [email or web address]   Date: 07/17/2020   Prepared by:  [facility]     Exercises   Intrinsic+ (MP flexion, IP extension) - 10 reps - 3 sec hold - 4x daily - 7x weekly   Seated Finger DIP AROM - 10 reps - 3 sec hold - 4x daily - 7x weekly   Seated Finger PIP AROM - 10 reps - 3 sec hold - 4x daily - 7x weekly   Seated Single Digit Intrinsic Stretch - 10 reps - 3 sec hold - 4x daily - 7x weekly   Seated Finger Composite Flexion Extension - 10 reps - 1-2 sets - 5 hold - 3-4x daily - 7x weekly   Hand AROM Flat Fist - 10 reps - 3 sets - 1x daily - 7x weekly   Seated Single Finger Extension - 10 reps - 3-5 hold - 4x daily - 7x weekly   Seated Pen Hurdles - 10 reps - 3x daily - 7x weekly              Splinting      Lcode:      Orthotic Mgmt, Subsequent Enc (31728)      Orthotic Mgmt & Training (85015)            Other:                                Therapeutic Exercise & NMR:  [x] (56964) Provided verbal/tactile cueing for activities related to strengthening, flexibility, endurance, ROM  for improvements in scapular, scapulothoracic and UE control with self care, reaching, carrying, lifting, house/yardwork, driving/computer work. [x] (96995) Provided verbal/tactile cueing for activities related to improving balance, coordination, kinesthetic sense, posture, motor skill, proprioception  to assist with  scapular, scapulothoracic and UE control with self care, reaching, carrying, lifting, house/yardwork, driving/computer work.     Therapeutic Activities & NMR:    [x] (27068 or 71867) Provided verbal/tactile cueing for activities related to improving balance, coordination, kinesthetic sense, posture, motor skill, proprioception and motor activation to allow for proper function of scapular, scapulothoracic and UE control with self care, carrying, lifting, driving/computer work    Home Exercise Program:    [x] (73413) Reviewed/Progressed HEP activities related to strengthening, flexibility, endurance, ROM of scapular, scapulothoracic and UE control with self care, reaching, carrying, lifting, house/yardwork, driving/computer work  [] (98980) Reviewed/Progressed HEP activities related to improving balance, coordination, kinesthetic sense, posture, motor skill, proprioception of scapular, scapulothoracic and UE control with self care, reaching, carrying, lifting, house/yardwork, driving/computer work      Manual Treatments:  PROM / STM / Oscillations-Mobs:  G-I, II, III, IV (PA's, Inf., Post.)  [] (39162) Provided manual therapy to mobilize soft tissue/joints of cervical/CT, scapular GHJ and UE for the purpose of modulating pain, promoting relaxation,  increasing ROM, reducing/eliminating soft tissue swelling/inflammation/restriction, improving soft tissue extensibility and allowing for proper ROM for normal function with self care, reaching, carrying, lifting, house/yardwork, driving/computer work    ADL Training:  [] (62527) Provided self-care/home management training related to activities of daily living and compensatory training, and/or use of adaptive equipment        Charges:  Timed Code Treatment Minutes: 45   Total Treatment Minutes: 45        [] EVAL (LOW) 03185 (typically 20 minutes face-to-face)    [] EVAL (MOD) 90227 (typically 30 minutes face-to-face)  [] EVAL (HIGH) 23836 (typically 45 minutes face-to-face)  [] OT Re-eval (50467)       [x] Paul [phone]) x   1   [] CQQHW(01847)  [] NMR (10650) x      [] Estim (attended) (70486)   [x] Manual ([id number]) x 1     [] US (46896)  [] TA (98396) x      [] Paraffin (48970)  [] ADL  ([phone]) x     [] Splint/L code:    [x] Estim (unattended) ([id number])  [] Fluidotherapy (56566)  [] Other:      ASSESSMENT:  Pt very painful and swollen. Usage of rice for grasp/release and desensitization seemed beneficial      Frequency/Duration:  1-2 days per week for 6 weeks 8/28/20        GOALS:  Patient stated goal: regain movement in R IF    [x]? Progressing: []? Met: []? Not Met: []? Adjusted     Therapist goals for Patient:   Short Term Goals: To be achieved in: 2 weeks  1. Independent in HEP and progression per patient tolerance, in order to prevent re-injury. [x]? Progressing: []? Met: []? Not Met: []? Adjusted   2. Patient will have a decrease in pain to facilitate improvement in movement, function, and ADLs as indicated by Functional Deficits. []? Progressing: []? Met: [x]? Not Met: []? Adjusted     Long Term Goals to be achieved in 6 weeks (through 8/28/20), including patient directed goals to address patient identified performance deficits:  1) Pt to be independent in graded HEP progression with a good level of effort and compliance. [x]? Progressing: []? Met: []? Not Met: []?  Adjusted   2) Pt to report a score of </= 25 % on the Quick DASH disability questionnaire for increased performance with carrying, moving, and handling objects. []? Progressing: []? Met: [x]? Not Met: []? Adjusted   3) Pt will demonstrate increased ROM to R IF  Fingertip to [facility] </= 1.5 cm for improved independence with .writing and opening containers  [x]? Progressing: []? Met: []? Not Met: []? Adjusted   4) Pt will demonstrate increased strength to R  [id number]# of L for improved independence with opening containers and lifting.  []? Progressing: []? Met: [x]? Not Met: []? Adjusted   5) Pt will have a decrease in pain to 2/10 in the evening to facilitate ADL's.  []? Progressing: []? Met: [x]? Not Met: []? Adjusted            Overall Progression Towards Functional Goals/Treatment Progress Update:  [] Patient is progressing as expected towards functional goals listed. [x] Progression is slowed due to complexities/impairments listed. Pain and edema  [] Progression has been slowed due to co-morbidities. [] Plan just implemented, too soon to assess goals progression <30 days  [] Goals require adjustment due to lack of progress  [] Patient is not progressing as expected and requires additional follow up with physician  [] All goals are met  [] Other:     Prognosis for POC: [x] Good [] Fair  [] Poor    Patient requires continued skilled intervention: [x] Yes  [] No    Treatment/Activity Tolerance:  [x] Patient able to complete treatment  [] Patient limited by fatigue  [] Patient limited by pain    [] Patient limited by other medical complications  [] Other:                  PLAN: Progress AROM and function as tolerated while controlling pain and edema  [x] Continue per plan of care [] Alter current plan (see comments above)  [] Plan of care initiated [] Hold pending MD visit [] Discharge      Electronically signed by:   Jim Underwood OTR/L T  OT [id number]    Note: If patient does not return for scheduled/ recommended follow up visits, this note will serve as a discharge from care along with most recent update on progress.

## 2020-07-22 ENCOUNTER — TELEMEDICINE (OUTPATIENT)
Dept: BARIATRICS/WEIGHT MGMT | Age: 50
End: 2020-07-22
Payer: COMMERCIAL

## 2020-07-22 PROCEDURE — 99213 OFFICE O/P EST LOW 20 MIN: CPT | Performed by: SURGERY

## 2020-07-22 NOTE — PROGRESS NOTES
[facility] ([facility]) Physicians   [employer]  Daniel Donald DO       TELEHEALTH EVALUATION -- Audio/Visual (During FYN-71 public health emergency)     Chief Complaint   Patient presents with    Weight Management           HPI:    Due to the COVID-19 pandemic restrictions on close contact interactions as recommended by CDC and health authorities, the patient's visit was conducted via telemedicine in lieu of a face to face visit. Patient verbally consented and agreed to proceed. Griselda Gutting is a very pleasant 44 y.o.  female  And multiple medical problems who is presenting for bariatric follow up care. Kuldip Medina is s/p laparoscopic sleeve gastrectomy by me. Comes today to the clinic without any complaints. Patient denies any nausea, vomiting, fevers, chills, shortness of breath, chest pain, constipation or urinary symptoms. Denies any heartburn nor dysphagia. Kuldip Medina is feeling very well, and is very active. Patient is very pleased with the weight loss and resolution of co-morbid conditions.         Past Medical History:   Diagnosis Date    Anxiety     Chronic gastric ulcer without hemorrhage and without perforation     CTS (carpal tunnel syndrome) 2/10/2015    Depression     Diabetes mellitus ([address]     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 ([address] 1/29/2016    ETD (eustachian tube dysfunction) 5/16/2014    Herpes simplex     Lumbar strain 4/19/2017    PCOS (polycystic ovarian syndrome)     Sleep apnea     TMJ syndrome      Past Surgical History:   Procedure Laterality Date    ENDOSCOPY, COLON, DIAGNOSTIC      FINGER TRIGGER RELEASE Right 6/12/2020    RIGHT INDEX FINGER TRIGGER FINGER RELEASE performed by Abbie Cerda MD at [address] [facility] HAND SURGERY Right 7/1/2020    INCISION AND DRAINAGE OF RIGHT HAND WOUND; SECONDARY CLOSURE OF WOUND performed by Abbie Cerda MD at [address] [facility] [place]  05/25/2018    EGD    VA LAP,STOMACH,OTHER,W/O TUBE N/A 7/31/2018 ROBOTIC ASSISTED LAPAROSCOPIC SLEEVE GASTRECTOMY  performed by Oumou Arzate DO at [address] [facility] History   Problem Relation Age of Onset    Cervical Cancer Mother     Diabetes Father     Stroke Father         25s    Hypertension Father     Coronary Art Dis Father     Other Father         CHIVO    Cancer Father         melanoma    Glaucoma Father     Diabetes Sister     Diabetes Maternal Grandmother     Cancer Maternal Grandmother         melanoma    Arthritis Maternal Grandmother     Diabetes Paternal Grandmother     Stroke Paternal Grandmother      Social History     Tobacco Use    Smoking status: Never Smoker    Smokeless tobacco: Never Used   Substance Use Topics    Alcohol use: Yes     Comment: infrequent, few times a month     I counseled the patient on the importance of not smoking and risks of ETOH. Allergies   Allergen Reactions    Effexor Xr [Venlafaxine Hcl Er] Other (See Comments)     Didn't respond well to it. There were no vitals filed for this visit.       Lab Results   Component Value Date    WBC 7.1 01/15/2020    RBC 4.49 01/15/2020    HGB 14.1 01/15/2020    HCT 41.8 01/15/2020    MCV 93.3 01/15/2020    MCH 31.5 01/15/2020    MCHC 33.8 01/15/2020    MPV 8.7 01/15/2020    NEUTOPHILPCT 64.0 06/20/2019    LYMPHOPCT 27.6 06/20/2019    MONOPCT 7.0 06/20/2019    EOSRELPCT 0.4 06/20/2019    BASOPCT 1.0 06/20/2019    NEUTROABS 5.5 06/20/2019    LYMPHSABS 2.4 06/20/2019    MONOSABS 0.6 06/20/2019    EOSABS 0.0 06/20/2019     Lab Results   Component Value Date     06/08/2020    K 4.2 06/08/2020    K 4.1 08/01/2018     06/08/2020    CO2 24 06/08/2020    ANIONGAP 11 06/08/2020    GLUCOSE 207 06/08/2020    BUN 21 06/08/2020    CREATININE 1.0 06/08/2020    LABGLOM 58 06/08/2020    GFRAA >60 06/08/2020    GFRAA >60 01/23/2013    CALCIUM 8.5 06/08/2020    PROT 6.6 10/04/2019    PROT 7.1 01/23/2013    LABALBU 4.2 10/04/2019    AGRATIO 1.8 10/04/2019    BILITOT 0.4 10/04/2019 ALKPHOS 68 10/04/2019    ALT 38 10/04/2019    AST 18 10/04/2019    GLOB 2.4 10/04/2019     Lab Results   Component Value Date    CHOL 98 10/04/2019    TRIG 103 10/04/2019    HDL 53 10/04/2019    HDL 44 06/07/2012    LDLCALC 24 10/04/2019    LABVLDL 21 10/04/2019     Lab Results   Component Value Date    TSHREFLEX 3.29 10/04/2019     Lab Results   Component Value Date    IRON 86 06/20/2019    TIBC 374 06/20/2019    LABIRON 23 06/20/2019     Lab Results   Component Value Date    WDXTJRKJ10 658 04/11/2018    FOLATE 10.94 04/11/2018     Lab Results   Component Value Date    VITD25 28.1 04/11/2018     Lab Results   Component Value Date    LABA1C 8.0 06/08/2020    .9 06/08/2020         Current Outpatient Medications:     pioglitazone (ACTOS) 15 MG tablet, TAKE ONE TABLET BY MOUTH DAILY, Disp: 90 tablet, Rfl: 0    pramipexole (MIRAPEX) 0.5 MG tablet, TAKE ONE TO TWO TABLETS BY MOUTH EVERY EVENING, Disp: 60 tablet, Rfl: 4    FARXIGA 10 MG tablet, TAKE ONE TABLET BY MOUTH EVERY MORNING, Disp: 90 tablet, Rfl: 1    buPROPion (WELLBUTRIN XL) 300 MG extended release tablet, TAKE ONE TABLET BY MOUTH EVERY MORNING, Disp: 90 tablet, Rfl: 1    amoxicillin-clavulanate (AUGMENTIN) 875-125 MG per tablet, Take 1 tablet by mouth 2 times daily for 14 days, Disp: 28 tablet, Rfl: 0    ciprofloxacin (CIPRO) 500 MG tablet, Take 1.5 tablets by mouth 2 times daily for 14 days, Disp: 42 tablet, Rfl: 0    insulin glargine (LANTUS SOLOSTAR) 100 UNIT/ML injection pen, Inject 24 Units into the skin nightly, Disp: 5 pen, Rfl: 2    Insulin Pen Needle (B-D UF III MINI PEN NEEDLES) 31G X 5 MM MISC, 1 each by Does not apply route daily, Disp: 100 each, Rfl: 12    Dulaglutide (TRULICITY) 1.5 VF/8.8KI SOPN, Inject 1.5 mg into the skin once a week, Disp: 12 pen, Rfl: 1    FLUoxetine (PROZAC) 20 MG capsule, TAKE 3 CAPSULES BY MOUTH ONE TIME A DAY, Disp: 270 capsule, Rfl: 2    Lisdexamfetamine Dimesylate (VYVANSE) 60 MG CAPS, Take 60 mg by mouth every morning for 30 days. , Disp: 30 capsule, Rfl: 0    clonazePAM (KLONOPIN) 0.5 MG tablet, Take 1 tablet by mouth nightly as needed for Anxiety for up to 30 days. , Disp: 10 tablet, Rfl: 0    cyclobenzaprine (FLEXERIL) 10 MG tablet, TAKE ONE TABLET BY MOUTH DAILY, Disp: 90 tablet, Rfl: 1    Lisdexamfetamine Dimesylate (VYVANSE) 60 MG CAPS, Take by mouth., Disp: , Rfl:     atorvastatin (LIPITOR) 40 MG tablet, Take 1 tablet by mouth daily, Disp: 90 tablet, Rfl: 1    MIRENA, 52 MG, IUD 52 mg, 1 Dose by Intrauterine route once, Disp: , Rfl:     Multiple Vitamins-Minerals (BARIATRIC FUSION) CHEW, Take 4 tablets by mouth daily , Disp: , Rfl:     b complex vitamins capsule, Take 1 capsule by mouth daily, Disp: , Rfl:     fluticasone (FLONASE) 50 MCG/ACT nasal spray, PLACE TWO SPRAYS IN EACH NOSTRIL ONCE DAILY, Disp: 1 Bottle, Rfl: 12    aspirin 81 MG EC tablet, Take 81 mg by mouth daily. , Disp: , Rfl:       Review of Systems - History obtained from the patient  General ROS: negative  Psychological ROS: negative  Ophthalmic ROS: negative  Neurological ROS: negative  ENT ROS: negative  Allergy and Immunology ROS: negative  Hematological and Lymphatic ROS: negative  Endocrine ROS: negative  Breast ROS: negative  Respiratory ROS: negative  Cardiovascular ROS: negative  Gastrointestinal ROS:negative  Genito-Urinary ROS: negative  Musculoskeletal ROS: negative   Skin ROS: negative    Physical Exam   Deferred     A/P:    Dianne Diaz was seen today for weight management. Diagnoses and all orders for this visit:    Severe obesity (BMI 35.0-35.9 with comorbidity) ([facility]    S/P laparoscopic sleeve gastrectomy          Mary Aguilar is 44 y.o. female , now with There is no height or weight on file to calculate BMI. s/p Sleeve gastrectomy, has gained 12 lbs since last visit, total of 81 lbs weight loss. The patient underwent dietary counseling with myself &/or registered dietician.  I have reviewed, discussed and

## 2020-07-22 NOTE — PROGRESS NOTES
Dietary Assessment Note    Vitals: There were no vitals filed for this visit. Patient gained 12 lbs over 6 months. Total Weight Loss: 81#    Labs reviewed: no lab studies available for review at time of visit    Protein intake: Patient not tracking     Fluid intake: 48-64 oz/day    Multivitamin/mineral intake: Generic - how many/day: 1    Calcium intake: None    Other: None    Exercise: Yes. Walking daily for about 30 minutes (during lunch break). Some hiking on the weekend. Nutrition Assessment: 2 year post-op visit. Feels she has fallen off the wagon and didn't manage quarantine very well with stress eating. Pt had to start taking insulin again and feels this was an eye opener. Breakfast: 8:30am NV protein bar    Snack: 11am NV protein bar    Lunch: None    Snack: 3pm smoothie - pineapple, protein powder scoop, water    Dinner: 1 cup edamame spaghetti (didn't like it so threw it away and had cheese and crackers)    Snack: protein bar    30-30-30: following  Amount at a time: 1-1.5 cups    Food Intolerances/issues: none    Client Concerns: weight regain    Goals: Focus on protein with veggies  Get more variety with protein sources   Restart using MFP - 1200 calories and 60-80g protein  Start 2 MVI +2 Ca  *Sent 9 inch plate, MVI recs and frozen meal options      Plan: Follow up in 3 months    Due to the COVID-19 restrictions on close contact interactions the patient's visit was conducted via telephone in lisbet of a face to face visit. The patient is here through telemedicine for their post op visit.     Patricia Nails

## 2020-07-28 ENCOUNTER — APPOINTMENT (OUTPATIENT)
Dept: OCCUPATIONAL THERAPY | Age: 50
End: 2020-07-28
Payer: COMMERCIAL

## 2020-08-05 ENCOUNTER — HOSPITAL ENCOUNTER (OUTPATIENT)
Dept: OCCUPATIONAL THERAPY | Age: 50
Setting detail: THERAPIES SERIES
Discharge: HOME | End: 2020-08-05
Payer: COMMERCIAL

## 2020-08-05 ENCOUNTER — OFFICE VISIT (OUTPATIENT)
Dept: ORTHOPEDIC SURGERY | Age: 50
End: 2020-08-05

## 2020-08-05 VITALS — HEIGHT: 66 IN | TEMPERATURE: 98.1 F | WEIGHT: 244 LBS | BODY MASS INDEX: 39.21 KG/M2

## 2020-08-05 PROCEDURE — 99024 POSTOP FOLLOW-UP VISIT: CPT | Performed by: ORTHOPAEDIC SURGERY

## 2020-08-05 PROCEDURE — 97110 THERAPEUTIC EXERCISES: CPT | Performed by: OCCUPATIONAL THERAPIST

## 2020-08-05 PROCEDURE — 97140 MANUAL THERAPY 1/> REGIONS: CPT | Performed by: OCCUPATIONAL THERAPIST

## 2020-08-05 NOTE — FLOWSHEET NOTE
[facility]  [address] [place] [address]  Phone [phone]      Fax [phone]    Occupational Therapy Treatment Note/ Progress Report:     Date:  2020    Patient Name:  Iona Hill    :  1970  MRN: [id number]    Medical/Treatment Diagnosis Information:  · Diagnosis: R IF trigger finger M65.321   · Treatment Diagnosis: R hand stiffness W43.743     Insurance/Certification information:  OT Insurance Information: BCBS Indiv deduct met   3970/4000 OOp met  $60 copay 60 OT/PT/ST  Physician Information:  Referring Practitioner: Sosa Jovel  Has the plan of care been signed (Y/N):        []  Yes  [x]  No       Visit # Insurance Allowable Auth Required   3 60 OT/PT/ST []  Yes []  No        Is this a Progress Report:     []  Yes  [x]  No      If Yes:  Date Range for reporting period:  Beginning  Ending    Progress report will be due (10 Rx or 30 days whichever is less):     Recertification will be due (POC Duration  / 90 days whichever is less):20    Date of Injury: several months ago  Date of Surgery: 20    Date of Patient follow up with Physician: 3-4 wks    RESTRICTIONS/PRECAUTIONS:     Latex Allergy:  [x]No      []Yes  Pacemaker:  [x] No       [] Yes     Preferred Language for Healthcare:   [x]English       []other:     Functional Scale: 57% (Quick DASH)   Date assessed:  2020      SUBJECTIVE:Difficulty with doorknobs and jars. Still swollen. Improved ability to type reported. States she forgot about using rice at home  Patient reported deficits/history of current problem: Started  catching and gradually got worse.   Feels she is worse post surgery in regards to mobility  Had 1 prior injection      Pain Scale:5-6 /10  In evening    Current : 1-2/10  Mainly with pressure to volar L index MP      OBJECTIVE:       Date:  2020   Objective Measures/Tests:      ROM: See eval     IF MP  PIP  DIP R  5/50  20/45  0/15    L  80  103  80 R  0/50  5/60  0/30 R  0/55  5/65  0/35    PROM  /65  /85  /45   Digits: tips to DPFC  IF   5.5 cm   5 cm   4.5 cm         Strength:            Observations: Other:      IF  P1  PIP  P2 R IF  8  7.5  6.4   R  IF   8  7.5  6.8 R  IF  7.8  7.5  6.5         MODALITIES:      Fluidotherapy (27054)      Estim (90657/39928)  Tens  10'    Paraffin (04741)      US (52284)      Iontophoresis (22119)      Hot Pack      Cold Pack            INTERVENTIONS:      Therapeutic Exercise (64368)      A/AAROM   MPflex  DIP flex  PIP flex  Hook'   composite fist  Straight fist  Finger ext   10 x 3 sec hold MPflex  DIP flex  PIP flex  Hook'   composite fist  Straight fist  10x ea MPflex  DIP flex  PIP flex  Hook'   composite fist  Straight fist  10x ea      Yellow foam squeeze  10 x2  Issued for home use               Therapeutic Activity (82451) Finger ext over pen  10x Grasp /release rice  8'     Finger flex gripping 2\" diameter cylinder  10x Finger flex gripping 2\" diameter cylinder  10x2 Finger flex gripping 1\" diameter cylinder  10x2                     Manual Therapy (38244) Retrograde massage    Retrograde massage  Retrograde massage     XL digi sleeve for edema during the day   XS glove at night k tape to hand and finger for edema XL digisleeve issued for IF    Elastomer insert fabricated to be worn over IF volar MP at night with edema glove     Edema glove S due to XS too small    Neuromuscular Reeducation (81148)                  ADL Training ([phone])                  HEP Training/Review See sheet(s)      Access Code: [email or web address]   URL: [email or web address]   Date: 07/17/2020   Prepared by:  [facility]     Exercises   Intrinsic+ (MP flexion, IP extension) - 10 reps - 3 sec hold - 4x daily - 7x weekly   Seated Finger DIP AROM - 10 reps - 3 sec hold - 4x daily - 7x weekly   Seated Finger PIP AROM - 10 reps - 3 sec hold - 4x daily - 7x weekly Seated Single Digit Intrinsic Stretch - 10 reps - 3 sec hold - 4x daily - 7x weekly   Seated Finger Composite Flexion Extension - 10 reps - 1-2 sets - 5 hold - 3-4x daily - 7x weekly   Hand AROM Flat Fist - 10 reps - 3 sets - 1x daily - 7x weekly   Seated Single Finger Extension - 10 reps - 3-5 hold - 4x daily - 7x weekly   Seated Pen Hurdles - 10 reps - 3x daily - 7x weekly              Splinting   Flex loop full hand and IP  Pt to use at home 3-4 x's per day for 10 minutes   Lcode:      Orthotic Mgmt, Subsequent Enc (47334)      Orthotic Mgmt & Training (14438)            Other:                                Therapeutic Exercise & NMR:  [x] (98830) Provided verbal/tactile cueing for activities related to strengthening, flexibility, endurance, ROM  for improvements in scapular, scapulothoracic and UE control with self care, reaching, carrying, lifting, house/yardwork, driving/computer work. [x] (72703) Provided verbal/tactile cueing for activities related to improving balance, coordination, kinesthetic sense, posture, motor skill, proprioception  to assist with  scapular, scapulothoracic and UE control with self care, reaching, carrying, lifting, house/yardwork, driving/computer work.     Therapeutic Activities & NMR:    [x] (36313 or 24182) Provided verbal/tactile cueing for activities related to improving balance, coordination, kinesthetic sense, posture, motor skill, proprioception and motor activation to allow for proper function of scapular, scapulothoracic and UE control with self care, carrying, lifting, driving/computer work    Home Exercise Program:    [x] (15258) Reviewed/Progressed HEP activities related to strengthening, flexibility, endurance, ROM of scapular, scapulothoracic and UE control with self care, reaching, carrying, lifting, house/yardwork, driving/computer work  [] (41886) Reviewed/Progressed HEP activities related to improving balance, coordination, kinesthetic sense, posture, motor skill, proprioception of scapular, scapulothoracic and UE control with self care, reaching, carrying, lifting, house/yardwork, driving/computer work      Manual Treatments:  PROM / STM / Oscillations-Mobs:  G-I, II, III, IV (PA's, Inf., Post.)  [x] (89357) Provided manual therapy to mobilize soft tissue/joints of cervical/CT, scapular GHJ and UE for the purpose of modulating pain, promoting relaxation,  increasing ROM, reducing/eliminating soft tissue swelling/inflammation/restriction, improving soft tissue extensibility and allowing for proper ROM for normal function with self care, reaching, carrying, lifting, house/yardwork, driving/computer work    ADL Training:  [] (04856) Provided self-care/home management training related to activities of daily living and compensatory training, and/or use of adaptive equipment        Charges:  Timed Code Treatment Minutes: 46   Total Treatment Minutes: 46        [] EVAL (LOW) 61566 (typically 20 minutes face-to-face)    [] EVAL (MOD) 20591 (typically 30 minutes face-to-face)  [] EVAL (HIGH) 43616 (typically 45 minutes face-to-face)  [] OT Re-eval (47880)       [x] Paul [phone]) x   2   [] KMPUG(58196)  [] NMR (40225) x      [] Estim (attended) (64037)   [x] Manual ([id number]) x 1     [] US (80153)  [] TA (96177) x      [] Paraffin (93539)  [] ADL  ([phone]) x     [] Splint/L code:    [] Estim (unattended) (22 [phone])  [] Fluidotherapy (35291)  [] Other:      ASSESSMENT:  Swelling remains an issue. Added flex loops to assist with increasing PROM    Frequency/Duration:  1-2 days per week for 6 weeks 8/28/20        GOALS:  Patient stated goal: regain movement in R IF    [x]? Progressing: []? Met: []? Not Met: []? Adjusted     Therapist goals for Patient:   Short Term Goals: To be achieved in: 2 weeks  1. Independent in HEP and progression per patient tolerance, in order to prevent re-injury. [x]? Progressing: []? Met: []? Not Met: []? Adjusted   2.  Patient will have a decrease in pain limited by pain    [] Patient limited by other medical complications  [] Other:                  PLAN: Progress AROM and function as tolerated while controlling pain and edema  [x] Continue per plan of care [] Alter current plan (see comments above)  [] Plan of care initiated [] Hold pending MD visit [] Discharge      Electronically signed by: Marquez Day OTR/L CHT  OT [id number]    Note: If patient does not return for scheduled/ recommended follow up visits, this note will serve as a discharge from care along with most recent update on progress.

## 2020-08-10 PROBLEM — N93.8 DUB (DYSFUNCTIONAL UTERINE BLEEDING): Status: RESOLVED | Noted: 2019-08-22 | Resolved: 2020-08-10

## 2020-08-11 ENCOUNTER — TELEMEDICINE (OUTPATIENT)
Dept: FAMILY MEDICINE CLINIC | Age: 50
End: 2020-08-11
Payer: COMMERCIAL

## 2020-08-11 PROCEDURE — 99214 OFFICE O/P EST MOD 30 MIN: CPT | Performed by: FAMILY MEDICINE

## 2020-08-11 PROCEDURE — 3052F HG A1C>EQUAL 8.0%<EQUAL 9.0%: CPT | Performed by: FAMILY MEDICINE

## 2020-08-11 RX ORDER — ATORVASTATIN CALCIUM 40 MG/1
40 TABLET, FILM COATED ORAL DAILY
Qty: 90 TABLET | Refills: 1 | Status: SHIPPED | OUTPATIENT
Start: 2020-08-11 | End: 2020-10-27

## 2020-08-11 RX ORDER — LANOLIN ALCOHOL/MO/W.PET/CERES
1000 CREAM (GRAM) TOPICAL DAILY
COMMUNITY

## 2020-08-11 RX ORDER — LISDEXAMFETAMINE DIMESYLATE 60 MG/1
60 CAPSULE ORAL EVERY MORNING
Qty: 30 CAPSULE | Refills: 0 | Status: CANCELLED | OUTPATIENT
Start: 2020-10-10 | End: 2020-11-09

## 2020-08-11 RX ORDER — LISDEXAMFETAMINE DIMESYLATE 60 MG/1
60 CAPSULE ORAL DAILY
Qty: 30 CAPSULE | Refills: 0 | Status: CANCELLED | OUTPATIENT
Start: 2020-09-10 | End: 2020-10-10

## 2020-08-11 RX ORDER — CLONAZEPAM 0.5 MG/1
0.5 TABLET ORAL NIGHTLY PRN
Qty: 10 TABLET | Refills: 0 | Status: CANCELLED | OUTPATIENT
Start: 2020-08-11 | End: 2020-09-10

## 2020-08-11 RX ORDER — LISDEXAMFETAMINE DIMESYLATE 60 MG/1
1 CAPSULE ORAL DAILY
Qty: 90 CAPSULE | Refills: 0 | Status: SHIPPED | OUTPATIENT
Start: 2020-08-11 | End: 2020-10-13 | Stop reason: SDUPTHER

## 2020-08-11 NOTE — PROGRESS NOTES
2020    TELEHEALTH EVALUATION -- Audio/Visual (During FWYJW-93 public health emergency)    HPI:    Angeles Oliva (:  1970) has requested an audio/video evaluation for the following concern(s):    The encounter diagnosis was Type 2 diabetes mellitus with both eyes affected by mild nonproliferative retinopathy without macular edema, with long-term current use of insulin ([address] and blister on her finger, breast cancer screening. Diabetes Mellitus Type II, Follow-up: Patient here for follow-up of Type 2 diabetes mellitus. Current symptoms/problems include none and have been stable. Symptoms have been present for several years. Known diabetic complications: retinopathy  Cardiovascular risk factors: diabetes mellitus, dyslipidemia, hypertension and obesity (BMI >= 30 kg/m2)  Current diabetic medications include lantus and trulicity. Eye exam current (within one year): yes  Weight trend: stable  Prior visit with dietician: no  Current diet: in general, a \"healthy\" diet    Current exercise: aerobics and resistnace training    Current monitoring regimen: home blood tests - 1 times daily  Home blood sugar records: fasting range: 110 - 120  Any episodes of hypoglycemia? no    Is She on ACE inhibitor or angiotensin II receptor blocker? Not Indicated     Vyvanse has been effective in preventing binge eating. She is having trouble sleeping but this is not related to medication. Started after her cat . Her cat always slept on top of her. Has some stress. Working on trying to get hand healed after surgery. Is improving slowly. She eating pretty well. Going to the gym 3 times a week. She takes Klonopin infrequently if is overwhelmed and cannot calm down.   She does not need refill      Lab Results   Component Value Date     2020    K 4.2 2020     2020    CO2 24 2020    BUN 21 (H) 2020    CREATININE 1.0 2020    GLUCOSE 207 (H) 2020 CALCIUM 8.5 06/08/2020    PROT 6.6 10/04/2019    LABALBU 4.2 10/04/2019    BILITOT 0.4 10/04/2019    ALKPHOS 68 10/04/2019    AST 18 10/04/2019    ALT 38 10/04/2019    LABGLOM 58 (A) 06/08/2020    GFRAA >60 06/08/2020    AGRATIO 1.8 10/04/2019    GLOB 2.4 10/04/2019        Lab Results   Component Value Date    LABA1C 8.0 06/08/2020     Lab Results   Component Value Date    .9 06/08/2020     Lab Results   Component Value Date    CHOL 98 10/04/2019    CHOL 135 06/20/2019    CHOL 129 09/27/2018     Lab Results   Component Value Date    TRIG 103 10/04/2019    TRIG 102 06/20/2019    TRIG 111 09/27/2018     Lab Results   Component Value Date    HDL 53 10/04/2019    HDL 51 06/20/2019    HDL 47 03/27/2019     Lab Results   Component Value Date    LDLCALC 24 10/04/2019    LDLCALC 64 06/20/2019    LDLCALC 71 03/27/2019     Lab Results   Component Value Date    LABVLDL 21 10/04/2019    LABVLDL 20 06/20/2019    LABVLDL 22 03/27/2019     No results found for: CHOLHDLRATIO      Review of Systems    Outpatient Medications Marked as Taking for the 8/11/20 encounter (Telemedicine) with Tab Polanco MD   Medication Sig Dispense Refill    vitamin B-12 (CYANOCOBALAMIN) 1000 MCG tablet Take 1,000 mcg by mouth daily      pioglitazone (ACTOS) 15 MG tablet TAKE ONE TABLET BY MOUTH DAILY 90 tablet 0    pramipexole (MIRAPEX) 0.5 MG tablet TAKE ONE TO TWO TABLETS BY MOUTH EVERY EVENING 60 tablet 4    FARXIGA 10 MG tablet TAKE ONE TABLET BY MOUTH EVERY MORNING 90 tablet 1    buPROPion (WELLBUTRIN XL) 300 MG extended release tablet TAKE ONE TABLET BY MOUTH EVERY MORNING 90 tablet 1    insulin glargine (LANTUS SOLOSTAR) 100 UNIT/ML injection pen Inject 24 Units into the skin nightly 5 pen 2    Insulin Pen Needle (B-D UF III MINI PEN NEEDLES) 31G X 5 MM MISC 1 each by Does not apply route daily 100 each 12    Dulaglutide (TRULICITY) 1.5 HV/0.3MF SOPN Inject 1.5 mg into the skin once a week 12 pen 1    FLUoxetine (PROZAC) 20 MG capsule TAKE 3 CAPSULES BY MOUTH ONE TIME A  capsule 2    Lisdexamfetamine Dimesylate (VYVANSE) 60 MG CAPS Take 60 mg by mouth every morning for 30 days. 30 capsule 0    clonazePAM (KLONOPIN) 0.5 MG tablet Take 1 tablet by mouth nightly as needed for Anxiety for up to 30 days. 10 tablet 0    cyclobenzaprine (FLEXERIL) 10 MG tablet TAKE ONE TABLET BY MOUTH DAILY 90 tablet 1    Lisdexamfetamine Dimesylate (VYVANSE) 60 MG CAPS Take by mouth.  atorvastatin (LIPITOR) 40 MG tablet Take 1 tablet by mouth daily 90 tablet 1    MIRENA, 52 MG, IUD 52 mg 1 Dose by Intrauterine route once      Multiple Vitamins-Minerals (BARIATRIC FUSION) CHEW Take 4 tablets by mouth daily       b complex vitamins capsule Take 1 capsule by mouth daily      fluticasone (FLONASE) 50 MCG/ACT nasal spray PLACE TWO SPRAYS IN EACH NOSTRIL ONCE DAILY 1 Bottle 12    aspirin 81 MG EC tablet Take 81 mg by mouth daily.             Social History     Tobacco Use    Smoking status: Never Smoker    Smokeless tobacco: Never Used   Substance Use Topics    Alcohol use: Yes     Comment: infrequent, few times a month    Drug use: No        Allergies   Allergen Reactions    Effexor Xr [Venlafaxine Hcl Er] Other (See Comments)     Didn't respond well to it.    ,   Past Medical History:   Diagnosis Date    Anxiety     Chronic gastric ulcer without hemorrhage and without perforation     CTS (carpal tunnel syndrome) 2/10/2015    Depression     [facility]     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 ([facility] 1/29/2016    DUB (dysfunctional uterine bleeding) 8/22/2019    ETD (eustachian tube dysfunction) 5/16/2014    Herpes simplex     Lumbar strain 4/19/2017    PCOS (polycystic ovarian syndrome)     Sleep apnea     TMJ syndrome    ,   Family History   Problem Relation Age of Onset    Cervical Cancer Mother     Diabetes Father     Stroke Father         25s    Hypertension Father     Coronary Art Dis Father  Other Father         CHIVO    Cancer Father         melanoma    Glaucoma Father     Diabetes Sister     Diabetes Maternal Grandmother     Cancer Maternal Grandmother         melanoma    Arthritis Maternal Grandmother     Diabetes Paternal Grandmother     Stroke Paternal Grandmother        PHYSICAL EXAMINATION:  [ INSTRUCTIONS:  \"[x]\" Indicates a positive item  \"[]\" Indicates a negative item  -- DELETE ALL ITEMS NOT EXAMINED]  Vital Signs: (As obtained by patient/caregiver or practitioner observation)    Blood pressure-  Heart rate-    Respiratory rate-    Temperature-  Pulse oximetry-   Wt 242 lbs    Wt Readings from Last 3 Encounters:   08/05/20 244 lb (110.7 kg)   07/21/20 244 lb (110.7 kg)   07/14/20 244 lb (110.7 kg)     Temp Readings from Last 3 Encounters:   08/05/20 98.1 °F (36.7 °C)   07/21/20 97.5 °F (36.4 °C)   07/01/20 97.6 °F (36.4 °C) (Temporal)     BP Readings from Last 3 Encounters:   07/01/20 116/66   06/12/20 128/64   06/08/20 127/71     Pulse Readings from Last 3 Encounters:   07/01/20 69   06/12/20 68   06/08/20 74      Constitutional: [x] Appears well-developed and well-nourished [x] No apparent distress      [] Abnormal-   Mental status  [x] Alert and awake  [x] Oriented to person/place/time []Able to follow commands      Eyes:  EOM    [x]  Normal  [] Abnormal-  Sclera  [x]  Normal  [] Abnormal -         Discharge []  None visible  [] Abnormal -    HENT:   [x] Normocephalic, atraumatic.   [] Abnormal   [] Mouth/Throat: Mucous membranes are moist.       Neck: [x] No visualized mass     Pulmonary/Chest: [x] Respiratory effort normal.  [x] No visualized signs of difficulty breathing or respiratory distress        [] Abnormal-      Musculoskeletal:   [x] Normal gait with no signs of ataxia         [] Normal range of motion of neck        [] Abnormal-       Neurological:        [x] No Facial Asymmetry (Cranial nerve 7 motor function) (limited exam to video visit)          [] No gaze palsy [] Abnormal-         Skin:        [x] No significant exanthematous lesions or discoloration noted on facial skin         [] Abnormal-            Psychiatric:       [x] Normal Affect [] No Hallucinations        [] Abnormal-     Other pertinent observable physical exam findings-     ASSESSMENT/PLAN:  1. Type 2 diabetes mellitus with both eyes affected by mild nonproliferative retinopathy without macular edema, with long-term current use of insulin (HCC)  Improved control by hx. Discussed diet, exercise and weight loss strategies   Check labs. Continue current meds  - Basic Metabolic Panel; Future  - Hemoglobin A1C; Future    2. Breast cancer screening    - [facility] DIGITAL SCREEN W OR WO CAD BILATERAL; Future    3. Pure hypercholesterolemia  LDL at goal < 100. Tolerating statin  - atorvastatin (LIPITOR) 40 MG tablet; Take 1 tablet by mouth daily  Dispense: 90 tablet; Refill: 1    4. Depression with anxiety  Worsening with stress. Continue Prozac and Wellbutrin. Refer to Dr. Charli Galvez for counseling. Consider increasing Prozac to 80 mg     5. Binge eating  Controlled. Discussed diet, exercise and weight loss strategies   - Lisdexamfetamine Dimesylate (VYVANSE) 60 MG CAPS; Take 1 tablet by mouth daily for 30 days. Dispense: 90 capsule; Refill: 0    Side effects of current medications reviewed and questions answered. Follow up in 3 months or prn. No follow-ups on file. Angeles Oliva is a 44 y.o. female being evaluated by a Virtual Visit (video visit) encounter to address concerns as mentioned above. A caregiver was present when appropriate. Due to this being a TeleHealth encounter ([facility]), evaluation of the following organ systems was limited: Vitals/Constitutional/EENT/Resp/CV/GI//MS/Neuro/Skin/Heme-Lymph-Imm.   Pursuant to the emergency declaration under the [address] [place] [address] and Response Supplemental Appropriations Act, this Virtual Visit was conducted with patient's (and/or legal guardian's) consent, to reduce the patient's risk of exposure to COVID-19 and provide necessary medical care. The patient (and/or legal guardian) has also been advised to contact this office for worsening conditions or problems, and seek emergency medical treatment and/or call 911 if deemed necessary. Patient identification was verified at the start of the visit: Yes    Total time spent on this encounter: Not billed by time    Services were provided through a video synchronous discussion virtually to substitute for in-person clinic visit. Patient and provider were located at their individual homes. --Saul White MD on 8/10/2020 at 9:19 PM    An electronic signature was used to authenticate this note.

## 2020-08-13 ENCOUNTER — HOSPITAL ENCOUNTER (OUTPATIENT)
Dept: OCCUPATIONAL THERAPY | Age: 50
Setting detail: THERAPIES SERIES
Discharge: HOME | End: 2020-08-13
Payer: COMMERCIAL

## 2020-08-13 PROCEDURE — 97110 THERAPEUTIC EXERCISES: CPT | Performed by: OCCUPATIONAL THERAPIST

## 2020-08-13 PROCEDURE — 97140 MANUAL THERAPY 1/> REGIONS: CPT | Performed by: OCCUPATIONAL THERAPIST

## 2020-08-13 NOTE — FLOWSHEET NOTE
[facility] [employer]  [facility] [place] [address]  Phone [phone]      Fax [phone]    Occupational Therapy Treatment Note/ Progress Report:     Date:  2020    Patient Name:  Dewey Isidro    :  1970  MRN: [id number]    Medical/Treatment Diagnosis Information:  · Diagnosis: R IF trigger finger M65.321   · Treatment Diagnosis: R hand stiffness H73.082     Insurance/Certification information:  OT Insurance Information: BCBS Indiv deduct met   3970/4000 OOp met  $60 copay 60 OT/PT/ST  Physician Information:  Referring Practitioner: Yojana Ashby  Has the plan of care been signed (Y/N):        []  Yes  [x]  No       Visit # Insurance Allowable Auth Required   4 60 OT/PT/ST []  Yes []  No        Is this a Progress Report:     []  Yes  [x]  No      If Yes:  Date Range for reporting period:  Beginning  Ending    Progress report will be due (10 Rx or 30 days whichever is less):     Recertification will be due (POC Duration  / 90 days whichever is less):20    Date of Injury: several months ago  Date of Surgery: 20    Date of Patient follow up with Physician: 3-4 wks    RESTRICTIONS/PRECAUTIONS:     Latex Allergy:  [x]No      []Yes  Pacemaker:  [x] No       [] Yes     Preferred Language for Healthcare:   [x]English       []other:     Functional Scale: 57% (Quick DASH)   Date assessed:  2020      SUBJECTIVE: Doing better with eating. Patient reported deficits/history of current problem: Started  catching and gradually got worse.   Feels she is worse post surgery in regards to mobility  Had 1 prior injection      Pain Scale:1-2/10  In evening    Current : 1/10  Mainly with pressure to volar L index MP      OBJECTIVE:       Date:  2020    Objective Measures/Tests:       ROM: See eval      IF MP  PIP  DIP R  5/50  20/45  0/15    L  80  103  80 R  0/50  5/60  0/30 daily - 7x weekly   Seated Finger DIP AROM - 10 reps - 3 sec hold - 4x daily - 7x weekly   Seated Finger PIP AROM - 10 reps - 3 sec hold - 4x daily - 7x weekly   Seated Single Digit Intrinsic Stretch - 10 reps - 3 sec hold - 4x daily - 7x weekly   Seated Finger Composite Flexion Extension - 10 reps - 1-2 sets - 5 hold - 3-4x daily - 7x weekly   Hand AROM Flat Fist - 10 reps - 3 sets - 1x daily - 7x weekly   Seated Single Finger Extension - 10 reps - 3-5 hold - 4x daily - 7x weekly   Seated Pen Hurdles - 10 reps - 3x daily - 7x weekly                Splinting   Flex loop full hand and IP  Pt to use at home 3-4 x's per day for 10 minutes    Lcode:       Orthotic Mgmt, Subsequent Enc (12379)       Orthotic Mgmt & Training (93106)              Other:                                     Therapeutic Exercise & NMR:  [x] (56144) Provided verbal/tactile cueing for activities related to strengthening, flexibility, endurance, ROM  for improvements in scapular, scapulothoracic and UE control with self care, reaching, carrying, lifting, house/yardwork, driving/computer work.    [] (96455) Provided verbal/tactile cueing for activities related to improving balance, coordination, kinesthetic sense, posture, motor skill, proprioception  to assist with  scapular, scapulothoracic and UE control with self care, reaching, carrying, lifting, house/yardwork, driving/computer work.     Therapeutic Activities & NMR:    [x] (96227 or 98470) Provided verbal/tactile cueing for activities related to improving balance, coordination, kinesthetic sense, posture, motor skill, proprioception and motor activation to allow for proper function of scapular, scapulothoracic and UE control with self care, carrying, lifting, driving/computer work    Home Exercise Program:    [x] (90933) Reviewed/Progressed HEP activities related to strengthening, flexibility, endurance, ROM of scapular, scapulothoracic and UE control with self care, reaching, carrying, lifting, house/yardwork, driving/computer work  [] (50761) Reviewed/Progressed HEP activities related to improving balance, coordination, kinesthetic sense, posture, motor skill, proprioception of scapular, scapulothoracic and UE control with self care, reaching, carrying, lifting, house/yardwork, driving/computer work      Manual Treatments:  PROM / STM / Oscillations-Mobs:  G-I, II, III, IV (PA's, Inf., Post.)  [x] (14831) Provided manual therapy to mobilize soft tissue/joints of cervical/CT, scapular GHJ and UE for the purpose of modulating pain, promoting relaxation,  increasing ROM, reducing/eliminating soft tissue swelling/inflammation/restriction, improving soft tissue extensibility and allowing for proper ROM for normal function with self care, reaching, carrying, lifting, house/yardwork, driving/computer work    ADL Training:  [] (52120) Provided self-care/home management training related to activities of daily living and compensatory training, and/or use of adaptive equipment        Charges:  Timed Code Treatment Minutes: 40   Total Treatment Minutes: 40        [] EVAL (LOW) 66007 (typically 20 minutes face-to-face)    [] EVAL (MOD) 76931 (typically 30 minutes face-to-face)  [] EVAL (HIGH) [phone] (typically 45 minutes face-to-face)  [] OT Re-eval (18351)       [x] Paul [phone] x   2   [] ERZBR(11452)  [] NMR (09384) x      [] Estim (attended) (77549)   [x] Manual ([address] [facility]) x 1     [] US (18745)  [] TA (14641) x      [] Paraffin (67317)  [] ADL  ([phone]) x     [] Splint/L code:    [] Estim (unattended) [phone])  [] Fluidotherapy (97579)  [] Other:      ASSESSMENT:  Swelling remains an issue. Added flex loops to assist with increasing PROM    Frequency/Duration:  1-2 days per week for 6 weeks 8/28/20        GOALS:  Patient stated goal: regain movement in R IF    [x]? Progressing: []? Met: []? Not Met: []? Adjusted     Therapist goals for Patient:   Short Term Goals: To be achieved in: 2 weeks  1.  Independent in requires continued skilled intervention: [x] Yes  [] No    Treatment/Activity Tolerance:  [x] Patient able to complete treatment  [] Patient limited by fatigue  [] Patient limited by pain    [] Patient limited by other medical complications  [] Other:                  PLAN: Progress AROM and function as tolerated while controlling pain and edema  [x] Continue per plan of care [] Alter current plan (see comments above)  [] Plan of care initiated [] Hold pending MD visit [] Discharge      Electronically signed by:  Pj DANIEL/L  [id number]    Note: If patient does not return for scheduled/ recommended follow up visits, this note will serve as a discharge from care along with most recent update on progress.

## 2020-08-19 ENCOUNTER — HOSPITAL ENCOUNTER (OUTPATIENT)
Dept: OCCUPATIONAL THERAPY | Age: 50
Setting detail: THERAPIES SERIES
Discharge: HOME | End: 2020-08-19
Payer: COMMERCIAL

## 2020-08-19 ENCOUNTER — OFFICE VISIT (OUTPATIENT)
Dept: ORTHOPEDIC SURGERY | Age: 50
End: 2020-08-19

## 2020-08-19 VITALS — BODY MASS INDEX: 39.21 KG/M2 | WEIGHT: 244 LBS | HEIGHT: 66 IN

## 2020-08-19 PROCEDURE — 99024 POSTOP FOLLOW-UP VISIT: CPT | Performed by: ORTHOPAEDIC SURGERY

## 2020-08-19 PROCEDURE — 97110 THERAPEUTIC EXERCISES: CPT | Performed by: OCCUPATIONAL THERAPIST

## 2020-08-19 PROCEDURE — 97140 MANUAL THERAPY 1/> REGIONS: CPT | Performed by: OCCUPATIONAL THERAPIST

## 2020-08-19 RX ORDER — CLONAZEPAM 0.5 MG/1
0.5 TABLET ORAL NIGHTLY PRN
Qty: 10 TABLET | Refills: 0 | Status: SHIPPED | OUTPATIENT
Start: 2020-08-19 | End: 2020-10-27

## 2020-08-19 RX ORDER — CYCLOBENZAPRINE HCL 10 MG
10 TABLET ORAL 3 TIMES DAILY PRN
Qty: 90 TABLET | Refills: 2 | Status: SHIPPED | OUTPATIENT
Start: 2020-08-19 | End: 2021-01-21

## 2020-08-19 RX ORDER — PEN NEEDLE, DIABETIC 31 GX5/16"
1 NEEDLE, DISPOSABLE MISCELLANEOUS DAILY
Qty: 100 EACH | Refills: 12 | Status: SHIPPED | OUTPATIENT
Start: 2020-08-19 | End: 2021-10-11

## 2020-08-19 RX ORDER — DULAGLUTIDE 1.5 MG/.5ML
1.5 INJECTION, SOLUTION SUBCUTANEOUS WEEKLY
Qty: 12 PEN | Refills: 1 | Status: SHIPPED | OUTPATIENT
Start: 2020-08-19 | End: 2020-12-14

## 2020-08-19 RX ORDER — BUPROPION HYDROCHLORIDE 300 MG/1
300 TABLET ORAL EVERY MORNING
Qty: 90 TABLET | Refills: 3 | Status: SHIPPED | OUTPATIENT
Start: 2020-08-19 | End: 2021-01-28 | Stop reason: SDUPTHER

## 2020-08-19 RX ORDER — PRAMIPEXOLE DIHYDROCHLORIDE 0.5 MG/1
1 TABLET ORAL NIGHTLY
Qty: 180 TABLET | Refills: 1 | Status: SHIPPED | OUTPATIENT
Start: 2020-08-19 | End: 2020-12-18

## 2020-08-19 RX ORDER — INSULIN GLARGINE 100 [IU]/ML
24 INJECTION, SOLUTION SUBCUTANEOUS NIGHTLY
Qty: 15 PEN | Refills: 1 | Status: SHIPPED | OUTPATIENT
Start: 2020-08-19 | End: 2021-01-21

## 2020-08-19 RX ORDER — FLUOXETINE HYDROCHLORIDE 20 MG/1
CAPSULE ORAL
Qty: 270 CAPSULE | Refills: 2 | Status: SHIPPED | OUTPATIENT
Start: 2020-08-19 | End: 2021-01-13 | Stop reason: ALTCHOICE

## 2020-08-19 NOTE — FLOWSHEET NOTE
[facility] [employer]  [address] [place] [address]  Phone [phone]      Fax [phone]    Occupational Therapy Treatment Note/ Progress Report:     Date:  2020    Patient Name:  Griselda Gutting    :  1970  MRN: [id number]    Medical/Treatment Diagnosis Information:  · Diagnosis: R IF trigger finger M65.321   · Treatment Diagnosis: R hand stiffness Y80.683     Insurance/Certification information:  OT Insurance Information: BCBS Indiv deduct met   3970/4000 OOp met  $60 copay 60 OT/PT/ST  Physician Information:  Referring Practitioner: Jairon Packer  Has the plan of care been signed (Y/N):        []  Yes  [x]  No       Visit # Insurance Allowable Auth Required   4 60 OT/PT/ST []  Yes []  No        Is this a Progress Report:     [x]  Yes  []  No      If Yes:  Date Range for reporting period:  Beginning  Ending    Progress report will be due (10 Rx or 30 days whichever is less):     Recertification will be due (POC Duration  / 90 days whichever is less):20    Date of Injury: several months ago  Date of Surgery: 20    Date of Patient follow up with Physician: 3-4 wks    RESTRICTIONS/PRECAUTIONS:     Latex Allergy:  [x]No      []Yes  Pacemaker:  [x] No       [] Yes     Preferred Language for Healthcare:   [x]English       []other:     Functional Scale:    7%   ( initial 57%) (Quick DASH)   Date assessed:  2020      SUBJECTIVE: Reports she is able to write, type , and  bars at gym. Still unable to make a full fist with IF. Volar MP edema of IF has decreased     Doing better with eating. Patient reported deficits/history of current problem: Started  catching and gradually got worse.   Feels she is worse post surgery in regards to mobility  Had 1 prior injection      Pain Scale:1-2/10  In evening    Current :0/10        OBJECTIVE:       Date:  2020   Objective Measures/Tests:    Progress note   ROM: See eval      IF MP  PIP  DIP R  5/50  20/45  0/15    L  80  103  80 R  0/55  5/65  0/35    PROM  /65  /85  /45 R   0/65  0/70  0/39 R  67  70  35   Digits: tips to DPFC  IF   5.5 cm   4.5 cm    PRE 4 cm    Post 3 cm              Strength:   :   R: 30#  L:  40#    No pain           Observations: Other:       IF  P1  PIP  P2 R IF  8  7.5  6.4   R  IF  7.8  7.5  6.5            MODALITIES:       Fluidotherapy (95579)       Estim (40270/25103)       Paraffin (82985)       US (51584)       Iontophoresis (05104)       Hot Pack       Cold Pack              INTERVENTIONS:       Therapeutic Exercise (74389)       A/AAROM   MPflex  DIP flex  PIP flex  Hook'   composite fist  Straight fist  Finger ext   10 x 3 sec hold MPflex  DIP flex  PIP flex  Hook'   composite fist  Straight fist  10x ea     Hook and composite fist x 20 each  AAROM  Hook to pen  Straight fist to highlighter  10 x 2 ea    yellow  Putty flatten with IP flex holding pen    Straight fist holding highlighter      Indiv finger flex with yellow putty     Yellow foam squeeze  10 x2  Issued for home use Putty squeeze (with glove) x 4'       Yellow digiflex x 20            Therapeutic Activity (80565) Finger ext over pen  10x  Rolling flex bar x 4'     Finger flex gripping 2\" diameter cylinder  10x Finger flex gripping 1\" diameter cylinder  10x2                          Manual Therapy (16944) Retrograde massage    Retrograde massage  Ret mass     PROM isolated joints and comp.  PROM isolated joints and comp    Place and hold IP flex and straight fist      Mini massager  4'    XL digi sleeve for edema during the day   XS glove at night XL digisleeve issued for IF    Elastomer insert fabricated to be worn over IF volar MP at night with edema glove            Neuromuscular Reeducation (81950)                     ADL Training (54485)                     HEP Training/Review See sheet(s)       Access Code: [email or web address]   URL: [email or web address]   Date: 07/17/2020   Prepared by: [facility]     Exercises   Intrinsic+ (MP flexion, IP extension) - 10 reps - 3 sec hold - 4x daily - 7x weekly   Seated Finger DIP AROM - 10 reps - 3 sec hold - 4x daily - 7x weekly   Seated Finger PIP AROM - 10 reps - 3 sec hold - 4x daily - 7x weekly   Seated Single Digit Intrinsic Stretch - 10 reps - 3 sec hold - 4x daily - 7x weekly   Seated Finger Composite Flexion Extension - 10 reps - 1-2 sets - 5 hold - 3-4x daily - 7x weekly   Hand AROM Flat Fist - 10 reps - 3 sets - 1x daily - 7x weekly   Seated Single Finger Extension - 10 reps - 3-5 hold - 4x daily - 7x weekly   Seated Pen Hurdles - 10 reps - 3x daily - 7x weekly                Splinting  Flex loop full hand and IP  Pt to use at home 3-4 x's per day for 10 minutes  Reports she is using flex loops   Lcode:       Orthotic Mgmt, Subsequent Enc (16634)       Orthotic Mgmt & Training (66519)              Other:                                     Therapeutic Exercise & NMR:  [x] (66254) Provided verbal/tactile cueing for activities related to strengthening, flexibility, endurance, ROM  for improvements in scapular, scapulothoracic and UE control with self care, reaching, carrying, lifting, house/yardwork, driving/computer work.    [] (52634) Provided verbal/tactile cueing for activities related to improving balance, coordination, kinesthetic sense, posture, motor skill, proprioception  to assist with  scapular, scapulothoracic and UE control with self care, reaching, carrying, lifting, house/yardwork, driving/computer work.     Therapeutic Activities & NMR:    [x] (19667 or 57416) Provided verbal/tactile cueing for activities related to improving balance, coordination, kinesthetic sense, posture, motor skill, proprioception and motor activation to allow for proper function of scapular, scapulothoracic and UE control with self care, carrying, lifting, driving/computer work    Home Exercise Program:    [x] (09271) Reviewed/Progressed HEP activities related to strengthening, flexibility, endurance, ROM of scapular, scapulothoracic and UE control with self care, reaching, carrying, lifting, house/yardwork, driving/computer work  [] (31455) Reviewed/Progressed HEP activities related to improving balance, coordination, kinesthetic sense, posture, motor skill, proprioception of scapular, scapulothoracic and UE control with self care, reaching, carrying, lifting, house/yardwork, driving/computer work      Manual Treatments:  PROM / STM / Oscillations-Mobs:  G-I, II, III, IV (PA's, Inf., Post.)  [x] (42367) Provided manual therapy to mobilize soft tissue/joints of cervical/CT, scapular GHJ and UE for the purpose of modulating pain, promoting relaxation,  increasing ROM, reducing/eliminating soft tissue swelling/inflammation/restriction, improving soft tissue extensibility and allowing for proper ROM for normal function with self care, reaching, carrying, lifting, house/yardwork, driving/computer work    ADL Training:  [] (73110) Provided self-care/home management training related to activities of daily living and compensatory training, and/or use of adaptive equipment        Charges:  Timed Code Treatment Minutes: 45   Total Treatment Minutes: 45        [] EVAL (LOW) 59555 (typically 20 minutes face-to-face)    [] EVAL (MOD) 21423 (typically 30 minutes face-to-face)  [] EVAL (HIGH) 89362 (typically 45 minutes face-to-face)  [] OT Re-eval (01000)       [x] Paul [phone]) x   2   [] YYFRV(36530)  [] NMR (87688) x      [] Estim (attended) (78437)   [x] Manual (43254 [facility]) x 1     [] US (24721)  [] TA (00933) x      [] Paraffin (68375)  [] ADL  (93330) x     [] Splint/L code:    [] Estim (unattended) ([id number])  [] Fluidotherapy (36298)  [] Other:      ASSESSMENT:  AROM Of IF remains and issue. May benefit from NMES .     Frequency/Duration:  1-2 days per week for 6 weeks 8/28/20        GOALS:  Patient stated goal: regain movement in R IF    [x]? Progressing: []? Met: []? Not Met: []? Adjusted     Therapist goals for Patient:   Short Term Goals: To be achieved in: 2 weeks  1. Independent in HEP and progression per patient tolerance, in order to prevent re-injury. [x]? Progressing: []? Met: []? Not Met: []? Adjusted   2. Patient will have a decrease in pain to facilitate improvement in movement, function, and ADLs as indicated by Functional Deficits. []? Progressing: [x]? Met: []? Not Met: []? Adjusted     Long Term Goals to be achieved in 6 weeks (through 8/28/20), including patient directed goals to address patient identified performance deficits:  1) Pt to be independent in graded HEP progression with a good level of effort and compliance. [x]? Progressing: []? Met: []? Not Met: []? Adjusted   2) Pt to report a score of </= 25 % on the Quick DASH disability questionnaire for increased performance with carrying, moving, and handling objects. []? Progressing: [x]? Met: []? Not Met: []? Adjusted   3) Pt will demonstrate increased ROM to R IF  Fingertip to [facility] </= 1.5 cm for improved independence with .writing and opening containers  [x]? Progressing: []? Met: []? Not Met: []? Adjusted   4) Pt will demonstrate increased strength to R  [id number]# of L for improved independence with opening containers and lifting.  []? Progressing: [x]? Met: []? Not Met: []? Adjusted   5) Pt will have a decrease in pain to 2/10 in the evening to facilitate ADL's.  []? Progressing: [x]? Met: []? Not Met: []? Adjusted            Overall Progression Towards Functional Goals/Treatment Progress Update:  [] Patient is progressing as expected towards functional goals listed. [x] Progression is slowed due to complexities/impairments listed. Pain and edema  [] Progression has been slowed due to co-morbidities.   [] Plan just implemented, too soon to assess goals progression <30 days  [] Goals require adjustment due to lack of progress  [] Patient is not progressing as expected and requires additional follow up with physician  [] All goals are met  [x] Other: Goals 2,4,5 met    Prognosis for POC: [x] Good [] Fair  [] Poor    Patient requires continued skilled intervention: [x] Yes  [] No    Treatment/Activity Tolerance:  [x] Patient able to complete treatment  [] Patient limited by fatigue  [] Patient limited by pain    [] Patient limited by other medical complications  [] Other:                  PLAN: Progress AROM and function  while controlling pain and edema  [x] Continue per plan of care [] Alter current plan (see comments above)  [] Plan of care initiated [] Hold pending MD visit [] Discharge      Electronically signed by: Joyce Worrell OTR/L KYARA  OT [id number]    Note: If patient does not return for scheduled/ recommended follow up visits, this note will serve as a discharge from care along with most recent update on progress.

## 2020-08-19 NOTE — PROGRESS NOTES
Chief Complaint:  Finger Pain (Right Index finger and long finger)      History of Present of Illness:  Ms. Sindy Welsh a 57-year-old female presenting as a repeat incision and drainage of right index finger after trigger finger release surgery and subsequent secondary closure  Date of most recent procedure: 7/1/2020    She presents today now almost 6 weeks since her recent incision and drainage. The patient states that she is having some triggering of her right long finger that she has noticed more frequently. She continues to work with therapy on motion for her index finger as well but has continued to have some stiffness compared with preop. With regards to her swelling and discomfort, she has minimal discomfort with day-to-day activities. She does have improvement in the swelling and range of motion overall.   No fever chills or other constitutional symptoms and no new skin changes    Examination:  General: Pleasant well-appearing no distress     Right hand/palm:  Dry incision, no evidence of drainage, localized surrounding swelling with faint hyperemia circumferentially around the incision that is soft and mobile and nontender  No tenderness along the remainder of the flexor tendon sheath or within the palm, less swelling compared with prior exam 2 weeks ago  There is no obvious fluctuance with palpation  Minimal swelling throughout the remainder of the finger and no swelling throughout the remainder of the hand or palm   Active finger flexion and extension including active FDS and FDP with no triggering pulp to palm distance approximately 2 to 3 cm with no triggering and full extension of the finger, there is triggering of the long finger with motion flexion and extension mild with mild discomfort along the long finger A1 pulley and flexor tendon sheath  Gross sensation intact radial and ulnar aspect of fingertip with brisk capillary refill    Radiology:     X-rays obtained and reviewed in office:  No new images obtained today  No orders of the defined types were placed in this encounter. Impression:  Encounter Diagnosis   Name Primary?  Trigger index finger of right hand Yes         Treatment Plan:  I discussed with the patient today her progress. She continues to be persistent and work with therapy gaining progressive motion. It appears that her tendon is gliding smoothly and her main difficulty is achieving a full composite fist secondary to the swelling still near her MP joints and palm area. However there does appear to be some gradual improvement. I am unsure of the exact cause but we discussed the possibility of an indolent infection but no signs of any progressive infection or flexor tenosynovitis today. Overall we discussed important aspects today including continued work on range of motion and activities, edema control strategies as well as diabetes management. We discussed the possibility of reexploration but the patient would much prefer secondary to her momentum with progress to avoid this if possible and I think that this would be reasonable given that there does not appear to be any obvious progressive infection and actually the swelling continues to improve.   I do recommend close monitoring of her progress and seeing her back in 3 weeks to reevaluate

## 2020-08-27 ENCOUNTER — HOSPITAL ENCOUNTER (OUTPATIENT)
Dept: OCCUPATIONAL THERAPY | Age: 50
Setting detail: THERAPIES SERIES
Discharge: HOME | End: 2020-08-27
Payer: COMMERCIAL

## 2020-08-27 PROCEDURE — 97110 THERAPEUTIC EXERCISES: CPT | Performed by: OCCUPATIONAL THERAPIST

## 2020-08-27 PROCEDURE — 97140 MANUAL THERAPY 1/> REGIONS: CPT | Performed by: OCCUPATIONAL THERAPIST

## 2020-08-27 NOTE — FLOWSHEET NOTE
[facility] [employer]  [address] [facility] [address] [facility] [address]  Phone [phone]      Fax [phone]    Occupational Therapy Treatment Note/ Progress Report:     Date:  2020    Patient Name:  Tami Robb    :  1970  MRN: [id number]    Medical/Treatment Diagnosis Information:  · Diagnosis: R IF trigger finger M65.321   · Treatment Diagnosis: R hand stiffness Z25.068     Insurance/Certification information:  OT Insurance Information: BCBS Indiv deduct met   3970/4000 OOp met  $60 copay 60 OT/PT/ST  Physician Information:  Referring Practitioner: Haritha Ovalles  Has the plan of care been signed (Y/N):        []  Yes  [x]  No       Visit # Insurance Allowable Auth Required   5 60 OT/PT/ST []  Yes []  No        Is this a Progress Report:     [x]  Yes  []  No      If Yes:  Date Range for reporting period:  Beginning  Ending    Progress report will be due (10 Rx or 30 days whichever is less):     Recertification will be due (POC Duration  / 90 days whichever is less):20    Date of Injury: several months ago  Date of Surgery: 20    Date of Patient follow up with Physician: 3-4 wks    RESTRICTIONS/PRECAUTIONS:     Latex Allergy:  [x]No      []Yes  Pacemaker:  [x] No       [] Yes     Preferred Language for Healthcare:   [x]English       []other:     Functional Scale:    7%   ( initial 57%) (Quick DASH)   Date assessed:  2020      SUBJECTIVE: Reports    Significant decreased MP edema . Pt reprs      she is able to write, type , and  bars at gym. Still unable to make a full fist with IF. Volar MP edema of IF has decreased     Doing better with eating. Patient reported deficits/history of current problem: Started  catching and gradually got worse.   Feels she is worse post surgery in regards to mobility  Had 1 prior injection      Pain Scale:1-2/10  In evening    Current :0/10        OBJECTIVE:       Date: 7/17/2020 8/5/20 8/13/20 8/19/20 8/27/20   Objective Measures/Tests:    Progress note    ROM: See eval       IF MP  PIP  DIP R  5/50  20/45  0/15    L  80  103  80 R  0/55  5/65  0/35    PROM  /65  /85  /45 R   0/65  0/70  0/39 R  67  70  35 R  75  75  35   Digits: tips to DPFC  IF   5.5 cm   4.5 cm    PRE 4 cm    Post 3 cm       Pre 3 cm      Post  2.5 cm           Strength:   :   R: 30#  L:  40#    No pain    R 40#    L 60#           Observations: Other:        IF  P1  PIP  P2 R IF  8  7.5  6.4   R  IF  7.8  7.5  6.5              MODALITIES:        Fluidotherapy (85912)        Estim (36409/30723)        Paraffin (36008)        US (77243)        Iontophoresis (30999)        Hot Pack        Cold Pack                INTERVENTIONS:        Therapeutic Exercise (67046)        A/AAROM   MPflex  DIP flex  PIP flex  Hook'   composite fist  Straight fist  Finger ext   10 x 3 sec hold MPflex  DIP flex  PIP flex  Hook'   composite fist  Straight fist  10x ea     Hook and composite fist x 20 each  AAROM  Hook to pen  Straight fist to highlighter  10 x 2 ea    yellow  Putty flatten with IP flex holding pen    Straight fist holding highlighter      Indiv finger flex with yellow putty AAROM    MP ext with IP flex  10x    Hook  10x      Yellow putty  Indiv finger flex with yellow putty    Putty flatten with IP flex holding pen     Yellow foam squeeze  10 x2  Issued for home use Putty squeeze (with glove) x 4'  Tea infuser  20 x      Yellow digiflex x 20   puttyciser  Yellow putty  lg knob   CW/CCW   10 x 2 ea direction        Green clip  IP flex 10x2   Therapeutic Activity (90037) Finger ext over pen  10x  Rolling flex bar x 4'      Finger flex gripping 2\" diameter cylinder  10x Finger flex gripping 1\" diameter cylinder  10x2                              Manual Therapy (91925) Retrograde massage    Retrograde massage  Ret mass     PROM isolated joints and comp.  PROM isolated joints and comp    Place and hold IP proprioception  to assist with  scapular, scapulothoracic and UE control with self care, reaching, carrying, lifting, house/yardwork, driving/computer work.     Therapeutic Activities & NMR:    [x] (54868 or 38556) Provided verbal/tactile cueing for activities related to improving balance, coordination, kinesthetic sense, posture, motor skill, proprioception and motor activation to allow for proper function of scapular, scapulothoracic and UE control with self care, carrying, lifting, driving/computer work    Home Exercise Program:    [x] (90802) Reviewed/Progressed HEP activities related to strengthening, flexibility, endurance, ROM of scapular, scapulothoracic and UE control with self care, reaching, carrying, lifting, house/yardwork, driving/computer work  [] (11432) Reviewed/Progressed HEP activities related to improving balance, coordination, kinesthetic sense, posture, motor skill, proprioception of scapular, scapulothoracic and UE control with self care, reaching, carrying, lifting, house/yardwork, driving/computer work      Manual Treatments:  PROM / STM / Oscillations-Mobs:  G-I, II, III, IV (PA's, Inf., Post.)  [x] (12560) Provided manual therapy to mobilize soft tissue/joints of cervical/CT, scapular GHJ and UE for the purpose of modulating pain, promoting relaxation,  increasing ROM, reducing/eliminating soft tissue swelling/inflammation/restriction, improving soft tissue extensibility and allowing for proper ROM for normal function with self care, reaching, carrying, lifting, house/yardwork, driving/computer work    ADL Training:  [] (28535) Provided self-care/home management training related to activities of daily living and compensatory training, and/or use of adaptive equipment        Charges:  Timed Code Treatment Minutes: 44   Total Treatment Minutes: 44        [] EVAL (LOW) 63755 (typically 20 minutes face-to-face)    [] EVAL (MOD) 09947 (typically 30 minutes face-to-face)  [] EVAL (HIGH) 04631 (typically 45 minutes face-to-face)  [] OT Re-eval (67346)       [x] Paul [phone]) x   2   [] PYOMJ(78326)  [] NMR (21095) x      [] Estim (attended) (28685)   [x] Manual ([phone]) x 1     [] US (06432)  [] TA (42931) x      [] Paraffin (44125)  [] ADL  (54593) x     [] Splint/L code:    [] Estim (unattended) ([phone])  [] Fluidotherapy (81859)  [] Other:      ASSESSMENT:  Pt cont to make gradual progress with AROM . Volar R IF MP edema has significantly decreased. AROM Of IF remains and issue. May benefit from NMES . Frequency/Duration:  1-2 days per week for 6 weeks 8/28/20        GOALS:  Patient stated goal: regain movement in R IF    [x]? Progressing: []? Met: []? Not Met: []? Adjusted     Therapist goals for Patient:   Short Term Goals: To be achieved in: 2 weeks  1. Independent in HEP and progression per patient tolerance, in order to prevent re-injury. [x]? Progressing: []? Met: []? Not Met: []? Adjusted   2. Patient will have a decrease in pain to facilitate improvement in movement, function, and ADLs as indicated by Functional Deficits. []? Progressing: [x]? Met: []? Not Met: []? Adjusted     Long Term Goals to be achieved in 6 weeks (through 8/28/20), including patient directed goals to address patient identified performance deficits:  1) Pt to be independent in graded HEP progression with a good level of effort and compliance. [x]? Progressing: []? Met: []? Not Met: []? Adjusted   2) Pt to report a score of </= 25 % on the Quick DASH disability questionnaire for increased performance with carrying, moving, and handling objects. []? Progressing: [x]? Met: []? Not Met: []? Adjusted   3) Pt will demonstrate increased ROM to R IF  Fingertip to [facility] </= 1.5 cm for improved independence with .writing and opening containers  [x]? Progressing: []? Met: []? Not Met: []?  Adjusted   4) Pt will demonstrate increased strength to R  [id number]# of L for improved independence with opening containers and lifting.  []? Progressing: [x]? Met: []? Not Met: []? Adjusted   5) Pt will have a decrease in pain to 2/10 in the evening to facilitate ADL's.  []? Progressing: [x]? Met: []? Not Met: []? Adjusted            Overall Progression Towards Functional Goals/Treatment Progress Update:  [] Patient is progressing as expected towards functional goals listed. [x] Progression is slowed due to complexities/impairments listed. Pain and edema  [] Progression has been slowed due to co-morbidities. [] Plan just implemented, too soon to assess goals progression <30 days  [] Goals require adjustment due to lack of progress  [] Patient is not progressing as expected and requires additional follow up with physician  [] All goals are met  [x] Other: Goals 2,4,5 met    Prognosis for POC: [x] Good [] Fair  [] Poor    Patient requires continued skilled intervention: [x] Yes  [] No    Treatment/Activity Tolerance:  [x] Patient able to complete treatment  [] Patient limited by fatigue  [] Patient limited by pain    [] Patient limited by other medical complications  [] Other:                  PLAN: Progress AROM and strength  while controlling pain and edema  [x] Continue per plan of care [] Alter current plan (see comments above)  [] Plan of care initiated [] Hold pending MD visit [] Discharge      Electronically signed by: Doreen DANIEL/ARY EDOUARD  OT [id number]    Note: If patient does not return for scheduled/ recommended follow up visits, this note will serve as a discharge from care along with most recent update on progress.

## 2020-09-03 ENCOUNTER — HOSPITAL ENCOUNTER (OUTPATIENT)
Dept: OCCUPATIONAL THERAPY | Age: 50
Setting detail: THERAPIES SERIES
Discharge: HOME | End: 2020-09-03
Payer: COMMERCIAL

## 2020-09-03 PROCEDURE — 97140 MANUAL THERAPY 1/> REGIONS: CPT | Performed by: OCCUPATIONAL THERAPIST

## 2020-09-03 PROCEDURE — 97110 THERAPEUTIC EXERCISES: CPT | Performed by: OCCUPATIONAL THERAPIST

## 2020-09-03 NOTE — FLOWSHEET NOTE
Occupational Therapy Re-Certification Plan of Care/MD UPDATE      Dear Referring Practitioner: Yojana Ashby,    We had the pleasure of treating the following patient for occupational therapy services at [address] [place] [address]. A summary of our findings can be found in the updated assessment below. This includes our plan of care. If you have any questions or concerns regarding these findings, please do not hesitate to contact me at the office phone number checked above.   Thank you for the referral.     Physician Signature:________________________________Date:__________________  By signing above (or electronic signature), therapists plan is approved by physician    Date Range Of Visits: 20 - 9/3/20  Total Visits to Date: 6  Overall Response to Treatment:   [x]Patient is responding well to treatment and improvement is noted with regards to goals   []Patient should continue to improve in reasonable time if they continue HEP   []Patient has plateaued and is no longer responding to skilled OT intervention    []Patient is getting worse and would benefit from return to referring MD   []Patient unable to adhere to initial POC   []Other:                  [facility]  [address] [facility] [address] [facility] [address]  Phone [phone]      Fax [phone]    Occupational Therapy Treatment Note/ Progress Report:     Date:  9/3/2020    Patient Name:  Dewey Isidro    :  1970  MRN: [id number]    Medical/Treatment Diagnosis Information:  · Diagnosis: R IF trigger finger M65.321   · Treatment Diagnosis: R hand stiffness A22.335     Insurance/Certification information:  OT Insurance Information: BCBS Indiv deduct met   3970/4000 OOp met  $60 copay 60 OT/PT/ST  Physician Information:  Referring Practitioner: Yojana Ashby  Has the plan of care been signed (Y/N):        []  Yes  [x]  No       Visit # Insurance Allowable Auth Required   6 60 OT/PT/ST []  Yes []  No        Is this a Progress Report:     [x]  Yes  []  No      If Yes:  Date Range for reporting period:  Beginning  Ending    Progress report will be due (10 Rx or 30 days whichever is less): 4/45/80    Recertification will be due (POC Duration  / 90 days whichever is less):10/1/20    Date of Injury: several months ago  Date of Surgery: 7/1/20    Date of Patient follow up with Physician: 3-4 wks    RESTRICTIONS/PRECAUTIONS:     Latex Allergy:  [x]No      []Yes  Pacemaker:  [x] No       [] Yes     Preferred Language for Healthcare:   [x]English       []other:     Functional Scale:    7%   ( initial 57%) (Quick DASH)   Date assessed:  8/19/2020                                     0%      9/3/20    SUBJECTIVE: Reports    Reports she cont to improve. R LF is triggering. she is able to write, type , and  bars at gym. Still unable to make a full fist with IF. Volar MP edema of IF has decreased       Patient reported deficits/history of current problem: Started  catching and gradually got worse. Feels she is worse post surgery in regards to mobility  Had 1 prior injection      Pain Scale:1-2/10  In evening    Current :0/10        OBJECTIVE:       Date:  7/17/2020 8/13/20  8/19/20 8/27/20 9/3/20   Objective Measures/Tests:   Progress note  Recert   ROM: See eval       IF MP  PIP  DIP R  5/50  20/45  0/15    L  80  103  80 R   0/65  0/70  0/39 R  67  70  35 R  75  75  35 R  70  70  40   Digits: tips to DPFC  IF   5.5 cm    PRE 4 cm    Post 3 cm       Pre 3 cm      Post  2.5 cm   Pre 2.75      Post 2.0           Strength:  :   R: 30#  L:  40#    No pain   gripII   R 40#    L 60#  II  R 45#        Tip pinch  R  10  L  10   Observations:          Other:        IF  P1  PIP  P2 R IF  8  7.5  6.4      R IF  P1  7.5  PIP 7  P2 6    L  6.2  6  5.2           MODALITIES:        Fluidotherapy (72624)        Estim (53835/80128)        Paraffin (54269)        US (26200) Iontophoresis (49373)        Hot Pack        Cold Pack                INTERVENTIONS:        Therapeutic Exercise (20685)        A/AAROM   MPflex  DIP flex  PIP flex  Hook'   composite fist  Straight fist  Finger ext   10 x 3 sec hold     Hook and composite fist x 20 each  AAROM  Hook to pen  Straight fist to highlighter  10 x 2 ea    yellow  Putty flatten with IP flex holding pen    Straight fist holding highlighter      Indiv finger flex with yellow putty AAROM    MP ext with IP flex  10x    Hook  10x      Yellow putty  Indiv finger flex with yellow putty    Putty flatten with IP flex holding pen AAROM    MP ext with IP flex  10x    Hook  10x       Putty squeeze (with glove) x 4'  Tea infuser  20 x Tea infuser  20 x     Yellow digiflex x 20   puttyciser  Yellow putty  lg knob   CW/CCW   10 x 2 ea direction        Green clip  IP flex 10x2    Therapeutic Activity (85919) Finger ext over pen  10x Rolling flex bar x 4'       Finger flex gripping 2\" diameter cylinder  10x                               Manual Therapy (22663) Retrograde massage    Ret mass     PROM isolated joints and comp. PROM isolated joints and comp    Place and hold IP flex and straight fist      Mini massager  4' PROM isolated joints and composite    Place and hold IP flex and straight fist  Hawks  to palm and IF Place and hold IP flex and straight fist  Hawks  to palm and IF    XL digi sleeve for edema during the day   XS glove at night               Neuromuscular Reeducation (27760)                        ADL Training (42622)                        HEP Training/Review See sheet(s)        Access Code: [email or web address]   URL: [email or web address]   Date: 07/17/2020   Prepared by:  [facility]     Exercises   Intrinsic+ (MP flexion, IP extension) - 10 reps - 3 sec hold - 4x daily - 7x weekly   Seated Finger DIP AROM - 10 reps - 3 sec hold - 4x daily - 7x weekly   Seated Finger PIP AROM - 10 reps - 3 sec hold - 4x daily - 7x weekly of scapular, scapulothoracic and UE control with self care, reaching, carrying, lifting, house/yardwork, driving/computer work      Manual Treatments:  PROM / STM / Oscillations-Mobs:  G-I, II, III, IV (PA's, Inf., Post.)  [x] (72283) Provided manual therapy to mobilize soft tissue/joints of cervical/CT, scapular GHJ and UE for the purpose of modulating pain, promoting relaxation,  increasing ROM, reducing/eliminating soft tissue swelling/inflammation/restriction, improving soft tissue extensibility and allowing for proper ROM for normal function with self care, reaching, carrying, lifting, house/yardwork, driving/computer work    ADL Training:  [] (16297) Provided self-care/home management training related to activities of daily living and compensatory training, and/or use of adaptive equipment        Charges:  Timed Code Treatment Minutes: 45   Total Treatment Minutes: 45        [] EVAL (LOW) 74645 (typically 20 minutes face-to-face)    [] EVAL (MOD) 74583 (typically 30 minutes face-to-face)  [] EVAL (HIGH) 90965 (typically 45 minutes face-to-face)  [] OT Re-eval (99681)       [x] Paul [phone]) x   2   [] IAVAU(46708)  [] NMR (64867) x      [] Estim (attended) (52253)   [x] Manual ([id number]) x 1     [] US (04054)  [] TA (25607) x      [] Paraffin (39227)  [] ADL  ([phone]) x     [] Splint/L code:    [] Estim (unattended) ([id number] [phone])  [] Fluidotherapy (51177)  [] Other:      ASSESSMENT:  All but AROM goals met. ROM cont to improve gradually. Frequency/Duration:  1 days per week for 4 weeks 10/1/20/20        GOALS:  Patient stated goal: regain movement in R IF    [x]? Progressing: []? Met: []? Not Met: []? Adjusted     Therapist goals for Patient:   Short Term Goals: To be achieved in: 2 weeks  1. Independent in HEP and progression per patient tolerance, in order to prevent re-injury. [x]? Progressing: []? Met: []? Not Met: []? Adjusted   2.  Patient will have a decrease in pain to facilitate improvement in movement, function, and ADLs as indicated by Functional Deficits. []? Progressing: [x]? Met: []? Not Met: []? Adjusted     Long Term Goals to be achieved in 4 weeks (through 10/1/20/20), including patient directed goals to address patient identified performance deficits:  1) Pt to be independent in graded HEP progression with a good level of effort and compliance. [x]? Progressing: []? Met: []? Not Met: []? Adjusted   2) Pt to report a score of </= 25 % on the Quick DASH disability questionnaire for increased performance with carrying, moving, and handling objects. []? Progressing: [x]? Met: []? Not Met: []? Adjusted   3) Pt will demonstrate increased ROM to R IF  Fingertip to [facility] </= 1.5 cm for improved independence with .writing and opening containers  [x]? Progressing: []? Met: []? Not Met: []? Adjusted   4) Pt will demonstrate increased strength to R  [id number]# of L for improved independence with opening containers and lifting.  []? Progressing: [x]? Met: []? Not Met: []? Adjusted   5) Pt will have a decrease in pain to 2/10 in the evening to facilitate ADL's.  []? Progressing: [x]? Met: []? Not Met: []? Adjusted            Overall Progression Towards Functional Goals/Treatment Progress Update:  [x] Patient is progressing as expected towards functional goals listed. [] Progression is slowed due to complexities/impairments listed. Pain and edema  [] Progression has been slowed due to co-morbidities.   [] Plan just implemented, too soon to assess goals progression <30 days  [] Goals require adjustment due to lack of progress  [] Patient is not progressing as expected and requires additional follow up with physician  [] All goals are met  [x] Other: Goals 2,4,5 met    Prognosis for POC: [x] Good [] Fair  [] Poor    Patient requires continued skilled intervention: [x] Yes  [] No    Treatment/Activity Tolerance:  [x] Patient able to complete treatment  [] Patient limited by fatigue  [] Patient limited by pain    []

## 2020-09-09 ENCOUNTER — HOSPITAL ENCOUNTER (OUTPATIENT)
Dept: OCCUPATIONAL THERAPY | Age: 50
Setting detail: THERAPIES SERIES
Discharge: HOME | End: 2020-09-09
Payer: COMMERCIAL

## 2020-09-09 ENCOUNTER — OFFICE VISIT (OUTPATIENT)
Dept: ORTHOPEDIC SURGERY | Age: 50
End: 2020-09-09
Payer: COMMERCIAL

## 2020-09-09 PROCEDURE — 97110 THERAPEUTIC EXERCISES: CPT | Performed by: OCCUPATIONAL THERAPIST

## 2020-09-09 PROCEDURE — 99024 POSTOP FOLLOW-UP VISIT: CPT | Performed by: ORTHOPAEDIC SURGERY

## 2020-09-09 PROCEDURE — 99213 OFFICE O/P EST LOW 20 MIN: CPT | Performed by: ORTHOPAEDIC SURGERY

## 2020-09-09 PROCEDURE — 97140 MANUAL THERAPY 1/> REGIONS: CPT | Performed by: OCCUPATIONAL THERAPIST

## 2020-09-09 PROCEDURE — 20550 NJX 1 TENDON SHEATH/LIGAMENT: CPT | Performed by: ORTHOPAEDIC SURGERY

## 2020-09-09 RX ORDER — LIDOCAINE HYDROCHLORIDE 10 MG/ML
20 INJECTION, SOLUTION INFILTRATION; PERINEURAL ONCE
Status: COMPLETED | OUTPATIENT
Start: 2020-09-09 | End: 2020-09-09

## 2020-09-09 RX ORDER — TRIAMCINOLONE ACETONIDE 40 MG/ML
20 INJECTION, SUSPENSION INTRA-ARTICULAR; INTRAMUSCULAR ONCE
Status: COMPLETED | OUTPATIENT
Start: 2020-09-09 | End: 2020-09-09

## 2020-09-09 RX ADMIN — LIDOCAINE HYDROCHLORIDE 20 ML: 10 INJECTION, SOLUTION INFILTRATION; PERINEURAL at 08:25

## 2020-09-09 RX ADMIN — TRIAMCINOLONE ACETONIDE 20 MG: 40 INJECTION, SUSPENSION INTRA-ARTICULAR; INTRAMUSCULAR at 08:26

## 2020-09-09 NOTE — FLOWSHEET NOTE
Scale:1-2/10  In evening    Current :0/10        OBJECTIVE:       Date:  7/17/2020 8/13/20  8/19/20 8/27/20 9/3/20 9/9/20   Objective Measures/Tests:   Progress note  Recert    ROM: See eval        IF MP  PIP  DIP R  5/50  20/45  0/15    L  80  103  80 R   0/65  0/70  0/39 R  67  70  35 R  75  75  35 R  70  70  40    Digits: tips to DPFC  IF   5.5 cm    PRE 4 cm    Post 3 cm       Pre 3 cm      Post  2.5 cm   Pre 2.75      Post 2.0     PRE 3            Strength:  :   R: 30#  L:  40#    No pain   gripII   R 40#    L 60#  II  R 45#         Tip pinch  R  10  L  10    Observations:           Other:         IF  P1  PIP  P2 R IF  8  7.5  6.4      R IF  P1  7.5  PIP 7  P2 6    L  6.2  6  5.2             MODALITIES:         Fluidotherapy (43599)         Estim (47301/49314)         Paraffin (00714)         US (81180)         Iontophoresis (45588)         Hot Pack         Cold Pack                  INTERVENTIONS:         Therapeutic Exercise (96317)         A/AAROM   MPflex  DIP flex  PIP flex  Hook'   composite fist  Straight fist  Finger ext   10 x 3 sec hold     Hook and composite fist x 20 each  AAROM  Hook to pen  Straight fist to highlighter  10 x 2 ea    yellow  Putty flatten with IP flex holding pen    Straight fist holding highlighter      Indiv finger flex with yellow putty AAROM    MP ext with IP flex  10x    Hook  10x      Yellow putty  Indiv finger flex with yellow putty    Putty flatten with IP flex holding pen AAROM    MP ext with IP flex  10x    Hook  10x   AAROM    MP ext with IP flex  10x    Hook  10x2       Putty squeeze (with glove) x 4'  Tea infuser  20 x Tea infuser  20 x Tea infuser  20 x     Yellow digiflex x 20   puttyciser  Yellow putty  lg knob   CW/CCW   10 x 2 ea direction  rubberband  Finger flex  finger ext  10x2 ea       Green clip  IP flex 10x2     Therapeutic Activity (21936) Finger ext over pen  10x Rolling flex bar x 4'        Finger flex gripping 2\" diameter cylinder  10x Manual Therapy (81154) Retrograde massage    Ret mass     PROM isolated joints and comp. PROM isolated joints and comp    Place and hold IP flex and straight fist      Mini massager  4' PROM isolated joints and composite    Place and hold IP flex and straight fist  Hawks  to palm and IF  Place and hold IP flex    Hawks  to palm and IF    XL digi sleeve for edema during the day   XS glove at night                 Neuromuscular Reeducation (55978)                           ADL Training (71867)                           HEP Training/Review See sheet(s)         Access Code: [email or web address]   URL: [employer] [place]/   Date: 07/17/2020   Prepared by: [facility]     Exercises   Intrinsic+ (MP flexion, IP extension) - 10 reps - 3 sec hold - 4x daily - 7x weekly   Seated Finger DIP AROM - 10 reps - 3 sec hold - 4x daily - 7x weekly   Seated Finger PIP AROM - 10 reps - 3 sec hold - 4x daily - 7x weekly   Seated Single Digit Intrinsic Stretch - 10 reps - 3 sec hold - 4x daily - 7x weekly   Seated Finger Composite Flexion Extension - 10 reps - 1-2 sets - 5 hold - 3-4x daily - 7x weekly   Hand AROM Flat Fist - 10 reps - 3 sets - 1x daily - 7x weekly   Seated Single Finger Extension - 10 reps - 3-5 hold - 4x daily - 7x weekly   Seated Pen Hurdles - 10 reps - 3x daily - 7x weekly                    Splinting   Reports she is using flex loops      Lcode:         Orthotic Mgmt, Subsequent Enc (36495)         Orthotic Mgmt & Training (74438)                  Other:                                               Therapeutic Exercise & NMR:  [x] (03033) Provided verbal/tactile cueing for activities related to strengthening, flexibility, endurance, ROM  for improvements in scapular, scapulothoracic and UE control with self care, reaching, carrying, lifting, house/yardwork, driving/computer work.     [x] (42240) Provided verbal/tactile cueing for activities related to improving balance, (typically 30 minutes face-to-face)  [] EVAL (HIGH) 72674 (typically 45 minutes face-to-face)  [] OT Re-eval (65737)       [x] Paul [phone]) x  1   [] GCFVB(83913)  [] NMR (99252) x      [] Estim (attended) (30622)   [x] Manual ([id number]) x 1     [] US (17771)  [] TA (47893) x      [] Paraffin (10262)  [] ADL  (77807) x     [] Splint/L code:    [] Estim (unattended) (99464)  [] Fluidotherapy (11461)  [] Other:      ASSESSMENT:    9/9/20  Cont to lack full flex of R index  Pt cont to make gradual progress with AROM . Volar R IF MP edema has significantly decreased. AROM Of IF remains and issue. May benefit from NMES . Frequency/Duration:  1-2 days per week for 6 weeks 8/28/20        GOALS:  Patient stated goal: regain movement in R IF    [x]? Progressing: []? Met: []? Not Met: []? Adjusted     Therapist goals for Patient:   Short Term Goals: To be achieved in: 2 weeks  1. Independent in HEP and progression per patient tolerance, in order to prevent re-injury. [x]? Progressing: []? Met: []? Not Met: []? Adjusted   2. Patient will have a decrease in pain to facilitate improvement in movement, function, and ADLs as indicated by Functional Deficits. []? Progressing: [x]? Met: []? Not Met: []? Adjusted     Long Term Goals to be achieved in 6 weeks (through 8/28/20), including patient directed goals to address patient identified performance deficits:  1) Pt to be independent in graded HEP progression with a good level of effort and compliance. [x]? Progressing: []? Met: []? Not Met: []? Adjusted   2) Pt to report a score of </= 25 % on the Quick DASH disability questionnaire for increased performance with carrying, moving, and handling objects. []? Progressing: [x]? Met: []? Not Met: []? Adjusted   3) Pt will demonstrate increased ROM to R IF  Fingertip to [facility] </= 1.5 cm for improved independence with .writing and opening containers  [x]? Progressing: []? Met: []? Not Met: []?  Adjusted   4) Pt will

## 2020-09-09 NOTE — FLOWSHEET NOTE
[facility], [employer]  [facility] [place] [address]  Phone [phone]      Fax [phone]    Occupational Therapy Treatment Note/ Progress Report:     Date:  2020    Patient Name:  Bar Bashir    :  1970  MRN: [id number]    Medical/Treatment Diagnosis Information:  · Diagnosis: R IF trigger finger M65.321   · Treatment Diagnosis: R hand stiffness K39.915     Insurance/Certification information:  OT Insurance Information: BCBS Indiv deduct met   3970/4000 OOp met  $60 copay 60 OT/PT/ST  Physician Information:  Referring Practitioner: Sandy Khan  Has the plan of care been signed (Y/N):        []  Yes  [x]  No       Visit # Insurance Allowable Auth Required   7 60 OT/PT/ST []  Yes []  No        Is this a Progress Report:     []  Yes  [x]  No      If Yes:  Date Range for reporting period:  Beginning  Ending    Progress report will be due (10 Rx or 30 days whichever is less): 75    Recertification will be due (POC Duration  / 90 days whichever is less):10/1/20    Date of Injury: several months ago  Date of Surgery: 20    Date of Patient follow up with Physician: 3-4 wks    RESTRICTIONS/PRECAUTIONS:     Latex Allergy:  [x]No      []Yes  Pacemaker:  [x] No       [] Yes     Preferred Language for Healthcare:   [x]English       []other:     Functional Scale:    7%   ( initial 57%) (Quick DASH)   Date assessed:  2020                                     0%      9/3/20    SUBJECTIVE: Reports    Reports she cont to improve. R LF is triggering. she is able to write, type , and  bars at gym. Still unable to make a full fist with IF. Volar MP edema of IF has decreased     Doing better with eating. Patient reported deficits/history of current problem: Started  catching and gradually got worse.   Feels she is worse post surgery in regards to mobility  Had 1 prior injection      Pain comp. PROM isolated joints and comp    Place and hold IP flex and straight fist      Mini massager  4' PROM isolated joints and composite    Place and hold IP flex and straight fist  Hawks  to palm and IF      XL digi sleeve for edema during the day   XS glove at night                 Neuromuscular Reeducation (59998)                           ADL Training (01434)                           HEP Training/Review See sheet(s)         Access Code: [email or web address]   URL: [email or web address]   Date: 07/17/2020   Prepared by: [facility]     Exercises   Intrinsic+ (MP flexion, IP extension) - 10 reps - 3 sec hold - 4x daily - 7x weekly   Seated Finger DIP AROM - 10 reps - 3 sec hold - 4x daily - 7x weekly   Seated Finger PIP AROM - 10 reps - 3 sec hold - 4x daily - 7x weekly   Seated Single Digit Intrinsic Stretch - 10 reps - 3 sec hold - 4x daily - 7x weekly   Seated Finger Composite Flexion Extension - 10 reps - 1-2 sets - 5 hold - 3-4x daily - 7x weekly   Hand AROM Flat Fist - 10 reps - 3 sets - 1x daily - 7x weekly   Seated Single Finger Extension - 10 reps - 3-5 hold - 4x daily - 7x weekly   Seated Pen Hurdles - 10 reps - 3x daily - 7x weekly                    Splinting   Reports she is using flex loops      Lcode:         Orthotic Mgmt, Subsequent Enc (62261)         Orthotic Mgmt & Training (20377)                  Other:                                               Therapeutic Exercise & NMR:  [x] (49613) Provided verbal/tactile cueing for activities related to strengthening, flexibility, endurance, ROM  for improvements in scapular, scapulothoracic and UE control with self care, reaching, carrying, lifting, house/yardwork, driving/computer work.     [x] (47537) Provided verbal/tactile cueing for activities related to improving balance, coordination, kinesthetic sense, posture, motor skill, proprioception  to assist with  scapular, scapulothoracic and UE control with self care, reaching, carrying, lifting, house/yardwork, driving/computer work.     Therapeutic Activities & NMR:    [x] (44064 or 59670) Provided verbal/tactile cueing for activities related to improving balance, coordination, kinesthetic sense, posture, motor skill, proprioception and motor activation to allow for proper function of scapular, scapulothoracic and UE control with self care, carrying, lifting, driving/computer work    Home Exercise Program:    [x] (18691) Reviewed/Progressed HEP activities related to strengthening, flexibility, endurance, ROM of scapular, scapulothoracic and UE control with self care, reaching, carrying, lifting, house/yardwork, driving/computer work  [] (02762) Reviewed/Progressed HEP activities related to improving balance, coordination, kinesthetic sense, posture, motor skill, proprioception of scapular, scapulothoracic and UE control with self care, reaching, carrying, lifting, house/yardwork, driving/computer work      Manual Treatments:  PROM / STM / Oscillations-Mobs:  G-I, II, III, IV (PA's, Inf., Post.)  [x] (39782) Provided manual therapy to mobilize soft tissue/joints of cervical/CT, scapular GHJ and UE for the purpose of modulating pain, promoting relaxation,  increasing ROM, reducing/eliminating soft tissue swelling/inflammation/restriction, improving soft tissue extensibility and allowing for proper ROM for normal function with self care, reaching, carrying, lifting, house/yardwork, driving/computer work    ADL Training:  [] (89106) Provided self-care/home management training related to activities of daily living and compensatory training, and/or use of adaptive equipment        Charges:  Timed Code Treatment Minutes: 45   Total Treatment Minutes: 45        [] EVAL (LOW) 12193 (typically 20 minutes face-to-face)    [] EVAL (MOD) 70092 (typically 30 minutes face-to-face)  [] EVAL (HIGH) 72563 (typically 45 minutes face-to-face)  [] OT Re-eval (09496)       [x] Paul [phone]) x   2   [] BAFKI(81975)  [] NMR (49816) x      [] Estim (attended) (09516)   [x] Manual ([id number]) x 1     [] US (42776)  [] TA (69711) x      [] Paraffin (04945)  [] ADL  ([phone]) x     [] Splint/L code:    [] Estim (unattended) (22 850776)  [] Fluidotherapy (29123)  [] Other:      ASSESSMENT:  Pt cont to make gradual progress with AROM . Volar R IF MP edema has significantly decreased. AROM Of IF remains and issue. May benefit from NMES . Frequency/Duration:  1 days per week for 4 weeks 10/1/20        GOALS:  Patient stated goal: regain movement in R IF    [x]? Progressing: []? Met: []? Not Met: []? Adjusted     Therapist goals for Patient:   Short Term Goals: To be achieved in: 2 weeks  1. Independent in HEP and progression per patient tolerance, in order to prevent re-injury. [x]? Progressing: []? Met: []? Not Met: []? Adjusted   2. Patient will have a decrease in pain to facilitate improvement in movement, function, and ADLs as indicated by Functional Deficits. []? Progressing: [x]? Met: []? Not Met: []? Adjusted     Long Term Goals to be achieved in 4 weeks (uzemfug99/1/20), including patient directed goals to address patient identified performance deficits:  1) Pt to be independent in graded HEP progression with a good level of effort and compliance. [x]? Progressing: []? Met: []? Not Met: []? Adjusted   2) Pt to report a score of </= 25 % on the Quick DASH disability questionnaire for increased performance with carrying, moving, and handling objects. []? Progressing: [x]? Met: []? Not Met: []? Adjusted   3) Pt will demonstrate increased ROM to R IF  Fingertip to [facility] </= 1.5 cm for improved independence with .writing and opening containers  [x]? Progressing: []? Met: []? Not Met: []? Adjusted   4) Pt will demonstrate increased strength to R  [id number]# of L for improved independence with opening containers and lifting.  []? Progressing: [x]? Met: []? Not Met: []?  Adjusted   5) Pt will have a decrease in pain to 2/10 in the evening to facilitate ADL's.  []? Progressing: [x]? Met: []? Not Met: []? Adjusted            Overall Progression Towards Functional Goals/Treatment Progress Update:  [] Patient is progressing as expected towards functional goals listed. [x] Progression is slowed due to complexities/impairments listed. Pain and edema  [] Progression has been slowed due to co-morbidities. [] Plan just implemented, too soon to assess goals progression <30 days  [] Goals require adjustment due to lack of progress  [] Patient is not progressing as expected and requires additional follow up with physician  [] All goals are met  [x] Other: Goals 2,4,5 met    Prognosis for POC: [x] Good [] Fair  [] Poor    Patient requires continued skilled intervention: [x] Yes  [] No    Treatment/Activity Tolerance:  [x] Patient able to complete treatment  [] Patient limited by fatigue  [] Patient limited by pain    [] Patient limited by other medical complications  [] Other:                  PLAN: Progress AROM and strength  while controlling pain and edema  [x] Continue per plan of care [] Alter current plan (see comments above)  [] Plan of care initiated [] Hold pending MD visit [] Discharge      Electronically signed by: Radha Washington OTR/L JAVAN  OT [id number]    Note: If patient does not return for scheduled/ recommended follow up visits, this note will serve as a discharge from care along with most recent update on progress.

## 2020-09-16 ENCOUNTER — HOSPITAL ENCOUNTER (OUTPATIENT)
Dept: OCCUPATIONAL THERAPY | Age: 50
Setting detail: THERAPIES SERIES
Discharge: HOME | End: 2020-09-16
Payer: COMMERCIAL

## 2020-09-16 PROCEDURE — 97140 MANUAL THERAPY 1/> REGIONS: CPT | Performed by: OCCUPATIONAL THERAPIST

## 2020-09-16 PROCEDURE — 97022 WHIRLPOOL THERAPY: CPT | Performed by: OCCUPATIONAL THERAPIST

## 2020-09-16 PROCEDURE — 97110 THERAPEUTIC EXERCISES: CPT | Performed by: OCCUPATIONAL THERAPIST

## 2020-09-16 NOTE — FLOWSHEET NOTE
[facility], [facility] [place] [address]  Phone [phone]      Fax [phone]    Occupational Therapy Treatment Note/ Progress Report:     Date:  2020    Patient Name:  Keaton Huber    :  1970  MRN: [id number]    Medical/Treatment Diagnosis Information:  · Diagnosis: R IF trigger finger M65.321   · Treatment Diagnosis: R hand stiffness E05.809     Insurance/Certification information:  OT Insurance Information: BCBS Indiv deduct met   3970/4000 OOp met  $60 copay 60 OT/PT/ST  Physician Information:  Referring Practitioner: Eron Olson  Has the plan of care been signed (Y/N):        []  Yes  [x]  No       Visit # Insurance Allowable Auth Required   8 60 OT/PT/ST []  Yes []  No        Is this a Progress Report:     [x]  Yes  []  No      If Yes:  Date Range for reporting period:  Beginning 20  Ending 20    Progress report will be due (10 Rx or 30 days whichever is less):     Recertification will be due (POC Duration  / 90 days whichever is less):10/01/20    Date of Injury: several months ago  Date of Surgery: 20    Date of Patient follow up with Physician: 3-4 wks    RESTRICTIONS/PRECAUTIONS:     Latex Allergy:  [x]No      []Yes  Pacemaker:  [x] No       [] Yes     Preferred Language for Healthcare:   [x]English       []other:     Functional Scale:    7%   ( initial 57%) (Quick DASH)   Date assessed:  2020                                     0%      9/3/20    SUBJECTIVE: Reports    R LF no improvement following injection      she is able to write, type , and  bars at gym. Still unable to make a full fist with IF. Volar MP edema of IF has decreased     Doing better with eating. Patient reported deficits/history of current problem: Started  catching and gradually got worse.   Feels she is worse post surgery in regards to mobility  Had 1 prior digi sleeve for edema during the day   XS glove at night               Neuromuscular Reeducation (06239)                        ADL Training (71016)                        HEP Training/Review See sheet(s)        Access Code: [email or web address]   URL: [email or web address]   Date: 07/17/2020   Prepared by: [facility]     Exercises   Intrinsic+ (MP flexion, IP extension) - 10 reps - 3 sec hold - 4x daily - 7x weekly   Seated Finger DIP AROM - 10 reps - 3 sec hold - 4x daily - 7x weekly   Seated Finger PIP AROM - 10 reps - 3 sec hold - 4x daily - 7x weekly   Seated Single Digit Intrinsic Stretch - 10 reps - 3 sec hold - 4x daily - 7x weekly   Seated Finger Composite Flexion Extension - 10 reps - 1-2 sets - 5 hold - 3-4x daily - 7x weekly   Hand AROM Flat Fist - 10 reps - 3 sets - 1x daily - 7x weekly   Seated Single Finger Extension - 10 reps - 3-5 hold - 4x daily - 7x weekly   Seated Pen Hurdles - 10 reps - 3x daily - 7x weekly                  Splinting  Reports she is using flex loops      Lcode:        Orthotic Mgmt, Subsequent Enc (79407)        Orthotic Mgmt & Training (20012)                Other:                                          Therapeutic Exercise & NMR:  [x] (39735) Provided verbal/tactile cueing for activities related to strengthening, flexibility, endurance, ROM  for improvements in scapular, scapulothoracic and UE control with self care, reaching, carrying, lifting, house/yardwork, driving/computer work. [x] (11676) Provided verbal/tactile cueing for activities related to improving balance, coordination, kinesthetic sense, posture, motor skill, proprioception  to assist with  scapular, scapulothoracic and UE control with self care, reaching, carrying, lifting, house/yardwork, driving/computer work.     Therapeutic Activities & NMR:    [] (08084 or 97279) Provided verbal/tactile cueing for activities related to improving balance, coordination, kinesthetic sense, posture, motor skill, proprioception and motor activation to allow for proper function of scapular, scapulothoracic and UE control with self care, carrying, lifting, driving/computer work    Home Exercise Program:    [x] (82630) Reviewed/Progressed HEP activities related to strengthening, flexibility, endurance, ROM of scapular, scapulothoracic and UE control with self care, reaching, carrying, lifting, house/yardwork, driving/computer work  [] (04212) Reviewed/Progressed HEP activities related to improving balance, coordination, kinesthetic sense, posture, motor skill, proprioception of scapular, scapulothoracic and UE control with self care, reaching, carrying, lifting, house/yardwork, driving/computer work      Manual Treatments:  PROM / STM / Oscillations-Mobs:  G-I, II, III, IV (PA's, Inf., Post.)  [x] (33791) Provided manual therapy to mobilize soft tissue/joints of cervical/CT, scapular GHJ and UE for the purpose of modulating pain, promoting relaxation,  increasing ROM, reducing/eliminating soft tissue swelling/inflammation/restriction, improving soft tissue extensibility and allowing for proper ROM for normal function with self care, reaching, carrying, lifting, house/yardwork, driving/computer work    ADL Training:  [] (17414) Provided self-care/home management training related to activities of daily living and compensatory training, and/or use of adaptive equipment        Charges:  Timed Code Treatment Minutes: 45   Total Treatment Minutes: 45        [] EVAL (LOW) 27129 (typically 20 minutes face-to-face)    [] EVAL (MOD) 03167 (typically 30 minutes face-to-face)  [] EVAL (HIGH) [phone] (typically 45 minutes face-to-face)  [] OT Re-eval (28349)       [x] Paul [phone] x  1   [] SSEHZ(39134)  [] NMR (42464) x      [] Estim (attended) (63353)   [x] Manual ([address] [facility]) x 1     [] US (25487)  [] TA (22640) x      [] Paraffin (91815)  [] ADL  ([phone]) x     [] Splint/L code:    [] Estim (unattended) (30 [phone])  [x] Fluidotherapy (99903)  [] Other: ASSESSMENT:    Frequency/Duration:  1-2 days per week for 4 weeks10/01/20        GOALS:  Patient stated goal: regain movement in R IF    [x]? Progressing: []? Met: []? Not Met: []? Adjusted     Therapist goals for Patient:   Short Term Goals: To be achieved in: 2 weeks  1. Independent in HEP and progression per patient tolerance, in order to prevent re-injury. [x]? Progressing: []? Met: []? Not Met: []? Adjusted   2. Patient will have a decrease in pain to facilitate improvement in movement, function, and ADLs as indicated by Functional Deficits. []? Progressing: [x]? Met: []? Not Met: []? Adjusted     Long Term Goals to be achieved in 6 weeks (through 8/28/20), including patient directed goals to address patient identified performance deficits:  1) Pt to be independent in graded HEP progression with a good level of effort and compliance. [x]? Progressing: []? Met: []? Not Met: []? Adjusted   2) Pt to report a score of </= 25 % on the Quick DASH disability questionnaire for increased performance with carrying, moving, and handling objects. []? Progressing: [x]? Met: []? Not Met: []? Adjusted   3) Pt will demonstrate increased ROM to R IF  Fingertip to [facility] </= 1.5 cm for improved independence with .writing and opening containers  [x]? Progressing: []? Met: []? Not Met: []? Adjusted   4) Pt will demonstrate increased strength to R  [id number]# of L for improved independence with opening containers and lifting.  []? Progressing: [x]? Met: []? Not Met: []? Adjusted   5) Pt will have a decrease in pain to 2/10 in the evening to facilitate ADL's.  []? Progressing: [x]? Met: []? Not Met: []? Adjusted            Overall Progression Towards Functional Goals/Treatment Progress Update:  [] Patient is progressing as expected towards functional goals listed. [x] Progression is slowed due to complexities/impairments listed. scar tissue  [] Progression has been slowed due to co-morbidities.   [] Plan just implemented, too soon to assess goals progression <30 days  [] Goals require adjustment due to lack of progress  [] Patient is not progressing as expected and requires additional follow up with physician  [] All goals are met  [x] Other: Goals 2,4,5 met    Prognosis for POC: [x] Good [] Fair  [] Poor    Patient requires continued skilled intervention: [x] Yes  [] No    Treatment/Activity Tolerance:  [x] Patient able to complete treatment  [] Patient limited by fatigue  [] Patient limited by pain    [] Patient limited by other medical complications  [] Other:                  PLAN: Progress AROM and strength  while controlling pain and edema  [x] Continue per plan of care [] Alter current plan (see comments above)  [] Plan of care initiated [] Hold pending MD visit [] Discharge      Electronically signed by: Jim Underwood OTR/L CHT  OT [id number]    Note: If patient does not return for scheduled/ recommended follow up visits, this note will serve as a discharge from care along with most recent update on progress.

## 2020-09-21 ENCOUNTER — VIRTUAL VISIT (OUTPATIENT)
Dept: PULMONOLOGY | Age: 50
End: 2020-09-21
Payer: COMMERCIAL

## 2020-09-21 PROCEDURE — 3052F HG A1C>EQUAL 8.0%<EQUAL 9.0%: CPT | Performed by: NURSE PRACTITIONER

## 2020-09-21 PROCEDURE — 99214 OFFICE O/P EST MOD 30 MIN: CPT | Performed by: NURSE PRACTITIONER

## 2020-09-21 ASSESSMENT — SLEEP AND FATIGUE QUESTIONNAIRES
HOW LIKELY ARE YOU TO NOD OFF OR FALL ASLEEP WHEN YOU ARE A PASSENGER IN A CAR FOR AN HOUR WITHOUT A BREAK: 0
HOW LIKELY ARE YOU TO NOD OFF OR FALL ASLEEP WHILE SITTING AND READING: 1
HOW LIKELY ARE YOU TO NOD OFF OR FALL ASLEEP WHILE SITTING INACTIVE IN A PUBLIC PLACE: 0
HOW LIKELY ARE YOU TO NOD OFF OR FALL ASLEEP IN A CAR, WHILE STOPPED FOR A FEW MINUTES IN TRAFFIC: 0
HOW LIKELY ARE YOU TO NOD OFF OR FALL ASLEEP WHILE WATCHING TV: 3
HOW LIKELY ARE YOU TO NOD OFF OR FALL ASLEEP WHILE SITTING QUIETLY AFTER LUNCH WITHOUT ALCOHOL: 0
HOW LIKELY ARE YOU TO NOD OFF OR FALL ASLEEP WHILE SITTING AND TALKING TO SOMEONE: 0
ESS TOTAL SCORE: 7
HOW LIKELY ARE YOU TO NOD OFF OR FALL ASLEEP WHILE LYING DOWN TO REST IN THE AFTERNOON WHEN CIRCUMSTANCES PERMIT: 3

## 2020-09-21 NOTE — PROGRESS NOTES
Bill Kunz MD, FAASM, [facility]  Feli Paiz, MSN, RN, CNP     [address] [facility] SLEEP MEDICINE  [address] [facility]  [place] [address]  Dept: [phone]  Dept Fax: : [address] MEDICINE  [address] [place] [id number] [phone]    Subjective:     Patient ID: Ilan Lebron is a 44 y.o. female. Chief Complaint   Patient presents with    Sleep Apnea       HPI:      Sleep Medicine Video Visit    Pursuant to the emergency declaration under the [facility], [facility] waiver authority and the [employer] Act this Telephone Visit was insisted, with patient's consent, to reduce the patient's risk of exposure to COVID-19 and provide continuity of care for an established patient. Services were provided through a synchronous discussion over a telephone and/or Video chat to substitute for in-person clinic visit, and coded as such. While patient is at home. Machine Modem/Download Info:  Compliance (hours/night): 6.4 hrs/night  Download AHI (/hour): 5.9 /HR  Average CPAP Pressure : 12.8 cmH2O           APAP - Settings  Pressure Min: 10 cmH2O  Pressure Max: 20 cmH2O                 Comfort Settings  Humidity Level (0-8): 3  Flex/EPR (0-3): 3 PAP Mask  Mask Type: Nasal mask     Radcliffe - Total score: 7    Follow-up :     Last Visit : September 2019      Patient reports the listed chronic Co-morbidities: DM, HTN, Obesity    are well controlled and stable at this time.      Subjective Health Changes: None      Over Night Oximetry: [] Yes  [] No  [x] NA [] WNL   Using O2: [] Yes  [] No  [x] NA   Patient is compliant with the machine  [x] Yes  [] No   Feeling rested when using the machine   [x] Yes  [] No     Pressure is comfortable with inspiration and expiration  [x] Yes  [] No     Noticed changes in pressure   [] Yes  [] No  [x] NA   Mask is fitting well  [x] Yes  [] No   Noting Mask Air Leak  [x] Yes  [] No   Having painful Aerophagia  [] Yes  [x] No   Nocturia   0  per night. Having  HA upon waking  [] Yes  [x] No   Dry mouth upon waking   Dry Nose  Dry Eyes  [] Yes  [x] No   Congestion upon waking   [] Yes  [x] No    Nose Bleeds  [] Yes  [x] No   Using Sleep Aides   Mirapex per PMD    Understands how to change humidification and/or tubing temperature for comfort while at home  [x] Yes  [] No     Difficulties falling asleep  [] Yes  [x] No   Difficulties staying asleep  [] Yes  [x] No   Approximate time to bed  10-11pm   Approximate wake time  5:30-6am   Taking Naps  no   If taking naps usual length    [x] NA   If taking naps using the machine  [] Yes  [] No  [x] NA [] With and With out    Drowsy when driving  [] Yes  [x] No     Does patient carry a DOT/CDL  [] Yes  [x] No     Does patient carry FAA/[employer] License   [] Yes  [x] No      Any concerns noted with the machine at this time  [] Yes  [x] No        Diagnosis Orders   1. Obstructive sleep apnea syndrome     2. Type 2 diabetes mellitus with both eyes affected by mild nonproliferative retinopathy without macular edema, with long-term current use of insulin (HCC)     3. Essential hypertension     4. Class 2 obesity without serious comorbidity with body mass index (BMI) of 38.0 to 38.9 in adult, unspecified obesity type         The chronic medical conditions listed are directly related to the primary diagnosis listed above. The management of the primary diagnosis affects the secondary diagnosis and vice versa. 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 Cont meds per PCP and other physicians. Essential hypertension  Chronic- Stable. Cont meds per PCP and other physicians.       Class 2 obesity without serious comorbidity with body mass index (BMI) of 38.0 to 38.9 in adult  Chronic-Stable. Encouraged her to work on weight loss through diet and exercise. Obstructive sleep apnea syndrome  Reviewed compliance download with pt. Supplies and parts as needed for her machine. These are medically necessary. Continue medications per her PCP and other physicians. Limit caffeine use after 3pm.  Encouraged her to work on weight loss through diet and exercise. Diagnoses of Type 2 diabetes mellitus with both eyes affected by mild nonproliferative retinopathy without macular edema, with long-term current use of insulin ([address] Essential hypertension, and Class 2 obesity without serious comorbidity with body mass index (BMI) of 38.0 to 38.9 in adult, unspecified obesity type were pertinent to this visit. The chronic medical conditions listed are directly related to the primary diagnosis listed above. The management of the primary diagnosis affects the secondary diagnosis and vice versa. The primary encounter diagnosis was Obstructive sleep apnea syndrome. Diagnoses of Type 2 diabetes mellitus with both eyes affected by mild nonproliferative retinopathy without macular edema, with long-term current use of insulin ([address] Essential hypertension, and Class 2 obesity without serious comorbidity with body mass index (BMI) of 38.0 to 38.9 in adult, unspecified obesity type were also pertinent to this visit. The chronic medical conditions listed are directly related to the primary diagnosis listed above. The management of the primary diagnosis affects the secondary diagnosis and vice versa. - Educated patient and reviewed compliance download with pt.    -Supplies and parts as needed for her machine, these are medically necessary.    - Patient using [employer] for supplies  -Continue medications per her PCP and other physicians.   -Limit caffeine use after 3pm.    -Encouraged her to work on weight loss through diet and exercise.   -  Patient able to access video feed. Visit completed via video chat communications. 20 min spent with patient. -F/U: 12 month. No orders of the defined types were placed in this encounter. No orders of the defined types were placed in this encounter. No orders of the defined types were placed in this encounter.       Di Real, MSN, RN, CNP 36.7

## 2020-09-21 NOTE — PROGRESS NOTES
Eugenia Edwards         : 1970    Diagnosis: [x] CHIVO (G47.33) [] CSA (G47.31) [] Apnea (G47.30)   Length of Need: [x] 12 Months [] 99 Months [] Other:    Machine (HORACIO!): [x] Respironics Dream Station      Auto [] ResMed AirSense     Auto [] Other:     []  CPAP () [] Bilevel ()   Mode: [] Auto [] Spontaneous    Mode: [] Auto [] Spontaneous                            Comfort Settings:   - Ramp Pressure:  cmH2O                                        - Ramp time: 15 min                                     -  Flex/EPR - 3 full time                                    - For ResMed Bilevel (TiMax-4 sec   TiMin- 0.2 sec)     Humidifier: [x] Heated ()        [x] Water chamber replacement ()/ 1 per 6 months        Mask:   [x] Nasal () /1 per 3 months [] Full Face () /1 per 3 months   [x] Patient choice -Size and fit mask [] Patient Choice - Size and fit mask   [] Dispense:  [] Dispense:    [x] Headgear () / 1 per 3 months [] Headgear () / 1 per 3 months   [x] Replacement Nasal Cushion ()/2 per month [] Interface Replacement ()/1 per month   [] Replacement Nasal Pillows ()/2 per month         Tubing: [x] Heated ()/1 per 3 months    [] Standard ()/1 per 3 months [] Other:           Filters: [x] Non-disposable ()/1 per 6 months     [x] Ultra-Fine, Disposable ()/2 per month        Miscellaneous: [] Chin Strap ()/ 1 per 6 months [] O2 bleed-in:       LPM   [] Oximetry on CPAP/Bilevel []  Other:    [x] Modem: ()         Start Order Date: 20    MEDICAL JUSTIFICATION:  I, the undersigned, certify that the above prescribed supplies are medically necessary for this patients wellbeing. In my opinion, the supplies are both reasonable and necessary in reference to accepted standards of medicalpractice in treatment of this patients condition.     LAI Hester - AMARJIT      NPI: [id number]       Order Signed Date: 09/21/20    Electronically signed by LAI Trinh CNP on 9/21/2020 at 8:28 AM

## 2020-09-23 ENCOUNTER — PATIENT MESSAGE (OUTPATIENT)
Dept: FAMILY MEDICINE CLINIC | Age: 50
End: 2020-09-23

## 2020-09-23 RX ORDER — FLUOCINONIDE TOPICAL SOLUTION USP, 0.05% 0.5 MG/ML
SOLUTION TOPICAL 2 TIMES DAILY
Qty: 1 BOTTLE | Refills: 2 | Status: SHIPPED | OUTPATIENT
Start: 2020-09-23 | End: 2020-11-10 | Stop reason: SDUPTHER

## 2020-09-23 RX ORDER — ALBUTEROL SULFATE 90 UG/1
2 AEROSOL, METERED RESPIRATORY (INHALATION) EVERY 4 HOURS PRN
Qty: 1 INHALER | Refills: 2 | Status: SHIPPED | OUTPATIENT
Start: 2020-09-23 | End: 2021-01-28 | Stop reason: SDUPTHER

## 2020-09-23 NOTE — TELEPHONE ENCOUNTER
From: Monica Perez  To: Sofia Ardon MD  Sent: 9/23/2020  1:45 PM EDT  Subject: Prescription Question    I have two issues, first one my head is all broke out again, I tried TGel with coal tar, Graciela, Ike tree and Witch hazel, is there a prescription shampoo that I can try? Second, while hiking last week I was having issues getting out of breath I have only noticed this outside, I have been going to the gym 3 or 4 times a week with no problems, I'm thinking maybe its an allergy or something. I have at least two more camping and hiking trips planned, should I start using an inhaler again? If the answer is yes you can send the prescriptions to [facility] on [facility].     Thank you

## 2020-09-24 NOTE — TELEPHONE ENCOUNTER
From: Kyrie Hernandez  To: Vidhi Mckoy MD  Sent: 9/23/2020 5:21 PM EDT  Subject: Prescription Question    Thank you      ----- Message -----   From:Chandni Ayon MD   [facility]:7/78/7328 5:19 PM EDT   To:Eugenia Edwards   Subject:RE: Prescription Question    Sindhu Velasco, you can use an albuterol inhaler before hiking. I will order it. Come in to see me if it is not effective. I will order a steroid solution for the scalp. Make an appointment if it gets worse or is not better in 4 to 5 days. Take care. Dr. Anny Bhat         ----- Message -----   From:Eugenia Edwards   Sent:9/23/2020 1:45 PM EDT   To:Chandni Figueroa MD   Subject:Prescription Question    I have two issues, first one my head is all broke out again, I tried TGel with coal tar, Graciela, Ike tree and Witch hazel, is there a prescription shampoo that I can try? Second, while hiking last week I was having issues getting out of breath I have only noticed this outside, I have been going to the gym 3 or 4 times a week with no problems, I'm thinking maybe its an allergy or something. I have at least two more camping and hiking trips planned, should I start using an inhaler again? If the answer is yes you can send the prescriptions to [facility].     Thank you

## 2020-10-13 ENCOUNTER — TELEMEDICINE (OUTPATIENT)
Dept: FAMILY MEDICINE CLINIC | Age: 50
End: 2020-10-13
Payer: COMMERCIAL

## 2020-10-13 PROCEDURE — 3052F HG A1C>EQUAL 8.0%<EQUAL 9.0%: CPT | Performed by: FAMILY MEDICINE

## 2020-10-13 PROCEDURE — 99214 OFFICE O/P EST MOD 30 MIN: CPT | Performed by: FAMILY MEDICINE

## 2020-10-13 RX ORDER — LISDEXAMFETAMINE DIMESYLATE 60 MG/1
1 CAPSULE ORAL DAILY
Qty: 90 CAPSULE | Refills: 0 | Status: SHIPPED | OUTPATIENT
Start: 2020-10-13 | End: 2021-01-13

## 2020-10-13 NOTE — PROGRESS NOTES
10/13/2020    TELEHEALTH EVALUATION -- Audio/Visual (During XVNSA-02 public health emergency)    HPI:    Camila Astudillo (:  1970) has requested an audio/video evaluation for the following concern(s):    The primary encounter diagnosis was Type 2 diabetes mellitus with both eyes affected by mild nonproliferative retinopathy without macular edema, with long-term current use of insulin ([address] Diagnoses of Essential hypertension, Binge eating, Migraine without aura and without status migrainosus, not intractable, Need for influenza vaccination, and Tremor were also pertinent to this visit. Tamanna Du has noted a tremor in her hands intermittently for a few years. Slowly getting worse and the past few months is constant. Interferes with taking a picture, occ with eating or drinking. Does not note tremor in legs or head. No gait disturbance. Her boyfriend notes she flails in her sleep sometimes. She feels her sleep has been restul. The flailing resolved with lowering the dose of Prozac one week ago. Tremor is not worse with Vyvanse. Tremor is mild and dose not interfere with fxn. Since on the lower dose of Prozac she had one day she was tearful and more depressed. Otherwise she has felt fine. No appetite or sleep disturbance, no anhedonia, Not suicidal.  She feels she is less tolerant since on lower dose. She feels less hot and sweating on the lower dose. Her Dad has benign tremor. Fh negative     Diabetes Mellitus Type II, Follow-up: Patient here for follow-up of Type 2 diabetes mellitus. Known diabetic complications: none  Cardiovascular risk factors: diabetes mellitus, dyslipidemia, hypertension and obesity (BMI >= 30 kg/m2)  Current diabetic medications include actos, trulicity, farxiga. , lantus    Eye exam current (within one year): yes  Weight trend: stable  Prior visit with dietician: no  Current diet: in general, a \"healthy\" diet   some days; some days eats only protein bars  Vyvanse is effective in decreasing binge eating. Well tolerated. No insomnia, palpitations, anxiety  She keeps mediation secure. Current exercise: gym 3 times a week. Current monitoring regimen: home blood tests - 1 times daily  Home blood sugar records: fasting range: 85-95  Any episodes of hypoglycemia? no    Is She on ACE inhibitor or angiotensin II receptor blocker? Not Indicated        Hyperlipidemia:  No new myalgias or GI upset on atorvastatin (Lipitor). Medication compliance: compliant most of the time. Patient is  following a low fat, low cholesterol diet. She is  exercising regularly.      Lab Results   Component Value Date    CHOL 98 10/04/2019    TRIG 103 10/04/2019    HDL 53 10/04/2019    LDLCALC 24 10/04/2019     Lab Results   Component Value Date    ALT 38 10/04/2019    AST 18 10/04/2019        Labs Renal Latest Ref Rng & Units 6/8/2020 1/15/2020 10/4/2019 6/20/2019 3/27/2019   BUN 7 - 20 mg/dL 21(H) 20 21(H) 20 20   Cr 0.6 - 1.1 mg/dL 1.0 1.1 1.0 1.0 1.0   K 3.5 - 5.1 mmol/L 4.2 4.5 4.3 4.5 4.0   Na 136 - 145 mmol/L 141 140 142 143 141     Lab Results   Component Value Date    LABA1C 8.0 06/08/2020     Lab Results   Component Value Date    .9 06/08/2020      TSH   Date Value Ref Range Status   10/04/2019 3.29 0.27 - 4.20 uIU/mL Final   06/20/2019 2.24 0.27 - 4.20 uIU/mL Final   07/12/2018 2.31 0.27 - 4.20 uIU/mL Final   01/23/2013 4.45 0.35 - 5.5 uIU/ml Final   09/30/2011 3.49 0.35 - 5.5 uIU/ml Final     Lab Results   Component Value Date    TSIYXHGL35 658 04/11/2018        Review of Systems    Outpatient Medications Marked as Taking for the 10/13/20 encounter (Telemedicine) with Lorena Beavers MD   Medication Sig Dispense Refill    albuterol sulfate  (90 Base) MCG/ACT inhaler Inhale 2 puffs into the lungs every 4 hours as needed for Wheezing 1 Inhaler 2    fluocinonide (LIDEX) 0.05 % external solution Apply topically 2 times daily 1 Bottle 2    clonazePAM (KLONOPIN) 0.5 MG tablet Take 1 tablet by mouth nightly as needed for Anxiety for up to 30 days. 10 tablet 0    cyclobenzaprine (FLEXERIL) 10 MG tablet Take 1 tablet by mouth 3 times daily as needed for Muscle spasms 90 tablet 2    FLUoxetine (PROZAC) 20 MG capsule TAKE 3 CAPSULES BY MOUTH ONE TIME A DAY (Patient taking differently: TAKE 2 CAPSULES BY MOUTH ONE TIME A DAY) 270 capsule 2    Dulaglutide (TRULICITY) 1.5 BI/8.6UW SOPN Inject 1.5 mg into the skin once a week 12 pen 1    insulin glargine (LANTUS SOLOSTAR) 100 UNIT/ML injection pen Inject 24 Units into the skin nightly 15 pen 1    Insulin Pen Needle (B-D UF III MINI PEN NEEDLES) 31G X 5 MM MISC 1 each by Does not apply route daily 100 each 12    pramipexole (MIRAPEX) 0.5 MG tablet Take 2 tablets by mouth nightly 180 tablet 1    dapagliflozin (FARXIGA) 10 MG tablet Take 1 tablet by mouth every morning 90 tablet 1    buPROPion (WELLBUTRIN XL) 300 MG extended release tablet Take 1 tablet by mouth every morning 90 tablet 3    vitamin B-12 (CYANOCOBALAMIN) 1000 MCG tablet Take 1,000 mcg by mouth daily      atorvastatin (LIPITOR) 40 MG tablet Take 1 tablet by mouth daily 90 tablet 1    Lisdexamfetamine Dimesylate (VYVANSE) 60 MG CAPS Take 1 tablet by mouth daily for 30 days. 90 capsule 0    pioglitazone (ACTOS) 15 MG tablet TAKE ONE TABLET BY MOUTH DAILY 90 tablet 0    Lisdexamfetamine Dimesylate (VYVANSE) 60 MG CAPS Take 60 mg by mouth every morning for 30 days. 30 capsule 0    Lisdexamfetamine Dimesylate (VYVANSE) 60 MG CAPS Take by mouth.  MIRENA, 52 MG, IUD 52 mg 1 Dose by Intrauterine route once      Multiple Vitamins-Minerals (BARIATRIC FUSION) CHEW Take 4 tablets by mouth daily       b complex vitamins capsule Take 1 capsule by mouth daily      fluticasone (FLONASE) 50 MCG/ACT nasal spray PLACE TWO SPRAYS IN EACH NOSTRIL ONCE DAILY 1 Bottle 12    aspirin 81 MG EC tablet Take 81 mg by mouth daily.             Social History Tobacco Use    Smoking status: Never Smoker    Smokeless tobacco: Never Used   Substance Use Topics    Alcohol use: Yes     Comment: infrequent, few times a month    Drug use: No        Allergies   Allergen Reactions    Effexor Xr [Venlafaxine Hcl Er] Other (See Comments)     Didn't respond well to it.    ,   Past Medical History:   Diagnosis Date    Anxiety     Chronic gastric ulcer without hemorrhage and without perforation     CTS (carpal tunnel syndrome) 2/10/2015    Depression     Diabetes mellitus ([facility]     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 ([facility] 1/29/2016    DUB (dysfunctional uterine bleeding) 8/22/2019    ETD (eustachian tube dysfunction) 5/16/2014    Herpes simplex     Lumbar strain 4/19/2017    PCOS (polycystic ovarian syndrome)     Sleep apnea     TMJ syndrome    ,   Past Surgical History:   Procedure Laterality Date    ENDOSCOPY, COLON, DIAGNOSTIC      FINGER TRIGGER RELEASE Right 6/12/2020    RIGHT INDEX FINGER TRIGGER FINGER RELEASE performed by Nat Kelly MD at [address] Right 7/1/2020    INCISION AND DRAINAGE OF RIGHT HAND WOUND; SECONDARY CLOSURE OF WOUND performed by Nat Kelly MD at [address] [facility]  05/25/2018    EGD    CO LAP,STOMACH,OTHER,W/O TUBE N/A 7/31/2018    ROBOTIC ASSISTED LAPAROSCOPIC SLEEVE GASTRECTOMY  performed by Vinod Reynoso DO at [address]   ,   Social History     Tobacco Use    Smoking status: Never Smoker    Smokeless tobacco: Never Used   Substance Use Topics    Alcohol use: Yes     Comment: infrequent, few times a month    Drug use: No   ,   Family History   Problem Relation Age of Onset    Cervical Cancer Mother     Diabetes Father     Stroke Father         25s    Hypertension Father     Coronary Art Dis Father     Other Father         CHIVO    Cancer Father         melanoma    Glaucoma Father     Diabetes Sister     Diabetes Maternal Grandmother     Cancer Maternal Grandmother melanoma    Arthritis Maternal Grandmother     Diabetes Paternal Grandmother     Stroke Paternal Grandmother        PHYSICAL EXAMINATION:  [ INSTRUCTIONS:  \"[x]\" Indicates a positive item  \"[]\" Indicates a negative item  -- DELETE ALL ITEMS NOT EXAMINED]  Vital Signs: (As obtained by patient/caregiver or practitioner observation)    Blood pressure-     Weigth 244 lbs. Heart rate-    Respiratory rate-    Temperature-  Pulse oximetry-   Wt Readings from Last 3 Encounters:   08/19/20 244 lb (110.7 kg)   08/05/20 244 lb (110.7 kg)   07/21/20 244 lb (110.7 kg)     Temp Readings from Last 3 Encounters:   08/05/20 98.1 °F (36.7 °C)   07/21/20 97.5 °F (36.4 °C)   07/01/20 97.6 °F (36.4 °C) (Temporal)     BP Readings from Last 3 Encounters:   07/01/20 116/66   06/12/20 128/64   06/08/20 127/71     Pulse Readings from Last 3 Encounters:   07/01/20 69   06/12/20 68   06/08/20 74      Constitutional: [x] Appears well-developed and well-nourished [x] No apparent distress      [] Abnormal-   Mental status  [x] Alert and awake  [x] Oriented to person/place/time []Able to follow commands      Eyes:  EOM    [x]  Normal  [] Abnormal-  Sclera  [x]  Normal  [] Abnormal -         Discharge [x]  None visible  [] Abnormal -    HENT:   [x] Normocephalic, atraumatic.   [] Abnormal   [x] Mouth/Throat: Mucous membranes are moist.       Neck: [x] No visualized mass     Pulmonary/Chest: [x] Respiratory effort normal.  [] No visualized signs of difficulty breathing or respiratory distress       Musculoskeletal:   [x] Normal gait with no signs of ataxia          Neurological:        [x] No Facial Asymmetry (Cranial nerve 7 motor function) (limited exam to video visit)          No visible tremor      Skin:        [x] No significant exanthematous lesions or discoloration noted on facial skin         [] Abnormal-            Psychiatric:       [x] Normal Affect [] No Hallucinations        [] Abnormal-     Other pertinent observable physical patient (and/or legal guardian) has also been advised to contact this office for worsening conditions or problems, and seek emergency medical treatment and/or call 911 if deemed necessary. Patient identification was verified at the start of the visit: Yes    Total time spent on this encounter: Not billed by time    Services were provided through a video synchronous discussion virtually to substitute for in-person clinic visit. Patient and provider were located at their individual homes. --Samm Castillo MD on 10/12/2020 at 9:23 PM    An electronic signature was used to authenticate this note.

## 2020-10-15 DIAGNOSIS — I10 ESSENTIAL HYPERTENSION: ICD-10-CM

## 2020-10-15 DIAGNOSIS — E11.3293 TYPE 2 DIABETES MELLITUS WITH BOTH EYES AFFECTED BY MILD NONPROLIFERATIVE RETINOPATHY WITHOUT MACULAR EDEMA, WITH LONG-TERM CURRENT USE OF INSULIN (HCC): ICD-10-CM

## 2020-10-15 DIAGNOSIS — Z79.4 TYPE 2 DIABETES MELLITUS WITH BOTH EYES AFFECTED BY MILD NONPROLIFERATIVE RETINOPATHY WITHOUT MACULAR EDEMA, WITH LONG-TERM CURRENT USE OF INSULIN (HCC): ICD-10-CM

## 2020-10-15 DIAGNOSIS — R25.1 TREMOR: ICD-10-CM

## 2020-10-15 LAB
A/G RATIO: 1.9 (ref 1.1–2.2)
ALBUMIN SERPL-MCNC: 4.3 G/DL (ref 3.4–5)
ALP BLD-CCNC: 73 U/L (ref 40–129)
ALT SERPL-CCNC: 43 U/L (ref 10–40)
ANION GAP SERPL CALCULATED.3IONS-SCNC: 11 MMOL/L (ref 3–16)
AST SERPL-CCNC: 23 U/L (ref 15–37)
BILIRUB SERPL-MCNC: 0.8 MG/DL (ref 0–1)
BUN BLDV-MCNC: 25 MG/DL (ref 7–20)
CALCIUM SERPL-MCNC: 9.1 MG/DL (ref 8.3–10.6)
CHLORIDE BLD-SCNC: 105 MMOL/L (ref 99–110)
CHOLESTEROL, TOTAL: 97 MG/DL (ref 0–199)
CO2: 26 MMOL/L (ref 21–32)
CREAT SERPL-MCNC: 1.1 MG/DL (ref 0.6–1.1)
GFR AFRICAN AMERICAN: >60
GFR NON-AFRICAN AMERICAN: 52
GLOBULIN: 2.3 G/DL
GLUCOSE BLD-MCNC: 96 MG/DL (ref 70–99)
HDLC SERPL-MCNC: 59 MG/DL (ref 40–60)
LDL CHOLESTEROL CALCULATED: 21 MG/DL
POTASSIUM SERPL-SCNC: 4.5 MMOL/L (ref 3.5–5.1)
SODIUM BLD-SCNC: 142 MMOL/L (ref 136–145)
TOTAL PROTEIN: 6.6 G/DL (ref 6.4–8.2)
TRIGL SERPL-MCNC: 83 MG/DL (ref 0–150)
TSH REFLEX: 2.44 UIU/ML (ref 0.27–4.2)
VITAMIN B-12: 676 PG/ML (ref 211–911)
VLDLC SERPL CALC-MCNC: 17 MG/DL

## 2020-10-16 LAB
ESTIMATED AVERAGE GLUCOSE: 137 MG/DL
HBA1C MFR BLD: 6.4 %

## 2020-10-27 RX ORDER — CLONAZEPAM 0.5 MG/1
TABLET ORAL
Qty: 10 TABLET | Refills: 0 | Status: SHIPPED | OUTPATIENT
Start: 2020-10-27 | End: 2021-01-13 | Stop reason: SDUPTHER

## 2020-10-27 RX ORDER — ATORVASTATIN CALCIUM 40 MG/1
40 TABLET, FILM COATED ORAL DAILY
Qty: 90 TABLET | Refills: 1 | Status: SHIPPED | OUTPATIENT
Start: 2020-10-27 | End: 2021-01-28 | Stop reason: SDUPTHER

## 2020-11-04 ENCOUNTER — OFFICE VISIT (OUTPATIENT)
Dept: ORTHOPEDIC SURGERY | Age: 50
End: 2020-11-04
Payer: COMMERCIAL

## 2020-11-04 VITALS — HEIGHT: 66 IN | BODY MASS INDEX: 39.21 KG/M2 | TEMPERATURE: 98.1 F | WEIGHT: 244 LBS

## 2020-11-04 PROCEDURE — 99213 OFFICE O/P EST LOW 20 MIN: CPT | Performed by: ORTHOPAEDIC SURGERY

## 2020-11-04 RX ORDER — CLINDAMYCIN HYDROCHLORIDE 300 MG/1
300 CAPSULE ORAL 3 TIMES DAILY
Qty: 21 CAPSULE | Refills: 0 | Status: SHIPPED | OUTPATIENT
Start: 2020-11-04 | End: 2020-11-11

## 2020-11-04 NOTE — PROGRESS NOTES
Assessment: 52-year-old female presenting with history of right index finger trigger finger release with subsequent wound dehiscence, suspected infection secondary closure with last procedure performed 7/1/2020  History of recent right long finger trigger finger status post corticosteroid injection  3. Right ankle wound superficial with possible superficial infection    Treatment Plan: For her right hand the patient does demonstrate continued improvement. She has still some lack of full composite fist mainly at the index finger but no pain or triggering. This is likely secondary to some tendon adhesion and scarring. I discussed with the patient the options and would recommend continued conservative management with use of the hand as tolerated. I would not recommend any further surgical exploration or procedure at this time particular with the patient's history of slow wound healing and the progress that she has made thus far. She has had improvement overall in her right long finger triggering and pain that has helped I think her overall function as well after that recent corticosteroid injection    I also discussed with her today some of her right leg concerns with the scratches from the thorns that she sustained now over 1 week ago. I do not see any signs of systemic infection or deep infection. She does have some irritation and I do know with her history of slow healing and history of diabetes mellitus there is some concern for superficial infection. I discussed close monitoring and did place her on a course of oral antibiotics today for 1 week.   I also discussed following up and establishing with her primary care physician this as well in case that other testing is necessary but I am happy to evaluate the patient again in 7 to 10 days at least via contact or telemedicine to reevaluate and ensure that there is continued improvement  She is to call sooner with any questions or concerns    No follow-ups on file.         History of Present Illness  Ms. Edwards is a 69-year-old female presenting as a repeat incision and drainage of right index finger after trigger finger release surgery and subsequent secondary closure  Date of most recent procedure: 7/1/2020  She returns here today as a scheduled follow-up stating that she is continue to make progress with regards to her range of motion. The corticosteroid injection performed in her right long finger A1 pulley at last visit was beneficial and she has not had any new pain or triggering in this finger and she feels that has also benefited likely some range of motion for her index finger as well. She continues to have a small amount of bulky scar near the A1 pulley but this has been progressively improving and she estimates that she is \"80% better\"  She has not had any new swelling or pain otherwise and no other changes reported    She also mentions to me today that she was recently hiking and developed some scratches from thorns on her right lower extremity. Most of these areas healed but there is one area that has been more red and inflamed and she was concerned about infection in this area. She has had no fever chills or other constitutional symptoms no diffuse leg swelling or other signs reported    Review of Systems  Pertinent items are noted in HPI  Review of systems reviewed from Patient History Form dated on 5/7/2020  and available in the patient's chart under the Media tab. Vital Signs  Vitals:    11/04/20 0906   Temp: 98.1 °F (36.7 °C)       Physical Exam  Constitutional:  Patient is well-nourished and demonstrates normal hygiene. Mental Status:  Patient is alert and oriented to person, place and time. Skin:  Intact, no rashes or lesions.      Right hand/palm:  Dry incision, no evidence of drainage, no swelling near the A1 pulley index finger with no hyperemia circumferentially around the incision that is soft and mobile and nontender, slightly bulky scar and firm near the ulnar aspect with no additional skin changes  No tenderness along the remainder of the flexor tendon sheath or within the palm including at the long finger A1 pulley  There is no obvious fluctuance with palpation  No l swelling throughout the remainder of the finger and no swelling throughout the remainder of the hand or palm   Active finger flexion and extension including active FDS and FDP with no triggering pulp to palm distance approximately 1-1-1/2 index finger cm with no triggering and near full extension of the finger 5 degree PIP contracture at PIP joint, there is no triggering of the long finger with motion flexion and extension  with no discomfort along the long finger A1 pulley and flexor tendon sheath  Gross sensation intact radial and ulnar aspect of fingertip with brisk capillary refill    Capillary refill brisk all fingers, symmetric  Gross sensation intact to light touch median/ulnar/radial nerves  Sensation intact to radial/ulnar aspect of fingertip    Right ankle/right lower extremity  No evidence of ankle swelling or erythema  Calf is soft and nontender  There multiple scratches and superficial wounds of various stages of healing particularly near the lateral aspect of the leg with no purulent drainage or fluctuance  Area slightly reddened and irritated superficially with small eschar formation near several of these wounds  Comfortable range of motion of ankle and active EHL FHL, comfortable range of motion of knee with no increased pain    Radiology:    X-rays obtained and reviewed in office:  No new images obtained today    Additional Diagnostic Test Findings:    Office Procedures:  No orders of the defined types were placed in this encounter.           Saida Deluca MD  Orthopaedic Surgeon, Hand & Upper Extremity   [facility]    Contact Information:  Jordy Urbina: [phone] Clinical )    This dictation was performed with a verbal recognition program (DRAGON) and it was checked for errors. It is possible that there are still dictated errors within this office note. If so, please bring any errors to my attention for an addendum. All efforts were made to ensure that this office note is accurate.

## 2020-11-10 RX ORDER — FLUOCINONIDE TOPICAL SOLUTION USP, 0.05% 0.5 MG/ML
SOLUTION TOPICAL 2 TIMES DAILY
Qty: 1 BOTTLE | Refills: 2 | Status: SHIPPED | OUTPATIENT
Start: 2020-11-10 | End: 2021-10-11

## 2020-11-19 ENCOUNTER — NURSE ONLY (OUTPATIENT)
Dept: PRIMARY CARE CLINIC | Age: 50
End: 2020-11-19
Payer: COMMERCIAL

## 2020-11-19 PROCEDURE — 99211 OFF/OP EST MAY X REQ PHY/QHP: CPT | Performed by: NURSE PRACTITIONER

## 2020-11-19 NOTE — PROGRESS NOTES
Eugenia Edwards received a viral test for COVID-19. They were educated on isolation and quarantine as appropriate. For any symptoms, they were directed to seek care from their PCP, given contact information to establish with a doctor, directed to an urgent care or the emergency room.

## 2020-11-20 ENCOUNTER — PATIENT MESSAGE (OUTPATIENT)
Dept: FAMILY MEDICINE CLINIC | Age: 50
End: 2020-11-20

## 2020-11-20 NOTE — TELEPHONE ENCOUNTER
From: Amy Moreno  To: Denis Bennett MD  Sent: 11/20/2020 4:05 PM EST  Subject: Non-Urgent Medical Question    My boyfriend tested positive for covid, I got tested yesterday, waiting for the results, I am sick have all the symptoms that Viraj Urias has, slight fever, cough, runny nose and blurred vision. His dr said to take zinc, melatonin, vitamin d and Tylenol. As a diabetic do I need to add anything?

## 2020-11-21 LAB — SARS-COV-2, NAA: NOT DETECTED

## 2020-11-23 ENCOUNTER — VIRTUAL VISIT (OUTPATIENT)
Dept: FAMILY MEDICINE CLINIC | Age: 50
End: 2020-11-23
Payer: COMMERCIAL

## 2020-11-23 PROCEDURE — 99213 OFFICE O/P EST LOW 20 MIN: CPT | Performed by: FAMILY MEDICINE

## 2020-11-23 RX ORDER — PHENOL 1.4 %
10 AEROSOL, SPRAY (ML) MUCOUS MEMBRANE DAILY
COMMUNITY
End: 2021-02-23

## 2020-11-23 NOTE — PROGRESS NOTES
Lisdexamfetamine Dimesylate (VYVANSE) 60 MG CAPS Take 1 tablet by mouth daily for 30 days. 90 capsule 0    albuterol sulfate  (90 Base) MCG/ACT inhaler Inhale 2 puffs into the lungs every 4 hours as needed for Wheezing 1 Inhaler 2    cyclobenzaprine (FLEXERIL) 10 MG tablet Take 1 tablet by mouth 3 times daily as needed for Muscle spasms 90 tablet 2    FLUoxetine (PROZAC) 20 MG capsule TAKE 3 CAPSULES BY MOUTH ONE TIME A DAY (Patient taking differently: TAKE 2 CAPSULES BY MOUTH ONE TIME A DAY) 270 capsule 2    Dulaglutide (TRULICITY) 1.5 ZQ/2.9AO SOPN Inject 1.5 mg into the skin once a week 12 pen 1    insulin glargine (LANTUS SOLOSTAR) 100 UNIT/ML injection pen Inject 24 Units into the skin nightly 15 pen 1    Insulin Pen Needle (B-D UF III MINI PEN NEEDLES) 31G X 5 MM MISC 1 each by Does not apply route daily 100 each 12    pramipexole (MIRAPEX) 0.5 MG tablet Take 2 tablets by mouth nightly 180 tablet 1    dapagliflozin (FARXIGA) 10 MG tablet Take 1 tablet by mouth every morning 90 tablet 1    buPROPion (WELLBUTRIN XL) 300 MG extended release tablet Take 1 tablet by mouth every morning 90 tablet 3    vitamin B-12 (CYANOCOBALAMIN) 1000 MCG tablet Take 1,000 mcg by mouth daily      MIRENA, 52 MG, IUD 52 mg 1 Dose by Intrauterine route once      Multiple Vitamins-Minerals (BARIATRIC FUSION) CHEW Take 4 tablets by mouth daily       b complex vitamins capsule Take 1 capsule by mouth daily      fluticasone (FLONASE) 50 MCG/ACT nasal spray PLACE TWO SPRAYS IN EACH NOSTRIL ONCE DAILY 1 Bottle 12    aspirin 81 MG EC tablet Take 81 mg by mouth daily.             Social History     Tobacco Use    Smoking status: Never Smoker    Smokeless tobacco: Never Used   Substance Use Topics    Alcohol use: Yes     Comment: infrequent, few times a month    Drug use: No      Patient Active Problem List   Diagnosis    PCOS (polycystic ovarian syndrome)    Pure hypercholesterolemia    Common migraine    Depression with anxiety    Obstructive sleep apnea syndrome    Herpes simplex    Binge eating    Pedal edema    FH: melanoma    Essential hypertension    Hiatal hernia with GERD and esophagitis    Class 2 obesity without serious comorbidity with body mass index (BMI) of 38.0 to 38.9 in adult    Type 2 diabetes mellitus with mild nonproliferative retinopathy of both eyes without macular edema (HCC)        Allergies   Allergen Reactions    Effexor Xr [Venlafaxine Hcl Er] Other (See Comments)     Didn't respond well to it.    ,   Past Medical History:   Diagnosis Date    Anxiety     Chronic gastric ulcer without hemorrhage and without perforation     CTS (carpal tunnel syndrome) 2/10/2015    Depression     [facility] [address]     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 ([facility] [address] 1/29/2016    DUB (dysfunctional uterine bleeding) 8/22/2019    ETD (eustachian tube dysfunction) 5/16/2014    Herpes simplex     Lumbar strain 4/19/2017    PCOS (polycystic ovarian syndrome)     Sleep apnea     TMJ syndrome    ,   Family History   Problem Relation Age of Onset    Cervical Cancer Mother     Diabetes Father     Stroke Father         25s    Hypertension Father     Coronary Art Dis Father     Other Father         CHIVO    Cancer Father         melanoma    Glaucoma Father     Diabetes Sister     Diabetes Maternal Grandmother     Cancer Maternal Grandmother         melanoma    Arthritis Maternal Grandmother     Diabetes Paternal Grandmother     Stroke Paternal Grandmother        PHYSICAL EXAMINATION:  [ INSTRUCTIONS:  \"[x]\" Indicates a positive item  \"[]\" Indicates a negative item  -- DELETE ALL ITEMS NOT EXAMINED]  Vital Signs: (As obtained by patient/caregiver or practitioner observation)    Blood pressure-  Heart rate-    Respiratory rate-    Temperature- 97.6 Pulse oximetry-   Wt 240 lb  Wt Readings from Last 3 Encounters:   11/04/20 244 lb (110.7 kg)   08/19/20 244 lb (110.7 kg) 08/05/20 244 lb (110.7 kg)     Temp Readings from Last 3 Encounters:   11/04/20 98.1 °F (36.7 °C)   08/05/20 98.1 °F (36.7 °C)   07/21/20 97.5 °F (36.4 °C)     BP Readings from Last 3 Encounters:   07/01/20 116/66   06/12/20 128/64   06/08/20 127/71     Pulse Readings from Last 3 Encounters:   07/01/20 69   06/12/20 68   06/08/20 74        Constitutional: [x] Appears well-developed and well-nourished [x] No apparent distress      [] Abnormal-   Mental status  [x] Alert and awake  [x] Oriented to person/place/time     Eyes:  EOM    [x]  Normal  [] Abnormal-  Sclera  [x]  Normal  [] Abnormal -         Discharge [x]  None visible  [] Abnormal -    HENT:   [x] Normocephalic, atraumatic. [] Abnormal   [x] Mouth/Throat: Mucous membranes are moist.       Neck: [x] No visualized mass     Pulmonary/Chest: [x] Respiratory effort normal.  [x] No visualized signs of difficulty breathing or respiratory distress        [] Abnormal-       Neurological:        [x] No Facial Asymmetry (Cranial nerve 7 motor function) (limited exam to video visit)       Skin:        [x] No significant exanthematous lesions or discoloration noted on facial skin         [] Abnormal-            Psychiatric:       [x] Normal Affect [x] No Hallucinations        [] Abnormal-     Other pertinent observable physical exam findings-     ASSESSMENT/PLAN:  1. Suspected COVID-19 virus infection  Based on sxs and exposure I suspect she has COVID. Discussed retesting. She prefers to isolate for full 10 days and until sxs improved. She is on 14 d quarantine because of COVID exposure. Discussed s/s that should prompt call to me or ER visit. Continue fluids, Tylenol, Zinc, Vitamin D. Call or return to clinic prn if these symptoms worsen or fail to improve as anticipated. No follow-ups on file. Brenden Caceres is a 44 y.o. female being evaluated by a Virtual Visit (video visit) encounter to address concerns as mentioned above.   A caregiver was present when appropriate. Due to this being a TeleHealth encounter (During MBVTM-03 public health emergency), evaluation of the following organ systems was limited: Vitals/Constitutional/EENT/Resp/CV/GI//MS/Neuro/Skin/Heme-Lymph-Imm. Pursuant to the emergency declaration under the [address] [place] [address], [address] [place] [address] and the [employer] and [employer] Act, this Virtual Visit was conducted with patient's (and/or legal guardian's) consent, to reduce the patient's risk of exposure to COVID-19 and provide necessary medical care. The patient (and/or legal guardian) has also been advised to contact this office for worsening conditions or problems, and seek emergency medical treatment and/or call 911 if deemed necessary. Patient identification was verified at the start of the visit: Yes    Total time spent on this encounter: Not billed by time    Services were provided through a video synchronous discussion virtually to substitute for in-person clinic visit. Patient and provider were located at their individual homes. --Tyesha Anne MD on 11/23/2020 at 3:49 PM    An electronic signature was used to authenticate this note.

## 2020-11-29 ENCOUNTER — PATIENT MESSAGE (OUTPATIENT)
Dept: FAMILY MEDICINE CLINIC | Age: 50
End: 2020-11-29

## 2020-11-30 NOTE — TELEPHONE ENCOUNTER
From: Fariba Patel  To: Jesus Reina MD  Sent: 11/29/2020 9:37 PM EST  Subject: Visit Follow-Up Question    I was supposed to give blood on Monday , they said I needed to be covid tested again and then they will reschedule me for 14 days out. Can you write the order?

## 2020-12-01 ENCOUNTER — PATIENT MESSAGE (OUTPATIENT)
Dept: FAMILY MEDICINE CLINIC | Age: 50
End: 2020-12-01

## 2020-12-02 NOTE — TELEPHONE ENCOUNTER
From: Martina Knapp  To: Rosa Morris MD  Sent: 12/1/2020 5:04 PM EST  Subject: Visit Follow-Up Question    I spiked a fever again today , 98.9, should I isolate again?      ----- Message -----   From:Heidi NGO   Sent:11/30/2020 3:54 PM EST   To:Eugenia Edwards   Subject:RE: Visit Follow-Up Question    Hi, Ms. Francy Lose,  You can just go to the [facility] testing site (assume that you would use [facility]) for the COVID test. The number to schedule the appointment is [phone]. If you need anything further from us, please do not hesitate to reach out. Thank you, Shaun Mustafa      ----- Message -----   From:Eugenia Edwards   Sent:11/29/2020 9:37 PM EST   To:Chandni Figueroa MD   Subject:Visit Follow-Up Question    I was supposed to give blood on Monday , they said I needed to be covid tested again and then they will reschedule me for 14 days out. Can you write the order?

## 2020-12-02 NOTE — TELEPHONE ENCOUNTER
Patient called today regarding her symptoms. She was given the information of how with COVID you may feel better one day and then have returning symptoms the next. Even after no longer testing positive for COVID you could have continued symptoms. Patient voiced understanding.

## 2020-12-03 ENCOUNTER — VIRTUAL VISIT (OUTPATIENT)
Dept: FAMILY MEDICINE CLINIC | Age: 50
End: 2020-12-03
Payer: COMMERCIAL

## 2020-12-03 ENCOUNTER — OFFICE VISIT (OUTPATIENT)
Dept: PRIMARY CARE CLINIC | Age: 50
End: 2020-12-03
Payer: COMMERCIAL

## 2020-12-03 PROCEDURE — 99211 OFF/OP EST MAY X REQ PHY/QHP: CPT | Performed by: NURSE PRACTITIONER

## 2020-12-03 PROCEDURE — 99213 OFFICE O/P EST LOW 20 MIN: CPT | Performed by: FAMILY MEDICINE

## 2020-12-03 RX ORDER — AMOXICILLIN AND CLAVULANATE POTASSIUM 875; 125 MG/1; MG/1
1 TABLET, FILM COATED ORAL 2 TIMES DAILY
Qty: 20 TABLET | Refills: 0 | Status: SHIPPED | OUTPATIENT
Start: 2020-12-03 | End: 2020-12-13

## 2020-12-03 NOTE — PROGRESS NOTES
12/3/2020    TELEHEALTH EVALUATION -- Audio/Visual (During EDRIE-00 public health emergency)    HPI:    Zachary De La Rosa (:  1970) has requested an audio/video evaluation for the following concern(s):    Concern for COVID infection    Eugenia developed a fever and loss of sense of smell on approx 11. Her boyfriend had COVID, she tested negative. She was better for one week; still had loss of smell and fatigue but no fever. Yesterday she developed a temp to 99, all over HA, ST, rhinorrhea. Mild cough that is unchanged. + sinus pressure. Clear nasal discharge. Diarrhea 3 to 4 times a day; was present when she was first ill. Resolved for one week and came back x 2 days. No abdominal pain, melena or hematochezia. No nausea or vomiting but appetite I decreased. Denies chest pain, dyspnea. Rx: Tylenol, Zinc, Vitamin D  Had COVID test this am again. FSBS: 125 this am.  Max 140. No hypoglycemia. Lab Results   Component Value Date     10/15/2020    K 4.5 10/15/2020    K 4.1 2018     10/15/2020    CO2 26 10/15/2020    BUN 25 10/15/2020    CREATININE 1.1 10/15/2020    GLUCOSE 96 10/15/2020    CALCIUM 9.1 10/15/2020       Lab Results   Component Value Date    LABA1C 6.4 10/15/2020     Lab Results   Component Value Date    .0 10/15/2020        Review of Systems    Outpatient Medications Marked as Taking for the 12/3/20 encounter (Virtual Visit) with Roseline Solorio MD   Medication Sig Dispense Refill    Zinc Sulfate (ZINC 15 PO) Take 1 tablet by mouth daily Zinc,Vit D, Calcium and Mg      Melatonin 10 MG TABS Take 10 mg by mouth daily      fluocinonide (LIDEX) 0.05 % external solution Apply topically 2 times daily 1 Bottle 2    atorvastatin (LIPITOR) 40 MG tablet TAKE 1 TABLET BY MOUTH  DAILY 90 tablet 1    clonazePAM (KLONOPIN) 0.5 MG tablet TAKE 1 TABLET BY MOUTH AT  NIGHT AS NEEDED FOR ANXIETY FOR UP TO 30 DAYS.  10 tablet 0    Lisdexamfetamine Dimesylate (VYVANSE) 60 MG CAPS Take 1 tablet by mouth daily for 30 days. 90 capsule 0    albuterol sulfate  (90 Base) MCG/ACT inhaler Inhale 2 puffs into the lungs every 4 hours as needed for Wheezing 1 Inhaler 2    cyclobenzaprine (FLEXERIL) 10 MG tablet Take 1 tablet by mouth 3 times daily as needed for Muscle spasms 90 tablet 2    FLUoxetine (PROZAC) 20 MG capsule TAKE 3 CAPSULES BY MOUTH ONE TIME A DAY (Patient taking differently: TAKE 2 CAPSULES BY MOUTH ONE TIME A DAY) 270 capsule 2    Dulaglutide (TRULICITY) 1.5 UW/7.5VC SOPN Inject 1.5 mg into the skin once a week 12 pen 1    insulin glargine (LANTUS SOLOSTAR) 100 UNIT/ML injection pen Inject 24 Units into the skin nightly 15 pen 1    Insulin Pen Needle (B-D UF III MINI PEN NEEDLES) 31G X 5 MM MISC 1 each by Does not apply route daily 100 each 12    pramipexole (MIRAPEX) 0.5 MG tablet Take 2 tablets by mouth nightly 180 tablet 1    dapagliflozin (FARXIGA) 10 MG tablet Take 1 tablet by mouth every morning 90 tablet 1    buPROPion (WELLBUTRIN XL) 300 MG extended release tablet Take 1 tablet by mouth every morning 90 tablet 3    vitamin B-12 (CYANOCOBALAMIN) 1000 MCG tablet Take 1,000 mcg by mouth daily      MIRENA, 52 MG, IUD 52 mg 1 Dose by Intrauterine route once      Multiple Vitamins-Minerals (BARIATRIC FUSION) CHEW Take 4 tablets by mouth daily       b complex vitamins capsule Take 1 capsule by mouth daily      fluticasone (FLONASE) 50 MCG/ACT nasal spray PLACE TWO SPRAYS IN EACH NOSTRIL ONCE DAILY 1 Bottle 12    aspirin 81 MG EC tablet Take 81 mg by mouth daily.             Social History     Tobacco Use    Smoking status: Never Smoker    Smokeless tobacco: Never Used   Substance Use Topics    Alcohol use: Yes     Comment: infrequent, few times a month    Drug use: No      Patient Active Problem List   Diagnosis    PCOS (polycystic ovarian syndrome)    Pure hypercholesterolemia    Common migraine    Depression with anxiety    Obstructive sleep apnea syndrome    Herpes simplex    Binge eating    Pedal edema    FH: melanoma    Essential hypertension    Hiatal hernia with GERD and esophagitis    Class 2 obesity without serious comorbidity with body mass index (BMI) of 38.0 to 38.9 in adult    Type 2 diabetes mellitus with mild nonproliferative retinopathy of both eyes without macular edema (HCC)        Allergies   Allergen Reactions    Effexor Xr [Venlafaxine Hcl Er] Other (See Comments)     Didn't respond well to it.    ,   Past Medical History:   Diagnosis Date    Anxiety     Chronic gastric ulcer without hemorrhage and without perforation     CTS (carpal tunnel syndrome) 2/10/2015    Depression     Diabetes mellitus ([facility] [address]     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 ([facility] [address] 1/29/2016    DUB (dysfunctional uterine bleeding) 8/22/2019    ETD (eustachian tube dysfunction) 5/16/2014    Herpes simplex     Lumbar strain 4/19/2017    PCOS (polycystic ovarian syndrome)     Sleep apnea     TMJ syndrome    ,   Social History     Tobacco Use    Smoking status: Never Smoker    Smokeless tobacco: Never Used   Substance Use Topics    Alcohol use: Yes     Comment: infrequent, few times a month    Drug use: No       PHYSICAL EXAMINATION:  [ INSTRUCTIONS:  \"[x]\" Indicates a positive item  \"[]\" Indicates a negative item  -- DELETE ALL ITEMS NOT EXAMINED]  Vital Signs: (As obtained by patient/caregiver or practitioner observation)    Blood pressure-  Heart rate-    Respiratory rate-    Temperature- 97.3 Pulse oximetry-   Wt 240 lb  Wt Readings from Last 3 Encounters:   11/04/20 244 lb (110.7 kg)   08/19/20 244 lb (110.7 kg)   08/05/20 244 lb (110.7 kg)     Temp Readings from Last 3 Encounters:   11/04/20 98.1 °F (36.7 °C)   08/05/20 98.1 °F (36.7 °C)   07/21/20 97.5 °F (36.4 °C)     BP Readings from Last 3 Encounters:   07/01/20 116/66   06/12/20 128/64   06/08/20 127/71     Pulse Readings from Last 3 Encounters:   07/01/20 69 being a TeleHealth encounter (During AEPDC-22 public health emergency), evaluation of the following organ systems was limited: Vitals/Constitutional/EENT/Resp/CV/GI//MS/Neuro/Skin/Heme-Lymph-Imm. Pursuant to the emergency declaration under the [address] [place] [address], [address] [place] [address] and the [employer] Act, this Virtual Visit was conducted with patient's (and/or legal guardian's) consent, to reduce the patient's risk of exposure to COVID-19 and provide necessary medical care. The patient (and/or legal guardian) has also been advised to contact this office for worsening conditions or problems, and seek emergency medical treatment and/or call 911 if deemed necessary. Patient identification was verified at the start of the visit: Yes    Total time spent on this encounter: Not billed by time    Services were provided through a video synchronous discussion virtually to substitute for in-person clinic visit. Patient and provider were located at their individual homes. --Letty Barney MD on 12/3/2020 at 12:03 PM    An electronic signature was used to authenticate this note.

## 2020-12-03 NOTE — LETTER
[address],[facility]  Phone: [phone]  Fax: [phone]    Heath oJlly MD        December 3, 2020     Patient: Bonnie Barros   YOB: 1970   Date of Visit: 12/3/2020       To Whom It May Concern: It is my medical opinion that Melanie Fernandez is unable to return to work until 12/7/20 due to acute illness. If you have any questions or concerns, please don't hesitate to call.     Sincerely,        Heath Jolly MD

## 2020-12-05 LAB — SARS-COV-2, NAA: NOT DETECTED

## 2021-01-07 ENCOUNTER — OFFICE VISIT (OUTPATIENT)
Dept: ORTHOPEDIC SURGERY | Age: 51
End: 2021-01-07
Payer: COMMERCIAL

## 2021-01-07 DIAGNOSIS — G56.03 BILATERAL CARPAL TUNNEL SYNDROME: ICD-10-CM

## 2021-01-07 DIAGNOSIS — M65.331 TRIGGER MIDDLE FINGER OF RIGHT HAND: ICD-10-CM

## 2021-01-07 DIAGNOSIS — M65.4 TENOSYNOVITIS, DE QUERVAIN: ICD-10-CM

## 2021-01-07 DIAGNOSIS — M65.321 TRIGGER INDEX FINGER OF RIGHT HAND: Primary | ICD-10-CM

## 2021-01-07 PROCEDURE — 20526 THER INJECTION CARP TUNNEL: CPT | Performed by: ORTHOPAEDIC SURGERY

## 2021-01-07 PROCEDURE — L3908 WHO COCK-UP NONMOLDE PRE OTS: HCPCS | Performed by: ORTHOPAEDIC SURGERY

## 2021-01-07 PROCEDURE — 99213 OFFICE O/P EST LOW 20 MIN: CPT | Performed by: ORTHOPAEDIC SURGERY

## 2021-01-07 RX ORDER — LIDOCAINE HYDROCHLORIDE 10 MG/ML
20 INJECTION, SOLUTION INFILTRATION; PERINEURAL ONCE
Status: COMPLETED | OUTPATIENT
Start: 2021-01-07 | End: 2021-01-07

## 2021-01-07 RX ORDER — TRIAMCINOLONE ACETONIDE 40 MG/ML
40 INJECTION, SUSPENSION INTRA-ARTICULAR; INTRAMUSCULAR ONCE
Status: COMPLETED | OUTPATIENT
Start: 2021-01-07 | End: 2021-01-07

## 2021-01-07 RX ADMIN — LIDOCAINE HYDROCHLORIDE 20 ML: 10 INJECTION, SOLUTION INFILTRATION; PERINEURAL at 15:37

## 2021-01-07 RX ADMIN — TRIAMCINOLONE ACETONIDE 40 MG: 40 INJECTION, SUSPENSION INTRA-ARTICULAR; INTRAMUSCULAR at 15:40

## 2021-01-07 RX ADMIN — TRIAMCINOLONE ACETONIDE 40 MG: 40 INJECTION, SUSPENSION INTRA-ARTICULAR; INTRAMUSCULAR at 15:38

## 2021-01-08 NOTE — PROGRESS NOTES
Gen/Psych: Examination reveals a pleasant individual in no acute distress. The patient is oriented to time, place and person. The patient's mood and affect are appropriate. Lymph: The lymphatic examination bilaterally reveals all areas to be without enlargement or induration. Skin intact without lymphadenopathy, discoloration, or abnormal temperature. Vascular: There is intact, symmetric circulation in both upper extremities.   Brisk capillary refill fingers  Musculoskeletal:       Cervical spine, shoulders, elbows, wrist:  satisfactory comfortable baseline range of motion, strength, and stability bilaterally  Negative Spurling sign bilateral upper extremity  Bilateral hand and digits:   Satisfactory range of motion bilaterally with full composite fist and full active extension of all fingers  No skin changes with well-healed right index finger A1 pulley incision with minimal firm scar no fluctuance erythema or additional changes  No swelling throughout the remainder of the bilateral hands  Appropriate finger tenodesis and resting cascade with otherwise full composite fist and full active extension of all fingers  5 out of 5 EPL FPL thumb abduction  5-5 FDS FDP EDC interossei  No thenar atrophy or intrinsic muscle wasting  Minimal tenderness over the right long finger A1 pulley and mild triggering with flexion and extension  No triggering with all other fingers including the right index finger                                         Neurological:  Direct compression, Tinel's, and Phalen's testing reproduces the patient's symptoms into the median nerve distribution  Bilaterally  Negative Tinel's bilateral cubital tunnel  Negative elbow hyperflexion test  2+ brachioradialis triceps tendon reflex with negative Jose sign bilateral upper extremity    Radiology:     X-rays obtained and reviewed in office:  No new images obtained today    DIAGNOSTIC TESTING: EMG: were not done

## 2021-01-11 ENCOUNTER — TELEPHONE (OUTPATIENT)
Dept: FAMILY MEDICINE CLINIC | Age: 51
End: 2021-01-11

## 2021-01-12 ENCOUNTER — PATIENT MESSAGE (OUTPATIENT)
Dept: FAMILY MEDICINE CLINIC | Age: 51
End: 2021-01-12

## 2021-01-12 PROBLEM — F41.0 PANIC ATTACKS: Status: ACTIVE | Noted: 2021-01-12

## 2021-01-12 NOTE — TELEPHONE ENCOUNTER
PA submitted via CM for Trulicity 8.3NQ/6.9WN pen-injectors.   Mallory POLLOCK Case ID: [id number]    STATUS: PENDING

## 2021-01-12 NOTE — TELEPHONE ENCOUNTER
RAFAI  Pt message  Me again, my anxiety is out the roof, I can't concentrate, I've been crying for two days. I'm not sure what to do. I took today off work to try and clear my head but I can't do that too much or I will get fired and that just makes me even more anxious. I have my klonopin they make me groggy but I also am not sleeping well.  I bought a bunch of books to try and occupy my mind with positive things and to help me focus    Pt has appt scheduled 01.13.2021

## 2021-01-13 ENCOUNTER — OFFICE VISIT (OUTPATIENT)
Dept: FAMILY MEDICINE CLINIC | Age: 51
End: 2021-01-13
Payer: COMMERCIAL

## 2021-01-13 VITALS
SYSTOLIC BLOOD PRESSURE: 144 MMHG | WEIGHT: 259 LBS | RESPIRATION RATE: 14 BRPM | DIASTOLIC BLOOD PRESSURE: 72 MMHG | HEART RATE: 83 BPM | TEMPERATURE: 96.6 F | HEIGHT: 66 IN | BODY MASS INDEX: 41.62 KG/M2 | OXYGEN SATURATION: 100 %

## 2021-01-13 DIAGNOSIS — Z12.31 BREAST CANCER SCREENING BY MAMMOGRAM: ICD-10-CM

## 2021-01-13 DIAGNOSIS — F41.0 PANIC ATTACKS: ICD-10-CM

## 2021-01-13 DIAGNOSIS — R63.2 BINGE EATING: Chronic | ICD-10-CM

## 2021-01-13 DIAGNOSIS — F41.8 DEPRESSION WITH ANXIETY: Primary | Chronic | ICD-10-CM

## 2021-01-13 DIAGNOSIS — G47.33 OBSTRUCTIVE SLEEP APNEA SYNDROME: ICD-10-CM

## 2021-01-13 DIAGNOSIS — E11.3293 TYPE 2 DIABETES MELLITUS WITH BOTH EYES AFFECTED BY MILD NONPROLIFERATIVE RETINOPATHY WITHOUT MACULAR EDEMA, WITH LONG-TERM CURRENT USE OF INSULIN (HCC): ICD-10-CM

## 2021-01-13 DIAGNOSIS — F31.61 BIPOLAR DISORDER, CURRENT EPISODE MIXED, MILD (HCC): ICD-10-CM

## 2021-01-13 DIAGNOSIS — Z79.4 TYPE 2 DIABETES MELLITUS WITH BOTH EYES AFFECTED BY MILD NONPROLIFERATIVE RETINOPATHY WITHOUT MACULAR EDEMA, WITH LONG-TERM CURRENT USE OF INSULIN (HCC): ICD-10-CM

## 2021-01-13 PROCEDURE — 82044 UR ALBUMIN SEMIQUANTITATIVE: CPT | Performed by: FAMILY MEDICINE

## 2021-01-13 PROCEDURE — 99214 OFFICE O/P EST MOD 30 MIN: CPT | Performed by: FAMILY MEDICINE

## 2021-01-13 RX ORDER — SERTRALINE HYDROCHLORIDE 100 MG/1
100 TABLET, FILM COATED ORAL DAILY
Qty: 30 TABLET | Refills: 2 | Status: SHIPPED | OUTPATIENT
Start: 2021-01-13 | End: 2021-01-28 | Stop reason: SDUPTHER

## 2021-01-13 RX ORDER — OXCARBAZEPINE 150 MG/1
150 TABLET, FILM COATED ORAL 2 TIMES DAILY
Qty: 60 TABLET | Refills: 2 | Status: SHIPPED | OUTPATIENT
Start: 2021-01-13 | End: 2021-01-28 | Stop reason: SDUPTHER

## 2021-01-13 RX ORDER — CLONAZEPAM 0.5 MG/1
TABLET ORAL
Qty: 10 TABLET | Refills: 0 | Status: SHIPPED | OUTPATIENT
Start: 2021-01-13 | End: 2021-02-23 | Stop reason: SDUPTHER

## 2021-01-13 NOTE — TELEPHONE ENCOUNTER
Received APPROVAL for Trulicity 5.9HU/6.7AA pen-injectors through 03/12/2022. Even though approval letter states 3/12/2022, I think [employer] meant 01/13/2022 because the letter also states that the approval is for 12 months. Letter attached. Please advise patient. Thank you.

## 2021-01-13 NOTE — PROGRESS NOTES
She wonders if Vyvanse can be increased; is helping the binge eating but has trouble with focus and staying on task. she is still binging but not as often and not eating as much. She keeps medication secure. DM II:  Compliant with meds. Diet: eats healthy most of the time. Exercise:  Very little. FSBS: fasting 100-110. PP 1430 - 140    Sleep Apnea; Compliant with CPAP. Last pulmonology follow up 9/21/20. Lab Results   Component Value Date     10/15/2020    K 4.5 10/15/2020     10/15/2020    CO2 26 10/15/2020    BUN 25 (H) 10/15/2020    CREATININE 1.1 10/15/2020    GLUCOSE 96 10/15/2020    CALCIUM 9.1 10/15/2020    PROT 6.6 10/15/2020    LABALBU 4.3 10/15/2020    BILITOT 0.8 10/15/2020    ALKPHOS 73 10/15/2020    AST 23 10/15/2020    ALT 43 (H) 10/15/2020    LABGLOM 52 (A) 10/15/2020    GFRAA >60 10/15/2020    AGRATIO 1.9 10/15/2020    GLOB 2.3 10/15/2020        Lab Results   Component Value Date    LABA1C 6.4 10/15/2020     Lab Results   Component Value Date    .0 10/15/2020     TSH   Date Value Ref Range Status   10/15/2020 2.44 0.27 - 4.20 uIU/mL Final   10/04/2019 3.29 0.27 - 4.20 uIU/mL Final   06/20/2019 2.24 0.27 - 4.20 uIU/mL Final   01/23/2013 4.45 0.35 - 5.5 uIU/ml Final   09/30/2011 3.49 0.35 - 5.5 uIU/ml Final     Lab Results   Component Value Date    WBC 7.1 01/15/2020    HGB 14.1 01/15/2020    HCT 41.8 01/15/2020    MCV 93.3 01/15/2020     01/15/2020     Lab Results   Component Value Date    [id number] 676 10/15/2020        Outpatient Medications Marked as Taking for the 1/13/21 encounter (Office Visit) with Neeta Bowers MD   Medication Sig Dispense Refill    FARXIGA 10 MG tablet TAKE 1 TABLET BY MOUTH IN  THE MORNING 90 tablet 1    pramipexole (MIRAPEX) 0.5 MG tablet TAKE 2 TABLETS BY MOUTH AT  NIGHT 180 tablet 3    TRULICITY 1.5 IP/5.2KN SOPN INJECT THE CONTENTS OF 1  PEN (1.5MG) INTO THE SKIN  ONCE WEEKLY AS DIRECTED .  12 pen 1  Pedal edema    FH: melanoma    Essential hypertension    Hiatal hernia with GERD and esophagitis    Class 2 obesity without serious comorbidity with body mass index (BMI) of 38.0 to 38.9 in adult    Type 2 diabetes mellitus with mild nonproliferative retinopathy of both eyes without macular edema (HCC)    Panic attacks       Past Medical History:   Diagnosis Date    Anxiety     Chronic gastric ulcer without hemorrhage and without perforation     CTS (carpal tunnel syndrome) 2/10/2015    Depression     Diabetes mellitus [facility]     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 ([facility] 1/29/2016    DUB (dysfunctional uterine bleeding) 8/22/2019    ETD (eustachian tube dysfunction) 5/16/2014    Herpes simplex     Lumbar strain 4/19/2017    PCOS (polycystic ovarian syndrome)     Sleep apnea     TMJ syndrome        Past Surgical History:   Procedure Laterality Date    ENDOSCOPY, COLON, DIAGNOSTIC      FINGER TRIGGER RELEASE Right 6/12/2020    RIGHT INDEX FINGER TRIGGER FINGER RELEASE performed by Eliud Lott MD at [address] Right 7/1/2020    INCISION AND DRAINAGE OF RIGHT HAND WOUND; SECONDARY CLOSURE OF WOUND performed by Eliud Lott MD at [address] [facility]  05/25/2018    EGD    IA LAP,STOMACH,OTHER,W/O TUBE N/A 7/31/2018    ROBOTIC ASSISTED LAPAROSCOPIC SLEEVE GASTRECTOMY  performed by Janina Rodriguez DO at [address] [place] [address] History   Problem Relation Age of Onset    Cervical Cancer Mother     Diabetes Father     Stroke Father         25s    Hypertension Father     Coronary Art Dis Father     Other Father         CHIVO    Cancer Father         melanoma    Glaucoma Father     Diabetes Sister     Diabetes Maternal Grandmother     Cancer Maternal Grandmother         melanoma    Arthritis Maternal Grandmother     Diabetes Paternal Grandmother     Stroke Paternal Grandmother        Social History     Tobacco Use    Smoking status: Never Smoker  Smokeless tobacco: Never Used   Substance Use Topics    Alcohol use: Yes     Comment: infrequent, few times a month    Drug use: No            Review of Systems  Review of Systems    Objective:   Physical Exam  Vitals:    01/13/21 1321   BP: (!) 144/72   Pulse: 83   Resp: 14   Temp: 96.6 °F (35.9 °C)   TempSrc: Temporal   SpO2: 100%   Weight: 259 lb (117.5 kg)   Height: 5' 6\" (1.676 m)     Wt Readings from Last 3 Encounters:   01/13/21 259 lb (117.5 kg)   11/04/20 244 lb (110.7 kg)   08/19/20 244 lb (110.7 kg)        Physical Exam  NAD  Skin is warm and dry. Mood and affect are moderately depressed. No agitation or psychomotor retardation. No pressured speech, grandiosity or tangential thoughts. Insight and judgement are intact. Not suicidal.   The neck is supple and free of adenopathy or masses, the thyroid is normal without enlargement or nodules. Chest is clear, no wheezing or rales. Normal symmetric air entry throughout both lung fields. Heart regular with normal rate, no murmer or gallop      Assessment:       Diagnosis Orders   1. Depression with anxiety /BAD clonazePAM (KLONOPIN) 0.5 MG tablet    sertraline (ZOLOFT) 100 MG tablet    Stop PRozac and resume Zoloft. Refer to Dr. Aroldo العراقي    Off work the rest of this week to get started on Zoloft   Trileptal   Consider psych referral  Risk karen with SSRI addressed   2. Panic attacks  sertraline (ZOLOFT) 100 MG tablet  Refer to Dr. Aroldo العراقي   3. Type 2 diabetes mellitus with both eyes affected by mild nonproliferative retinopathy without macular edema, with long-term current use of insulin (HCC)  HM DIABETES FOOT EXAM  Well controlled. POCT microalbumin   4. Obstructive sleep apnea syndrome  Complaint with CPAP   5.  Binge eating  lisdexamfetamine (VYVANSE) 70 MG capsule  Increase dose  Discussed diet, exercise and weight loss strategies           Plan: Side effects of current medications reviewed and questions answered. Follow up in 4 weeks or prn. Evisit in 2 weeks.

## 2021-01-13 NOTE — LETTER
[address] [address],[facility]  Phone: [phone]  Fax: [phone]    Dwan Sorensen MD        January 13, 2021     Patient: Zion Villarreal   YOB: 1970   Date of Visit: 1/13/2021       To Whom It May Concern: It is my medical opinion that Meliton Halsted is unable to work 1/12/21 - 1/17/21. She may return to work without restriction on 1/18/21. If you have any questions or concerns, please don't hesitate to call.     Sincerely,        Dawn Sorensen MD

## 2021-01-18 ENCOUNTER — PATIENT MESSAGE (OUTPATIENT)
Dept: FAMILY MEDICINE CLINIC | Age: 51
End: 2021-01-18

## 2021-01-19 DIAGNOSIS — F41.8 DEPRESSION WITH ANXIETY: Chronic | ICD-10-CM

## 2021-01-19 DIAGNOSIS — E11.9 TYPE 2 DIABETES MELLITUS WITHOUT COMPLICATION (HCC): ICD-10-CM

## 2021-01-21 RX ORDER — INSULIN GLARGINE 100 [IU]/ML
40 INJECTION, SOLUTION SUBCUTANEOUS NIGHTLY
Qty: 15 PEN | Refills: 3 | Status: SHIPPED | OUTPATIENT
Start: 2021-01-21 | End: 2022-01-17

## 2021-01-21 RX ORDER — CLONAZEPAM 0.5 MG/1
TABLET ORAL
Qty: 10 TABLET | OUTPATIENT
Start: 2021-01-21

## 2021-01-21 RX ORDER — CYCLOBENZAPRINE HCL 10 MG
TABLET ORAL
Qty: 90 TABLET | Refills: 2 | Status: SHIPPED | OUTPATIENT
Start: 2021-01-21 | End: 2021-04-12

## 2021-01-21 RX ORDER — HYDROXYZINE HYDROCHLORIDE 25 MG/1
25 TABLET, FILM COATED ORAL EVERY 6 HOURS PRN
Qty: 30 TABLET | Refills: 1 | Status: SHIPPED | OUTPATIENT
Start: 2021-01-21 | End: 2021-02-23 | Stop reason: SDUPTHER

## 2021-01-21 NOTE — TELEPHONE ENCOUNTER
From: Jonathan Fabry  Sent: 1/21/2021 9:55 AM EST  To: [facility] [address] Staff  Subject: RE: Visit Follow-Up Question    I have had a couple of spells this week! Mostly the last couple of days! Jist start crying or my heart starts racing. I have been tryi.g not to worry! But what if the drugs don't work? What happens if people find out I'm bipolar? What if my boyfriend can't handle it? What if I tell a customer to go f himself. What if I don't want to wash dishes or do laundry or wear makeup! All of these things keep racing in my head. I know I have to give the meds time to work but it feels better to explain it to someone who knows what I am talking about. Unless you go through it or are trained to understand it you really don't have any idea. My therapy is tomorrow hopefully that gives me some assurance. ----- Message -----  From: Derrick Ly MD  Sent: 1/18/21 9:36 AM  To: Eugenia Edwards  Subject: RE: Visit Follow-Up Question    Mendel Santee, I am glad most of the side effects improved. If the the sluggishness persists, let me know. Let me know how you are doing in a week or so. Take care. Dr. Maryellen Montilla         ----- Message -----   From:Eugenia Edwards   Sent:1/18/2021 8:42 AM EST   To:Chandni Mauricio MD   [facility] Portal Dr. Maryellen Montilla, I seem to be handling the medicine switch ok. I did have really bad gas the first couple of nights after dinner. I have had a couple of small short bouts of crying, that made no sense. I'm back at work today and my anxiety is high but I am trying to work through it. I am keeping a journal. Also back on my diet and hopefully to the gym tomorrow.  I did notice on Saturday and Sunday I was sluggish but some of that could be from the new meds and me needing to catch up on some rest.

## 2021-01-22 ENCOUNTER — VIRTUAL VISIT (OUTPATIENT)
Dept: PSYCHOLOGY | Age: 51
End: 2021-01-22
Payer: COMMERCIAL

## 2021-01-22 DIAGNOSIS — F39 MOOD DISORDER (HCC): Primary | ICD-10-CM

## 2021-01-22 DIAGNOSIS — F41.9 ANXIETY DISORDER, UNSPECIFIED TYPE: ICD-10-CM

## 2021-01-22 PROCEDURE — 90791 PSYCH DIAGNOSTIC EVALUATION: CPT | Performed by: PSYCHOLOGIST
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
## 2021-01-22 NOTE — PATIENT INSTRUCTIONS
Goals: 1. Practice diaphragmatic breathing throughout the day  2. Practice being mindful - paying attention to the present moment on purpose and in a nonjudgmental way      MINDFULNESS    Mindfulness means paying attention in a particular way: on purpose, in the present moment, and non-judgmentally. [place]    \"Mindfulness is the basic human ability to be fully present, aware of where we are and what were doing, and not overly reactive or overwhelmed by whats going on around us. \"   -Mindful [place]    Paying attention on purpose  Mindfulness involves paying attention on purpose. Mindfulness involves a conscious direction of our awareness. This can mean purposefully directing our attention to the breath, or a particular emotion, or an activity as simple as eating. Doing so allows us to actively shape the mind. Paying attention in the present moment  Left to itself, the mind wanders through all kinds of thoughts  including thoughts expressing anger, craving, depression, self-pity, and anxiety. As we indulge in these kinds of thoughts, we reinforce those emotions and cause ourselves to suffer. These thoughts usually center around the past or future. But the past no longer exists. The future is just a fantasy until it happens. The one moment we actually can experience  the present moment  is the one we seem most to avoid. By purposefully directing our awareness away from thoughts about the past or future and instead towards the [address] - our present moment experience - we decrease the effect of these thoughts on our lives.     Paying attention non-judgmentally Mindfulness is an emotionally non-reactive state. We don't  that this experience is good and that one is bad. Or, if we do make those judgments, we dont get upset in reaction to our experience. We simply notice it arising, observe it mindfully, and allow it to pass through us. When practicing mindfulness, we may be aware that certain experiences are pleasant and some are unpleasant, but on an emotional level we dont react. Resources  · \"Wherever You Go, There You Are: Mindfulness Meditation in Everyday Life\" - by Sherin Rodriguez  · \"The Miracle of Mindfulness: An Introduction to the Practice of Meditation\" - by Michel Mendiola  · \"The Mindfulness Solution: Everyday Practices for Everyday Problems\" - by Roseanne Barnett    Ways to Practice Mindfulness  · Pay attention to your breathing  · Take a mindful walk  · Eat mindfully  · Ground yourself in your five senses  · Journal  · Try dishwashing, cleaning and doing laundry a little bit slower than you usually do  · Meditate        Diaphragmatic Breathing    \"The entire autonomic nervous system (and through it, our internal organs and glands) is largely driven by our breathing patterns. By changing our breathing we can influence millions of biochemical reactions in our body, producing more relaxing substances such as endorphins and fewer anxiety-producing ones like adrenaline and higher blood acidity. Mindfulness of the breath is so effective that it is common to all meditative and prayer traditions. \" Anxiety Fear & Breathing - Breathing. com    \"When overcoming high levels of anxiety, it is important to learn the techniques of correct breathing. Many people who live with high levels of anxiety are known to breathe through their chest. Shallow breathing through the chest means you are disrupting the balance of oxygen and carbon dioxide necessary to be in a relaxed state. This type of breathing will perpetuate the symptoms of anxiety. \" [employer]. com What Is Diaphragmatic Breathing? Diaphragmatic breathing is a technique that helps you slow down your breathing when feeling stressed or anxious by using your diaphragm muscle to bring about a state of physiological relaxation.  babies naturally breathe this way, and singers, wind instrument players, and yoga practitioners also use this type of breathing. The diaphragm is a large muscle that rests across the bottom of your rib cage. When you inhale, the diaphragm muscle drops, opening up space so air can come in. When watching someone do this, it looks like your stomach is filling with air. This type of breathing helps activate the part of your nervous system that controls relaxation. It can lead to decreased heart rate, blood pressure, decreased muscle tension, and overall feelings of relaxation. Why Is Diaphragmatic Breathing Important? ? Our breathing changes when we are feeling anxious. We tend to take short,  quick, shallow breaths, or even hyperventilate; this is called overbreathing.    ? It is a good idea to learn techniques for managing overbreathing, because this  type of breathing can actually make you feel even more anxious!    ? Diaphragmatic breathing is a great portable tool that you can use whenever you are feeling anxious. However, it does require some practice. Key point: Like other anxiety-management skills, the purpose of calm  breathing is not to avoid anxiety at all costs, but just to take the edge off or  help you ride out the feelings. Why Be Concerned With How Im Breathing?  To increase your awareness of the role that breathing plays in physical tension and your bodys stress response.  To lower your level of stress-related arousal and tension.  To give you a method of taking calm, relaxing breaths to break the cycle of increasing arousal during stressful situations. What Is the Best Way To Use Diaphragmatic Breathing Exercises?  Use diaphragmatic breathing frequently.  Take deep breaths at the first signs of stress, anxiety, physical tension, or other symptoms.  Schedule time for relaxation. How to Do It  Diaphragmatic breathing involves taking smooth, slow, and regular breaths. Sitting upright can increase the capacity of your lungs to fill with air. It is best to 'take the weight' off your shoulders by supporting your arms on the side-arms of a chair, or on your lap. You may also choose to practice breathing while lying down as well. 1. Take a slow breath in through the nose, breathing into your lower belly (for about 4 seconds)   2. Exhale slowly through the mouth (for about 7-8 seconds)     About 6-8 breathing cycles per minute is often helpful to decrease anxiety, but find your  own comfortable breathing rhythm. These cycles regulate the amount of oxygen you  take in so that you do not experience the fainting, tingling, and giddy sensations that are  sometimes associated with overbreathing. Helpful Hints  Make sure that you arent hyperventitating; it is important to pause for a second or two after each breath. Try to breathe from your diaphragm or abdomen. Your shoulders and chest area  should be fairly relaxed and still. If this is challenging at first, it can be helpful to  first try this exercise by lying down on the floor with one hand on your heart, the  other hand on your abdomen. Watch the hand on your abdomen rise as you fill  your lungs with air, expanding your chest.     Rules of Practice   Try diaphragmatic breathing for one to two minutes throughout the day. You do not need to be feeling anxious to practice  in fact, at first you  should practice while feeling relatively calm. You need to be comfortable  breathing this way when feeling calm before you can feel comfortable doing it  when anxious. [place] gradually master this skill and feel the benefits! Once you are comfortable with this technique, you will feel more comfortable using it in situations that cause anxiety.

## 2021-01-26 ENCOUNTER — TELEPHONE (OUTPATIENT)
Dept: FAMILY MEDICINE CLINIC | Age: 51
End: 2021-01-26

## 2021-01-26 ENCOUNTER — PATIENT MESSAGE (OUTPATIENT)
Dept: FAMILY MEDICINE CLINIC | Age: 51
End: 2021-01-26

## 2021-01-28 ENCOUNTER — VIRTUAL VISIT (OUTPATIENT)
Dept: FAMILY MEDICINE CLINIC | Age: 51
End: 2021-01-28
Payer: COMMERCIAL

## 2021-01-28 DIAGNOSIS — E66.9 CLASS 2 OBESITY WITHOUT SERIOUS COMORBIDITY WITH BODY MASS INDEX (BMI) OF 38.0 TO 38.9 IN ADULT, UNSPECIFIED OBESITY TYPE: ICD-10-CM

## 2021-01-28 DIAGNOSIS — I10 ESSENTIAL HYPERTENSION: ICD-10-CM

## 2021-01-28 DIAGNOSIS — E78.00 PURE HYPERCHOLESTEROLEMIA: Chronic | ICD-10-CM

## 2021-01-28 DIAGNOSIS — F41.8 DEPRESSION WITH ANXIETY: Primary | Chronic | ICD-10-CM

## 2021-01-28 DIAGNOSIS — G47.61 PERIODIC LIMB MOVEMENT DISORDER: ICD-10-CM

## 2021-01-28 DIAGNOSIS — Z79.4 TYPE 2 DIABETES MELLITUS WITH BOTH EYES AFFECTED BY MILD NONPROLIFERATIVE RETINOPATHY WITHOUT MACULAR EDEMA, WITH LONG-TERM CURRENT USE OF INSULIN (HCC): ICD-10-CM

## 2021-01-28 DIAGNOSIS — E11.3293 TYPE 2 DIABETES MELLITUS WITH BOTH EYES AFFECTED BY MILD NONPROLIFERATIVE RETINOPATHY WITHOUT MACULAR EDEMA, WITH LONG-TERM CURRENT USE OF INSULIN (HCC): ICD-10-CM

## 2021-01-28 DIAGNOSIS — F31.61 BIPOLAR DISORDER, CURRENT EPISODE MIXED, MILD (HCC): ICD-10-CM

## 2021-01-28 DIAGNOSIS — Z11.59 ENCOUNTER FOR HEPATITIS C SCREENING TEST FOR LOW RISK PATIENT: ICD-10-CM

## 2021-01-28 DIAGNOSIS — F41.0 PANIC ATTACKS: ICD-10-CM

## 2021-01-28 LAB — DIABETIC RETINOPATHY: POSITIVE

## 2021-01-28 PROCEDURE — 99214 OFFICE O/P EST MOD 30 MIN: CPT | Performed by: FAMILY MEDICINE

## 2021-01-28 RX ORDER — SERTRALINE HYDROCHLORIDE 100 MG/1
100 TABLET, FILM COATED ORAL DAILY
Qty: 90 TABLET | Refills: 1 | Status: SHIPPED | OUTPATIENT
Start: 2021-01-28 | End: 2021-02-10

## 2021-01-28 RX ORDER — ATORVASTATIN CALCIUM 40 MG/1
40 TABLET, FILM COATED ORAL DAILY
Qty: 90 TABLET | Refills: 1 | Status: SHIPPED | OUTPATIENT
Start: 2021-01-28 | End: 2021-08-12

## 2021-01-28 RX ORDER — BUPROPION HYDROCHLORIDE 300 MG/1
300 TABLET ORAL EVERY MORNING
Qty: 90 TABLET | Refills: 3 | Status: SHIPPED | OUTPATIENT
Start: 2021-01-28 | End: 2022-01-13

## 2021-01-28 RX ORDER — PRAMIPEXOLE DIHYDROCHLORIDE 0.5 MG/1
TABLET ORAL
Qty: 180 TABLET | Refills: 3 | Status: SHIPPED | OUTPATIENT
Start: 2021-01-28 | End: 2021-05-03 | Stop reason: ALTCHOICE

## 2021-01-28 RX ORDER — OXCARBAZEPINE 150 MG/1
150 TABLET, FILM COATED ORAL 2 TIMES DAILY
Qty: 180 TABLET | Refills: 3 | Status: SHIPPED | OUTPATIENT
Start: 2021-01-28 | End: 2021-11-05

## 2021-01-28 RX ORDER — ALBUTEROL SULFATE 90 UG/1
2 AEROSOL, METERED RESPIRATORY (INHALATION) EVERY 4 HOURS PRN
Qty: 1 INHALER | Refills: 2 | Status: SHIPPED | OUTPATIENT
Start: 2021-01-28 | End: 2021-02-22

## 2021-01-28 NOTE — PROGRESS NOTES
2021    TELEHEALTH EVALUATION -- Audio/Visual (During [facility]-53 public health emergency)    HPI:    Leilani Jaramillo (:  1970) has requested an audio/video evaluation for the following concern(s):    The primary encounter diagnosis was Type 2 diabetes mellitus with both eyes affected by mild nonproliferative retinopathy without macular edema, with long-term current use of insulin ([address] Diagnoses of Pure hypercholesterolemia, Essential hypertension, Depression with anxiety, Class 2 obesity without serious comorbidity with body mass index (BMI) of 38.0 to 38.9 in adult, unspecified obesity type, and Binge eating were also pertinent to this visit. Overdue mammogram.      Depression with anxiety:  Zoloft was discontinued several months ago when it became ineffective. Prozac was started. It was ineffective so a few weeks ago Zoloft was restarted. She is feeling better back on the Zoloft. Not back to baseline yet but much better. One panic attack since last visit - triggered by work. Is functioning better at work. No mood swings, impulsivity, decreased need for sleep. Sleeping well. Appetite is fine. Not suicidal.   Taking Klonopin a few nights to help with sleep. Effective. And well tolerated. Keeps secure. Takes Atarax x 2 for anxiety - helps a lot. Works better than Klonopin. Well tolerated. Back to work since Tuesday. DM II;   Fasting 100-110. Lunch 130-140. Is making good food choices. Not exercising but plans to go back to the gym. Hypertension: Patient here for follow-up of elevated blood pressure. She is not exercising and is adherent to low salt diet. Blood pressure is not testing t home. Cardiac symptoms none. Patient denies dyspnea and exertional chest pressure/discomfort. Cardiovascular risk factors: diabetes mellitus, dyslipidemia, hypertension and obesity (BMI >= 30 kg/m2). Use of agents associated with hypertension: amphetamines. History of target organ damage: none. Hyperlipidemia:  No new myalgias or GI upset on atorvastatin (Lipitor). Medication compliance: compliant most of the time. Patient is  following a low fat, low cholesterol diet. She is not exercising regularly. Lab Results   Component Value Date    CHOL 97 10/15/2020    TRIG 83 10/15/2020    HDL 59 10/15/2020    [address] 21 10/15/2020     Lab Results   Component Value Date    ALT 43 (H) 10/15/2020    AST 23 10/15/2020        Labs Renal Latest Ref Rng & Units 10/15/2020 6/8/2020 1/15/2020 10/4/2019 6/20/2019   BUN 7 - 20 mg/dL 25(H) 21(H) 20 21(H) 20   Cr 0.6 - 1.1 mg/dL 1.1 1.0 1.1 1.0 1.0   K 3.5 - 5.1 mmol/L 4.5 4.2 4.5 4.3 4.5   Na 136 - 145 mmol/L 142 141 140 142 143     Lab Results   Component Value Date    LABA1C 6.4 10/15/2020     Lab Results   Component Value Date    .0 10/15/2020        Review of Systems    Outpatient Medications Marked as Taking for the 1/28/21 encounter (Virtual Visit) with Erendira Holman MD   Medication Sig Dispense Refill    insulin glargine (LANTUS SOLOSTAR) 100 UNIT/ML injection pen Inject 40 Units into the skin nightly 15 pen 3    cyclobenzaprine (FLEXERIL) 10 MG tablet TAKE 1 TABLET BY MOUTH 3  TIMES DAILY AS NEEDED FOR  MUSCLE SPASM(S) 90 tablet 2    hydrOXYzine (ATARAX) 25 MG tablet Take 1 tablet by mouth every 6 hours as needed for Anxiety 30 tablet 1    clonazePAM (KLONOPIN) 0.5 MG tablet TAKE 1 TABLET BY MOUTH AT  NIGHT AS NEEDED FOR ANXIETY FOR UP TO 30 DAYS.  10 tablet 0  sertraline (ZOLOFT) 100 MG tablet Take 1 tablet by mouth daily 30 tablet 2    lisdexamfetamine (VYVANSE) 70 MG capsule Take 1 capsule by mouth every morning for 90 days. 90 capsule 0    OXcarbazepine (TRILEPTAL) 150 MG tablet Take 1 tablet by mouth 2 times daily 60 tablet 2    FARXIGA 10 MG tablet TAKE 1 TABLET BY MOUTH IN  THE MORNING 90 tablet 1    pramipexole (MIRAPEX) 0.5 MG tablet TAKE 2 TABLETS BY MOUTH AT  NIGHT 180 tablet 3    TRULICITY 1.5 TP/9.0RY SOPN INJECT THE CONTENTS OF 1  PEN (1.5MG) INTO THE SKIN  ONCE WEEKLY AS DIRECTED . 12 pen 1    Melatonin 10 MG TABS Take 10 mg by mouth daily      fluocinonide (LIDEX) 0.05 % external solution Apply topically 2 times daily 1 Bottle 2    atorvastatin (LIPITOR) 40 MG tablet TAKE 1 TABLET BY MOUTH  DAILY 90 tablet 1    albuterol sulfate  (90 Base) MCG/ACT inhaler Inhale 2 puffs into the lungs every 4 hours as needed for Wheezing 1 Inhaler 2    Insulin Pen Needle (B-D UF III MINI PEN NEEDLES) 31G X 5 MM MISC 1 each by Does not apply route daily 100 each 12    buPROPion (WELLBUTRIN XL) 300 MG extended release tablet Take 1 tablet by mouth every morning 90 tablet 3    vitamin B-12 (CYANOCOBALAMIN) 1000 MCG tablet Take 1,000 mcg by mouth daily      MIRENA, 52 MG, IUD 52 mg 1 Dose by Intrauterine route once      Multiple Vitamins-Minerals (BARIATRIC FUSION) CHEW Take 4 tablets by mouth daily       b complex vitamins capsule Take 1 capsule by mouth daily      fluticasone (FLONASE) 50 MCG/ACT nasal spray PLACE TWO SPRAYS IN EACH NOSTRIL ONCE DAILY 1 Bottle 12    aspirin 81 MG EC tablet Take 81 mg by mouth daily.              Social History     Tobacco Use    Smoking status: Never Smoker    Smokeless tobacco: Never Used   Substance Use Topics    Alcohol use: Yes     Comment: infrequent, few times a month    Drug use: No      Patient Active Problem List   Diagnosis    PCOS (polycystic ovarian syndrome)    Pure hypercholesterolemia  Common migraine    Depression with anxiety    Obstructive sleep apnea syndrome    Herpes simplex    Binge eating    Pedal edema    FH: melanoma    Essential hypertension    Hiatal hernia with GERD and esophagitis    Class 2 obesity without serious comorbidity with body mass index (BMI) of 38.0 to 38.9 in adult    Type 2 diabetes mellitus with mild nonproliferative retinopathy of both eyes without macular edema (HCC)    Panic attacks      Allergies   Allergen Reactions    Effexor Xr [Venlafaxine Hcl Er] Other (See Comments)     Didn't respond well to it.    ,   Past Medical History:   Diagnosis Date    Anxiety     Chronic gastric ulcer without hemorrhage and without perforation     CTS (carpal tunnel syndrome) 2/10/2015    Depression     Diabetes mellitus ([facility]     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 ([facility] 1/29/2016    DUB (dysfunctional uterine bleeding) 8/22/2019    ETD (eustachian tube dysfunction) 5/16/2014    Herpes simplex     Lumbar strain 4/19/2017    PCOS (polycystic ovarian syndrome)     Sleep apnea     TMJ syndrome        PHYSICAL EXAMINATION:  [ INSTRUCTIONS:  \"[x]\" Indicates a positive item  \"[]\" Indicates a negative item  -- DELETE ALL ITEMS NOT EXAMINED]  Vital Signs: (As obtained by patient/caregiver or practitioner observation)    Blood pressure-  Heart rate-    Respiratory rate-    Temperature-  Pulse oximetry-   Wt 245 lb    Wt Readings from Last 3 Encounters:   01/13/21 259 lb (117.5 kg)   11/04/20 244 lb (110.7 kg)   08/19/20 244 lb (110.7 kg)     Temp Readings from Last 3 Encounters:   01/13/21 96.6 °F (35.9 °C) (Temporal)   11/04/20 98.1 °F (36.7 °C)   08/05/20 98.1 °F (36.7 °C)     BP Readings from Last 3 Encounters:   01/13/21 (!) 144/72   07/01/20 116/66   06/12/20 128/64     Pulse Readings from Last 3 Encounters:   01/13/21 83   07/01/20 69   06/12/20 68      Constitutional: [x] Appears well-developed and well-nourished [x] No apparent distress [] Abnormal-   Mental status  [x] Alert and awake  [x] Oriented to person/place/time []    Pulmonary/Chest: [x] Respiratory effort normal.  [x] No visualized signs of difficulty breathing or respiratory distress        [] Abnormal-            Skin:        [x] No significant exanthematous lesions or discoloration noted on facial skin         [] Abnormal-            Psychiatric:       [x] Normal Affect [x] No Hallucinations        [] Abnormal-   Mood +, affect is normal, speech appropriate, no agitation or psychomotor retardation. Not suicidal.       Other pertinent observable physical exam findings-     ASSESSMENT/PLAN:  1. Depression with anxiety  Much improved back on Zoloft. Discussed increasing dose if not well controlled in another week or so  - sertraline (ZOLOFT) 100 MG tablet; Take 1 tablet by mouth daily  Dispense: 90 tablet; Refill: 1  - buPROPion (WELLBUTRIN XL) 300 MG extended release tablet; Take 1 tablet by mouth every morning  Dispense: 90 tablet; Refill: 3    2. Panic attacks  Much improved. Continue PRN Atarax. - sertraline (ZOLOFT) 100 MG tablet; Take 1 tablet by mouth daily  Dispense: 90 tablet; Refill: 1    3. Bipolar disorder, current episode depressed, mild ([address]  Well controlled. - OXcarbazepine (TRILEPTAL) 150 MG tablet; Take 1 tablet by mouth 2 times daily  Dispense: 180 tablet; Refill: 3    4. Type 2 diabetes mellitus with both eyes affected by mild nonproliferative retinopathy without macular edema, with long-term current use of insulin (HCC)  Well controlled. Resume exercise as planned    5. Pure hypercholesterolemia  LDL goal <100. Labs before next appt  - atorvastatin (LIPITOR) 40 MG tablet; Take 1 tablet by mouth daily  Dispense: 90 tablet; Refill: 1    6. Essential hypertension  She will get a cuff and start to monitor.   In office appt at follow up to check BPP 7. Class 2 obesity without serious comorbidity with body mass index (BMI) of 38.0 to 38.9 in adult, unspecified obesity type  Weight down. Discussed diet, exercise and weight loss strategies     8. Periodic limb movement disorder    - pramipexole (MIRAPEX) 0.5 MG tablet; TAKE 2 TABLETS BY MOUTH AT  NIGHT  Dispense: 180 tablet; Refill: 3  Side effects of current medications reviewed and questions answered. Follow up in 3 months or prn. No follow-ups on file. Emiliano Bender is a 44 y.o. female being evaluated by a Virtual Visit (video visit) encounter to address concerns as mentioned above. A caregiver was present when appropriate. Due to this being a TeleHealth encounter (During [facility] public health emergency), evaluation of the following organ systems was limited: Vitals/Constitutional/EENT/Resp/CV/GI//MS/Neuro/Skin/Heme-Lymph-Imm. Pursuant to the emergency declaration under the [address] [place] [address] authority and the [employer] Act, this Virtual Visit was conducted with patient's (and/or legal guardian's) consent, to reduce the patient's risk of exposure to COVID-19 and provide necessary medical care. The patient (and/or legal guardian) has also been advised to contact this office for worsening conditions or problems, and seek emergency medical treatment and/or call 911 if deemed necessary. Patient identification was verified at the start of the visit: Yes    Total time spent on this encounter: Not billed by time    Services were provided through a video synchronous discussion virtually to substitute for in-person clinic visit. Patient and provider were located at their individual homes. --Erendira Holman MD on 1/27/2021 at 8:39 PM    An electronic signature was used to authenticate this note.

## 2021-01-29 ENCOUNTER — VIRTUAL VISIT (OUTPATIENT)
Dept: PSYCHOLOGY | Age: 51
End: 2021-01-29
Payer: COMMERCIAL

## 2021-01-29 DIAGNOSIS — F41.9 ANXIETY DISORDER, UNSPECIFIED TYPE: ICD-10-CM

## 2021-01-29 DIAGNOSIS — F39 MOOD DISORDER (HCC): Primary | ICD-10-CM

## 2021-01-29 PROCEDURE — 90832 PSYTX W PT 30 MINUTES: CPT | Performed by: PSYCHOLOGIST

## 2021-01-29 NOTE — PROGRESS NOTES
Behavioral Health Consultation  Junior Young Psy.D. Psychologist  1/29/2021  8:30-9 AM      Time spent with Patient: 30 minutes  This is patient's second [facility] appointment. Reason for Consult: Anxiety, bipolar symptoms  Referring Provider: MD Elena Pepe [address] [place] [address]    TELEHEALTH VISIT -- [address] (During QASEK-77 public health emergency)    Pursuant to the emergency declaration under the [facility] [place] [address] waiver authority and the [employer] Act, this Virtual Visit was conducted, with patient's consent, to reduce the patient's risk of exposure to COVID-19 and provide continuity of care for an established patient. Services were provided through a video synchronous discussion virtually to substitute for in-person clinic visit. Pt gave verbal informed consent to participate in telehealth services. Conducted a risk-benefit analysis and determined that the patient's presenting problems are consistent with the use of telepsychology. Determined that the patient has sufficient knowledge and skills in the use of technology enabling them to adequately benefit from telepsychology. It was determined that this patient was able to be properly treated without an in-person session. Patient verified that they were currently located at the address that was provided during registration.     Verified the following information:  Patient's identification: Yes  Patient location: [address] [place] [address]  Patient's call back number: [phone]  Patient's emergency contact's name and number, as well as permission to contact them if needed:  Extended Emergency Contact Information  Primary Emergency Contact: Noelia Clore, [address] [place] [address] Phone: [phone]  Mobile Phone: [phone]  Relation: Brother/Sister Secondary Emergency Contact: teodoro valenzuela  Home Phone: [phone]  Relation: Other    Provider location: Jose F Red:  Pt reported that she's had bouts of anxiety and tearfulness. Manages via walking away, keeping busy, or going walks. She feels unable to sit still, can't clear her mind. Hasn't returned to the gym yet. Writing in her journal. Debating living situation and relationship moving forward. Father lives in [place], [place] eventually need pt to care for him. Had been on Wellbutrin for years. Recently restarted Zoloft. PCP also prescribed Trileptal and hydroxyzine. Believes Trileptal is helping bc she's sleeping better and focusing better while doing puzzles. O:  Interventions:  -Supportive techniques  -Processed recent experience with managing stress and anxiety. Reinforced her efforts towards self-care.  -Discussed her experience on current medication regimen  -Highlighted potential connection between trauma history and difficulty being still  -Discussed worry mgmt techniques. Provided article How to Worry More Mindfully. A:  MSE:  Appearance: good hygiene  and appropriate attire  Attitude: cooperative, friendly and mild distress  Consciousness: alert  Orientation: oriented to person, place, time, general circumstance  Memory: recent and remote memory intact  Attention/Concentration: intact during session  Psychomotor Activity: normal  Eye Contact: normal  Speech: normal rate and volume, well-articulated  Mood: anxious, dysphoric  Affect: congruent, slightly calmer and brighter  Perception: within normal limits  Thought Content: within normal limits  Thought Process: logical, coherent and goal-directed  Insight: good  Judgment: intact  Ability to understand instructions: Yes  Ability to respond meaningfully: Yes  Morbid Ideation: no   Suicide Assessment: no suicidal ideation, plan, or intent. Appropriate for outpatient / telehealth care at this time.   Homicidal Ideation: no JARET 7 SCORE 1/22/2021   JARET-7 Total Score 21     Interpretation of JARET-7 score: 5-9 = mild anxiety, 10-14 = moderate anxiety, 15+ = severe anxiety. Recommend referral to behavioral health for scores 10 or greater. PHQ Scores 1/22/2021 4/1/2019 7/12/2018 4/19/2017 1/21/2015   PHQ2 Score 4 0 0 0 2   PHQ9 Score 19 0 0 0 2     Interpretation of Total Score Depression Severity: 1-4 = Minimal depression, 5-9 = Mild depression, 10-14 = Moderate depression, 15-19 = Moderately severe depression, 20-27 = Severe depression    Diagnosis:    1. Mood disorder ([facility] [address]    2. Anxiety disorder, unspecified type        Patient Active Problem List   Diagnosis    PCOS (polycystic ovarian syndrome)    Pure hypercholesterolemia    Common migraine    Depression with anxiety    Obstructive sleep apnea syndrome    Herpes simplex    Binge eating    Pedal edema    FH: melanoma    Essential hypertension    Hiatal hernia with GERD and esophagitis    Class 2 obesity without serious comorbidity with body mass index (BMI) of 38.0 to 38.9 in adult    Type 2 diabetes mellitus with mild nonproliferative retinopathy of both eyes without macular edema (HCC)    Panic attacks         Plan:  Pt interventions:  Supportive techniques, Provided Psychoeducation re: worry mgmt techniques, Emphasized self-care as important for managing overall health, Provided handout on How to Worry More Mindfully article and Cognitive strategies to target balanced thinking. Pt Behavioral Change Plan:  Pt set the following goals:  1. Practice diaphragmatic breathing throughout the day  2. Practice being mindful - paying attention to the present moment on purpose and in a nonjudgmental way  3. Try delaying your worries, planning a set worry time for later in the day, and/or worrying for a set amount of time and then shifting to something else when that time period ends    Pt scheduled F/U virtual visit in 2 weeks.

## 2021-01-29 NOTE — PATIENT INSTRUCTIONS
Goals: 1. Practice diaphragmatic breathing throughout the day  2. Practice being mindful - paying attention to the present moment on purpose and in a nonjudgmental way  3. Try delaying your worries, planning a set worry time for later in the day, and/or worrying for a set amount of time and then shifting to something else when that time period ends        How to Worry More Mindfully  It's possible. Here's how to cope when stressful thinking seems constant. Written by Dr. Komal Freedman  Published in [place] [employer]    During times of high-stakes uncertainty, it's normal to stress about potential threats and negative outcomes, and it can be tougher to resist anxious thoughts given that the coronavirus has disrupted the usual ways we comfort ourselves. But getting lost in worries is emotionally depleting, and it interferes with moving forward. That's why it's worth improving how you handle this pesky mental habit. Many of us worry because we feel that it helps us plan. It's tempting to keep unsettling issues top of mind  the same way we review our to-dos  to prepare. Our minds will try to solve a problem, even if it's a problem that can't be solved by us, said Shey Bhakta, a  at the [place] [address], and an author of Worry Less, Live More: The Mindful Way Through [facility].     As a psychologist, some of my clients describe associating worrying with warding off bad outcomes. And it can appear as if that strategy works, since most of our worries never materialize. But none of us can control the future with our thoughts. We may also worry because it feels like a way to do something when we feel helpless. There's some logic in that, and even some science: When we worry, we may feel less afraid, since worrying involves thinking rather than feeling. Usually when you're worrying, you're only partly attending to your thoughts. With concerns stealthily arising, it's easy to feel as if you have a personal threat ticker contaminating your head space at inopportune moments. With worry, as in the rest of life, it can be difficult to perform when multitasking. In the 1980s, Dr. Christian Neville, a professor emeritus in psychology at [facility] and leading expert on anxiety, developed a paradoxical intervention: Encouraging chronic worriers to carve out time to worry. By planning when to worry, you can cut hours of intrusive worrying. When my clients are skeptical about scheduling worry, I tell them it may sound like a simple hack, but the practice hinges on behavioral science. First, it encourages self-monitoring, or tracking when and where your mind wanders. Second, by setting aside a space for worry, you'll be less likely to associate worry with a wide variety of activities. This concept is known as stimulus control: By reducing the amount of time you worry, it's easier to break the all-day worry habit. Finally, deciding to worry for a longer period rather than jumping in and out of half-attended-to thoughts is a form of exposure therapy, a gold standard treatment for anxiety. Experiencing what you fear when you are wanting to avoid allows you to learn that your thoughts and feelings come and go. Kaylee An also come to appreciate that you don't have to worry to manage. The current is not the constant.     Make a worry appointment Rather than recommending the impossible, Don't worry!  I prescribe 20 to 30 minutes of Dr. Sameer Elder concentrated worry time to my clients, encouraging them not to do this right before bed or first thing in the morning, especially if they tend to wake up with a sense of dread. Instead, my clients plan a more constructive time to either try a single session or two, 15-minute ones. If you have an array of worries, you can also set times for specific topics, such as financial stressors or health concerns, limiting your total worry time to half an hour. During your worry period, feel free to list your worries, or, ideally, take steps if your concerns lend themselves to problem solving. Writing down worries ahead of time actually improves problem solving since we're able to perform better when our worries aren't taking up our mental space. A 2011 study led by cognitive scientists Dr. Dania Shane and Dr. Danielle Whitmore highlights this benefit: Students who struggled with academic anxiety took 10 minutes to journal about their fears before a big exam, which significantly improved their performance. After your worry time, postpone worries until your next worry session, just as you might answer your emails in a batch rather than letting them interrupt your flow. When you inevitably catch yourself entertaining pop-up worries, don't blow a loud whistle at yourself  that's not the compassionate stance that will free you. Rather, speak to yourself as a friend would, putting a metaphorical hand on your shoulder to warmly whisper, It's OK, you don't have to do this right now.  Sometimes when I suggest this approach, my clients wonder if, by scheduling worry, they will get themselves stuck in an endless distressing loop. I explain that many people who struggle with obsessive compulsive disorder and find themselves bombarded with disturbing thoughts, like intrusive ideas of harming a loved one, are encouraged to similarly sit with thoughts to realize that mental events aren't dangerous. I also encourage coming up with a strategy to prevent worry appointments from spiraling into ruminating, like noticing sights and sounds, to return to your current reality. Plan times outside of worry to be fully present    Often, when I ask clients how their worry time is going, they report that they actually start to feel bored by their concerns or that they feel more at peace with difficult circumstances. Some tell me that they forget their worry appointments. I'm always happy to hear this, but I remind them that the goal isn't what happens when they're worrying; it's about being more effective during the rest of their lives. To improve your ability to be in the moment, schedule rituals, like leaving your phone at home and taking a daily walk or savoring your breakfast without jumping from news headlines to work emails. Even if your worry habit tries to interrupt you [facility] if I miss something important?!), mindfulness correlates with improving how you manage whatever life throws your way. Take a study led by two psychologists, Dr. Jesica White and Dr. Rafael Zuleta at the [place], [place]. The researchers divided participants into groups: One group listened to a recording on focused breathing while the other listened to a recording that provoked worrying. Afterward, when both groups viewed negative images, the participants from the worry group responded more negatively versus those who had practiced staying present. In other words, worrying depletes us, while being present facilitates facing challenges. Worry Management     The following strategies may be used to deal with your worries when you feel that they are becoming overwhelming. 1. Worry Place and Time. Set a 30-minute worry period that will take place at the same time and same place each day. Your worry place and time will be:____________________________________________________________________    2. Delay Worry. If you notice you are worrying outside of your scheduled worry time, tell yourself, I have plenty of time to focus on this later. Right now Im just going to be in the moment and notice what Im doing, what others are doing, the environment, and other things I see, hear, or smell.      3. Worry Log. Record all your worries during your worry time on the Worry Log on the following page and then take time to categorize these worries. You can choose categories that are helpful for you. You might organize them by Big Concerns, Medium Concerns, and Small Concerns.  Another option would be to categorize them by content area, such as Work Concerns, Family Concerns, Financial Concerns, and Relationship Concerns.  Any means of categorizing can be used; however, it is important not to use too many categories; usually between three and seven work best. In the next column of your worry log, you can write how you will manage the problem. If the problem is something you have absolutely no control over, you might write down, Im not going to worry about this problem because there is nothing I can do about it right now. 

## 2021-02-01 ENCOUNTER — PATIENT MESSAGE (OUTPATIENT)
Dept: FAMILY MEDICINE CLINIC | Age: 51
End: 2021-02-01

## 2021-02-01 NOTE — TELEPHONE ENCOUNTER
From: Sandrita Thorpe  To: Zara Lobo MD  Sent: 2/1/2021  8:27 AM EST  Subject: Visit Follow-Up Question    I am starting to feel better but still having some restless nights. Also I am having an incontinence problem. I find it hard to make it down the stairs in the morning. Also( this is embarrassing) I seem to release a lot of fluid. I tried smelling it I wasn't sure it was pee. I did some research on the internet and bought some AZO bladder control. The net said it was normal for some women if they have intense orgasms. Is there something else I can do?

## 2021-02-03 ENCOUNTER — PATIENT MESSAGE (OUTPATIENT)
Dept: FAMILY MEDICINE CLINIC | Age: 51
End: 2021-02-03

## 2021-02-03 NOTE — TELEPHONE ENCOUNTER
From: An Rasmussen  To: Charissa Espino MD  Sent: 2/3/2021 12:57 PM EST  Subject: Visit Follow-Up Question    My [place] papers need to be for intermittent. I was off January 12th thru the 16th  and then again 21st, 22nd and 25th. Thwy are sending the papers back to you. I am back to shaking more and also some small bouts of dreams but working through it the beat I can.

## 2021-02-05 NOTE — TELEPHONE ENCOUNTER
Pt message  The incontinence is worse I am barely making it to the bathroom now. Didn't make it a couple of times. The new shantanu seem to be helping. Wilver Guerrero. I got some books on Bipolar and did some studing.     Please advise  Thank you

## 2021-02-08 ENCOUNTER — TELEPHONE (OUTPATIENT)
Dept: FAMILY MEDICINE CLINIC | Age: 51
End: 2021-02-08

## 2021-02-08 DIAGNOSIS — N32.81 OAB (OVERACTIVE BLADDER): Primary | ICD-10-CM

## 2021-02-10 ENCOUNTER — PATIENT MESSAGE (OUTPATIENT)
Dept: FAMILY MEDICINE CLINIC | Age: 51
End: 2021-02-10

## 2021-02-10 DIAGNOSIS — F41.0 PANIC ATTACKS: ICD-10-CM

## 2021-02-10 DIAGNOSIS — F41.8 DEPRESSION WITH ANXIETY: Chronic | ICD-10-CM

## 2021-02-10 RX ORDER — TOLTERODINE 4 MG/1
4 CAPSULE, EXTENDED RELEASE ORAL DAILY
Qty: 30 CAPSULE | Refills: 3 | Status: SHIPPED | OUTPATIENT
Start: 2021-02-10 | End: 2021-06-23

## 2021-02-10 RX ORDER — SERTRALINE HYDROCHLORIDE 100 MG/1
150 TABLET, FILM COATED ORAL DAILY
Qty: 135 TABLET | Refills: 1 | Status: SHIPPED | OUTPATIENT
Start: 2021-02-10 | End: 2021-02-23

## 2021-02-10 NOTE — TELEPHONE ENCOUNTER
Pt message   I Haven't noticed the shaking as much, but I have had a few crying and sleepless nights. Is it possible to up dosage or to try one of the newer Bipolar / depression medicine.  Just hate feeling like this    Please advise  Thank you

## 2021-02-10 NOTE — TELEPHONE ENCOUNTER
Received DENIAL for Myrbetriq 25MG er tablets. Denial letter attached. Please advise patient. Thank you.

## 2021-02-12 ENCOUNTER — VIRTUAL VISIT (OUTPATIENT)
Dept: PSYCHOLOGY | Age: 51
End: 2021-02-12
Payer: COMMERCIAL

## 2021-02-12 DIAGNOSIS — F41.9 ANXIETY DISORDER, UNSPECIFIED TYPE: ICD-10-CM

## 2021-02-12 DIAGNOSIS — F31.62 BIPOLAR DISORDER, CURRENT EPISODE MIXED, MODERATE (HCC): Primary | ICD-10-CM

## 2021-02-12 PROCEDURE — 90832 PSYTX W PT 30 MINUTES: CPT | Performed by: PSYCHOLOGIST

## 2021-02-12 NOTE — PATIENT INSTRUCTIONS
Goals: 1. Practice diaphragmatic breathing throughout the day  2. Practice being mindful - paying attention to the present moment on purpose and in a nonjudgmental way  3.  Try delaying your worries, planning a set worry time for later in the day, and/or worrying for a set amount of time and then shifting to something else when that time period ends

## 2021-02-12 NOTE — PROGRESS NOTES
Behavioral Health Consultation  Yas Yang Psy.D. Psychologist  2/12/2021  10-10:30 AM      Time spent with Patient: 30 minutes  This is patient's third [facility] appointment. Reason for Consult: Anxiety, bipolar symptoms  Referring Provider: MD Elena Narayanan [facility]    TELEHEALTH VISIT -- [address] (During GAGAD-29 public health emergency)    Pursuant to the emergency declaration under the [address] [place] [address], [facility] waiver authority and the [employer] Act, this Virtual Visit was conducted, with patient's consent, to reduce the patient's risk of exposure to COVID-19 and provide continuity of care for an established patient. Services were provided through a video synchronous discussion virtually to substitute for in-person clinic visit. Pt gave verbal informed consent to participate in telehealth services. Conducted a risk-benefit analysis and determined that the patient's presenting problems are consistent with the use of telepsychology. Determined that the patient has sufficient knowledge and skills in the use of technology enabling them to adequately benefit from telepsychology. It was determined that this patient was able to be properly treated without an in-person session. Patient verified that they were currently located at the address that was provided during registration.     Verified the following information:  Patient's identification: Yes  Patient location: [address] [place] [address]  Patient's call back number: [phone]  Patient's emergency contact's name and number, as well as permission to contact them if needed:  Extended Emergency Contact Information  Primary Emergency Contact: Alva Conception, [address] Phone: [phone]  Mobile Phone: [phone]  Relation: Brother/Sister Secondary Emergency Contact: teodoro valenzuela  Home Phone: [phone]  Relation: Other    Provider location: [facility] Lob:  Pt reported that she's been \"okay, I guess. \" Still have fits of crying and anxiety, which she manages by going to sleep. Somewhat better able to concentrate at work over the past week. Zoloft dose was increased to 150mg today. Manic episodes occur a couple times per week. Writing things down in her guided journal so she can read through and understand them better later. Enjoys cooking; helps her focus and be creative. Finding it helpful to differentiate between what she wants to do vs has to do. Reflected on an internal addison between her parts that argue about productivity vs relaxation time. O:  Interventions:  -Supportive techniques  -Processed recent stressors  -Reinforced her efforts towards self-care via journaling  -Explored parts  -Administered MDQ and discussed probable bipolar diagnosis. Processed reaction. A:  MSE:  Appearance: good hygiene  and appropriate attire  Attitude: cooperative, friendly and mild distress  Consciousness: alert  Orientation: oriented to person, place, time, general circumstance  Memory: recent and remote memory intact  Attention/Concentration: intact during session  Psychomotor Activity: normal  Eye Contact: eyes were closed for much of the visit  Speech: normal rate and volume, well-articulated  Mood: anxious, dysphoric  Affect: congruent, slightly calmer and brighter  Perception: within normal limits  Thought Content: within normal limits  Thought Process: logical, coherent and goal-directed  Insight: good  Judgment: intact  Ability to understand instructions: Yes  Ability to respond meaningfully: Yes  Morbid Ideation: no   Suicide Assessment: no suicidal ideation, plan, or intent. Appropriate for outpatient / telehealth care at this time.   Homicidal Ideation: no    JARET 7 SCORE 1/22/2021   JARET-7 Total Score 21 Interpretation of JARET-7 score: 5-9 = mild anxiety, 10-14 = moderate anxiety, 15+ = severe anxiety. Recommend referral to behavioral health for scores 10 or greater. PHQ Scores 1/22/2021 4/1/2019 7/12/2018 4/19/2017 1/21/2015   PHQ2 Score 4 0 0 0 2   PHQ9 Score 19 0 0 0 2     Interpretation of Total Score Depression Severity: 1-4 = Minimal depression, 5-9 = Mild depression, 10-14 = Moderate depression, 15-19 = Moderately severe depression, 20-27 = Severe depression      Mood Disorder Questionnaire  Has there ever been a period of time when you were not your usual self and:  1. You felt so good or so hyper that other people thought you were not your normal self or you were so hyper that you got into trouble? Yes  2. You were so irritable that you shouted at people or started fights or arguments? Yes  3. You felt much more self-confident than usual? Yes  4. You got much less sleep than usual and found that you didn't really miss it? Yes  5. You were more talkative or spoke much faster than usual? Yes  6. Thoughts raced through your head or you couldn't slow your mind down? Yes  7. You were so easily distracted by things around you that you had trouble concentrating or staying on track? Yes  8. You had more energy than usual? Yes  9. You were much more active or did more things than usual? Yes  10. You were much more social or outgoing than usual, for example, you telephoned friends in the middle of the night? Yes  11. You were much more interested in sex than usual? Yes  15. You did things that were unusual for you or that other people might have thought were excessive, foolish, or risky? Yes  13. Spending money got you or your family in trouble? Yes    Have several of these ever happened during the same period of time? Yes    How much of a problem did any of these cause you-like being unable to work; having family, money or legal troubles; getting into arguments or fights? [ ] No problems     [ ]  Minor problems     [X] Moderate problems     [ ] Serious problems    Scoring (must meet all 3 for positive screen result, indicating possible bipolar disorder): \"Yes\" to 7 or more of numbered items Yes  \"Yes\" to same period of time Yes  \"Moderate\" or \"Serious\" problems Yes      Diagnosis:    1. Bipolar disorder, current episode mixed, moderate ([address]    2. Anxiety disorder, unspecified type        Patient Active Problem List   Diagnosis    PCOS (polycystic ovarian syndrome)    Pure hypercholesterolemia    Common migraine    Depression with anxiety    Obstructive sleep apnea syndrome    Herpes simplex    Binge eating    Pedal edema    FH: melanoma    Essential hypertension    Hiatal hernia with GERD and esophagitis    Class 2 obesity without serious comorbidity with body mass index (BMI) of 38.0 to 38.9 in adult    Type 2 diabetes mellitus with mild nonproliferative retinopathy of both eyes without macular edema (HCC)    Panic attacks         Plan:  Pt interventions:  Supportive techniques, Provided Psychoeducation re: bipolar disorder, Emphasized self-care as important for managing overall health and Cognitive strategies to target balanced thinking. Pt Behavioral Change Plan:  Pt set the following goals:  1. Practice diaphragmatic breathing throughout the day  2. Practice being mindful - paying attention to the present moment on purpose and in a nonjudgmental way  3. Try delaying your worries, planning a set worry time for later in the day, and/or worrying for a set amount of time and then shifting to something else when that time period ends    Pt scheduled F/U virtual visit in 2 weeks.

## 2021-02-21 NOTE — PROGRESS NOTES
Subjective:      Patient ID: Abe Hillman 44 y.o. HPI  [facility] The primary encounter diagnosis was Depression with anxiety. Diagnoses of Class 2 obesity without serious comorbidity with body mass index (BMI) of 38.0 to 38.9 in adult, unspecified obesity type, Binge eating, and Type 2 diabetes mellitus with both eyes affected by mild nonproliferative retinopathy without macular edema, with long-term current use of insulin ([facility]) were also pertinent to this visit. Depression with anxiety, possible BAD:  On Trileptal, Wellbutrin and Zoloft. Seeing Dr. Fide Anne.     Had a bad day yesterday with a customer on the phone being difficult. Cried after that. Took a hydroxyzine and was able to get back to work. Cried on and off the rest of the day. Has a few bad days a week. Missed 1/2 day of work last week and 1 day this week. No karen, racing thoughts, impulsive behavior. She has compulsive scalp picking - much worse recently. Falling to sleep ok but has trouble waking up in the middle of the night and has trouble getting back to sleep. Has a lot of anxiety. The crying yesterday was triggered by a panic attack. She is binge eating again. She feels Vyvanse is not working. She does not find the Vyvanse increases her anxiety or picking. Not suicidal.   Taking Klonopin 1 to 2 times a week at [facility] if she has too much going on in her head. Effective. Takes Hydroxyzine 3 to 4 times; during the day if works better than Klonopin. Has info for mammogram and will schedule. Treatment Adherence:   Medication compliance:  compliant most of the time  Diet compliance:  noncompliant: eats well when cooks for boyfriend 3 to 4 times a week. when by herself she eats junk  Weight trend: fluctuating  Current exercise: no regular exercise  Barriers: lack of motivation    Diabetes Mellitus Type 2: Current symptoms/problems include none. Home blood sugar records: is not testing often  One week ago was 132 after lunch  Any episodes of hypoglycemia? yes - twice in the past few months. Once walking at the mall. Both during the day  Eye exam current (within one year): yes  Tobacco history: She  reports that she has never smoked. She has never used smokeless tobacco.   Daily Aspirin? Yes    Hypertension:  Home blood pressure monitoring: No.  She is not adherent to a low sodium diet. Patient denies chest pain, shortness of breath, headache, lightheadedness and peripheral edema. Antihypertensive medication side effects: no medication side effects noted. Use of agents associated with hypertension: none. Hyperlipidemia:  No new myalgias or GI upset on atorvastatin (Lipitor).        Lab Results   Component Value Date    LABA1C 6.4 10/15/2020    LABA1C 8.0 06/08/2020    LABA1C 7.9 01/15/2020     Lab Results   Component Value Date    LABMICR <1.20 01/15/2020    CREATININE 1.1 10/15/2020     Lab Results   Component Value Date    ALT 43 (H) 10/15/2020    AST 23 10/15/2020     Lab Results   Component Value Date    CHOL 97 10/15/2020    TRIG 83 10/15/2020    HDL 59 10/15/2020    [address] 21 10/15/2020         Outpatient Medications Marked as Taking for the 2/23/21 encounter (Office Visit) with Zara Lobo MD   Medication Sig Dispense Refill    albuterol sulfate  (90 Base) MCG/ACT inhaler USE 2 INHALATIONS BY MOUTH  EVERY 4 HOURS AS NEEDED FOR WHEEZING 20.1 g 0    tolterodine (DETROL LA) 4 MG extended release capsule Take 1 capsule by mouth daily 30 capsule 3    sertraline (ZOLOFT) 100 MG tablet Take 1.5 tablets by mouth daily 135 tablet 1    OXcarbazepine (TRILEPTAL) 150 MG tablet Take 1 tablet by mouth 2 times daily 180 tablet 3    pramipexole (MIRAPEX) 0.5 MG tablet TAKE 2 TABLETS BY MOUTH AT  NIGHT 180 tablet 3    atorvastatin (LIPITOR) 40 MG tablet Take 1 tablet by mouth daily 90 tablet 1  buPROPion (WELLBUTRIN XL) 300 MG extended release tablet Take 1 tablet by mouth every morning 90 tablet 3    insulin glargine (LANTUS SOLOSTAR) 100 UNIT/ML injection pen Inject 40 Units into the skin nightly 15 pen 3    cyclobenzaprine (FLEXERIL) 10 MG tablet TAKE 1 TABLET BY MOUTH 3  TIMES DAILY AS NEEDED FOR  MUSCLE SPASM(S) 90 tablet 2    hydrOXYzine (ATARAX) 25 MG tablet Take 1 tablet by mouth every 6 hours as needed for Anxiety 30 tablet 1    clonazePAM (KLONOPIN) 0.5 MG tablet TAKE 1 TABLET BY MOUTH AT  NIGHT AS NEEDED FOR ANXIETY FOR UP TO 30 DAYS. 10 tablet 0    lisdexamfetamine (VYVANSE) 70 MG capsule Take 1 capsule by mouth every morning for 90 days. 90 capsule 0    FARXIGA 10 MG tablet TAKE 1 TABLET BY MOUTH IN  THE MORNING 90 tablet 1    TRULICITY 1.5 OU/2.1LT SOPN INJECT THE CONTENTS OF 1  PEN (1.5MG) INTO THE SKIN  ONCE WEEKLY AS DIRECTED . 12 pen 1    fluocinonide (LIDEX) 0.05 % external solution Apply topically 2 times daily 1 Bottle 2    Insulin Pen Needle (B-D UF III MINI PEN NEEDLES) 31G X 5 MM MISC 1 each by Does not apply route daily 100 each 12    vitamin B-12 (CYANOCOBALAMIN) 1000 MCG tablet Take 1,000 mcg by mouth daily      MIRENA, 52 MG, IUD 52 mg 1 Dose by Intrauterine route once      Multiple Vitamins-Minerals (BARIATRIC FUSION) CHEW Take 4 tablets by mouth daily       b complex vitamins capsule Take 1 capsule by mouth daily      fluticasone (FLONASE) 50 MCG/ACT nasal spray PLACE TWO SPRAYS IN EACH NOSTRIL ONCE DAILY 1 Bottle 12    aspirin 81 MG EC tablet Take 81 mg by mouth daily. Allergies   Allergen Reactions    Effexor Xr [Venlafaxine Hcl Er] Other (See Comments)     Didn't respond well to it.          Patient Active Problem List   Diagnosis    PCOS (polycystic ovarian syndrome)    Pure hypercholesterolemia    Common migraine    Depression with anxiety    Obstructive sleep apnea syndrome    Herpes simplex    Binge eating    Pedal edema  FH: melanoma    Essential hypertension    Hiatal hernia with GERD and esophagitis    Class 2 obesity without serious comorbidity with body mass index (BMI) of 38.0 to 38.9 in adult    Type 2 diabetes mellitus with mild nonproliferative retinopathy of both eyes without macular edema (HCC)    Panic attacks        Past Medical History:   Diagnosis Date    Anxiety     Chronic gastric ulcer without hemorrhage and without perforation     CTS (carpal tunnel syndrome) 2/10/2015    Depression     Diabetes mellitus ([facility]     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 ([facility] 1/29/2016    DUB (dysfunctional uterine bleeding) 8/22/2019    ETD (eustachian tube dysfunction) 5/16/2014    Herpes simplex     Lumbar strain 4/19/2017    PCOS (polycystic ovarian syndrome)     Sleep apnea     TMJ syndrome        Past Surgical History:   Procedure Laterality Date    ENDOSCOPY, COLON, DIAGNOSTIC      FINGER TRIGGER RELEASE Right 6/12/2020    RIGHT INDEX FINGER TRIGGER FINGER RELEASE performed by Suzan Kelley MD at [address] Right 7/1/2020    INCISION AND DRAINAGE OF RIGHT HAND WOUND; SECONDARY CLOSURE OF WOUND performed by Suzan Kelley MD at [id number] [facility]  05/25/2018    EGD    KS LAP,STOMACH,OTHER,W/O TUBE N/A 7/31/2018    ROBOTIC ASSISTED LAPAROSCOPIC SLEEVE GASTRECTOMY  performed by Arnulfo Trejo DO at [address] OR        Social History     Tobacco Use    Smoking status: Never Smoker    Smokeless tobacco: Never Used   Substance Use Topics    Alcohol use: Yes     Comment: infrequent, few times a month    Drug use: No        Family History   Problem Relation Age of Onset    Cervical Cancer Mother     Diabetes Father     Stroke Father         25s    Hypertension Father     Coronary Art Dis Father     Other Father         CHIVO    Cancer Father         melanoma    Glaucoma Father     Diabetes Sister     Diabetes Maternal Grandmother     Cancer Maternal Grandmother melanoma    Arthritis Maternal Grandmother     Diabetes Paternal Grandmother     Stroke Paternal Grandmother         Review of Systems  Review of Systems    Objective:   Physical Exam  Wt Readings from Last 3 Encounters:   01/13/21 259 lb (117.5 kg)   11/04/20 244 lb (110.7 kg)   08/19/20 244 lb (110.7 kg)     Temp Readings from Last 3 Encounters:   02/23/21 97.4 °F (36.3 °C) (Temporal)   01/13/21 96.6 °F (35.9 °C) (Temporal)   11/04/20 98.1 °F (36.7 °C)     BP Readings from Last 3 Encounters:   02/23/21 130/66   01/13/21 (!) 144/72   07/01/20 116/66     Pulse Readings from Last 3 Encounters:   02/23/21 101   01/13/21 83   07/01/20 69      NAD   Skin is warm and dry. Well hydrated. No rash. Mood and affect are moderately anxious and mild depression. No agitation or psychomotor retardation. Not suicidal.   No racing thoughts, delusions, grandiosity, pressures speech. The neck is supple and free of adenopathy or masses, the thyroid is normal without enlargement or nodules. Chest: clear with no wheezes or rales. No retractions, or use of accessory muscles noted. Cardiovascular: PMI is not displaced, and no thrill noted. Regular rate and rhythm with no rub, murmur or gallop. No peripheral edema. The abdomen is soft without tenderness, guarding, mass, rebound or organomegaly. Aorta, femoral, DP and PT pulses intact. Sensation normal to monofilament feet bilaterally. Feet in good repair, without significant callouses and without ulceration or deformity. Assessment / Plan:        Diagnosis Orders   1. Depression with anxiety  clonazePAM (KLONOPIN) 0.5 MG tablet    sertraline (ZOLOFT) 100 MG tablet  Not controlled  Increase ZOloft ot 200 mg  Continue couseling. mychart follow up in 2 weeks   2.  Class 2 obesity without serious comorbidity with body mass index (BMI) of 38.0 to 38.9 in adult, unspecified obesity type  Discussed diet, exercise and weight loss strategies 3. Binge eating  Hold Vyvanse for now; consider retrial in a few weeks. 4. Type 2 diabetes mellitus with both eyes affected by mild nonproliferative retinopathy without macular edema, with long-term current use of insulin ([address]     5. Panic attacks  sertraline (ZOLOFT) 100 MG tablet         Side effects of current medications reviewed and questions answered. Follow up in 3 months or prn. Follow up in 4 weeks or prn.

## 2021-02-22 RX ORDER — ALBUTEROL SULFATE 90 UG/1
AEROSOL, METERED RESPIRATORY (INHALATION)
Qty: 20.1 G | Refills: 0 | Status: SHIPPED | OUTPATIENT
Start: 2021-02-22 | End: 2021-03-31

## 2021-02-23 ENCOUNTER — OFFICE VISIT (OUTPATIENT)
Dept: FAMILY MEDICINE CLINIC | Age: 51
End: 2021-02-23
Payer: COMMERCIAL

## 2021-02-23 VITALS
DIASTOLIC BLOOD PRESSURE: 66 MMHG | TEMPERATURE: 97.4 F | HEART RATE: 101 BPM | RESPIRATION RATE: 16 BRPM | BODY MASS INDEX: 41.8 KG/M2 | HEIGHT: 66 IN | SYSTOLIC BLOOD PRESSURE: 130 MMHG | OXYGEN SATURATION: 98 %

## 2021-02-23 DIAGNOSIS — E66.9 CLASS 2 OBESITY WITHOUT SERIOUS COMORBIDITY WITH BODY MASS INDEX (BMI) OF 38.0 TO 38.9 IN ADULT, UNSPECIFIED OBESITY TYPE: ICD-10-CM

## 2021-02-23 DIAGNOSIS — F41.0 PANIC ATTACKS: ICD-10-CM

## 2021-02-23 DIAGNOSIS — F41.8 DEPRESSION WITH ANXIETY: Primary | Chronic | ICD-10-CM

## 2021-02-23 DIAGNOSIS — Z79.4 TYPE 2 DIABETES MELLITUS WITH BOTH EYES AFFECTED BY MILD NONPROLIFERATIVE RETINOPATHY WITHOUT MACULAR EDEMA, WITH LONG-TERM CURRENT USE OF INSULIN (HCC): ICD-10-CM

## 2021-02-23 DIAGNOSIS — E11.3293 TYPE 2 DIABETES MELLITUS WITH BOTH EYES AFFECTED BY MILD NONPROLIFERATIVE RETINOPATHY WITHOUT MACULAR EDEMA, WITH LONG-TERM CURRENT USE OF INSULIN (HCC): ICD-10-CM

## 2021-02-23 DIAGNOSIS — R63.2 BINGE EATING: Chronic | ICD-10-CM

## 2021-02-23 PROCEDURE — 99214 OFFICE O/P EST MOD 30 MIN: CPT | Performed by: FAMILY MEDICINE

## 2021-02-23 RX ORDER — CLONAZEPAM 0.5 MG/1
TABLET ORAL
Qty: 10 TABLET | Refills: 0 | Status: SHIPPED | OUTPATIENT
Start: 2021-02-23 | End: 2021-03-10

## 2021-02-23 RX ORDER — SERTRALINE HYDROCHLORIDE 100 MG/1
200 TABLET, FILM COATED ORAL DAILY
Qty: 135 TABLET | Refills: 1
Start: 2021-02-23 | End: 2021-03-11 | Stop reason: DRUGHIGH

## 2021-02-23 RX ORDER — HYDROXYZINE HYDROCHLORIDE 25 MG/1
25 TABLET, FILM COATED ORAL EVERY 6 HOURS PRN
Qty: 30 TABLET | Refills: 1 | Status: SHIPPED | OUTPATIENT
Start: 2021-02-23 | End: 2021-03-10

## 2021-02-26 ENCOUNTER — VIRTUAL VISIT (OUTPATIENT)
Dept: PSYCHOLOGY | Age: 51
End: 2021-02-26
Payer: COMMERCIAL

## 2021-02-26 DIAGNOSIS — F42.4 HABITUAL SELF-EXCORIATION: ICD-10-CM

## 2021-02-26 DIAGNOSIS — F31.32 BIPOLAR AFFECTIVE DISORDER, CURRENTLY DEPRESSED, MODERATE (HCC): Primary | ICD-10-CM

## 2021-02-26 DIAGNOSIS — F41.9 ANXIETY DISORDER, UNSPECIFIED TYPE: ICD-10-CM

## 2021-02-26 PROCEDURE — 90832 PSYTX W PT 30 MINUTES: CPT | Performed by: PSYCHOLOGIST

## 2021-02-26 NOTE — PATIENT INSTRUCTIONS
Goals: 1. Practice diaphragmatic breathing throughout the day  2. Practice being mindful - paying attention to the present moment on purpose and in a nonjudgmental way  3. Try delaying your worries, planning a set worry time for later in the day, and/or worrying for a set amount of time and then shifting to something else when that time period ends  4. To help you fall asleep, try the Counting One exercise. Close your eyes. Take slow breaths. Each time you exhale, repeat \"one\" to yourself in your mind, drawing the word out to match your exhale. You are not counting, just saying \"one\" to yourself.   5. To help you not pick at your scalp, consider the following: wear a hat, wear a glove, tape your fingernails, occupy your hands by coloring or writing

## 2021-03-09 DIAGNOSIS — F41.8 DEPRESSION WITH ANXIETY: Chronic | ICD-10-CM

## 2021-03-10 RX ORDER — HYDROXYZINE HYDROCHLORIDE 25 MG/1
25 TABLET, FILM COATED ORAL EVERY 6 HOURS PRN
Qty: 60 TABLET | Refills: 2 | Status: SHIPPED | OUTPATIENT
Start: 2021-03-10 | End: 2021-04-12

## 2021-03-10 RX ORDER — CLONAZEPAM 0.5 MG/1
TABLET ORAL
Qty: 10 TABLET | Refills: 0 | Status: SHIPPED | OUTPATIENT
Start: 2021-03-10 | End: 2021-03-31

## 2021-03-11 ENCOUNTER — VIRTUAL VISIT (OUTPATIENT)
Dept: FAMILY MEDICINE CLINIC | Age: 51
End: 2021-03-11
Payer: COMMERCIAL

## 2021-03-11 DIAGNOSIS — R63.2 BINGE EATING: Chronic | ICD-10-CM

## 2021-03-11 DIAGNOSIS — J30.1 SEASONAL ALLERGIC RHINITIS DUE TO POLLEN: ICD-10-CM

## 2021-03-11 DIAGNOSIS — F41.0 PANIC ATTACKS: ICD-10-CM

## 2021-03-11 DIAGNOSIS — F41.8 DEPRESSION WITH ANXIETY: Primary | Chronic | ICD-10-CM

## 2021-03-11 PROCEDURE — 99214 OFFICE O/P EST MOD 30 MIN: CPT | Performed by: FAMILY MEDICINE

## 2021-03-11 RX ORDER — SERTRALINE HYDROCHLORIDE 100 MG/1
150 TABLET, FILM COATED ORAL DAILY
Qty: 135 TABLET | Refills: 1 | Status: SHIPPED
Start: 2021-03-11 | End: 2021-04-12 | Stop reason: DRUGHIGH

## 2021-03-11 RX ORDER — MONTELUKAST SODIUM 10 MG/1
10 TABLET ORAL DAILY
Qty: 30 TABLET | Refills: 5 | Status: SHIPPED | OUTPATIENT
Start: 2021-03-11

## 2021-03-11 RX ORDER — BUSPIRONE HYDROCHLORIDE 10 MG/1
10 TABLET ORAL 3 TIMES DAILY
Qty: 90 TABLET | Refills: 2 | Status: SHIPPED | OUTPATIENT
Start: 2021-03-11 | End: 2021-06-23

## 2021-03-11 NOTE — PROGRESS NOTES
3/11/2021    TELEHEALTH EVALUATION -- Audio/Visual (During PECGL-65 public health emergency)    HPI:    Joan Dies (:  1970) has requested an audio/video evaluation for the following concern(s):    The primary encounter diagnosis was Depression with anxiety. A diagnosis of Binge eating was also pertinent to this visit. Alexander Brody is here for follow up after increasing Zoloft dose for uncontrolled anxiety and depression 3 weeks ago. Her mood disorder was triggering binge eating and Vyvanse was no longer effective. She is taking PRN Atarax (during the day) or Klonopin ( at [facility] ) for anxiety. She is having few bad days but bad days are just as bad. She feels \"blah\". She continues to have panic attacks but less often and not daily. Atarax is effective. She has not having trouble falling to sleep but wakes up at 3 am and has trouble falling back to sleep. Is eating better and weight is down 10 lbs. Not suicidal.  Energy is still low. No mood swings or karen. Is not binging. Feels too flat on Zoloft 200 mg. Denies diarrhea, sweating, HA  She is seeing a therapist, is not helping. She is thinking about finding a new therapist.    Asthma;  More wheezing and dyspnea with exertion the past few weeks. Sneezing, congestion, PND. Rx: Flonase not helping much. Has used Albuterol a few times and it helps. She notes no rest or HS syptoms.       FSBS:  105 am and 130 pm    Lab Results   Component Value Date     10/15/2020    K 4.5 10/15/2020     10/15/2020    CO2 26 10/15/2020    BUN 25 (H) 10/15/2020    CREATININE 1.1 10/15/2020    GLUCOSE 96 10/15/2020    CALCIUM 9.1 10/15/2020    PROT 6.6 10/15/2020    LABALBU 4.3 10/15/2020    BILITOT 0.8 10/15/2020    ALKPHOS 73 10/15/2020    AST 23 10/15/2020    ALT 43 (H) 10/15/2020    LABGLOM 52 (A) 10/15/2020    GFRAA >60 10/15/2020    AGRATIO 1.9 10/15/2020    GLOB 2.3 10/15/2020        Lab Results   Component Value Date    LABA1C 6.4 10/15/2020     Lab Results   Component Value Date    .0 10/15/2020     TSH   Date Value Ref Range Status   10/15/2020 2.44 0.27 - 4.20 uIU/mL Final   10/04/2019 3.29 0.27 - 4.20 uIU/mL Final   06/20/2019 2.24 0.27 - 4.20 uIU/mL Final   01/23/2013 4.45 0.35 - 5.5 uIU/ml Final   09/30/2011 3.49 0.35 - 5.5 uIU/ml Final     Lab Results   Component Value Date    WBC 7.1 01/15/2020    HGB 14.1 01/15/2020    HCT 41.8 01/15/2020    MCV 93.3 01/15/2020     01/15/2020        Review of Systems    Outpatient Medications Marked as Taking for the 3/11/21 encounter (Virtual Visit) with Duran Dempsey MD   Medication Sig Dispense Refill    clonazePAM (KLONOPIN) 0.5 MG tablet TAKE 1 TABLET BY MOUTH AT  NIGHT AS NEEDED FOR ANXIETY (30 DAY SUPPLY) 10 tablet 0    hydrOXYzine (ATARAX) 25 MG tablet TAKE 1 TABLET BY MOUTH  EVERY 6 HOURS AS NEEDED FOR ANXIETY 60 tablet 2    sertraline (ZOLOFT) 100 MG tablet Take 2 tablets by mouth daily 135 tablet 1    albuterol sulfate  (90 Base) MCG/ACT inhaler USE 2 INHALATIONS BY MOUTH  EVERY 4 HOURS AS NEEDED FOR WHEEZING 20.1 g 0    tolterodine (DETROL LA) 4 MG extended release capsule Take 1 capsule by mouth daily 30 capsule 3    OXcarbazepine (TRILEPTAL) 150 MG tablet Take 1 tablet by mouth 2 times daily 180 tablet 3    pramipexole (MIRAPEX) 0.5 MG tablet TAKE 2 TABLETS BY MOUTH AT  NIGHT 180 tablet 3    atorvastatin (LIPITOR) 40 MG tablet Take 1 tablet by mouth daily 90 tablet 1    buPROPion (WELLBUTRIN XL) 300 MG extended release tablet Take 1 tablet by mouth every morning 90 tablet 3    insulin glargine (LANTUS SOLOSTAR) 100 UNIT/ML injection pen Inject 40 Units into the skin nightly 15 pen 3    cyclobenzaprine (FLEXERIL) 10 MG tablet TAKE 1 TABLET BY MOUTH 3  TIMES DAILY AS NEEDED FOR  MUSCLE SPASM(S) 90 tablet 2    FARXIGA 10 MG tablet TAKE 1 TABLET BY MOUTH IN  THE MORNING 90 tablet 1    TRULICITY 1.5 WG/9.7OE SOPN INJECT THE CONTENTS OF 1  PEN (1.5MG) INTO THE SKIN  ONCE WEEKLY AS DIRECTED . 12 pen 1    fluocinonide (LIDEX) 0.05 % external solution Apply topically 2 times daily 1 Bottle 2    Insulin Pen Needle (B-D UF III MINI PEN NEEDLES) 31G X 5 MM MISC 1 each by Does not apply route daily 100 each 12    vitamin B-12 (CYANOCOBALAMIN) 1000 MCG tablet Take 1,000 mcg by mouth daily      MIRENA, 52 MG, IUD 52 mg 1 Dose by Intrauterine route once      Multiple Vitamins-Minerals (BARIATRIC FUSION) CHEW Take 4 tablets by mouth daily       b complex vitamins capsule Take 1 capsule by mouth daily      fluticasone (FLONASE) 50 MCG/ACT nasal spray PLACE TWO SPRAYS IN EACH NOSTRIL ONCE DAILY 1 Bottle 12    aspirin 81 MG EC tablet Take 81 mg by mouth daily.             Social History     Tobacco Use    Smoking status: Never Smoker    Smokeless tobacco: Never Used   Substance Use Topics    Alcohol use: Yes     Comment: infrequent, few times a month    Drug use: No        Allergies   Allergen Reactions    Effexor Xr [Venlafaxine Hcl Er] Other (See Comments)     Didn't respond well to it.    ,   Past Medical History:   Diagnosis Date    Anxiety     Chronic gastric ulcer without hemorrhage and without perforation     CTS (carpal tunnel syndrome) 2/10/2015    Depression     Diabetes mellitus ([facility]     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 ([facility] [facility] 1/29/2016    DUB (dysfunctional uterine bleeding) 8/22/2019    ETD (eustachian tube dysfunction) 5/16/2014    Herpes simplex     Lumbar strain 4/19/2017    PCOS (polycystic ovarian syndrome)     Sleep apnea     TMJ syndrome        PHYSICAL EXAMINATION:  [ INSTRUCTIONS:  \"[x]\" Indicates a positive item  \"[]\" Indicates a negative item  -- DELETE ALL ITEMS NOT EXAMINED]  Vital Signs: (As obtained by patient/caregiver or practitioner observation)    Blood pressure-  Heart rate-    Respiratory rate-    Temperature-  Pulse oximetry-   Wt 245 lb  Wt Readings from Last 3 Encounters:   01/13/21 259 lb (117.5 kg) 11/04/20 244 lb (110.7 kg)   08/19/20 244 lb (110.7 kg)     Temp Readings from Last 3 Encounters:   02/23/21 97.4 °F (36.3 °C) (Temporal)   01/13/21 96.6 °F (35.9 °C) (Temporal)   11/04/20 98.1 °F (36.7 °C)     BP Readings from Last 3 Encounters:   02/23/21 130/66   01/13/21 (!) 144/72   07/01/20 116/66     Pulse Readings from Last 3 Encounters:   02/23/21 101   01/13/21 83   07/01/20 69      Constitutional: [x] Appears well-developed and well-nourished [x] No apparent distress      [] Abnormal-   Mental status  [x] Alert and awake  [x] Oriented to person/place/time        External Ears [x] Normal     Neck: [x] No visualized mass     Pulmonary/Chest: [x] Respiratory effort normal.  [x] No visualized signs of difficulty breathing or respiratory distress        [] Abnormal-      Musculoskeletal:   [] Normal gait with no signs of ataxia         [] Normal range of motion of neck        [] Abnormal-           Skin:        [x] No significant exanthematous lesions or discoloration noted on facial skin         [] Abnormal-            Psychiatric:       [] Normal Affect        [x] Abnormal- mood and affect are moderately anxious. No agitation or psychomotor retardation. Not suicidal.   Other pertinent observable physical exam findings-     ASSESSMENT/PLAN:  1. Depression with anxiety  Not controlled. Decrease Zoloft back to 150 mg due to side effects and add Buspar. Refer to [employer] [employer] for counseling. Continue exercise   - sertraline (ZOLOFT) 100 MG tablet; Take 1.5 tablets by mouth daily  Dispense: 135 tablet; Refill: 1  - busPIRone (BUSPAR) 10 MG tablet; Take 1 tablet by mouth 3 times daily  Dispense: 90 tablet; Refill: 2    2. Binge eating  Improved, wt down    3. Seasonal allergic rhinitis due to pollen  Follow up if not improved in 3 to 5 days; may need to add steroid inhaler  - montelukast (SINGULAIR) 10 MG tablet; Take 1 tablet by mouth daily  Dispense: 30 tablet; Refill: 5    4.  Panic attacks  Continue

## 2021-03-22 ENCOUNTER — TELEPHONE (OUTPATIENT)
Dept: FAMILY MEDICINE CLINIC | Age: 51
End: 2021-03-22

## 2021-03-25 ENCOUNTER — TELEPHONE (OUTPATIENT)
Dept: FAMILY MEDICINE CLINIC | Age: 51
End: 2021-03-25

## 2021-03-25 DIAGNOSIS — F41.8 DEPRESSION WITH ANXIETY: Primary | Chronic | ICD-10-CM

## 2021-03-25 NOTE — TELEPHONE ENCOUNTER
Pt informed - referral faxed    psych scheduling - end of June -July at this time. Pt states she cannot find a psychiatrist with openings before May.   Asking for your opinion   Please advise  Thank you

## 2021-03-28 NOTE — PROGRESS NOTES
Subjective:      Patient ID: Letty Stone 44 y.o. female. The primary encounter diagnosis was Depression with anxiety. A diagnosis of Panic attacks was also pertinent to this visit. HPI    Shira Pittman is here for follow up on depression, anxiety, panic attacks. Last visit:   Shira Pittman is here for follow up after increasing Zoloft dose for uncontrolled anxiety and depression 3 weeks ago. Her mood disorder was triggering binge eating and Vyvanse was no longer effective. She is taking PRN Atarax (during the day) or Klonopin ( at [facility] ) for anxiety. She is having few bad days but bad days are just as bad. She feels \"blah\". She continues to have panic attacks but less often and not daily. Atarax is effective. She has not having trouble falling to sleep but wakes up at 3 am and has trouble falling back to sleep. Is eating better and weight is down 10 lbs. Not suicidal.  Energy is still low. No mood swings or karen. Is not binging. Feels too flat on Zoloft 200 mg. Denies diarrhea, sweating, HA  She is seeing a therapist, is not helping. She is thinking about finding a new therapist.    At last visit, Zoloft dose was decreased and Buspar was added. 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 Anxiety is high but better with Buspar. Panic attacks 3 times a week. Triggered by minor issues. Depression comes and goes. Tearful every day. Still has trouble with focus and staying on task. Several days last week had more energy and was able to start some projects but never finished anything. She has no decreased need for sleep. She has impulsive spending and \"is in mess because of it\". Eg: decided to rubber stamp and had to buy \"everything on the [place]" but never started to use it. Did the same with wood burning. She has to have everything organized but never uses it. Is better since on Zoloft, not worse.    Most days is down and depressed. Most nights sleeping well with Klonopin and/or Atarax. Appetite was improved (less stress eating ) at the higher dose of Zoloft but is still pretty good. Is not binging. Not suicidal.   No trouble with self care - is showering, etc normally. She is off work until [employer]. She is not ready to go back to work right now due to persistent anxiety and panic. She has been reading books on bipolar and she feels she has bipolar. She is looking for a psychiatrist and for a new therapist.       She notes increase in restless leg syndrome - not better on lower dose of Zoloft. Is not worse since on Buspar. Is post memopausal.  Elevating legs helps.       Outpatient Medications Marked as Taking for the 3/29/21 encounter (Office Visit) with Suyapa Moralez MD   Medication Sig Dispense Refill    montelukast (SINGULAIR) 10 MG tablet Take 1 tablet by mouth daily 30 tablet 5    sertraline (ZOLOFT) 100 MG tablet Take 1.5 tablets by mouth daily 135 tablet 1    busPIRone (BUSPAR) 10 MG tablet Take 1 tablet by mouth 3 times daily 90 tablet 2    clonazePAM (KLONOPIN) 0.5 MG tablet TAKE 1 TABLET BY MOUTH AT  NIGHT AS NEEDED FOR ANXIETY (30 DAY SUPPLY) 10 tablet 0    hydrOXYzine (ATARAX) 25 MG tablet TAKE 1 TABLET BY MOUTH  EVERY 6 HOURS AS NEEDED FOR ANXIETY 60 tablet 2    albuterol sulfate  (90 Base) MCG/ACT inhaler USE 2 INHALATIONS BY MOUTH  EVERY 4 HOURS AS NEEDED FOR WHEEZING 20.1 g 0    tolterodine (DETROL LA) 4 MG extended release capsule Take 1 capsule by mouth daily 30 capsule 3    OXcarbazepine (TRILEPTAL) 150 MG tablet Take 1 tablet by mouth 2 times daily 180 tablet 3    pramipexole (MIRAPEX) 0.5 MG tablet TAKE 2 TABLETS BY MOUTH AT  NIGHT 180 tablet 3    atorvastatin (LIPITOR) 40 MG tablet Take 1 tablet by mouth daily 90 tablet 1    buPROPion (WELLBUTRIN XL) 300 MG extended release tablet Take 1 tablet by mouth every morning 90 tablet 3    insulin glargine (LANTUS SOLOSTAR) 100 UNIT/ML injection pen Inject 40 Units into the skin nightly 15 pen 3    cyclobenzaprine (FLEXERIL) 10 MG tablet TAKE 1 TABLET BY MOUTH 3  TIMES DAILY AS NEEDED FOR  MUSCLE SPASM(S) 90 tablet 2    lisdexamfetamine (VYVANSE) 70 MG capsule Take 1 capsule by mouth every morning for 90 days. 90 capsule 0    FARXIGA 10 MG tablet TAKE 1 TABLET BY MOUTH IN  THE MORNING 90 tablet 1    TRULICITY 1.5 VQ/1.1YR SOPN INJECT THE CONTENTS OF 1  PEN (1.5MG) INTO THE SKIN  ONCE WEEKLY AS DIRECTED . 12 pen 1    fluocinonide (LIDEX) 0.05 % external solution Apply topically 2 times daily 1 Bottle 2    Insulin Pen Needle (B-D UF III MINI PEN NEEDLES) 31G X 5 MM MISC 1 each by Does not apply route daily 100 each 12    vitamin B-12 (CYANOCOBALAMIN) 1000 MCG tablet Take 1,000 mcg by mouth daily      MIRENA, 52 MG, IUD 52 mg 1 Dose by Intrauterine route once      Multiple Vitamins-Minerals (BARIATRIC FUSION) CHEW Take 4 tablets by mouth daily       b complex vitamins capsule Take 1 capsule by mouth daily      fluticasone (FLONASE) 50 MCG/ACT nasal spray PLACE TWO SPRAYS IN EACH NOSTRIL ONCE DAILY 1 Bottle 12    aspirin 81 MG EC tablet Take 81 mg by mouth daily. Allergies   Allergen Reactions    Effexor Xr [Venlafaxine Hcl Er] Other (See Comments)     Didn't respond well to it.         Patient Active Problem List   Diagnosis    PCOS (polycystic ovarian syndrome)    Pure hypercholesterolemia    Common migraine    Depression with anxiety    Obstructive sleep apnea syndrome    Herpes simplex    Binge eating    Pedal edema    FH: melanoma    Essential hypertension    Hiatal hernia with GERD and esophagitis    Class 2 obesity without serious comorbidity with body mass index (BMI) of 38.0 to 38.9 in adult    Type 2 diabetes mellitus with mild nonproliferative retinopathy of both eyes without macular edema (HCC)    Panic attacks       Past Medical History:   Diagnosis Date    Anxiety     Chronic gastric ulcer without hemorrhage and without perforation     CTS (carpal tunnel syndrome) 2/10/2015    Depression     Diabetes [facility]     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 ([facility] 1/29/2016    DUB (dysfunctional uterine bleeding) 8/22/2019    ETD (eustachian tube dysfunction) 5/16/2014    Herpes simplex     Lumbar strain 4/19/2017    PCOS (polycystic ovarian syndrome)     Sleep apnea     TMJ syndrome        Past Surgical History:   Procedure Laterality Date    ENDOSCOPY, COLON, DIAGNOSTIC      FINGER TRIGGER RELEASE Right 6/12/2020    RIGHT INDEX FINGER TRIGGER FINGER RELEASE performed by Bree Beckwith MD at [address] Right 7/1/2020    INCISION AND DRAINAGE OF RIGHT HAND WOUND; SECONDARY CLOSURE OF WOUND performed by Bree Beckwith MD at [facility]  05/25/2018    EGD    IL LAP,STOMACH,OTHER,W/O TUBE N/A 7/31/2018    ROBOTIC ASSISTED LAPAROSCOPIC SLEEVE GASTRECTOMY  performed by Gail West DO at [address] [place] [address] History   Problem Relation Age of Onset    Cervical Cancer Mother     Diabetes Father     Stroke Father         25s    Hypertension Father     Coronary Art Dis Father     Other Father         CHIVO    Cancer Father         melanoma    Glaucoma Father     Diabetes Sister     Diabetes Maternal Grandmother     Cancer Maternal Grandmother         melanoma    Arthritis Maternal Grandmother     Diabetes Paternal Grandmother     Stroke Paternal Grandmother        Social History     Tobacco Use    Smoking status: Never Smoker    Smokeless tobacco: Never Used   Substance Use Topics    Alcohol use: Yes     Comment: infrequent, few times a month    Drug use: No            Review of Systems  Review of Systems    Lab Results   Component Value Date     10/15/2020    K 4.5 10/15/2020    K 4.1 08/01/2018     10/15/2020    CO2 26 10/15/2020    BUN 25 10/15/2020    CREATININE 1.1 10/15/2020    GLUCOSE 96 10/15/2020    CALCIUM 9.1 10/15/2020       Lab Results   Component Value Date    WBC 7.1 01/15/2020    HGB 14.1 01/15/2020    HCT 41.8 01/15/2020    MCV 93.3 01/15/2020     01/15/2020     Lab Results   Component Value Date    IRON 86 06/20/2019    TIBC 374 06/20/2019    FERRITIN 58.7 06/20/2019       Objective:   Physical Exam  Vitals:    03/29/21 0816 03/29/21 0826   BP: (!) 147/73 138/68   Pulse: 91    Resp: 12    Temp: 97.7 °F (36.5 °C)    TempSrc: Temporal    SpO2: 97%    Height: 5' 6\" (1.676 m)      Wt Readings from Last 3 Encounters:   01/13/21 259 lb (117.5 kg)   11/04/20 244 lb (110.7 kg)   08/19/20 244 lb (110.7 kg)        Physical Exam  NAD  Skin is warm and dry. Mood and affect are mildly anxious. No agitation or psychomotor retardation. No pressured speech, grandiosity or tangential thoughts. Insight and judgement are intact. Not suicidal.   The neck is supple and free of adenopathy or masses, the thyroid is normal without enlargement or nodules. Chest is clear, no wheezing or rales. Normal symmetric air entry throughout both lung fields. Heart regular with normal rate, no murmer or gallop       Assessment:       Diagnosis Orders   1. Depression with anxiety  ARIPiprazole (ABILIFY) 2 MG tablet   2. Panic attacks  ARIPiprazole (ABILIFY) 2 MG tablet    rule out BAD II. Add Abilify as will help treatment resistant depression as well. Continue efforts to find psychiatrist and psychologist.       Plan:      Side effects of current medications reviewed and questions answered. MyChart follow up in 2 weeks and in office or VV in 4 weeks. Continue off work - consider return mid-April if [place] helps.

## 2021-03-29 ENCOUNTER — OFFICE VISIT (OUTPATIENT)
Dept: FAMILY MEDICINE CLINIC | Age: 51
End: 2021-03-29
Payer: COMMERCIAL

## 2021-03-29 ENCOUNTER — PATIENT MESSAGE (OUTPATIENT)
Dept: FAMILY MEDICINE CLINIC | Age: 51
End: 2021-03-29

## 2021-03-29 VITALS
HEIGHT: 66 IN | SYSTOLIC BLOOD PRESSURE: 138 MMHG | HEART RATE: 91 BPM | RESPIRATION RATE: 12 BRPM | TEMPERATURE: 97.7 F | OXYGEN SATURATION: 97 % | DIASTOLIC BLOOD PRESSURE: 68 MMHG | BODY MASS INDEX: 41.8 KG/M2

## 2021-03-29 DIAGNOSIS — F41.8 DEPRESSION WITH ANXIETY: Chronic | ICD-10-CM

## 2021-03-29 DIAGNOSIS — F41.0 PANIC ATTACKS: ICD-10-CM

## 2021-03-29 DIAGNOSIS — F41.8 DEPRESSION WITH ANXIETY: Primary | Chronic | ICD-10-CM

## 2021-03-29 PROCEDURE — 99214 OFFICE O/P EST MOD 30 MIN: CPT | Performed by: FAMILY MEDICINE

## 2021-03-29 RX ORDER — ARIPIPRAZOLE 2 MG/1
2 TABLET ORAL DAILY
Qty: 30 TABLET | Refills: 2 | Status: SHIPPED | OUTPATIENT
Start: 2021-03-29 | End: 2021-11-22 | Stop reason: SDUPTHER

## 2021-03-30 DIAGNOSIS — F41.8 DEPRESSION WITH ANXIETY: Chronic | ICD-10-CM

## 2021-03-30 NOTE — TELEPHONE ENCOUNTER
Patient states she feels as if she wants to sit around the cry while on the zoloft 150mg    She has not yet started the abilify, her pharmacy had to order it for her.        JUAN ALBERTO

## 2021-03-31 RX ORDER — HYDROXYZINE HYDROCHLORIDE 25 MG/1
25 TABLET, FILM COATED ORAL EVERY 6 HOURS PRN
Qty: 180 TABLET | OUTPATIENT
Start: 2021-03-31

## 2021-03-31 RX ORDER — ALBUTEROL SULFATE 90 UG/1
AEROSOL, METERED RESPIRATORY (INHALATION)
Qty: 20.1 G | Refills: 0 | Status: SHIPPED | OUTPATIENT
Start: 2021-03-31 | End: 2021-04-12

## 2021-03-31 RX ORDER — CLONAZEPAM 0.5 MG/1
TABLET ORAL
Qty: 10 TABLET | Refills: 0 | Status: SHIPPED | OUTPATIENT
Start: 2021-04-10 | End: 2021-04-12

## 2021-04-01 ENCOUNTER — IMMUNIZATION (OUTPATIENT)
Dept: PRIMARY CARE CLINIC | Age: 51
End: 2021-04-01
Payer: COMMERCIAL

## 2021-04-01 DIAGNOSIS — Z79.4 TYPE 2 DIABETES MELLITUS WITH BOTH EYES AFFECTED BY MILD NONPROLIFERATIVE RETINOPATHY WITHOUT MACULAR EDEMA, WITH LONG-TERM CURRENT USE OF INSULIN (HCC): ICD-10-CM

## 2021-04-01 DIAGNOSIS — E11.3293 TYPE 2 DIABETES MELLITUS WITH BOTH EYES AFFECTED BY MILD NONPROLIFERATIVE RETINOPATHY WITHOUT MACULAR EDEMA, WITH LONG-TERM CURRENT USE OF INSULIN (HCC): ICD-10-CM

## 2021-04-01 DIAGNOSIS — Z11.59 ENCOUNTER FOR HEPATITIS C SCREENING TEST FOR LOW RISK PATIENT: ICD-10-CM

## 2021-04-01 DIAGNOSIS — E78.00 PURE HYPERCHOLESTEROLEMIA: Chronic | ICD-10-CM

## 2021-04-01 PROCEDURE — 0001A COVID-19, PFIZER VACCINE 30MCG/0.3ML DOSE: CPT | Performed by: FAMILY MEDICINE

## 2021-04-01 PROCEDURE — 91300 COVID-19, PFIZER VACCINE 30MCG/0.3ML DOSE: CPT | Performed by: FAMILY MEDICINE

## 2021-04-05 RX ORDER — ROPINIROLE 0.25 MG/1
.25-1 TABLET, FILM COATED ORAL NIGHTLY
Qty: 60 TABLET | Refills: 2 | Status: SHIPPED | OUTPATIENT
Start: 2021-04-05 | End: 2021-05-03 | Stop reason: ALTCHOICE

## 2021-04-05 NOTE — TELEPHONE ENCOUNTER
Patient message -     Hi, the restless leg is horrible, even just sitting to watch TV I can't keep still, I have been sleeping ( when I can sleep) on the loveseat. I have tried everything, compression socks seem to work some but not completely. My moods seem to be a little better although I am still crying alot and have had a couple anxiety attacks. I made an appointment with a CNP bit couldn't get in until the 22nd of April. I also tried a pillow under my legs but that was too hard to keep under my legs.

## 2021-04-07 ENCOUNTER — TELEPHONE (OUTPATIENT)
Dept: FAMILY MEDICINE CLINIC | Age: 51
End: 2021-04-07

## 2021-04-07 NOTE — TELEPHONE ENCOUNTER
Scanned into Media, printed March OV's, and placed in  [facility]' basket.

## 2021-04-08 ENCOUNTER — PATIENT MESSAGE (OUTPATIENT)
Dept: FAMILY MEDICINE CLINIC | Age: 51
End: 2021-04-08

## 2021-04-08 NOTE — TELEPHONE ENCOUNTER
From: Prabha Fails  To: Skyler Prado MD  Sent: 4/8/2021 11:12 AM EDT  Subject: Visit Follow-Up Question    My disability people [employer] sent [employer] some paperwork for you, they need more detailed information. I'm not handling the BS very good. They are making me even crazier.

## 2021-04-11 DIAGNOSIS — F41.8 DEPRESSION WITH ANXIETY: Chronic | ICD-10-CM

## 2021-04-12 RX ORDER — CYCLOBENZAPRINE HCL 10 MG
TABLET ORAL
Qty: 90 TABLET | Refills: 2 | Status: SHIPPED | OUTPATIENT
Start: 2021-04-12 | End: 2021-11-11

## 2021-04-12 RX ORDER — HYDROXYZINE HYDROCHLORIDE 25 MG/1
25 TABLET, FILM COATED ORAL EVERY 6 HOURS PRN
Qty: 180 TABLET | Refills: 1 | Status: SHIPPED | OUTPATIENT
Start: 2021-04-12 | End: 2021-11-05

## 2021-04-12 RX ORDER — SERTRALINE HYDROCHLORIDE 100 MG/1
100 TABLET, FILM COATED ORAL DAILY
Qty: 135 TABLET | Refills: 1 | Status: SHIPPED
Start: 2021-04-12 | End: 2021-10-15

## 2021-04-12 RX ORDER — ALBUTEROL SULFATE 90 UG/1
AEROSOL, METERED RESPIRATORY (INHALATION)
Qty: 20.1 G | Refills: 1 | Status: SHIPPED | OUTPATIENT
Start: 2021-04-12 | End: 2021-11-22 | Stop reason: SDUPTHER

## 2021-04-12 RX ORDER — CLONAZEPAM 0.5 MG/1
TABLET ORAL
Qty: 10 TABLET | Refills: 0 | Status: SHIPPED | OUTPATIENT
Start: 2021-04-12 | End: 2021-10-11

## 2021-04-12 NOTE — TELEPHONE ENCOUNTER
Please advise -     Dr. Yobani Goins , I was wrong, I was not ready to come back to work. I am literally just sitting here crying and staring. I had2 hours sleep. didn't feel tired at all. I am taking 4 of the Re quip 2 hours before bed time and not having a lot of pain in my legs. however, when sitting at my desk for work I have to keep moving and they keep jumping around. I still feel [place] but feel like I am getting there.      Could we extend the leave for another week? Or two. I see nolvia CNP on the 29th and you on the 30th.

## 2021-04-12 NOTE — TELEPHONE ENCOUNTER
Requested Prescriptions     Pending Prescriptions Disp Refills    cyclobenzaprine (FLEXERIL) 10 MG tablet [Pharmacy Med Name: CYCLOBENZAPRINE  10MG  TAB] 90 tablet 2     Sig: TAKE 1 TABLET BY MOUTH 3  TIMES DAILY AS NEEDED FOR  MUSCLE SPASM(S)    albuterol sulfate  (90 Base) MCG/ACT inhaler [Pharmacy Med Name: ALBUTEROL HFA (PV)] 20.1 g 0     Sig: USE 2 INHALATIONS BY MOUTH  EVERY 4 HOURS AS NEEDED FOR WHEEZING    hydrOXYzine (ATARAX) 25 MG tablet [Pharmacy Med Name: HYDROXYZINE HYDROCHLORIDE  25MG  TAB] 180 tablet      Sig: TAKE 1 TABLET BY MOUTH  EVERY 6 HOURS AS NEEDED FOR ANXIETY    clonazePAM (KLONOPIN) 0.5 MG tablet [Pharmacy Med Name: CLONAZEPAM  0.5MG  TAB] 10 tablet      Sig: TAKE 1 TABLET BY MOUTH AT  NIGHT AS NEEDED FOR ANXIETY (30 DAY SUPPLY)     LAST OV 03/29/2021  LAST LAB 10/15/2020  SC CMA

## 2021-04-13 NOTE — TELEPHONE ENCOUNTER
Kelechi from [facility] called, stating that the prescription request that was faxed over to the office was sent back to their office, but blank. He states that the request was not filled out and called to confirm the fax number. He states that he will fax over the prescription request again. Just FYI.

## 2021-04-14 ENCOUNTER — PATIENT MESSAGE (OUTPATIENT)
Dept: FAMILY MEDICINE CLINIC | Age: 51
End: 2021-04-14

## 2021-04-15 ENCOUNTER — TELEPHONE (OUTPATIENT)
Dept: FAMILY MEDICINE CLINIC | Age: 51
End: 2021-04-15

## 2021-04-15 NOTE — TELEPHONE ENCOUNTER
Form received and placed at [facility], [facility] desk

## 2021-04-15 NOTE — TELEPHONE ENCOUNTER
From: Fadi Moreno  To: Arne Saint, MD  Sent: 4/14/2021  7:28 PM EDT  Subject: Visit [place] Dr. Radha Bravo, sorry to be such a pest. However, the  STD people need another form filled out. I sent it over by fax, they need it as soon as possible. I have not been paid yet because they keep finding all of these forms for people to fill out. Again. Sorry.

## 2021-04-16 ENCOUNTER — PATIENT MESSAGE (OUTPATIENT)
Dept: FAMILY MEDICINE CLINIC | Age: 51
End: 2021-04-16

## 2021-04-19 NOTE — TELEPHONE ENCOUNTER
From: Mauricio Raymond MD  To: Lydia Tsai  Sent: 4/16/2021 6:29 PM EDT  Subject: Kathy Perez, I need to know the name and phone number of the therapist you are seeing and the CNP you will be seeing for the disability forms. Take care.  Dr. Holli Mahajan

## 2021-04-22 ENCOUNTER — NURSE ONLY (OUTPATIENT)
Dept: PRIMARY CARE CLINIC | Age: 51
End: 2021-04-22

## 2021-04-22 ENCOUNTER — TELEPHONE (OUTPATIENT)
Dept: PRIMARY CARE CLINIC | Age: 51
End: 2021-04-22

## 2021-04-22 ENCOUNTER — IMMUNIZATION (OUTPATIENT)
Dept: PRIMARY CARE CLINIC | Age: 51
End: 2021-04-22
Payer: COMMERCIAL

## 2021-04-22 DIAGNOSIS — Z23 HIGH PRIORITY FOR COVID-19 VIRUS VACCINATION: Primary | ICD-10-CM

## 2021-04-22 PROCEDURE — 0002A COVID-19, PFIZER VACCINE 30MCG/0.3ML DOSE: CPT | Performed by: NURSE PRACTITIONER

## 2021-04-22 PROCEDURE — 91300 COVID-19, PFIZER VACCINE 30MCG/0.3ML DOSE: CPT | Performed by: NURSE PRACTITIONER

## 2021-04-22 NOTE — TELEPHONE ENCOUNTER
----- Message from Ruba Vargas sent at 2021  1:23 PM EDT -----  Regardinnd dose reschedule request  Caller Name: MaluDave Edwards    Relationship to Patient: ##self    Preferred Call Back Number: ##[phone]    Initial Dose Location: ##[facility]    Additional Notes: ##pt is requesting earliest possible appt for the same day

## 2021-04-22 NOTE — TELEPHONE ENCOUNTER
Please advise    You should receive paperwork for my intermittent fmla. I am out 1/2 of today if you need to know.     Lowering the ziloft seems to have stopped the restless leg. But my moods are still crazy, been crying for two days.

## 2021-04-26 NOTE — TELEPHONE ENCOUNTER
JUAN ALBERTO Green I apologize,  I am not sure where STD got that May 13th. I Went back to work on the 19th. Requested intermittent FMLA to cover any episodes and Dr. Yasmin Figueroa. I got the approval for both in my email yesterday and am working with my HR department to get it straightened out.      I probably could have used some more time off, I'm still having some bad days of just crying about everything.  It sucks.      My CNP appointment is Thursday and I see you on Friday of this next week.      I will follow-up with my HR department on Monday to get this mess figured out.     ZANDER

## 2021-04-27 ENCOUNTER — TELEPHONE (OUTPATIENT)
Dept: FAMILY MEDICINE CLINIC | Age: 51
End: 2021-04-27

## 2021-04-27 NOTE — TELEPHONE ENCOUNTER
Sol Clarke advised. She will keep her appt with Dr. Felipe Golden on Friday and see what the psych NP says about medication on Thursday. I let her know that if she changes her mind and wants to be seen tomorrow, to call back and we will check the schedule for availability.

## 2021-04-27 NOTE — TELEPHONE ENCOUNTER
Patient calling today stating she had taken her zoloft down to ease her restless leg. She started on requip as well. Patient states she has only had a couple of times experiencing restless leg since the changes but now she states her mood is \"blah\". She states she has times of crying and times of not wanting to do anything. She states she has not had thoughts of hurting herself or others. It was suggested that while today is going to be a gorgeous day, she should try to get out for a while. She states that sounded like a good idea. She and her birdMitch will go out for a walk later today. Patient has an appointment this Friday with Dr. Holli Mahajan and this Thursday with her CNP. She was informed this message will be sent to Dr. Holli Mahajan and if it is decided the patient needs to be seen prior to Friday we will give her a call. Otherwise the patient was asked to hold to the current plan and call if she feels she needs to talk or get into the office sooner.      FYI - please advise

## 2021-04-27 NOTE — TELEPHONE ENCOUNTER
I would be happy to see her sooner if needed; I would like to see what the psych NP recommends for her depression

## 2021-04-29 NOTE — PROGRESS NOTES
Subjective:      Patient ID: Enma Ellsworth 44 y.o. female. The primary encounter diagnosis was Depression with anxiety. Diagnoses of Panic attacks, Obstructive sleep apnea syndrome, and Class 2 obesity without serious comorbidity with body mass index (BMI) of 38.0 to 38.9 in adult, unspecified obesity type were also pertinent to this visit. HPI    Depression with anxiety and panic attacks: continues to struggle. Had to decrease dose of Zoloft due to RLS; helped the RLS but depression worsened. Yesterday she [facility] psych [email or web address]. Michelle Downjaqueline  She increased Abilify. Anxiety continues to be problem; not every day but most days. No panic attacks. She has noted episodes of mild karen. She has a few days a week that she feels she does not need to sleep, can stay up and get a lot done. + impulsive spending and increased sexuality at times. No impulsive gambling. Not suicidal. Appetite is increased - stress eating. Bored eating. Is scheduled to go back to work on 5/12/21. [facility] on 5/20. DM II  Labs due; had done this am.   Diet:  Eats healthy meals but is eating unhealthy snacks  Exercise:  Hiking and walking. FSBS  Last tested , fasting 110. Checking infrequently.      Lab Results   Component Value Date     10/15/2020    K 4.5 10/15/2020     10/15/2020    CO2 26 10/15/2020    BUN 25 (H) 10/15/2020    CREATININE 1.1 10/15/2020    GLUCOSE 96 10/15/2020    CALCIUM 9.1 10/15/2020    PROT 6.6 10/15/2020    LABALBU 4.3 10/15/2020    BILITOT 0.8 10/15/2020    ALKPHOS 73 10/15/2020    AST 23 10/15/2020    ALT 43 (H) 10/15/2020    LABGLOM 52 (A) 10/15/2020    GFRAA >60 10/15/2020    AGRATIO 1.9 10/15/2020    GLOB 2.3 10/15/2020      p  Lab Results   Component Value Date    LABA1C 6.4 10/15/2020     Lab Results   Component Value Date    .0 10/15/2020        Outpatient Medications Marked as Taking for the 4/30/21 encounter (Office Visit) with Catrachito Ball MD   Medication Sig Dispense Refill    cyclobenzaprine (FLEXERIL) 10 MG tablet TAKE 1 TABLET BY MOUTH 3  TIMES DAILY AS NEEDED FOR  MUSCLE SPASM(S) 90 tablet 2    albuterol sulfate  (90 Base) MCG/ACT inhaler USE 2 INHALATIONS BY MOUTH  EVERY 4 HOURS AS NEEDED FOR WHEEZING 20.1 g 1    hydrOXYzine (ATARAX) 25 MG tablet TAKE 1 TABLET BY MOUTH  EVERY 6 HOURS AS NEEDED FOR ANXIETY 180 tablet 1    clonazePAM (KLONOPIN) 0.5 MG tablet TAKE 1 TABLET BY MOUTH AT  NIGHT AS NEEDED FOR ANXIETY (30 DAY SUPPLY) 10 tablet 0    sertraline (ZOLOFT) 100 MG tablet Take 1 tablet by mouth daily 135 tablet 1    rOPINIRole (REQUIP) 0.25 MG tablet Take 1-4 tablets by mouth nightly 60 tablet 2    ARIPiprazole (ABILIFY) 2 MG tablet Take 1 tablet by mouth daily (Patient taking differently: Take 5 mg by mouth daily ) 30 tablet 2    montelukast (SINGULAIR) 10 MG tablet Take 1 tablet by mouth daily 30 tablet 5    busPIRone (BUSPAR) 10 MG tablet Take 1 tablet by mouth 3 times daily 90 tablet 2    tolterodine (DETROL LA) 4 MG extended release capsule Take 1 capsule by mouth daily 30 capsule 3    OXcarbazepine (TRILEPTAL) 150 MG tablet Take 1 tablet by mouth 2 times daily 180 tablet 3    pramipexole (MIRAPEX) 0.5 MG tablet TAKE 2 TABLETS BY MOUTH AT  NIGHT 180 tablet 3    atorvastatin (LIPITOR) 40 MG tablet Take 1 tablet by mouth daily 90 tablet 1    buPROPion (WELLBUTRIN XL) 300 MG extended release tablet Take 1 tablet by mouth every morning 90 tablet 3    insulin glargine (LANTUS SOLOSTAR) 100 UNIT/ML injection pen Inject 40 Units into the skin nightly 15 pen 3    FARXIGA 10 MG tablet TAKE 1 TABLET BY MOUTH IN  THE MORNING 90 tablet 1    TRULICITY 1.5 MZ/2.4WE SOPN INJECT THE CONTENTS OF 1  PEN (1.5MG) INTO THE SKIN  ONCE WEEKLY AS DIRECTED .  12 pen 1    fluocinonide (LIDEX) 0.05 % external solution Apply topically 2 times daily 1 Bottle 2    Insulin Pen Needle (B-D UF III MINI PEN NEEDLES) 31G X 5 MM MISC 1 each by Does not apply route daily 100 each 12    vitamin B-12 (CYANOCOBALAMIN) 1000 MCG tablet Take 1,000 mcg by mouth daily      MIRENA, 52 MG, IUD 52 mg 1 Dose by Intrauterine route once      Multiple Vitamins-Minerals (BARIATRIC FUSION) CHEW Take 4 tablets by mouth daily       b complex vitamins capsule Take 1 capsule by mouth daily      fluticasone (FLONASE) 50 MCG/ACT nasal spray PLACE TWO SPRAYS IN EACH NOSTRIL ONCE DAILY 1 Bottle 12    aspirin 81 MG EC tablet Take 81 mg by mouth daily. Allergies   Allergen Reactions    Effexor Xr [Venlafaxine Hcl Er] Other (See Comments)     Didn't respond well to it.         Patient Active Problem List   Diagnosis    PCOS (polycystic ovarian syndrome)    Pure hypercholesterolemia    Common migraine    Depression with anxiety    Obstructive sleep apnea syndrome    Herpes simplex    Binge eating    Pedal edema    FH: melanoma    Essential hypertension    Hiatal hernia with GERD and esophagitis    Class 2 obesity without serious comorbidity with body mass index (BMI) of 38.0 to 38.9 in adult    Type 2 diabetes mellitus with mild nonproliferative retinopathy of both eyes without macular edema (HCC)    Panic attacks       Past Medical History:   Diagnosis Date    Anxiety     Chronic gastric ulcer without hemorrhage and without perforation     CTS (carpal tunnel syndrome) 2/10/2015    Depression     Diabetes mellitus ([address]     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 ([address] 1/29/2016    DUB (dysfunctional uterine bleeding) 8/22/2019    ETD (eustachian tube dysfunction) 5/16/2014    Herpes simplex     Lumbar strain 4/19/2017    PCOS (polycystic ovarian syndrome)     Sleep apnea     TMJ syndrome        Past Surgical History:   Procedure Laterality Date    ENDOSCOPY, COLON, DIAGNOSTIC      FINGER TRIGGER RELEASE Right 6/12/2020    RIGHT INDEX FINGER TRIGGER FINGER RELEASE performed by Eulalia Moreno MD at [place] [address] disorder, mild, hypomanic, with mixed features, in partial remission (HCC)  Increase Abilify as planned. Gene sight testing was ordered by psych. Plan attempt return to work in 2 weeks. May need a few more weeks off for medication adjustment; will see how she is doing in 2 weeks. Follow up psych NP as planned. 2. Depression with anxiety     3. Panic attacks  Improved with Buspar. 4 DM II Labs pending. Continue current r.    5. Class 2 obesity without serious comorbidity with body mass index (BMI) of 38.0 to 38.9 in adult, unspecified obesity type  Discussed diet, exercise and weight loss strategies    6. Binge eating  Addressed; hope           Plan:      Side effects of current medications reviewed and questions answered. Follow up in 3 months or prn.

## 2021-04-30 ENCOUNTER — OFFICE VISIT (OUTPATIENT)
Dept: FAMILY MEDICINE CLINIC | Age: 51
End: 2021-04-30
Payer: COMMERCIAL

## 2021-04-30 VITALS
HEIGHT: 66 IN | TEMPERATURE: 97.3 F | BODY MASS INDEX: 42.59 KG/M2 | SYSTOLIC BLOOD PRESSURE: 134 MMHG | HEART RATE: 78 BPM | WEIGHT: 265 LBS | RESPIRATION RATE: 16 BRPM | DIASTOLIC BLOOD PRESSURE: 74 MMHG | OXYGEN SATURATION: 99 %

## 2021-04-30 DIAGNOSIS — E11.3293 TYPE 2 DIABETES MELLITUS WITH BOTH EYES AFFECTED BY MILD NONPROLIFERATIVE RETINOPATHY WITHOUT MACULAR EDEMA, WITH LONG-TERM CURRENT USE OF INSULIN (HCC): ICD-10-CM

## 2021-04-30 DIAGNOSIS — G47.33 OBSTRUCTIVE SLEEP APNEA SYNDROME: ICD-10-CM

## 2021-04-30 DIAGNOSIS — E66.9 CLASS 2 OBESITY WITHOUT SERIOUS COMORBIDITY WITH BODY MASS INDEX (BMI) OF 38.0 TO 38.9 IN ADULT, UNSPECIFIED OBESITY TYPE: ICD-10-CM

## 2021-04-30 DIAGNOSIS — F41.8 DEPRESSION WITH ANXIETY: Chronic | ICD-10-CM

## 2021-04-30 DIAGNOSIS — R63.2 BINGE EATING: Chronic | ICD-10-CM

## 2021-04-30 DIAGNOSIS — Z79.4 TYPE 2 DIABETES MELLITUS WITH BOTH EYES AFFECTED BY MILD NONPROLIFERATIVE RETINOPATHY WITHOUT MACULAR EDEMA, WITH LONG-TERM CURRENT USE OF INSULIN (HCC): ICD-10-CM

## 2021-04-30 DIAGNOSIS — F41.0 PANIC ATTACKS: ICD-10-CM

## 2021-04-30 DIAGNOSIS — F31.81 BIPOLAR II DISORDER, MILD, HYPOMANIC, WITH MIXED FEATURES, IN PARTIAL REMISSION (HCC): Primary | ICD-10-CM

## 2021-04-30 PROCEDURE — 99214 OFFICE O/P EST MOD 30 MIN: CPT | Performed by: FAMILY MEDICINE

## 2021-05-03 ENCOUNTER — PATIENT MESSAGE (OUTPATIENT)
Dept: FAMILY MEDICINE CLINIC | Age: 51
End: 2021-05-03

## 2021-05-03 DIAGNOSIS — G25.81 RLS (RESTLESS LEGS SYNDROME): Primary | ICD-10-CM

## 2021-05-03 RX ORDER — GABAPENTIN 300 MG/1
300-600 CAPSULE ORAL NIGHTLY
Qty: 60 CAPSULE | Refills: 2 | Status: SHIPPED | OUTPATIENT
Start: 2021-05-03 | End: 2021-05-19 | Stop reason: SDUPTHER

## 2021-05-03 NOTE — TELEPHONE ENCOUNTER
From: [facility]  To: Gato Garibay MD  Sent: 5/3/2021 10:36 AM EDT  Subject: Prescription Question    Dr. Art Garner, I have been taking 4 of the Requip at night for the restless leg, its not working, I having hours of the restlegg at nights and also just when sitting at my desk. Is there an alternative medication we can try?

## 2021-05-04 NOTE — TELEPHONE ENCOUNTER
Patient's message -  Please advise, is patient to d/c Requip? Do I take both the new meds and the old one for the restless leg?

## 2021-05-05 ENCOUNTER — TELEPHONE (OUTPATIENT)
Dept: FAMILY MEDICINE CLINIC | Age: 51
End: 2021-05-05

## 2021-05-05 DIAGNOSIS — Z79.4 TYPE 2 DIABETES MELLITUS WITH BOTH EYES AFFECTED BY MILD NONPROLIFERATIVE RETINOPATHY WITHOUT MACULAR EDEMA, WITH LONG-TERM CURRENT USE OF INSULIN (HCC): Primary | ICD-10-CM

## 2021-05-05 DIAGNOSIS — E11.3293 TYPE 2 DIABETES MELLITUS WITH BOTH EYES AFFECTED BY MILD NONPROLIFERATIVE RETINOPATHY WITHOUT MACULAR EDEMA, WITH LONG-TERM CURRENT USE OF INSULIN (HCC): Primary | ICD-10-CM

## 2021-05-05 DIAGNOSIS — E78.00 PURE HYPERCHOLESTEROLEMIA: ICD-10-CM

## 2021-05-05 DIAGNOSIS — Z11.59 NEED FOR HEPATITIS C SCREENING TEST: ICD-10-CM

## 2021-05-05 DIAGNOSIS — I10 ESSENTIAL HYPERTENSION: ICD-10-CM

## 2021-05-05 NOTE — TELEPHONE ENCOUNTER
Spoke with Louise at labs, she advised to place orders for all new labs.   Labs pending for review and approval

## 2021-05-05 NOTE — TELEPHONE ENCOUNTER
Pt called, requesting new blood work orders because of the draw site error. Pt states that the lab informed her that she needs new orders placed. Please advise.

## 2021-05-13 ENCOUNTER — PATIENT MESSAGE (OUTPATIENT)
Dept: FAMILY MEDICINE CLINIC | Age: 51
End: 2021-05-13

## 2021-05-13 ENCOUNTER — TELEPHONE (OUTPATIENT)
Dept: FAMILY MEDICINE CLINIC | Age: 51
End: 2021-05-13

## 2021-05-14 NOTE — TELEPHONE ENCOUNTER
From: Suzi Urias  To: Racheal Cruz MD  Sent: 5/13/2021 7:09 PM EDT  Subject: Visit Follow-Up Question    My  for my STD has sent over a new form they need it before they will approve me and give me a check. Still having a few issues but feeling much better. My official day back was the 12th.

## 2021-05-17 ENCOUNTER — TELEPHONE (OUTPATIENT)
Dept: FAMILY MEDICINE CLINIC | Age: 51
End: 2021-05-17

## 2021-05-17 NOTE — TELEPHONE ENCOUNTER
Received today from patient a credit disability form from [employer] [address] [place] [address] attending physician's statement of disability. Patient was seen on 4/30/2021 by Dr. Ariana Babb. Will she need another appointment to have the forms completed? (Please let the  staff know and we will schedule her if needed.)  Scanned incomplete forms in to Epic.  (Patient also dropped off an employer's statement but I do not see anything that needs to be filled out on it by our office - scanned it in so it did not get lost before being given back to patient.)

## 2021-05-17 NOTE — TELEPHONE ENCOUNTER
Received signed authorization for release of PHI from patient. She is requesting records from this office be sent to [facility]. Requesting records from March 2021-present. Please send to [facility] attn. Bree Taylor NP at fax # [phone]. Placed request on Dr. Jessica Lara' desk along with printed records to get permission to send them.

## 2021-05-18 NOTE — TELEPHONE ENCOUNTER
Just want to be certain, does patient need an appointment to go over disability forms?  (She was last seen on 4/30/2021.)

## 2021-05-18 NOTE — TELEPHONE ENCOUNTER
Faxed requested records to [employer] attn. Theresa Cade NP at fax # [phone]. Scanned all sent records in to [address] [place] [address] Rd.

## 2021-05-18 NOTE — TELEPHONE ENCOUNTER
Patient is scheduled for an in-office appointment on 5/19/21 with Dr. Selene Patterson at 3:10 p.m. Forms from [employer] [address] [place] [address] were placed on [facility] for patient's appointment tomorrow.

## 2021-05-19 ENCOUNTER — OFFICE VISIT (OUTPATIENT)
Dept: FAMILY MEDICINE CLINIC | Age: 51
End: 2021-05-19
Payer: COMMERCIAL

## 2021-05-19 VITALS
TEMPERATURE: 97.3 F | DIASTOLIC BLOOD PRESSURE: 72 MMHG | RESPIRATION RATE: 14 BRPM | BODY MASS INDEX: 42.77 KG/M2 | HEART RATE: 83 BPM | OXYGEN SATURATION: 96 % | HEIGHT: 66 IN | SYSTOLIC BLOOD PRESSURE: 134 MMHG

## 2021-05-19 DIAGNOSIS — R63.2 BINGE EATING: Chronic | ICD-10-CM

## 2021-05-19 DIAGNOSIS — E11.3293 TYPE 2 DIABETES MELLITUS WITH BOTH EYES AFFECTED BY MILD NONPROLIFERATIVE RETINOPATHY WITHOUT MACULAR EDEMA, WITH LONG-TERM CURRENT USE OF INSULIN (HCC): ICD-10-CM

## 2021-05-19 DIAGNOSIS — F31.81 BIPOLAR II DISORDER, MILD, HYPOMANIC, WITH MIXED FEATURES, IN PARTIAL REMISSION (HCC): Primary | ICD-10-CM

## 2021-05-19 DIAGNOSIS — Z79.4 TYPE 2 DIABETES MELLITUS WITH BOTH EYES AFFECTED BY MILD NONPROLIFERATIVE RETINOPATHY WITHOUT MACULAR EDEMA, WITH LONG-TERM CURRENT USE OF INSULIN (HCC): ICD-10-CM

## 2021-05-19 LAB
CREATININE URINE POCT: 39.6
MICROALBUMIN/CREAT 24H UR: <5 MG/G{CREAT}
MICROALBUMIN/CREAT UR-RTO: <12.6

## 2021-05-19 PROCEDURE — 82044 UR ALBUMIN SEMIQUANTITATIVE: CPT | Performed by: FAMILY MEDICINE

## 2021-05-19 PROCEDURE — 99214 OFFICE O/P EST MOD 30 MIN: CPT | Performed by: FAMILY MEDICINE

## 2021-05-19 RX ORDER — GABAPENTIN 300 MG/1
300-600 CAPSULE ORAL NIGHTLY
Qty: 60 CAPSULE | Refills: 2 | Status: SHIPPED | OUTPATIENT
Start: 2021-05-19 | End: 2021-08-12

## 2021-05-19 SDOH — ECONOMIC STABILITY: FOOD INSECURITY: WITHIN THE PAST 12 MONTHS, YOU WORRIED THAT YOUR FOOD WOULD RUN OUT BEFORE YOU GOT MONEY TO BUY MORE.: NEVER TRUE

## 2021-05-19 NOTE — PROGRESS NOTES
Subjective:      Patient ID: Denis Angel 44 y.o. female. The encounter diagnosis was Bipolar II disorder, mild, hypomanic, with mixed features, in partial remission ([facility] [address] HPI    Che Denis is here to address on-going depression and disability. She is currently seeing a psych NP at [facility]. Her psych NP Lenor Simmonds  Therapist - still does not have one. All the therapists are scheduling out 2 to 3 months. She is doing CBT work books. She has been on disability since April 21. She went back to work on Monday and is doing pretty well. She feels very even, sometimes a little too even. But she has started to enjoy music again. Sleeping pretty well. Her boyfriend noted she is kicking again at night. This had improved with lowering the dose of Zoloft. Is manageable. Not suicidal.   No racing thoughts, impulsive behavior or decreased need for sleep. Appetite is increased since on Abilify. She would like to to back on Vyvanse. It did not make her more anxious or manic. It is very effective for stopping binge eating. She did not have insomnia, palpitaitons, anxiety with the Vyvanse. Outpatient Medications Marked as Taking for the 5/19/21 encounter (Office Visit) with Maru Yusuf MD   Medication Sig Dispense Refill    TURMERIC PO Take 1 tablet by mouth daily      gabapentin (NEURONTIN) 300 MG capsule Take 1-2 capsules by mouth nightly for 90 days.  60 capsule 2    cyclobenzaprine (FLEXERIL) 10 MG tablet TAKE 1 TABLET BY MOUTH 3  TIMES DAILY AS NEEDED FOR  MUSCLE SPASM(S) 90 tablet 2    albuterol sulfate  (90 Base) MCG/ACT inhaler USE 2 INHALATIONS BY MOUTH  EVERY 4 HOURS AS NEEDED FOR WHEEZING 20.1 g 1    hydrOXYzine (ATARAX) 25 MG tablet TAKE 1 TABLET BY MOUTH  EVERY 6 HOURS AS NEEDED FOR ANXIETY 180 tablet 1    clonazePAM (KLONOPIN) 0.5 MG tablet TAKE 1 TABLET BY MOUTH AT  NIGHT AS NEEDED FOR ANXIETY (30 DAY SUPPLY) 10 tablet 0    sertraline (ZOLOFT) 100 MG tablet Take 1 tablet by mouth daily 135 tablet 1    ARIPiprazole (ABILIFY) 2 MG tablet Take 1 tablet by mouth daily (Patient taking differently: Take 5 mg by mouth daily ) 30 tablet 2    montelukast (SINGULAIR) 10 MG tablet Take 1 tablet by mouth daily 30 tablet 5    busPIRone (BUSPAR) 10 MG tablet Take 1 tablet by mouth 3 times daily 90 tablet 2    tolterodine (DETROL LA) 4 MG extended release capsule Take 1 capsule by mouth daily 30 capsule 3    OXcarbazepine (TRILEPTAL) 150 MG tablet Take 1 tablet by mouth 2 times daily 180 tablet 3    atorvastatin (LIPITOR) 40 MG tablet Take 1 tablet by mouth daily 90 tablet 1    buPROPion (WELLBUTRIN XL) 300 MG extended release tablet Take 1 tablet by mouth every morning 90 tablet 3    insulin glargine (LANTUS SOLOSTAR) 100 UNIT/ML injection pen Inject 40 Units into the skin nightly 15 pen 3    FARXIGA 10 MG tablet TAKE 1 TABLET BY MOUTH IN  THE MORNING 90 tablet 1    TRULICITY 1.5 OF/7.6SF SOPN INJECT THE CONTENTS OF 1  PEN (1.5MG) INTO THE SKIN  ONCE WEEKLY AS DIRECTED . 12 pen 1    fluocinonide (LIDEX) 0.05 % external solution Apply topically 2 times daily 1 Bottle 2    Insulin Pen Needle (B-D UF III MINI PEN NEEDLES) 31G X 5 MM MISC 1 each by Does not apply route daily 100 each 12    vitamin B-12 (CYANOCOBALAMIN) 1000 MCG tablet Take 1,000 mcg by mouth daily      MIRENA, 52 MG, IUD 52 mg 1 Dose by Intrauterine route once      Multiple Vitamins-Minerals (BARIATRIC FUSION) CHEW Take 4 tablets by mouth daily       b complex vitamins capsule Take 1 capsule by mouth daily      fluticasone (FLONASE) 50 MCG/ACT nasal spray PLACE TWO SPRAYS IN EACH NOSTRIL ONCE DAILY 1 Bottle 12    aspirin 81 MG EC tablet Take 81 mg by mouth daily. Allergies   Allergen Reactions    Effexor Xr [Venlafaxine Hcl Er] Other (See Comments)     Didn't respond well to it.         Patient Active Problem List   Diagnosis    PCOS (polycystic ovarian syndrome)    Pure hypercholesterolemia    Common migraine    Obstructive sleep apnea syndrome    Herpes simplex    Binge eating    Pedal edema    FH: melanoma    Essential hypertension    Hiatal hernia with GERD and esophagitis    Class 2 obesity without serious comorbidity with body mass index (BMI) of 38.0 to 38.9 in adult    Type 2 diabetes mellitus with mild nonproliferative retinopathy of both eyes without macular edema (HCC)    Panic attacks    Bipolar II disorder, mild, hypomanic, with mixed features, in partial remission ([facility] [address]       Past Medical History:   Diagnosis Date    Anxiety     Chronic gastric ulcer without hemorrhage and without perforation     CTS (carpal tunnel syndrome) 2/10/2015    Depression     Diabetes mellitus ([facility] [address]     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 ([facility] [address] 1/29/2016    DUB (dysfunctional uterine bleeding) 8/22/2019    ETD (eustachian tube dysfunction) 5/16/2014    Herpes simplex     Lumbar strain 4/19/2017    PCOS (polycystic ovarian syndrome)     Sleep apnea     TMJ syndrome        Past Surgical History:   Procedure Laterality Date    ENDOSCOPY, COLON, DIAGNOSTIC      FINGER TRIGGER RELEASE Right 6/12/2020    RIGHT INDEX FINGER TRIGGER FINGER RELEASE performed by Sree Jones MD at [address] Right 7/1/2020    INCISION AND DRAINAGE OF RIGHT HAND WOUND; SECONDARY CLOSURE OF WOUND performed by Sree Jones MD at [address] [place], [place] [address]way HISTORY  05/25/2018    EGD    NH LAP,STOMACH,OTHER,W/O TUBE N/A 7/31/2018    ROBOTIC ASSISTED LAPAROSCOPIC SLEEVE GASTRECTOMY  performed by Earnest Ramon DO at [address]        Family History   Problem Relation Age of Onset    Cervical Cancer Mother     Diabetes Father     Stroke Father         25s    Hypertension Father     Coronary Art Dis Father     Other Father         CHIVO    Cancer Father         melanoma    Glaucoma Father     Diabetes Sister     Diabetes Maternal Grandmother     Cancer Maternal Grandmother         melanoma    Arthritis Maternal Grandmother     Diabetes Paternal Grandmother     Stroke Paternal Grandmother        Social History     Tobacco Use    Smoking status: Never Smoker    Smokeless tobacco: Never Used   Vaping Use    Vaping Use: Never used   Substance Use Topics    Alcohol use: Yes     Comment: infrequent, few times a month    Drug use: No            Review of Systems  Review of Systems    Lab Results   Component Value Date     04/30/2021    K 4.0 04/30/2021     04/30/2021    CO2 24 04/30/2021    BUN 21 (H) 04/30/2021    CREATININE 1.3 (H) 04/30/2021    GLUCOSE 123 (H) 04/30/2021    CALCIUM 8.6 04/30/2021    PROT 6.6 10/15/2020    LABALBU 4.3 10/15/2020    BILITOT 0.8 10/15/2020    ALKPHOS 73 10/15/2020    AST 23 10/15/2020    ALT 43 (H) 10/15/2020    LABGLOM 43 (A) 04/30/2021    GFRAA 52 (A) 04/30/2021    AGRATIO 1.9 10/15/2020    GLOB 2.3 10/15/2020        Lab Results   Component Value Date    LABA1C 6.4 10/15/2020     Lab Results   Component Value Date    .0 10/15/2020      Objective:   Physical Exam  Vitals:    05/19/21 1504   BP: 134/72   Pulse: 83   Resp: 14   Temp: 97.3 °F (36.3 °C)   TempSrc: Temporal   SpO2: 96%   Height: 5' 6\" (1.676 m)       Physical Exam  NAD  Skin is warm and dry. Mood and affect are mildly anxious. No agitation or psychomotor retardation. No pressured speech, grandiosity or tangential thoughts. Insight and judgement are intact. Not suicidal.   The neck is supple and free of adenopathy or masses, the thyroid is normal without enlargement or nodules. Chest is clear, no wheezing or rales. Normal symmetric air entry throughout both lung fields. Heart regular with normal rate, no murmer or gallop       Assessment:       Diagnosis Orders   1.  Bipolar II disorder, mild, hypomanic, with mixed features, in partial remission ([address]  Doing much better  Continue current meds and follow up with Psych NP  Return to work without restrictions. 2. Binge eating  lisdexamfetamine (VYVANSE) 70 MG capsule    lisdexamfetamine (VYVANSE) 70 MG capsule    lisdexamfetamine (VYVANSE) 70 MG capsule  Discussed diet, exercise and weight loss strategies   PDMP reviewed and no concerns noted. Risk karen addressed; she will monitor and make psych NP aware she is on the stimulant   3. Type 2 diabetes mellitus with both eyes affected by mild nonproliferative retinopathy without macular edema, with long-term current use of insulin ([facility])  POCT microalbumin  Well controlled. Plan:      Side effects of current medications reviewed and questions answered. Follow up in 3 months or prn.

## 2021-05-20 DIAGNOSIS — E11.3293 TYPE 2 DIABETES MELLITUS WITH BOTH EYES AFFECTED BY MILD NONPROLIFERATIVE RETINOPATHY WITHOUT MACULAR EDEMA, WITH LONG-TERM CURRENT USE OF INSULIN (HCC): ICD-10-CM

## 2021-05-20 DIAGNOSIS — Z79.4 TYPE 2 DIABETES MELLITUS WITH BOTH EYES AFFECTED BY MILD NONPROLIFERATIVE RETINOPATHY WITHOUT MACULAR EDEMA, WITH LONG-TERM CURRENT USE OF INSULIN (HCC): ICD-10-CM

## 2021-05-20 DIAGNOSIS — I10 ESSENTIAL HYPERTENSION: ICD-10-CM

## 2021-05-20 DIAGNOSIS — E78.00 PURE HYPERCHOLESTEROLEMIA: ICD-10-CM

## 2021-05-20 DIAGNOSIS — Z11.59 NEED FOR HEPATITIS C SCREENING TEST: ICD-10-CM

## 2021-05-20 LAB
A/G RATIO: 1.6 (ref 1.1–2.2)
ALBUMIN SERPL-MCNC: 4.2 G/DL (ref 3.4–5)
ALP BLD-CCNC: 75 U/L (ref 40–129)
ALT SERPL-CCNC: 21 U/L (ref 10–40)
ANION GAP SERPL CALCULATED.3IONS-SCNC: 10 MMOL/L (ref 3–16)
AST SERPL-CCNC: 15 U/L (ref 15–37)
BASOPHILS ABSOLUTE: 0.1 K/UL (ref 0–0.2)
BASOPHILS RELATIVE PERCENT: 1.2 %
BILIRUB SERPL-MCNC: 0.4 MG/DL (ref 0–1)
BUN BLDV-MCNC: 20 MG/DL (ref 7–20)
CALCIUM SERPL-MCNC: 9 MG/DL (ref 8.3–10.6)
CHLORIDE BLD-SCNC: 103 MMOL/L (ref 99–110)
CHOLESTEROL, TOTAL: 122 MG/DL (ref 0–199)
CO2: 26 MMOL/L (ref 21–32)
CREAT SERPL-MCNC: 1.3 MG/DL (ref 0.6–1.1)
CREATININE URINE: 109.5 MG/DL (ref 28–259)
EOSINOPHILS ABSOLUTE: 0.1 K/UL (ref 0–0.6)
EOSINOPHILS RELATIVE PERCENT: 1.4 %
GFR AFRICAN AMERICAN: 52
GFR NON-AFRICAN AMERICAN: 43
GLOBULIN: 2.6 G/DL
GLUCOSE BLD-MCNC: 204 MG/DL (ref 70–99)
HCT VFR BLD CALC: 34.3 % (ref 36–48)
HDLC SERPL-MCNC: 63 MG/DL (ref 40–60)
HEMOGLOBIN: 11.1 G/DL (ref 12–16)
HEPATITIS C ANTIBODY INTERPRETATION: NORMAL
LDL CHOLESTEROL CALCULATED: 39 MG/DL
LYMPHOCYTES ABSOLUTE: 1.4 K/UL (ref 1–5.1)
LYMPHOCYTES RELATIVE PERCENT: 23.3 %
MCH RBC QN AUTO: 25.3 PG (ref 26–34)
MCHC RBC AUTO-ENTMCNC: 32.3 G/DL (ref 31–36)
MCV RBC AUTO: 78.2 FL (ref 80–100)
MICROALBUMIN UR-MCNC: <1.2 MG/DL
MICROALBUMIN/CREAT UR-RTO: NORMAL MG/G (ref 0–30)
MONOCYTES ABSOLUTE: 0.5 K/UL (ref 0–1.3)
MONOCYTES RELATIVE PERCENT: 8 %
NEUTROPHILS ABSOLUTE: 4.1 K/UL (ref 1.7–7.7)
NEUTROPHILS RELATIVE PERCENT: 66.1 %
PDW BLD-RTO: 18.1 % (ref 12.4–15.4)
PLATELET # BLD: 202 K/UL (ref 135–450)
PMV BLD AUTO: 8 FL (ref 5–10.5)
POTASSIUM SERPL-SCNC: 4.4 MMOL/L (ref 3.5–5.1)
RBC # BLD: 4.39 M/UL (ref 4–5.2)
SODIUM BLD-SCNC: 139 MMOL/L (ref 136–145)
TOTAL PROTEIN: 6.8 G/DL (ref 6.4–8.2)
TRIGL SERPL-MCNC: 99 MG/DL (ref 0–150)
VLDLC SERPL CALC-MCNC: 20 MG/DL
WBC # BLD: 6.2 K/UL (ref 4–11)

## 2021-05-20 NOTE — TELEPHONE ENCOUNTER
Received forms back from Dr. Escobedo Reach out and ready to send.  I scanned them into chart and Faxed the [facility] over and called the pt to let her know the other was completed but needed her to fill out one page with her information

## 2021-05-21 LAB
ESTIMATED AVERAGE GLUCOSE: 194.4 MG/DL
HBA1C MFR BLD: 8.4 %

## 2021-05-25 DIAGNOSIS — E11.9 TYPE 2 DIABETES MELLITUS WITHOUT COMPLICATION, WITHOUT LONG-TERM CURRENT USE OF INSULIN (HCC): ICD-10-CM

## 2021-05-26 RX ORDER — DULAGLUTIDE 1.5 MG/.5ML
INJECTION, SOLUTION SUBCUTANEOUS
Qty: 6 ML | Refills: 3 | Status: SHIPPED | OUTPATIENT
Start: 2021-05-26 | End: 2022-03-18 | Stop reason: ALTCHOICE

## 2021-06-01 ENCOUNTER — PATIENT MESSAGE (OUTPATIENT)
Dept: FAMILY MEDICINE CLINIC | Age: 51
End: 2021-06-01

## 2021-06-01 NOTE — TELEPHONE ENCOUNTER
Form printed and placed at [facility], [facility] desk

## 2021-06-07 ENCOUNTER — PATIENT MESSAGE (OUTPATIENT)
Dept: FAMILY MEDICINE CLINIC | Age: 51
End: 2021-06-07

## 2021-06-07 DIAGNOSIS — G47.9 SLEEP DISORDER: Primary | ICD-10-CM

## 2021-06-07 NOTE — TELEPHONE ENCOUNTER
From: Shine Minium  To: Catrachito Ball MD  Sent: 6/7/2021 1:24 PM EDT  Subject: Prescription Question    My restless leg is really bad. I am kicking the crap out of my boyfriend at nights, its so bad I end up sleeping on the couch. I am currently taking 3 Pramipexole tabs and 2 Gabapentin Caps, I have tried taking two muscle relaxers and that didn't work, took 2 acetamenophen PM's last night but was still awake until 3 am. got up at 5 to go to the gym.

## 2021-06-08 ENCOUNTER — OFFICE VISIT (OUTPATIENT)
Dept: PULMONOLOGY | Age: 51
End: 2021-06-08
Payer: COMMERCIAL

## 2021-06-08 VITALS
OXYGEN SATURATION: 98 % | DIASTOLIC BLOOD PRESSURE: 60 MMHG | HEIGHT: 66 IN | HEART RATE: 79 BPM | SYSTOLIC BLOOD PRESSURE: 122 MMHG | WEIGHT: 269 LBS | BODY MASS INDEX: 43.23 KG/M2

## 2021-06-08 DIAGNOSIS — I10 ESSENTIAL HYPERTENSION: ICD-10-CM

## 2021-06-08 DIAGNOSIS — E66.9 CLASS 2 OBESITY WITHOUT SERIOUS COMORBIDITY WITH BODY MASS INDEX (BMI) OF 38.0 TO 38.9 IN ADULT, UNSPECIFIED OBESITY TYPE: ICD-10-CM

## 2021-06-08 DIAGNOSIS — G25.81 RESTLESS LEG SYNDROME: ICD-10-CM

## 2021-06-08 DIAGNOSIS — G47.33 OBSTRUCTIVE SLEEP APNEA SYNDROME: Primary | ICD-10-CM

## 2021-06-08 DIAGNOSIS — Z79.4 TYPE 2 DIABETES MELLITUS WITH BOTH EYES AFFECTED BY MILD NONPROLIFERATIVE RETINOPATHY WITHOUT MACULAR EDEMA, WITH LONG-TERM CURRENT USE OF INSULIN (HCC): ICD-10-CM

## 2021-06-08 DIAGNOSIS — E11.3293 TYPE 2 DIABETES MELLITUS WITH BOTH EYES AFFECTED BY MILD NONPROLIFERATIVE RETINOPATHY WITHOUT MACULAR EDEMA, WITH LONG-TERM CURRENT USE OF INSULIN (HCC): ICD-10-CM

## 2021-06-08 DIAGNOSIS — F31.81 BIPOLAR II DISORDER, MILD, HYPOMANIC, WITH MIXED FEATURES, IN PARTIAL REMISSION (HCC): ICD-10-CM

## 2021-06-08 PROCEDURE — 99214 OFFICE O/P EST MOD 30 MIN: CPT | Performed by: NURSE PRACTITIONER

## 2021-06-08 PROCEDURE — 3052F HG A1C>EQUAL 8.0%<EQUAL 9.0%: CPT | Performed by: NURSE PRACTITIONER

## 2021-06-08 ASSESSMENT — SLEEP AND FATIGUE QUESTIONNAIRES
HOW LIKELY ARE YOU TO NOD OFF OR FALL ASLEEP WHILE LYING DOWN TO REST IN THE AFTERNOON WHEN CIRCUMSTANCES PERMIT: 2
HOW LIKELY ARE YOU TO NOD OFF OR FALL ASLEEP WHILE SITTING AND READING: 2
HOW LIKELY ARE YOU TO NOD OFF OR FALL ASLEEP WHILE SITTING QUIETLY AFTER LUNCH WITHOUT ALCOHOL: 3
ESS TOTAL SCORE: 13
HOW LIKELY ARE YOU TO NOD OFF OR FALL ASLEEP WHILE WATCHING TV: 3
HOW LIKELY ARE YOU TO NOD OFF OR FALL ASLEEP IN A CAR, WHILE STOPPED FOR A FEW MINUTES IN TRAFFIC: 0
HOW LIKELY ARE YOU TO NOD OFF OR FALL ASLEEP WHILE SITTING AND TALKING TO SOMEONE: 0
HOW LIKELY ARE YOU TO NOD OFF OR FALL ASLEEP WHILE SITTING INACTIVE IN A PUBLIC PLACE: 0
HOW LIKELY ARE YOU TO NOD OFF OR FALL ASLEEP WHEN YOU ARE A PASSENGER IN A CAR FOR AN HOUR WITHOUT A BREAK: 3

## 2021-06-08 NOTE — ASSESSMENT & PLAN NOTE
Chronic-not stable:  Discussed importance of treating obstructive sleep apnea and getting sufficient sleep to assist with weight control. Encouraged her to work on weight loss through diet and exercise. Recommended DASH or Mediterranean diets.

## 2021-06-08 NOTE — ASSESSMENT & PLAN NOTE
Chronic-Unstable: Reviewed and analyzed results of physiologic download from patient's machine and reviewed with patient. Supplies and parts as needed for her machine. These are medically necessary. Limit caffeine use after 3pm. Based on the analyzed data will change following settings: Pmin 14. Ramp to 10. Will increase pressure to control AHI. Discussed with patient the importance of using her machine each night and all night. Patient to continue working with her PCP and psychiatry for control of depression/bipolar disorder. Instructed not to drive unless had 4 hrs of effective therapy for his CHIVO the night before. Reviewed the risks of under or untreated CHIVO including, but not limited to, higher risks of motor vehicle accidents, stroke, heart attacks, and death. she understands and accepts all these risks. The patient is advised to use the machine every night. Verbal and written instruction on PAP equipment cleaning and disinfection schedule provided. Follow up in 3 months.

## 2021-06-08 NOTE — LETTER
[facility]  [id number] [facility]  [address] [address]  Phone: [phone]  Fax: [phone]    June 8, 2021       Patient: Kelly De Leon   MR Number: [id number]   YOB: 1970   Date of Visit: 6/8/2021       Carlos Toledo was seen for a follow up visit today. Here is my assessment and plan as well as an attached copy of her visit today:    Binge eating  Chronic- Stable. Discussed the importance of treating obstructive sleep apnea as part of the management of this disorder. Cont any meds per PCP and other physicians. Continue Vyvanse per PCP. Essential hypertension  Chronic- Stable. Discussed the importance of treating obstructive sleep apnea as part of the management of this disorder. Cont any meds per PCP and other physicians. Class 2 obesity without serious comorbidity with body mass index (BMI) of 38.0 to 38.9 in adult  Chronic-not stable:  Discussed importance of treating obstructive sleep apnea and getting sufficient sleep to assist with weight control. Encouraged her to work on weight loss through diet and exercise. Recommended DASH or Mediterranean diets. Type 2 diabetes mellitus with mild nonproliferative retinopathy of both eyes without macular edema (HCC)  Chronic- Stable. Discussed the importance of treating obstructive sleep apnea as part of the management of this disorder. Cont any meds per PCP and other physicians. Bipolar II disorder, mild, hypomanic, with mixed features, in partial remission (HCC)  Chronic- Stable. Discussed the importance of treating obstructive sleep apnea as part of the management of this disorder. Cont any meds per PCP and other physicians. Continue working with PCP and psychiatry for symptoms control and medication management. Restless leg syndrome  Chronic-Worsening: Will order labs. Discussed compliance with machine is important for the control of RLS.  Currently taking Pramipexole 1.5mg, 600mg gabapentin, and cyclobenzaprine 10mg at 8pm. Discussed with patient to take Pramipexole and gabapentin 1-2 hours prior to symptoms starting (6-7pm). Also discussed not taking Tylenol PM or any first generation antihistamines as they can worsen RLS. Discussed with patient that Vyvanse and other medications she is taking for anxiety/depression can also worsen RLS and she should discuss with her PCP and psychiatrist.    Obstructive sleep apnea syndrome  Chronic-Unstable: Reviewed and analyzed results of physiologic download from patient's machine and reviewed with patient. Supplies and parts as needed for her machine. These are medically necessary. Limit caffeine use after 3pm. Based on the analyzed data will change following settings: Pmin 14. Ramp to 10. Will increase pressure to control AHI. Discussed with patient the importance of using her machine each night and all night. Patient to continue working with her PCP and psychiatry for control of depression/bipolar disorder. Instructed not to drive unless had 4 hrs of effective therapy for his CHIVO the night before. Reviewed the risks of under or untreated CHIVO including, but not limited to, higher risks of motor vehicle accidents, stroke, heart attacks, and death. she understands and accepts all these risks. The patient is advised to use the machine every night. Verbal and written instruction on PAP equipment cleaning and disinfection schedule provided. Follow up in 3 months. If you have questions or concerns, please do not hesitate to call me. I look forward to following Dileep Ha along with you.     Sincerely,    LAI Sneed    CC providers:  MD Mary [place] [phone]  [address] [place]

## 2021-06-08 NOTE — PROGRESS NOTES
Leonie Soriano MD, Katiuska Jaimes, [facility]  Tiffanie Kehrt CNP  Bala Clint CNP [place] [address]  [address] [facility], [address] [facility] [phone]   [facility] [address]. [address] [facility] [address]. [address] [phone]     [place] [id number] [phone]      Assessment/Plan:     1. Obstructive sleep apnea syndrome  Assessment & Plan:  Chronic-Unstable: Reviewed and analyzed results of physiologic download from patient's machine and reviewed with patient. Supplies and parts as needed for her machine. These are medically necessary. Limit caffeine use after 3pm. Based on the analyzed data will change following settings: Pmin 14. Ramp to 10. Will increase pressure to control AHI. Discussed with patient the importance of using her machine each night and all night. Patient to continue working with her PCP and psychiatry for control of depression/bipolar disorder. Instructed not to drive unless had 4 hrs of effective therapy for his CHIVO the night before. Reviewed the risks of under or untreated CHIVO including, but not limited to, higher risks of motor vehicle accidents, stroke, heart attacks, and death. she understands and accepts all these risks. The patient is advised to use the machine every night. Verbal and written instruction on PAP equipment cleaning and disinfection schedule provided. Follow up in 3 months. 2. Restless leg syndrome  Assessment & Plan:  Chronic-Worsening: Will order labs. Discussed compliance with machine is important for the control of RLS. Currently taking Pramipexole 1.5mg, 600mg gabapentin, and cyclobenzaprine 10mg at 8pm. Discussed with patient to take Pramipexole and gabapentin 1-2 hours prior to symptoms starting (6-7pm). Also discussed not taking Tylenol PM or any first generation antihistamines as they can worsen RLS.  Discussed with patient that Vyvanse and other medications she is taking for anxiety/depression can also worsen RLS and she should discuss with her PCP and psychiatrist.  Orders:  -     Ferritin; Future  -     Iron and TIBC; Future  -     Renal Panel; Future  -     TSH with Reflex; Future  3. Essential hypertension  Assessment & Plan:  Chronic- Stable. Discussed the importance of treating obstructive sleep apnea as part of the management of this disorder. Cont any meds per PCP and other physicians. 4. Type 2 diabetes mellitus with both eyes affected by mild nonproliferative retinopathy without macular edema, with long-term current use of insulin (HCC)  Assessment & Plan:  Chronic- Stable. Discussed the importance of treating obstructive sleep apnea as part of the management of this disorder. Cont any meds per PCP and other physicians. 5. Bipolar II disorder, mild, hypomanic, with mixed features, in partial remission ([facility]  Assessment & Plan:  Chronic- Stable. Discussed the importance of treating obstructive sleep apnea as part of the management of this disorder. Cont any meds per PCP and other physicians. Continue working with PCP and psychiatry for symptoms control and medication management. 6. Class 2 obesity without serious comorbidity with body mass index (BMI) of 38.0 to 38.9 in adult, unspecified obesity type  Assessment & Plan:  Chronic-not stable:  Discussed importance of treating obstructive sleep apnea and getting sufficient sleep to assist with weight control. Encouraged her to work on weight loss through diet and exercise. Recommended DASH or Mediterranean diets. Reviewed, analyzed, and documented physiologic data from patient's PAP machine. This information was analyzed to assess complexity and medical decision making in regards to further testing and management. The primary encounter diagnosis was Obstructive sleep apnea syndrome.  Diagnoses of Restless leg syndrome, Essential hypertension, Type 2 diabetes mellitus with both eyes affected by mild nonproliferative retinopathy without macular edema, with long-term current use of insulin ([address] Bipolar II disorder, mild, hypomanic, with mixed features, in partial remission ([address] and Class 2 obesity without serious comorbidity with body mass index (BMI) of 38.0 to 38.9 in adult, unspecified obesity type were also pertinent to this visit. The chronic medical conditions listed are directly related to the primary diagnosis listed above. The management of the primary diagnosis affects the secondary diagnosis and vice versa. Subjective:   Subjective   Patient ID: Lydia Tsai is a 44 y.o. female. Chief Complaint   Patient presents with    Sleep Apnea       HPI:  Machine Modem/Download Info:  Compliance (hours/night): 6.2 hrs/night  % of nights >= 4 hrs: 65.6 %  Download AHI (/hour): 5.6 /HR  Average CPAP Pressure : 15.9 cmH2O      APAP - Settings  Pressure Min: 10 cmH2O  Pressure Max: 20 cmH2O                 Comfort Settings  Humidity Level (0-8): 0  Heated Tubing (Yes/No): Yes  Flex/EPR (0-3): 3 PAP Mask  Clinically Relevant Leak: No     Eugenia is having increased symptoms with her RLS over the past few weeks. Patient reports that she is kicking her bed partner and causing him to have to leave to another room. Her legs start kicking and feeling like she has to move them around 7-8pm. She is having difficulty sleeping at night due to her legs and also with mental health issues. She reports the last 2 nights she has only slept for 2 hours. She tried taking twoTylenol PM and two muscle relaxer's to see if that would help her symptoms with no relief. She has been working with her PCP and Psychiatry for medication management. RLS initially improved with decreasing dosage of her antidepressant. She recently began Vyvanse 70mg again for binge eating disorder that was started on 5/19/21.  She is taking 1.5mg of pramipexole, 600mg Gabapentin, and 10mg cyclobenazeprine each night at [address] [place] [address] reports she missed some days of machine use due to traveling to her boyfriends house and not taking it with her. She also states that she does not sleep some nights due to her anxiety/depression so she does not use her machine. [facility]    [facility] - Total score: 13    Social History     Socioeconomic History    Marital status:      Spouse name: Not on file    Number of children: Not on file    Years of education: Not on file    Highest education level: Not on file   Occupational History    Not on file   Tobacco Use    Smoking status: Never Smoker    Smokeless tobacco: Never Used   Vaping Use    Vaping Use: Never used   Substance and Sexual Activity    Alcohol use: Yes     Comment: infrequent, few times a month    Drug use: No    Sexual activity: Yes     Partners: Male   Other Topics Concern    Not on file   Social History Narrative    Not on file     Social Determinants of Health     Financial Resource Strain: Medium Risk    Difficulty of Paying Living Expenses: Somewhat hard   Food Insecurity: No Food Insecurity    Worried About Running Out of Food in the Last Year: Never true    Guilherme of Food in the Last Year: Never true   Transportation Needs:     Lack of Transportation (Medical):      Lack of Transportation (Non-Medical):    Physical Activity:     Days of Exercise per Week:     Minutes of Exercise per Session:    Stress:     Feeling of Stress :    Social Connections:     Frequency of Communication with Friends and Family:     Frequency of Social Gatherings with Friends and Family:     Attends Orthodoxy Services:     Active Member of Clubs or Organizations:     Attends Club or Organization Meetings:     Marital Status:    Intimate Partner Violence:     Fear of Current or Ex-Partner:     Emotionally Abused:     Physically Abused:     Sexually Abused:        Current Outpatient Medications   Medication Instructions    albuterol sulfate  (90 Base) MCG/ACT inhaler USE 2 INHALATIONS BY MOUTH  EVERY 4 HOURS AS NEEDED FOR WHEEZING    ARIPiprazole (ABILIFY) 2 mg, Oral, DAILY    aspirin 81 mg, DAILY    atorvastatin (LIPITOR) 40 mg, Oral, DAILY    b complex vitamins capsule 1 capsule, Oral, DAILY    buPROPion (WELLBUTRIN XL) 300 mg, Oral, EVERY MORNING    busPIRone (BUSPAR) 10 mg, Oral, 3 TIMES DAILY    clonazePAM (KLONOPIN) 0.5 MG tablet TAKE 1 TABLET BY MOUTH AT  NIGHT AS NEEDED FOR ANXIETY (30 DAY SUPPLY)    cyclobenzaprine (FLEXERIL) 10 MG tablet TAKE 1 TABLET BY MOUTH 3  TIMES DAILY AS NEEDED FOR  MUSCLE SPASM(S)    FARXIGA 10 MG tablet TAKE 1 TABLET BY MOUTH IN  THE MORNING    fluocinonide (LIDEX) 0.05 % external solution Topical, 2 TIMES DAILY    fluticasone (FLONASE) 50 MCG/ACT nasal spray PLACE TWO SPRAYS IN EACH NOSTRIL ONCE DAILY    gabapentin (NEURONTIN) 300-600 mg, Oral, NIGHTLY    hydrOXYzine (ATARAX) 25 mg, Oral, EVERY 6 HOURS PRN    Insulin Pen Needle (B-D UF III MINI PEN NEEDLES) 31G X 5 MM MISC 1 each, Does not apply, DAILY    Lantus SoloStar 40 Units, Subcutaneous, NIGHTLY    [START ON 7/18/2021] lisdexamfetamine (VYVANSE) 70 mg, Oral, EVERY MORNING    [START ON 6/18/2021] lisdexamfetamine (VYVANSE) 70 mg, Oral, EVERY MORNING    lisdexamfetamine (VYVANSE) 70 mg, Oral, EVERY MORNING    MIRENA, 52 MG, IUD 52 mg 1 Dose, Intrauterine, ONCE    montelukast (SINGULAIR) 10 mg, Oral, DAILY    Multiple Vitamins-Minerals (BARIATRIC FUSION) CHEW 4 tablets, Oral, DAILY    OXcarbazepine (TRILEPTAL) 150 mg, Oral, 2 TIMES DAILY    sertraline (ZOLOFT) 100 mg, Oral, DAILY    tolterodine (DETROL LA) 4 mg, Oral, DAILY    TRULICITY 1.5 PT/4.4HO SOPN INJECT THE CONTENTS OF 1  PEN SUBCUTANEOUSLY ONCE  WEEKLY AS DIRECTED    TURMERIC PO 1 tablet, Oral, DAILY    vitamin B-12 (CYANOCOBALAMIN) 1,000 mcg, Oral, DAILY          Vitals:  Weight BMI   Wt Readings from Last 3 Encounters:   06/08/21 269 lb (122 kg)   04/30/21 265 lb (120.2 kg)   01/13/21 259 lb (117.5 kg) Body mass index is 43.42 kg/m².      BP HR SaO2   BP Readings from Last 3 Encounters:   06/08/21 122/60   05/19/21 134/72   04/30/21 134/74    Pulse Readings from Last 3 Encounters:   06/08/21 79   05/19/21 83   04/30/21 78    SpO2 Readings from Last 3 Encounters:   06/08/21 98%   05/19/21 96%   04/30/21 99%        Electronically signed by LAI Perry on 6/8/2021 at 1:17 PM

## 2021-06-08 NOTE — ASSESSMENT & PLAN NOTE
Chronic-Worsening: Will order labs. Discussed compliance with machine is important for the control of RLS. Currently taking Pramipexole 1.5mg, 600mg gabapentin, and cyclobenzaprine 10mg at 8pm. Discussed with patient to take Pramipexole and gabapentin 1-2 hours prior to symptoms starting (6-7pm). Also discussed not taking Tylenol PM or any first generation antihistamines as they can worsen RLS.  Discussed with patient that Vyvanse and other medications she is taking for anxiety/depression can also worsen RLS and she should discuss with her PCP and psychiatrist.

## 2021-06-09 ENCOUNTER — TELEPHONE (OUTPATIENT)
Dept: PULMONOLOGY | Age: 51
End: 2021-06-09

## 2021-06-09 DIAGNOSIS — G25.81 RESTLESS LEG SYNDROME: ICD-10-CM

## 2021-06-09 LAB
ALBUMIN SERPL-MCNC: 3.8 G/DL (ref 3.4–5)
ANION GAP SERPL CALCULATED.3IONS-SCNC: 11 MMOL/L (ref 3–16)
BUN BLDV-MCNC: 23 MG/DL (ref 7–20)
CALCIUM SERPL-MCNC: 8.5 MG/DL (ref 8.3–10.6)
CHLORIDE BLD-SCNC: 106 MMOL/L (ref 99–110)
CO2: 22 MMOL/L (ref 21–32)
CREAT SERPL-MCNC: 1.4 MG/DL (ref 0.6–1.1)
FERRITIN: 7.6 NG/ML (ref 15–150)
GFR AFRICAN AMERICAN: 48
GFR NON-AFRICAN AMERICAN: 39
GLUCOSE BLD-MCNC: 181 MG/DL (ref 70–99)
IRON SATURATION: 11 % (ref 15–50)
IRON: 44 UG/DL (ref 37–145)
PHOSPHORUS: 3 MG/DL (ref 2.5–4.9)
POTASSIUM SERPL-SCNC: 4.6 MMOL/L (ref 3.5–5.1)
SODIUM BLD-SCNC: 139 MMOL/L (ref 136–145)
TOTAL IRON BINDING CAPACITY: 385 UG/DL (ref 260–445)
TSH REFLEX: 3.01 UIU/ML (ref 0.27–4.2)

## 2021-06-09 NOTE — TELEPHONE ENCOUNTER
Attempted to call the patient to inform her per [address]. No answer, LM on  to call back for results.

## 2021-06-15 ENCOUNTER — TELEPHONE (OUTPATIENT)
Dept: FAMILY MEDICINE CLINIC | Age: 51
End: 2021-06-15

## 2021-06-15 NOTE — TELEPHONE ENCOUNTER
Pt inquiring about if [facility] paperwork has been faxed or if she has anything else to complete. States she is being denied until forms are faxed. Please notify pt once complete and faxed.

## 2021-06-20 ENCOUNTER — PATIENT MESSAGE (OUTPATIENT)
Dept: FAMILY MEDICINE CLINIC | Age: 51
End: 2021-06-20

## 2021-06-21 ENCOUNTER — E-VISIT (OUTPATIENT)
Dept: FAMILY MEDICINE CLINIC | Age: 51
End: 2021-06-21
Payer: COMMERCIAL

## 2021-06-21 DIAGNOSIS — L03.119 CELLULITIS OF HAND: ICD-10-CM
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
## 2021-06-21 PROCEDURE — 99422 OL DIG E/M SVC 11-20 MIN: CPT | Performed by: FAMILY MEDICINE

## 2021-06-21 RX ORDER — DOXYCYCLINE HYCLATE 100 MG
100 TABLET ORAL 2 TIMES DAILY
Qty: 20 TABLET | Refills: 0 | Status: SHIPPED | OUTPATIENT
Start: 2021-06-21 | End: 2021-07-01

## 2021-06-21 NOTE — PROGRESS NOTES
2    [START ON 7/18/2021] lisdexamfetamine (VYVANSE) 70 MG capsule Take 1 capsule by mouth every morning for 90 days. 90 capsule 0    lisdexamfetamine (VYVANSE) 70 MG capsule Take 1 capsule by mouth every morning for 30 days. 30 capsule 0    lisdexamfetamine (VYVANSE) 70 MG capsule Take 1 capsule by mouth every morning for 30 days.  30 capsule 0    cyclobenzaprine (FLEXERIL) 10 MG tablet TAKE 1 TABLET BY MOUTH 3  TIMES DAILY AS NEEDED FOR  MUSCLE SPASM(S) 90 tablet 2    albuterol sulfate  (90 Base) MCG/ACT inhaler USE 2 INHALATIONS BY MOUTH  EVERY 4 HOURS AS NEEDED FOR WHEEZING 20.1 g 1    hydrOXYzine (ATARAX) 25 MG tablet TAKE 1 TABLET BY MOUTH  EVERY 6 HOURS AS NEEDED FOR ANXIETY 180 tablet 1    clonazePAM (KLONOPIN) 0.5 MG tablet TAKE 1 TABLET BY MOUTH AT  NIGHT AS NEEDED FOR ANXIETY (30 DAY SUPPLY) 10 tablet 0    sertraline (ZOLOFT) 100 MG tablet Take 1 tablet by mouth daily 135 tablet 1    ARIPiprazole (ABILIFY) 2 MG tablet Take 1 tablet by mouth daily (Patient taking differently: Take 5 mg by mouth daily ) 30 tablet 2    montelukast (SINGULAIR) 10 MG tablet Take 1 tablet by mouth daily 30 tablet 5    busPIRone (BUSPAR) 10 MG tablet Take 1 tablet by mouth 3 times daily 90 tablet 2    tolterodine (DETROL LA) 4 MG extended release capsule Take 1 capsule by mouth daily 30 capsule 3    OXcarbazepine (TRILEPTAL) 150 MG tablet Take 1 tablet by mouth 2 times daily 180 tablet 3    atorvastatin (LIPITOR) 40 MG tablet Take 1 tablet by mouth daily 90 tablet 1    buPROPion (WELLBUTRIN XL) 300 MG extended release tablet Take 1 tablet by mouth every morning 90 tablet 3    insulin glargine (LANTUS SOLOSTAR) 100 UNIT/ML injection pen Inject 40 Units into the skin nightly 15 pen 3    fluocinonide (LIDEX) 0.05 % external solution Apply topically 2 times daily 1 Bottle 2    Insulin Pen Needle (B-D UF III MINI PEN NEEDLES) 31G X 5 MM MISC 1 each by Does not apply route daily 100 each 12    vitamin B-12 (CYANOCOBALAMIN) 1000 MCG tablet Take 1,000 mcg by mouth daily      MIRENA, 52 MG, IUD 52 mg 1 Dose by Intrauterine route once      Multiple Vitamins-Minerals (BARIATRIC FUSION) CHEW Take 4 tablets by mouth daily       b complex vitamins capsule Take 1 capsule by mouth daily      fluticasone (FLONASE) 50 MCG/ACT nasal spray PLACE TWO SPRAYS IN EACH NOSTRIL ONCE DAILY 1 Bottle 12    aspirin 81 MG EC tablet Take 81 mg by mouth daily. No current facility-administered medications on file prior to visit. Lab Results   Component Value Date     06/09/2021    K 4.6 06/09/2021    K 4.1 08/01/2018     06/09/2021    CO2 22 06/09/2021    BUN 23 06/09/2021    CREATININE 1.4 06/09/2021    GLUCOSE 181 06/09/2021    CALCIUM 8.5 06/09/2021       Lab Results   Component Value Date    WBC 6.2 05/20/2021    HGB 11.1 (L) 05/20/2021    HCT 34.3 (L) 05/20/2021    MCV 78.2 (L) 05/20/2021     05/20/2021        Diagnosis Orders   1. Bitten by other birds, initial encounter  doxycycline hyclate (VIBRA-TABS) 100 MG tablet   2. Cellulitis of hand  doxycycline hyclate (VIBRA-TABS) 100 MG tablet        11-20 minutes were spent on the digital evaluation and management of this patient.

## 2021-07-01 ENCOUNTER — PATIENT MESSAGE (OUTPATIENT)
Dept: FAMILY MEDICINE CLINIC | Age: 51
End: 2021-07-01

## 2021-07-02 ENCOUNTER — OFFICE VISIT (OUTPATIENT)
Dept: FAMILY MEDICINE CLINIC | Age: 51
End: 2021-07-02
Payer: COMMERCIAL

## 2021-07-02 VITALS
RESPIRATION RATE: 14 BRPM | TEMPERATURE: 97.4 F | HEART RATE: 88 BPM | OXYGEN SATURATION: 99 % | DIASTOLIC BLOOD PRESSURE: 60 MMHG | SYSTOLIC BLOOD PRESSURE: 120 MMHG

## 2021-07-02 DIAGNOSIS — J06.9 VIRAL URI: Primary | ICD-10-CM

## 2021-07-02 DIAGNOSIS — Z23 NEED FOR SHINGLES VACCINE: ICD-10-CM

## 2021-07-02 PROCEDURE — 90471 IMMUNIZATION ADMIN: CPT | Performed by: FAMILY MEDICINE

## 2021-07-02 PROCEDURE — 99213 OFFICE O/P EST LOW 20 MIN: CPT | Performed by: FAMILY MEDICINE

## 2021-07-02 PROCEDURE — 90750 HZV VACC RECOMBINANT IM: CPT | Performed by: FAMILY MEDICINE

## 2021-07-02 RX ORDER — AMOXICILLIN AND CLAVULANATE POTASSIUM 875; 125 MG/1; MG/1
1 TABLET, FILM COATED ORAL 2 TIMES DAILY
Qty: 20 TABLET | Refills: 0 | Status: SHIPPED | OUTPATIENT
Start: 2021-07-02 | End: 2021-07-12

## 2021-07-02 RX ORDER — BENZONATATE 200 MG/1
200 CAPSULE ORAL 3 TIMES DAILY PRN
Qty: 30 CAPSULE | Refills: 1 | Status: SHIPPED | OUTPATIENT
Start: 2021-07-02 | End: 2021-07-12

## 2021-07-02 NOTE — TELEPHONE ENCOUNTER
From: Martina Knapp  To: Rosa Morris MD  Sent: 7/1/2021 5:29 PM EDT  Subject: Visit Follow-Up Question    I have a bad cold or sinus infection. I tried to do an evisit but after the questionnaire it told me to contact my Dr. I have a cough that comes and goes, a bad headache ad sneezing and runny nose. Been taking therapy which seems to be helping with the sneezing runny nose but not the cough. Also. A little short of breath.

## 2021-07-02 NOTE — PROGRESS NOTES
tablet 2    montelukast (SINGULAIR) 10 MG tablet Take 1 tablet by mouth daily 30 tablet 5    OXcarbazepine (TRILEPTAL) 150 MG tablet Take 1 tablet by mouth 2 times daily 180 tablet 3    atorvastatin (LIPITOR) 40 MG tablet Take 1 tablet by mouth daily 90 tablet 1    buPROPion (WELLBUTRIN XL) 300 MG extended release tablet Take 1 tablet by mouth every morning 90 tablet 3    insulin glargine (LANTUS SOLOSTAR) 100 UNIT/ML injection pen Inject 40 Units into the skin nightly 15 pen 3    fluocinonide (LIDEX) 0.05 % external solution Apply topically 2 times daily 1 Bottle 2    Insulin Pen Needle (B-D UF III MINI PEN NEEDLES) 31G X 5 MM MISC 1 each by Does not apply route daily 100 each 12    vitamin B-12 (CYANOCOBALAMIN) 1000 MCG tablet Take 1,000 mcg by mouth daily      MIRENA, 52 MG, IUD 52 mg 1 Dose by Intrauterine route once      Multiple Vitamins-Minerals (BARIATRIC FUSION) CHEW Take 4 tablets by mouth daily       b complex vitamins capsule Take 1 capsule by mouth daily      fluticasone (FLONASE) 50 MCG/ACT nasal spray PLACE TWO SPRAYS IN EACH NOSTRIL ONCE DAILY 1 Bottle 12    aspirin 81 MG EC tablet Take 81 mg by mouth daily. Allergies   Allergen Reactions    Effexor Xr [Venlafaxine Hcl Er] Other (See Comments)     Didn't respond well to it.         Patient Active Problem List   Diagnosis    PCOS (polycystic ovarian syndrome)    Pure hypercholesterolemia    Common migraine    Obstructive sleep apnea syndrome    Herpes simplex    Binge eating    Pedal edema    FH: melanoma    Essential hypertension    Hiatal hernia with GERD and esophagitis    Class 2 obesity without serious comorbidity with body mass index (BMI) of 38.0 to 38.9 in adult    Type 2 diabetes mellitus with mild nonproliferative retinopathy of both eyes without macular edema (HCC)    Panic attacks    Bipolar II disorder, mild, hypomanic, with mixed features, in partial remission (HCC)    Restless leg syndrome Past Medical History:   Diagnosis Date    Anxiety     Chronic gastric ulcer without hemorrhage and without perforation     CTS (carpal tunnel syndrome) 2/10/2015    Depression     Diabetes mellitus ([facility]     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 ([facility] 1/29/2016    DUB (dysfunctional uterine bleeding) 8/22/2019    ETD (eustachian tube dysfunction) 5/16/2014    Herpes simplex     Lumbar strain 4/19/2017    PCOS (polycystic ovarian syndrome)     Sleep apnea     TMJ syndrome        Past Surgical History:   Procedure Laterality Date    ENDOSCOPY, COLON, DIAGNOSTIC      FINGER TRIGGER RELEASE Right 6/12/2020    RIGHT INDEX FINGER TRIGGER FINGER RELEASE performed by Jory Castrejon MD at [address] Right 7/1/2020    INCISION AND DRAINAGE OF RIGHT HAND WOUND; SECONDARY CLOSURE OF WOUND performed by Jory Castrejon MD at [address] [facility]  05/25/2018    EGD    SD LAP,STOMACH,OTHER,W/O TUBE N/A 7/31/2018    ROBOTIC ASSISTED LAPAROSCOPIC SLEEVE GASTRECTOMY  performed by Nicky Cerda DO at [address] [place] [address] History   Problem Relation Age of Onset    Cervical Cancer Mother     Diabetes Father     Stroke Father         25s    Hypertension Father     Coronary Art Dis Father     Other Father         CHIVO    Cancer Father         melanoma    Glaucoma Father     Diabetes Sister     Diabetes Maternal Grandmother     Cancer Maternal Grandmother         melanoma    Arthritis Maternal Grandmother     Diabetes Paternal Grandmother     Stroke Paternal Grandmother        Social History     Tobacco Use    Smoking status: Never Smoker    Smokeless tobacco: Never Used   Vaping Use    Vaping Use: Never used   Substance Use Topics    Alcohol use: Yes     Comment: infrequent, few times a month    Drug use: No            Review of Systems  Review of Systems    Objective:   Physical Exam  Vitals:    07/02/21 1549   BP: 120/60   Pulse: 88   Resp: 14   Temp: 97.4 °F (36.3 °C)   TempSrc: Temporal   SpO2: 99%     Wt Readings from Last 3 Encounters:   06/08/21 269 lb (122 kg)   04/30/21 265 lb (120.2 kg)   01/13/21 259 lb (117.5 kg)      Physical Exam  NAD    Skin is warm and dry. Well hydrated, no rash. No respiratory distress. Ear exam - bilateral normal, TM intact without perforation or effusion, external canal normal. No significant ceruminosis noted. . Nares boggy with mucoid discharge. Moderate maxillary sinus tenderness. Pharynx normal, no oral lesions. Shotty cervical LNS, neg submandibular and supraclavicular LNS. Chest is clear, no wheezing or rales. Normal symmetric air entry throughout both lung fields. Cardiac regular w/o murmer, gallop. Assessment:       Diagnosis Orders   1. Viral URI  amoxicillin-clavulanate (AUGMENTIN) 875-125 MG per tablet    benzonatate (TESSALON) 200 MG capsule   2. Need for shingles vaccine  Shingrix           Plan:      advised to fill antibiotic only if significant progression of symptoms or not improved within 10 days or onset of illness. Natural history of viral infections and risk of antibiotic use addressed   Side effects of current medications reviewed and questions answered. Call or return to clinic prn if these symptoms worsen or fail to improve as anticipated.

## 2021-07-30 NOTE — PROGRESS NOTES
Subjective:      Patient ID: Sera Hillman 46 y.o. HPI  Eugenia Edwards The primary encounter diagnosis was Type 2 diabetes mellitus with both eyes affected by mild nonproliferative retinopathy without macular edema, with long-term current use of insulin ([facility] [address] Diagnoses of Pure hypercholesterolemia, Essential hypertension, Class 2 obesity without serious comorbidity with body mass index (BMI) of 38.0 to 38.9 in adult, unspecified obesity type, and Bipolar II disorder, mild, hypomanic, with mixed features, in partial remission ([facility] [address] were also pertinent to this visit. Mammogram: has contact information. Stiffness in hands for a few hours in the AM.  Mild weakness. Trouble making a fist.  She has no numbness. MGM had RA. BAD:  Seeing psych NP. Feels she is doing reasonably well. Is still waiting for to see a therapist.     Treatment Adherence:   Medication compliance:  compliant most of the time  Diet compliance:  compliant most of the time but snacking to much. Working from home. Weight trend: increasing slowly  Current exercise: no regular exercise. Did go to the gym 2 times last week and walked once. Barriers: lack of motivation    Diabetes Mellitus Type 2: Current symptoms/problems include none. Home blood sugar records: patient does not test  Any episodes of hypoglycemia? no  Eye exam current (within one year): no  Tobacco history: She  reports that she has never smoked. She has never used smokeless tobacco.   Daily Aspirin? Yes    Hypertension:  Home blood pressure monitoring: No.  She is adherent to a low sodium diet. Patient denies chest pain, blurred vision, peripheral edema and palpitations. Has dyspnea and dry cough on and off x 1-2 weeks. Mild sneezing. Antihypertensive medication side effects: no medication side effects noted. Use of agents associated with hypertension: none. Hyperlipidemia:  No new myalgias or GI upset on atorvastatin (Lipitor).        Lab 3    atorvastatin (LIPITOR) 40 MG tablet Take 1 tablet by mouth daily 90 tablet 1    buPROPion (WELLBUTRIN XL) 300 MG extended release tablet Take 1 tablet by mouth every morning 90 tablet 3    insulin glargine (LANTUS SOLOSTAR) 100 UNIT/ML injection pen Inject 40 Units into the skin nightly 15 pen 3    fluocinonide (LIDEX) 0.05 % external solution Apply topically 2 times daily 1 Bottle 2    Insulin Pen Needle (B-D UF III MINI PEN NEEDLES) 31G X 5 MM MISC 1 each by Does not apply route daily 100 each 12    vitamin B-12 (CYANOCOBALAMIN) 1000 MCG tablet Take 1,000 mcg by mouth daily      MIRENA, 52 MG, IUD 52 mg 1 Dose by Intrauterine route once      Multiple Vitamins-Minerals (BARIATRIC FUSION) CHEW Take 4 tablets by mouth daily       b complex vitamins capsule Take 1 capsule by mouth daily      fluticasone (FLONASE) 50 MCG/ACT nasal spray PLACE TWO SPRAYS IN EACH NOSTRIL ONCE DAILY 1 Bottle 12    aspirin 81 MG EC tablet Take 81 mg by mouth daily. Allergies   Allergen Reactions    Effexor Xr [Venlafaxine Hcl Er] Other (See Comments)     Didn't respond well to it.          Patient Active Problem List   Diagnosis    PCOS (polycystic ovarian syndrome)    Pure hypercholesterolemia    Common migraine    Obstructive sleep apnea syndrome    Herpes simplex    Binge eating    Pedal edema    FH: melanoma    Essential hypertension    Hiatal hernia with GERD and esophagitis    Class 2 obesity without serious comorbidity with body mass index (BMI) of 38.0 to 38.9 in adult    Type 2 diabetes mellitus with mild nonproliferative retinopathy of both eyes without macular edema (HCC)    Panic attacks    Bipolar II disorder, mild, hypomanic, with mixed features, in partial remission (HCC)    Restless leg syndrome        Past Medical History:   Diagnosis Date    Anxiety     Chronic gastric ulcer without hemorrhage and without perforation     CTS (carpal tunnel syndrome) 2/10/2015    Depression  Diabetes mellitus ([facility] [address]     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 ([facility] [address] 1/29/2016    DUB (dysfunctional uterine bleeding) 8/22/2019    ETD (eustachian tube dysfunction) 5/16/2014    Herpes simplex     Lumbar strain 4/19/2017    PCOS (polycystic ovarian syndrome)     Sleep apnea     TMJ syndrome        Past Surgical History:   Procedure Laterality Date    ENDOSCOPY, COLON, DIAGNOSTIC      FINGER TRIGGER RELEASE Right 6/12/2020    RIGHT INDEX FINGER TRIGGER FINGER RELEASE performed by Ilana Beauchamp MD at [address] Right 7/1/2020    INCISION AND DRAINAGE OF RIGHT HAND WOUND; SECONDARY CLOSURE OF WOUND performed by Ilana Beauchamp MD at [address] [facility]  05/25/2018    EGD    [facility] LAP,STOMACH,OTHER,W/O TUBE N/A 7/31/2018    ROBOTIC ASSISTED LAPAROSCOPIC SLEEVE GASTRECTOMY  performed by Lolis Jessica DO at [facility] OR        Social History     Tobacco Use    Smoking status: Never Smoker    Smokeless tobacco: Never Used   Vaping Use    Vaping Use: Never used   Substance Use Topics    Alcohol use: Yes     Comment: infrequent, few times a month    Drug use: No        Family History   Problem Relation Age of Onset    Cervical Cancer Mother     Diabetes Father     Stroke Father         25s    Hypertension Father     Coronary Art Dis Father     Other Father         CHIVO    Cancer Father         melanoma    Glaucoma Father     Diabetes Sister     Diabetes Maternal Grandmother     Cancer Maternal Grandmother         melanoma    Arthritis Maternal Grandmother     Diabetes Paternal Grandmother     Stroke Paternal Grandmother         Review of Systems  Review of Systems    Objective:   Physical Exam  Wt Readings from Last 3 Encounters:   08/02/21 271 lb 3.2 oz (123 kg)   06/08/21 269 lb (122 kg)   04/30/21 265 lb (120.2 kg)     Temp Readings from Last 3 Encounters:   08/02/21 97.9 °F (36.6 °C) (Temporal)   07/02/21 97.4 °F (36.3 °C) (Temporal)   05/19/21 97.3 °F (36.3 °C) (Temporal)     BP Readings from Last 3 Encounters:   08/02/21 126/72   07/02/21 120/60   06/08/21 122/60     Pulse Readings from Last 3 Encounters:   08/02/21 89   07/02/21 88   06/08/21 79      NAD   Skin is warm and dry. Well hydrated. No rash. Mood and affect are mildly depressed. No agitation or psychomotor retardation. The neck is supple and free of adenopathy or masses, the thyroid is normal without enlargement or nodules. Chest: clear with no wheezes or rales. No retractions, or use of accessory muscles noted. Cardiovascular: PMI is not displaced, and no thrill noted. Regular rate and rhythm with no rub, murmur or gallop. No peripheral edema. The abdomen is soft without tenderness, guarding, mass, rebound or organomegaly. Aorta, femoral, DP and PT pulses intact. Feet in good repair, without significant callouses and without ulceration or deformity. Mild swelling MPJs bilateral.   Assessment / Plan:        Diagnosis Orders   1. Type 2 diabetes mellitus with both eyes affected by mild nonproliferative retinopathy without macular edema, with long-term current use of insulin ([address]  [facility] ([facility] [facility]    Continuous Blood Gluc Sensor (FREESTYLE FRACISCO 2 SENSOR) [facility]    Continuous Blood Gluc  (FREESTYLE FRACISCO 2 READER) [facility]    Discussed diet, exercise and weight loss strategies   She agrees to check FSBS and send me results. 2. Pure hypercholesterolemia  LDL at goal < 100  Continue statin   3. Essential hypertension  At goal < 130/80 without meds   4. Class 2 obesity without serious comorbidity with body mass index (BMI) of 38.0 to 38.9 in adult, unspecified obesity type  Discussed diet, exercise and weight loss strategies    5. Bipolar II disorder, mild, hypomanic, with mixed features, in partial remission ([address]  Continue with psych NP   6. Arthralgias -  RF, ECHO, CRP.   Consider rheum referral     Side effects of current medications reviewed and questions answered. Follow up in 3 months or prn.

## 2021-08-02 ENCOUNTER — OFFICE VISIT (OUTPATIENT)
Dept: FAMILY MEDICINE CLINIC | Age: 51
End: 2021-08-02
Payer: COMMERCIAL

## 2021-08-02 ENCOUNTER — HOSPITAL ENCOUNTER (OUTPATIENT)
Dept: MAMMOGRAPHY | Age: 51
Discharge: HOME | End: 2021-08-02
Payer: COMMERCIAL

## 2021-08-02 ENCOUNTER — TELEPHONE (OUTPATIENT)
Dept: BARIATRICS/WEIGHT MGMT | Age: 51
End: 2021-08-02

## 2021-08-02 VITALS — BODY MASS INDEX: 43.55 KG/M2 | HEIGHT: 66 IN | WEIGHT: 271 LBS

## 2021-08-02 VITALS
WEIGHT: 271.2 LBS | DIASTOLIC BLOOD PRESSURE: 72 MMHG | OXYGEN SATURATION: 100 % | BODY MASS INDEX: 43.77 KG/M2 | RESPIRATION RATE: 12 BRPM | HEART RATE: 89 BPM | SYSTOLIC BLOOD PRESSURE: 126 MMHG | TEMPERATURE: 97.9 F

## 2021-08-02 DIAGNOSIS — E78.00 PURE HYPERCHOLESTEROLEMIA: Chronic | ICD-10-CM

## 2021-08-02 DIAGNOSIS — E66.9 CLASS 2 OBESITY WITHOUT SERIOUS COMORBIDITY WITH BODY MASS INDEX (BMI) OF 38.0 TO 38.9 IN ADULT, UNSPECIFIED OBESITY TYPE: ICD-10-CM

## 2021-08-02 DIAGNOSIS — M19.90 ARTHRITIS: ICD-10-CM

## 2021-08-02 DIAGNOSIS — Z79.4 TYPE 2 DIABETES MELLITUS WITH BOTH EYES AFFECTED BY MILD NONPROLIFERATIVE RETINOPATHY WITHOUT MACULAR EDEMA, WITH LONG-TERM CURRENT USE OF INSULIN (HCC): ICD-10-CM

## 2021-08-02 DIAGNOSIS — F31.81 BIPOLAR II DISORDER, MILD, HYPOMANIC, WITH MIXED FEATURES, IN PARTIAL REMISSION (HCC): ICD-10-CM

## 2021-08-02 DIAGNOSIS — I10 ESSENTIAL HYPERTENSION: ICD-10-CM

## 2021-08-02 DIAGNOSIS — Z79.4 TYPE 2 DIABETES MELLITUS WITH BOTH EYES AFFECTED BY MILD NONPROLIFERATIVE RETINOPATHY WITHOUT MACULAR EDEMA, WITH LONG-TERM CURRENT USE OF INSULIN (HCC): Primary | ICD-10-CM

## 2021-08-02 DIAGNOSIS — Z12.31 BREAST CANCER SCREENING BY MAMMOGRAM: ICD-10-CM

## 2021-08-02 DIAGNOSIS — E11.3293 TYPE 2 DIABETES MELLITUS WITH BOTH EYES AFFECTED BY MILD NONPROLIFERATIVE RETINOPATHY WITHOUT MACULAR EDEMA, WITH LONG-TERM CURRENT USE OF INSULIN (HCC): Primary | ICD-10-CM

## 2021-08-02 DIAGNOSIS — E11.3293 TYPE 2 DIABETES MELLITUS WITH BOTH EYES AFFECTED BY MILD NONPROLIFERATIVE RETINOPATHY WITHOUT MACULAR EDEMA, WITH LONG-TERM CURRENT USE OF INSULIN (HCC): ICD-10-CM

## 2021-08-02 LAB
A/G RATIO: 1.4 (ref 1.1–2.2)
ALBUMIN SERPL-MCNC: 4.2 G/DL (ref 3.4–5)
ALP BLD-CCNC: 80 U/L (ref 40–129)
ALT SERPL-CCNC: 23 U/L (ref 10–40)
ANION GAP SERPL CALCULATED.3IONS-SCNC: 12 MMOL/L (ref 3–16)
AST SERPL-CCNC: 18 U/L (ref 15–37)
BASOPHILS ABSOLUTE: 0.1 K/UL (ref 0–0.2)
BASOPHILS RELATIVE PERCENT: 0.9 %
BILIRUB SERPL-MCNC: 0.3 MG/DL (ref 0–1)
BUN BLDV-MCNC: 20 MG/DL (ref 7–20)
C-REACTIVE PROTEIN: <3 MG/L (ref 0–5.1)
CALCIUM SERPL-MCNC: 8.9 MG/DL (ref 8.3–10.6)
CHLORIDE BLD-SCNC: 106 MMOL/L (ref 99–110)
CO2: 25 MMOL/L (ref 21–32)
CREAT SERPL-MCNC: 1.1 MG/DL (ref 0.6–1.1)
EOSINOPHILS ABSOLUTE: 0.1 K/UL (ref 0–0.6)
EOSINOPHILS RELATIVE PERCENT: 1.8 %
GFR AFRICAN AMERICAN: >60
GFR NON-AFRICAN AMERICAN: 52
GLOBULIN: 2.9 G/DL
GLUCOSE BLD-MCNC: 98 MG/DL (ref 70–99)
HCT VFR BLD CALC: 37.8 % (ref 36–48)
HEMOGLOBIN: 12.4 G/DL (ref 12–16)
LYMPHOCYTES ABSOLUTE: 1.3 K/UL (ref 1–5.1)
LYMPHOCYTES RELATIVE PERCENT: 18.7 %
MCH RBC QN AUTO: 27.2 PG (ref 26–34)
MCHC RBC AUTO-ENTMCNC: 32.9 G/DL (ref 31–36)
MCV RBC AUTO: 82.7 FL (ref 80–100)
MONOCYTES ABSOLUTE: 0.6 K/UL (ref 0–1.3)
MONOCYTES RELATIVE PERCENT: 8.3 %
NEUTROPHILS ABSOLUTE: 4.8 K/UL (ref 1.7–7.7)
NEUTROPHILS RELATIVE PERCENT: 70.3 %
PDW BLD-RTO: 21 % (ref 12.4–15.4)
PLATELET # BLD: 188 K/UL (ref 135–450)
PMV BLD AUTO: 8 FL (ref 5–10.5)
POTASSIUM SERPL-SCNC: 4.4 MMOL/L (ref 3.5–5.1)
RBC # BLD: 4.57 M/UL (ref 4–5.2)
RHEUMATOID FACTOR: <10 IU/ML
SODIUM BLD-SCNC: 143 MMOL/L (ref 136–145)
TOTAL PROTEIN: 7.1 G/DL (ref 6.4–8.2)
WBC # BLD: 6.8 K/UL (ref 4–11)

## 2021-08-02 PROCEDURE — 77063 BREAST TOMOSYNTHESIS BI: CPT

## 2021-08-02 PROCEDURE — 99214 OFFICE O/P EST MOD 30 MIN: CPT | Performed by: FAMILY MEDICINE

## 2021-08-02 PROCEDURE — 3052F HG A1C>EQUAL 8.0%<EQUAL 9.0%: CPT | Performed by: FAMILY MEDICINE

## 2021-08-02 RX ORDER — FLASH GLUCOSE SCANNING READER
1 EACH MISCELLANEOUS 4 TIMES DAILY
Qty: 1 EACH | Refills: 5 | Status: SHIPPED | OUTPATIENT
Start: 2021-08-02

## 2021-08-02 RX ORDER — DEXTROAMPHETAMINE SACCHARATE, AMPHETAMINE ASPARTATE, DEXTROAMPHETAMINE SULFATE AND AMPHETAMINE SULFATE 7.5; 7.5; 7.5; 7.5 MG/1; MG/1; MG/1; MG/1
30 TABLET ORAL 2 TIMES DAILY
COMMUNITY
End: 2021-10-11

## 2021-08-02 RX ORDER — FLASH GLUCOSE SENSOR
1 KIT MISCELLANEOUS
Qty: 2 EACH | Refills: 5 | Status: SHIPPED | OUTPATIENT
Start: 2021-08-02 | End: 2021-09-07 | Stop reason: SDUPTHER

## 2021-08-03 LAB
CYCLIC CITRULLINATED PEPTIDE ANTIBODY IGG: 0.5 U/ML (ref 0–2.9)
ESTIMATED AVERAGE GLUCOSE: 145.6 MG/DL
HBA1C MFR BLD: 6.7 %

## 2021-08-03 NOTE — TELEPHONE ENCOUNTER
Lvm for pt, advise pt to schedule a follow up appt with Dr Clemente Blum, and she will be able to see the dieticians as well.

## 2021-08-10 ENCOUNTER — PATIENT MESSAGE (OUTPATIENT)
Dept: FAMILY MEDICINE CLINIC | Age: 51
End: 2021-08-10

## 2021-08-10 DIAGNOSIS — E11.3293 TYPE 2 DIABETES MELLITUS WITH BOTH EYES AFFECTED BY MILD NONPROLIFERATIVE RETINOPATHY WITHOUT MACULAR EDEMA, WITH LONG-TERM CURRENT USE OF INSULIN (HCC): ICD-10-CM

## 2021-08-10 DIAGNOSIS — Z79.4 TYPE 2 DIABETES MELLITUS WITH BOTH EYES AFFECTED BY MILD NONPROLIFERATIVE RETINOPATHY WITHOUT MACULAR EDEMA, WITH LONG-TERM CURRENT USE OF INSULIN (HCC): ICD-10-CM

## 2021-08-10 NOTE — TELEPHONE ENCOUNTER
From: Srini Dalton  To: Letty Barney MD  Sent: 8/10/2021 10:29 AM EDT  Subject: Test Results Question    Here are my readings. The arm monitor is great. This model doesn't sync with my phone though. That's a pain.

## 2021-08-24 ENCOUNTER — TELEPHONE (OUTPATIENT)
Dept: PULMONOLOGY | Age: 51
End: 2021-08-24

## 2021-08-24 NOTE — TELEPHONE ENCOUNTER
Patient has called regarding recall and  registered machine by calling [phone]. Patient was instructed to continue using machine and a message will be sent to their provider about their individual plan of care. Patient was instructed not to store machine where it is exposed to extreme heat, cold, or direct sunlight, not to travel with it in the trunk of their car, not to use any after-market  like the So Clean or Ozone . Patient was instructed to quit using the machine and call the office if they notice any particles in the tubing while cleaning, any unusual odors coming from the machine or have any new onset of symptoms like cough or headache. Patient was informed that we will call them back if the provider has any further instructions or wants them to stop using their machine, otherwise, continue using machine.

## 2021-08-24 NOTE — TELEPHONE ENCOUNTER
Given the severity of her sleep apnea and her comorbid conditions it is recommended that shecontinue therapy until her machine can be repaired/replaced or if his insurance will allow we can prescribe machine from a different manufacture although many those are also on backorder currently. she should contact her equipment company to discuss getting a inline filter to use in the interim till her machine can be repaired/replaced. she also should make sure that he registers her machine to either her equipment company or the [employer] website so that way she can be contacted when they have a full plan as far as repair/replacement.     Verner Platt, MD

## 2021-09-07 RX ORDER — FLASH GLUCOSE SENSOR
1 KIT MISCELLANEOUS
Qty: 6 EACH | Refills: 3 | Status: SHIPPED | OUTPATIENT
Start: 2021-09-07 | End: 2022-04-13 | Stop reason: SDUPTHER

## 2021-09-08 NOTE — TELEPHONE ENCOUNTER
Office has been notified that pt is requiring Prior Authorization for the following medication:  --   Continuous Blood Gluc Sensor (FREESTYLE FRACISCO 2 SENSOR)       Please initiate this request through CoverMyMeds, contacting the following Payor/Insurance:  --  Member ID [id number]     Please see below, or the documentation attached to this encounter for any additional information that may assist in processing PA:  --     Thank you!

## 2021-09-27 ENCOUNTER — TELEPHONE (OUTPATIENT)
Dept: PULMONOLOGY | Age: 51
End: 2021-09-27

## 2021-09-27 ENCOUNTER — VIRTUAL VISIT (OUTPATIENT)
Dept: PULMONOLOGY | Age: 51
End: 2021-09-27
Payer: COMMERCIAL

## 2021-09-27 DIAGNOSIS — E11.3293 TYPE 2 DIABETES MELLITUS WITH BOTH EYES AFFECTED BY MILD NONPROLIFERATIVE RETINOPATHY WITHOUT MACULAR EDEMA, WITHOUT LONG-TERM CURRENT USE OF INSULIN (HCC): ICD-10-CM

## 2021-09-27 DIAGNOSIS — I10 ESSENTIAL HYPERTENSION: ICD-10-CM

## 2021-09-27 DIAGNOSIS — E66.09 CLASS 2 OBESITY DUE TO EXCESS CALORIES WITHOUT SERIOUS COMORBIDITY WITH BODY MASS INDEX (BMI) OF 38.0 TO 38.9 IN ADULT: ICD-10-CM

## 2021-09-27 DIAGNOSIS — G25.81 RESTLESS LEG SYNDROME: ICD-10-CM

## 2021-09-27 DIAGNOSIS — G47.33 OBSTRUCTIVE SLEEP APNEA SYNDROME: Primary | ICD-10-CM

## 2021-09-27 PROCEDURE — 99214 OFFICE O/P EST MOD 30 MIN: CPT | Performed by: NURSE PRACTITIONER

## 2021-09-27 ASSESSMENT — SLEEP AND FATIGUE QUESTIONNAIRES
ESS TOTAL SCORE: 0
HOW LIKELY ARE YOU TO NOD OFF OR FALL ASLEEP WHILE SITTING AND TALKING TO SOMEONE: 0
HOW LIKELY ARE YOU TO NOD OFF OR FALL ASLEEP WHILE WATCHING TV: 0
HOW LIKELY ARE YOU TO NOD OFF OR FALL ASLEEP WHEN YOU ARE A PASSENGER IN A CAR FOR AN HOUR WITHOUT A BREAK: 0
HOW LIKELY ARE YOU TO NOD OFF OR FALL ASLEEP WHILE SITTING AND READING: 0
HOW LIKELY ARE YOU TO NOD OFF OR FALL ASLEEP WHILE LYING DOWN TO REST IN THE AFTERNOON WHEN CIRCUMSTANCES PERMIT: 0
HOW LIKELY ARE YOU TO NOD OFF OR FALL ASLEEP IN A CAR, WHILE STOPPED FOR A FEW MINUTES IN TRAFFIC: 0
HOW LIKELY ARE YOU TO NOD OFF OR FALL ASLEEP WHILE SITTING QUIETLY AFTER LUNCH WITHOUT ALCOHOL: 0
HOW LIKELY ARE YOU TO NOD OFF OR FALL ASLEEP WHILE SITTING INACTIVE IN A PUBLIC PLACE: 0

## 2021-09-27 NOTE — PROGRESS NOTES
Ignacio Dubon MD, FAASM, [facility]  Kyung Berger, MSN, RN, [address] [address]  Dept: [phone]  Dept Fax: [phone]  Loc: [phone]    Subjective:     Patient ID: Danrell Marin is a 46 y.o. female. Chief Complaint   Patient presents with    Sleep Apnea       HPI:      Sleep Medicine Video Visit    Pursuant to the emergency declaration under the [address] [place] [address] [facility] authority and the [employer] Act this Telephone Visit was insisted, with patient's consent, to reduce the patient's risk of exposure to COVID-19 and provide continuity of care for an established patient. Services were provided through a synchronous discussion over a telephone and/or Video chat to substitute for in-person clinic visit, and coded as such. While patient is at home.     Machine Modem/Download Info:  Compliance (hours/night): 7 hrs/night  Download AHI (/hour): 4.4 /HR  Average CPAP Pressure : 16.4 cmH2O           APAP - Settings  Pressure Min: 14 cmH2O  Pressure Max: 20 cmH2O                 Comfort Settings  Humidity Level (0-8): 1  Flex/EPR (0-3): 3 PAP Mask  Mask Type: Nasal mask  Clinically Relevant Leak: Yes     Oxford - Total score: 0    Follow-up :     Last Visit : June 2021      Subjective Health Changes: None      Over Night Oximetry: [] Yes  [] No  [x] NA [] WNL   Using O2: [] Yes  [] No  [x] NA   Patient is compliant with the machine  [x] Yes  [] No [] Per patient   Feeling rested when using the machine   [x] Yes  [] No     Pressure is comfortable with inspiration and expiration  [x] Yes  [] No   ([x] NA   [] Feeling of suffocation  [] Feeling like not enough air    [] To much pressure)     Noticed changes in pressure  [] NA  [x] Yes    [] No     Mask is fitting well  [x] Yes  [] No   Noting Mask Air Leak  [] Yes  [x] No Having painful Aerophagia  [] Yes  [x] No   Nocturia   0-1  per night. Having  HA upon waking  [] Yes  [x] No   Dry mouth upon waking   Dry Nose  Dry Eyes  [x] Yes  [] No   Congestion upon waking   [] Yes  [x] No    Nose Bleeds  [] Yes  [x] No   Using Sleep Aides  Clonazepam, Gabapentin, Mirapex and Vyvanse (for day time sleepiness)  [] NA  [] OTC  [] Per our office   [x] Per another provider   Understands how to change humidification and/or tubing temperature for comfort while at home  [x] Yes  [] No     Difficulties falling asleep  [] Yes  [x] No   Difficulties staying asleep  [] Yes  [x] No   Approximate time to bed  10pm   Approximate wake time  6an   Taking Naps  no   If taking naps usual length  [x] NA   If taking naps using the machine [x] NA  [] Yes    [] No    [] With and With out    Drowsy when driving  [] Yes  [x] No     Does patient carry a DOT/CDL  [] Yes  [x] No     Does patient carry FAA/[employer] License   [] Yes  [x] No      Any concerns noted with the machine at this time  [x] Yes  [] No    Patient ordered a new machine:   Concerns with the machine:     Noise  Increased symptoms include but are not limited to   Dryness  Frequent waking  Waking un refreshed  Daytime sleepiness   Machine is greater then 11years old    Yes    Patient was benefiting from use prior to the start of the malfunctioning of the machine. Assessment/Plan:   1. Obstructive sleep apnea syndrome  Assessment & Plan:  After downloading data and reviewing  Reviewed compliance download with pt. Supplies and parts as needed for his machine. These are medically necessary. Continue medications per his PCP and other physicians. Limit caffeine use after 3pm.    The chronic medical conditions listed are directly related to the primary diagnosis listed above.     The management of the primary diagnosis affects the secondary diagnosis and vice versa    Patient is complaint encouraged to maintain compliance to aide on controlling other stated healthcare concerns. 2. Type 2 diabetes mellitus with both eyes affected by mild nonproliferative retinopathy without macular edema, without long-term current use of insulin (HCC)  Assessment & Plan:  Chronic- stable. After speaking with patient:    Agree with current plan, and would agree to continue this plan per prescribing and managing physician. 3. Restless leg syndrome  Assessment & Plan:  Chronic- stable. After speaking with patient:    Agree with current plan, and would agree to continue this plan per prescribing and managing physician. 4. Essential hypertension  Assessment & Plan:  Chronic- stable. After speaking with patient:    Agree with current plan, and would agree to continue this plan per prescribing and managing physician. 5. Class 2 obesity due to excess calories without serious comorbidity with body mass index (BMI) of 38.0 to 38.9 in adult  Assessment & Plan:  Patient encouraged to work on maintaining a healthy weight per height. Achievable with diet restriction/modifications and exercise (may consult primary care if unsure of any restrictions or concerns). Weight management directly correlates to risk of control and maintenance of Obstructive Sleep Apnea.            - After pulling data and reviewing it   - Reviewed compliance download with patient    -Medically necessary supplies and parts as needed for her machine.   - Continue medications per his primary care provider and other physicians.   - Encouraged to limit caffeine use after 3pm.    - Encouraged her to work on weight loss through diet and exercise  - Educated not to drive when feeling sleepy   - Patient using Other Wants to change and will call back with    - Tube Temperature  Humidifier  (if you are dry turn it high)  Obtaining a new machine   Office locations  Compliance  Follow up  New machine ordered: Machine pressure settings to the current settings  Recall Information and DME contact     The chronic medical conditions listed are directly related to the primary diagnosis listed above. The management of the primary diagnosis affects the secondary diagnosis and vice versa.     - Will follow up in off in 3 months    Electronically signed by  Marina Varghese MSN, RN, CNP on 9/27/2021 at 8:19 AM

## 2021-09-27 NOTE — TELEPHONE ENCOUNTER
Patient left a voicemail stating she is using [facility] [address] for her DME. Any questions, please call patient at [phone].

## 2021-09-27 NOTE — PROGRESS NOTES
Diagnosis: [x] CHIVO (G47.33) [] CSA (G47.31) [] Apnea (G47.30)   Length of Need: [x] 12 Months [] 99 Months [] Other:   Machine (HORACIO!): [] Respironics Dream Station   2   Auto [x] ResMed AirSense     Auto 11 [] Other:     [x]  CPAP () [] Bilevel ()   Mode: [x] Auto [] Spontaneous    Mode: [] Auto [] Spontaneous        APAP - Settings  Pressure Min: 14 cmH2O  Pressure Max: 20 cmH2O                Comfort Settings: Ramp Pressure: 7 cmH2O  Ramp time: 15 min  Flex/EPR - 3 full time                                  For ResMed Bileve (TiMax-4 sec   TiMin- 0.2 sec)     Humidifier: [] Heated ()        [x] Water chamber replacement ()/ 1 per 6 months        Mask:   [x] Nasal () /1 per 3 months [] Full Face () /1 per 3 months   [x] Patient choice -Size and fit mask [] Patient Choice - Size and fit mask   [] Dispense: [] Dispense:   [x] Headgear () / 1 per 3 months [] Headgear () / 1 per 3 months   [x] Replacement Nasal Cushion ()/2 per month [] Interface Replacement ()/1 per month   [] Replacement Nasal Pillows ()/2 per month         Tubing: [x] Heated ()/1 per 3 months    [] Standard ()/1 per 3 months [] Other:           Filters: [x] Non-disposable ()/1 per 6 months     [x] Ultra-Fine, Disposable ()/2 per month        Miscellaneous: [] Chin Strap ()/ 1 per 6 months [] O2 bleed-in:        LPM   [] Oxymetry on CPAP/Bilevel []  Other:         Start Order Date: 09/27/21    MEDICAL JUSTIFICATION:  I, the undersigned, certify that the above prescribed supplies are medically necessary for this patients wellbeing. In my opinion, the supplies are both reasonable and necessary in reference to accepted standards of medicalpractice in treatment of this patients condition.     Claudie Kayser CNP     NPI: [id number]      Order Signed Date: 09/27/21    Martin Melara  1970  [address]  [address] [place] [address] (home) [phone] (mobile)      Insurance Info (confirm with patient if correct):  Payor/Plan Subscr  Sex Relation Sub.  Ins. ID Effective Group Num

## 2021-09-30 ENCOUNTER — NURSE ONLY (OUTPATIENT)
Dept: FAMILY MEDICINE CLINIC | Age: 51
End: 2021-09-30
Payer: COMMERCIAL

## 2021-09-30 DIAGNOSIS — Z23 NEED FOR SHINGLES VACCINE: Primary | ICD-10-CM

## 2021-09-30 PROCEDURE — 90750 HZV VACC RECOMBINANT IM: CPT | Performed by: FAMILY MEDICINE

## 2021-09-30 PROCEDURE — 90471 IMMUNIZATION ADMIN: CPT | Performed by: FAMILY MEDICINE

## 2021-10-10 NOTE — PROGRESS NOTES
Subjective:      Patient ID: Lizabeth Vidal 46 y.o. female. is here for evaluation for knee pain. HPI    Tripped over tent door jamb and landed on both knees 2 weeks ago. Has no left knee pain but the right has a knot and is tender. Hurts when first gets up from sitting or in the AM: gets better after a few steps. Hurts to kneel or put pressure on the area. Does not give out or lock. Also hurts to fully extend - eg to elevate it extended. Is getting better slowly. Rx: Voltaren gel did not help. Icy hot helped a bit. FSBS:  100-110 fasting and 130 - 140 post prandial.      Lab Results   Component Value Date     08/02/2021    K 4.4 08/02/2021    K 4.1 08/01/2018     08/02/2021    CO2 25 08/02/2021    BUN 20 08/02/2021    CREATININE 1.1 08/02/2021    GLUCOSE 98 08/02/2021    CALCIUM 8.9 08/02/2021      Labs Renal Latest Ref Rng & Units 8/2/2021 6/9/2021 5/20/2021 4/30/2021 10/15/2020   BUN 7 - 20 mg/dL 20 23(H) 20 21(H) 25(H)   Cr 0.6 - 1.1 mg/dL 1.1 1.4(H) 1. 3(H) 1. 3(H) 1.1   K 3.5 - 5.1 mmol/L 4.4 4.6 4.4 4.0 4.5   Na 136 - 145 mmol/L 143 139 139 138 142           Outpatient Medications Marked as Taking for the 10/11/21 encounter (Office Visit) with Rico Cardona MD   Medication Sig Dispense Refill    Continuous Blood Gluc Sensor (FREESTYLE FRACISCO 2 SENSOR) MISC 1 each by Does not apply route every 14 days 6 each 3    atorvastatin (LIPITOR) 40 MG tablet TAKE 1 TABLET BY MOUTH  DAILY 90 tablet 1    gabapentin (NEURONTIN) 300 MG capsule TAKE 1 TO 2 CAPSULES BY  MOUTH AT NIGHT 180 capsule 1    Continuous Blood Gluc  (FREESTYLE FRACISCO 2 READER) ANDRIA 1 each by Does not apply route 4 times daily 1 each 5    tolterodine (DETROL LA) 4 MG extended release capsule TAKE ONE CAPSULE BY MOUTH DAILY 90 capsule 1    busPIRone (BUSPAR) 10 MG tablet TAKE ONE TABLET BY MOUTH THREE TIMES A  tablet 1    FARXIGA 10 MG tablet TAKE 1 TABLET BY MOUTH IN  THE MORNING 90 tablet 1    Common migraine    Obstructive sleep apnea syndrome    Herpes simplex    Binge eating    Pedal edema    FH: melanoma    Essential hypertension    Hiatal hernia with GERD and esophagitis    Class 2 obesity without serious comorbidity with body mass index (BMI) of 38.0 to 38.9 in adult    Type 2 diabetes mellitus with mild nonproliferative retinopathy of both eyes without macular edema (HCC)    Panic attacks    Bipolar II disorder, mild, hypomanic, with mixed features, in partial remission ([address]    Restless leg syndrome       Past Medical History:   Diagnosis Date    Anxiety     Chronic gastric ulcer without hemorrhage and without perforation     CTS (carpal tunnel syndrome) 2/10/2015    Depression     Diabetes mellitus [facility]     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 ([facility] 1/29/2016    DUB (dysfunctional uterine bleeding) 8/22/2019    ETD (eustachian tube dysfunction) 5/16/2014    Herpes simplex     Lumbar strain 4/19/2017    PCOS (polycystic ovarian syndrome)     Sleep apnea     TMJ syndrome        Past Surgical History:   Procedure Laterality Date    ENDOSCOPY, COLON, DIAGNOSTIC      FINGER TRIGGER RELEASE Right 6/12/2020    RIGHT INDEX FINGER TRIGGER FINGER RELEASE performed by Bethanie Silveira MD at [address] Right 7/1/2020    INCISION AND DRAINAGE OF RIGHT HAND WOUND; SECONDARY CLOSURE OF WOUND performed by Bethanie Silveira MD at [address] [place], [place] [address]way HISTORY  05/25/2018    EGD    NM LAP,STOMACH,OTHER,W/O TUBE N/A 7/31/2018    ROBOTIC ASSISTED LAPAROSCOPIC SLEEVE GASTRECTOMY  performed by Demetrius Delarosa DO at [address]        Family History   Problem Relation Age of Onset    Cervical Cancer Mother     Diabetes Father     Stroke Father         25s    Hypertension Father     Coronary Art Dis Father     Other Father         CHIVO    Cancer Father         melanoma    Glaucoma Father     Diabetes Sister     Diabetes Maternal Grandmother     Cancer Maternal Grandmother         melanoma    Arthritis Maternal Grandmother     Diabetes Paternal Grandmother     Stroke Paternal Grandmother        Social History     Tobacco Use    Smoking status: Never Smoker    Smokeless tobacco: Never Used   Vaping Use    Vaping Use: Never used   Substance Use Topics    Alcohol use: Yes     Comment: infrequent, few times a month    Drug use: No            Review of Systems  Review of Systems    Objective:   Physical Exam  Vitals:    10/11/21 1433   BP: 128/68   Pulse: 76   Resp: 14   Temp: 97.9 °F (36.6 °C)   TempSrc: Temporal   SpO2: 97%     Wt Readings from Last 3 Encounters:   08/02/21 271 lb (122.9 kg)   08/02/21 271 lb 3.2 oz (123 kg)   06/08/21 269 lb (122 kg)        Physical Exam  Constitutional:       General: She is not in acute distress. Appearance: She is well-developed. Cardiovascular:      Rate and Rhythm: Normal rate and regular rhythm. Heart sounds: Normal heart sounds. Pulmonary:      Effort: Pulmonary effort is normal.      Breath sounds: Normal breath sounds. Musculoskeletal:      Right knee: Swelling present. No deformity, effusion, erythema, lacerations, bony tenderness or crepitus. Normal range of motion. Tenderness present. No medial joint line, lateral joint line, MCL, LCL, ACL, PCL or patellar tendon tenderness. No LCL laxity, MCL laxity, ACL laxity or PCL laxity. Normal alignment, normal meniscus and normal patellar mobility. Normal pulse. Instability Tests: Anterior drawer test negative. Anterior Lachman test negative. Legs:    Skin:     General: Skin is warm and dry. Assessment:       Diagnosis Orders   1. Contusion of right knee, initial encounter  methylPREDNISolone (MEDROL, MINAL,) 4 MG tablet  Exercises and informational handout reviewed and copy provided.     2. Type 2 diabetes mellitus with both eyes affected by mild nonproliferative retinopathy without macular edema, without long-term current use of insulin ([address] Watch sugars with prednisone and call if > 250    3. Need for influenza vaccination            Plan:      Side effects of current medications reviewed and questions answered. Call or return to clinic prn if these symptoms worsen or fail to improve as anticipated.

## 2021-10-11 ENCOUNTER — OFFICE VISIT (OUTPATIENT)
Dept: FAMILY MEDICINE CLINIC | Age: 51
End: 2021-10-11
Payer: COMMERCIAL

## 2021-10-11 VITALS
OXYGEN SATURATION: 97 % | TEMPERATURE: 97.9 F | HEART RATE: 76 BPM | RESPIRATION RATE: 14 BRPM | SYSTOLIC BLOOD PRESSURE: 128 MMHG | DIASTOLIC BLOOD PRESSURE: 68 MMHG

## 2021-10-11 DIAGNOSIS — E11.3293 TYPE 2 DIABETES MELLITUS WITH BOTH EYES AFFECTED BY MILD NONPROLIFERATIVE RETINOPATHY WITHOUT MACULAR EDEMA, WITHOUT LONG-TERM CURRENT USE OF INSULIN (HCC): ICD-10-CM

## 2021-10-11 DIAGNOSIS — Z23 NEED FOR INFLUENZA VACCINATION: ICD-10-CM

## 2021-10-11 DIAGNOSIS — S80.01XA CONTUSION OF RIGHT KNEE, INITIAL ENCOUNTER: Primary | ICD-10-CM

## 2021-10-11 PROCEDURE — 90674 CCIIV4 VAC NO PRSV 0.5 ML IM: CPT | Performed by: FAMILY MEDICINE

## 2021-10-11 PROCEDURE — 90471 IMMUNIZATION ADMIN: CPT | Performed by: FAMILY MEDICINE

## 2021-10-11 PROCEDURE — 99213 OFFICE O/P EST LOW 20 MIN: CPT | Performed by: FAMILY MEDICINE

## 2021-10-11 RX ORDER — METHYLPREDNISOLONE 4 MG/1
TABLET ORAL
Qty: 1 KIT | Refills: 0 | Status: SHIPPED | OUTPATIENT
Start: 2021-10-11 | End: 2022-03-13

## 2021-10-11 NOTE — PATIENT INSTRUCTIONS
Patient Education        Knee (Pes Anserine) Bursitis: Exercises  Introduction  Here are some examples of exercises for you to try. The exercises may be suggested for a condition or for rehabilitation. Start each exercise slowly. Ease off the exercises if you start to have pain. You will be told when to start these exercises and which ones will work best for you. How to do the exercises  Heel slide    1. Lie on your back with your affected knee straight. Your good knee should be bent. 2. Bend your affected knee by sliding your heel across the floor and toward your buttock until you feel a gentle stretch in your knee. 3. Hold for about 6 seconds, and then slowly straighten your knee. 4. Repeat 8 to 12 times. Quad sets    1. Sit with your affected leg straight and supported on the floor or a firm bed. Place a small, rolled-up towel under your affected knee. Your other leg should be bent, with that foot flat on the floor. 2. Tighten the thigh muscles of your affected leg by pressing the back of your knee down into the towel. 3. Hold for about 6 seconds, then rest for up to 10 seconds. 4. Repeat 8 to 12 times. Straight-leg raises to the front    1. Lie on your back with your good knee bent so that your foot rests flat on the floor. Your affected leg should be straight. Make sure that your low back has a normal curve. You should be able to slip your hand in between the floor and the small of your back, with your palm touching the floor and your back touching the back of your hand. 2. Tighten the thigh muscles in your affected leg by pressing the back of your knee flat down to the floor. Hold your knee straight. 3. Keeping the thigh muscles tight and your leg straight, lift your affected leg up so that your heel is about 12 inches off the floor. 4. Hold for about 6 seconds, then lower slowly. Rest for up to 10 seconds between repetitions. 5. Repeat 8 to 12 times.   Follow-up care is a key part of your treatment and safety. Be sure to make and go to all appointments, and call your doctor if you are having problems. It's also a good idea to know your test results and keep a list of the medicines you take. Where can you learn more? Go to [email or web address] org and sign in to your [facility] account. Enter Q783 in the ERCOM box to learn more about \"Knee (Pes Anserine) Bursitis: Exercises. \"     If you do not have an account, please click on the \"Sign Up Now\" link. Current as of: July 1, 2021               Content Version: 13.0  © 8396-1091 [employer]wise, [employer]. Care instructions adapted under license by [facility] ([facility]). If you have questions about a medical condition or this instruction, always ask your healthcare professional. [address] any warranty or liability for your use of this information.

## 2021-10-15 DIAGNOSIS — F41.0 PANIC ATTACKS: ICD-10-CM

## 2021-10-15 DIAGNOSIS — F41.8 DEPRESSION WITH ANXIETY: Chronic | ICD-10-CM

## 2021-10-15 RX ORDER — SERTRALINE HYDROCHLORIDE 100 MG/1
TABLET, FILM COATED ORAL
Qty: 135 TABLET | Refills: 3 | Status: SHIPPED | OUTPATIENT
Start: 2021-10-15 | End: 2022-09-12

## 2021-10-15 NOTE — TELEPHONE ENCOUNTER
Requested Prescriptions     Pending Prescriptions Disp Refills    sertraline (ZOLOFT) 100 MG tablet [Pharmacy Med Name: Sertraline HCl 100 MG Oral Tablet] 135 tablet 3     Sig: TAKE 1 AND 1/2 TABLETS BY  MOUTH DAILY       Last ov 10/11/2021  Last labs 8/2/2021

## 2021-10-18 ENCOUNTER — PATIENT MESSAGE (OUTPATIENT)
Dept: FAMILY MEDICINE CLINIC | Age: 51
End: 2021-10-18

## 2021-10-18 DIAGNOSIS — M25.561 ACUTE PAIN OF RIGHT KNEE: Primary | ICD-10-CM

## 2021-10-18 NOTE — TELEPHONE ENCOUNTER
Please advise on next step thank you    My knee is worse, I am done with the steroids. If I sit for too long it's really bad. I am leaving Thursday for a camping trip.

## 2021-10-19 ENCOUNTER — TELEPHONE (OUTPATIENT)
Dept: ORTHOPEDIC SURGERY | Age: 51
End: 2021-10-19

## 2021-10-19 ENCOUNTER — OFFICE VISIT (OUTPATIENT)
Dept: ORTHOPEDIC SURGERY | Age: 51
End: 2021-10-19
Payer: COMMERCIAL

## 2021-10-19 VITALS — HEIGHT: 66 IN | RESPIRATION RATE: 16 BRPM | BODY MASS INDEX: 43.74 KG/M2

## 2021-10-19 DIAGNOSIS — M25.561 ACUTE PAIN OF RIGHT KNEE: Primary | ICD-10-CM

## 2021-10-19 PROCEDURE — 99213 OFFICE O/P EST LOW 20 MIN: CPT | Performed by: ORTHOPAEDIC SURGERY

## 2021-10-19 PROCEDURE — L1833 KO ADJ JNT POS R SUP PRE OTS: HCPCS | Performed by: ORTHOPAEDIC SURGERY

## 2021-10-19 NOTE — PROGRESS NOTES
CHIEF COMPLAINT: Right knee pain. History:   Kelvin Gallegos is a 46 y.o. female referred by Carson Aldrich MD for Sports Medicine consultation for evaluation and treatment of right knee pain / injury. The patient complains of right knee pain. This is evaluated as a personal injury. The pain began 2 weeks ago. Rate pain 8/10. There was a history of injury. She tripped and fell and landed directly on her knee 2 weeks ago. Her knee was gradually improving, especially after a course of oral steroids until 2 days ago. She was getting out of the bathtub and twisted her knee and felt a pop. She has had increased pain since that time. The pain is located anterior and lateral aspect of her knee. Symptoms are worse with getting up from sitting, stairs, kneeling, and twisting. The knee has not given out or felt unstable. The patient can bend and straighten the knee fully. There is swelling in the knee. There was catching / locking of the knee. The patient has not had PT. The patient has not had an injection. The patient cannot take NSAIDs because of GI issues. The patient has not tried ice. The patient's occupation is . Outside reports reviewed: none.     Past Medical History:   Diagnosis Date    Anxiety     Chronic gastric ulcer without hemorrhage and without perforation     CTS (carpal tunnel syndrome) 2/10/2015    Depression     Diabetes mellitus ([address]     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 ([facility] 1/29/2016    DUB (dysfunctional uterine bleeding) 8/22/2019    ETD (eustachian tube dysfunction) 5/16/2014    Herpes simplex     Lumbar strain 4/19/2017    PCOS (polycystic ovarian syndrome)     Sleep apnea     TMJ syndrome        Past Surgical History:   Procedure Laterality Date    ENDOSCOPY, COLON, DIAGNOSTIC      FINGER TRIGGER RELEASE Right 6/12/2020    RIGHT INDEX FINGER TRIGGER FINGER RELEASE performed by Kristen Sacks, MD at [address] [facility] HAND SURGERY Right 7/1/2020 INCISION AND DRAINAGE OF RIGHT HAND WOUND; SECONDARY CLOSURE OF WOUND performed by Alex Sorensen MD at [address] [facility]  05/25/2018    EGD    ME LAP,STOMACH,OTHER,W/O TUBE N/A 7/31/2018    ROBOTIC ASSISTED LAPAROSCOPIC SLEEVE GASTRECTOMY  performed by Candido Robles DO at [facility] History   Problem Relation Age of Onset    Cervical Cancer Mother     Diabetes Father     Stroke Father         25s    Hypertension Father     Coronary Art Dis Father     Other Father         CHIVO    Cancer Father         melanoma    Glaucoma Father     Diabetes Sister     Diabetes Maternal Grandmother     Cancer Maternal Grandmother         melanoma    Arthritis Maternal Grandmother     Diabetes Paternal Grandmother     Stroke Paternal Grandmother        Social History     Socioeconomic History    Marital status:      Spouse name: None    Number of children: None    Years of education: None    Highest education level: None   Occupational History    None   Tobacco Use    Smoking status: Never Smoker    Smokeless tobacco: Never Used   Vaping Use    Vaping Use: Never used   Substance and Sexual Activity    Alcohol use: Yes     Comment: infrequent, few times a month    Drug use: No    Sexual activity: Yes     Partners: Male   Other Topics Concern    None   Social History Narrative    None     Social Determinants of Health     Financial Resource Strain: Medium Risk    Difficulty of Paying Living Expenses: Somewhat hard   Food Insecurity: No Food Insecurity    Worried About Running Out of Food in the Last Year: Never true    Guilherme of Food in the Last Year: Never true   Transportation Needs:     Lack of Transportation (Medical):      Lack of Transportation (Non-Medical):    Physical Activity:     Days of Exercise per Week:     Minutes of Exercise per Session:    Stress:     Feeling of Stress :    Social Connections:     Frequency of Communication with Friends and Family:     Frequency of Social Gatherings with Friends and Family:     Attends [employer] Services:     Active Member of Clubs or Organizations:     Attends Club or Organization Meetings:     Marital Status:    Intimate Partner Violence:     Fear of Current or Ex-Partner:     Emotionally Abused:     Physically Abused:     Sexually Abused:        Current Outpatient Medications   Medication Sig Dispense Refill    sertraline (ZOLOFT) 100 MG tablet TAKE 1 AND 1/2 TABLETS BY  MOUTH DAILY 135 tablet 3    methylPREDNISolone (MEDROL, MINAL,) 4 MG tablet Take as directed on package insert 1 kit 0    Continuous Blood Gluc Sensor (FREESTYLE FRACISCO 2 SENSOR) MISC 1 each by Does not apply route every 14 days 6 each 3    atorvastatin (LIPITOR) 40 MG tablet TAKE 1 TABLET BY MOUTH  DAILY 90 tablet 1    gabapentin (NEURONTIN) 300 MG capsule TAKE 1 TO 2 CAPSULES BY  MOUTH AT NIGHT 180 capsule 1    Continuous Blood Gluc  (FREESTYLE FRACISCO 2 READER) ANDRIA 1 each by Does not apply route 4 times daily 1 each 5    tolterodine (DETROL LA) 4 MG extended release capsule TAKE ONE CAPSULE BY MOUTH DAILY 90 capsule 1    busPIRone (BUSPAR) 10 MG tablet TAKE ONE TABLET BY MOUTH THREE TIMES A  tablet 1    FARXIGA 10 MG tablet TAKE 1 TABLET BY MOUTH IN  THE MORNING 90 tablet 1    TRULICITY 1.5 IQ/6.8BM SOPN INJECT THE CONTENTS OF 1  PEN SUBCUTANEOUSLY ONCE  WEEKLY AS DIRECTED 6 mL 3    TURMERIC PO Take 1 tablet by mouth daily      lisdexamfetamine (VYVANSE) 70 MG capsule Take 1 capsule by mouth every morning for 90 days.  90 capsule 0    cyclobenzaprine (FLEXERIL) 10 MG tablet TAKE 1 TABLET BY MOUTH 3  TIMES DAILY AS NEEDED FOR  MUSCLE SPASM(S) 90 tablet 2    albuterol sulfate  (90 Base) MCG/ACT inhaler USE 2 INHALATIONS BY MOUTH  EVERY 4 HOURS AS NEEDED FOR WHEEZING 20.1 g 1    hydrOXYzine (ATARAX) 25 MG tablet TAKE 1 TABLET BY MOUTH  EVERY 6 HOURS AS NEEDED FOR ANXIETY 180 tablet 1    ARIPiprazole (ABILIFY) 2 MG tablet Take 1 tablet by mouth daily (Patient taking differently: Take 5 mg by mouth daily ) 30 tablet 2    montelukast (SINGULAIR) 10 MG tablet Take 1 tablet by mouth daily 30 tablet 5    OXcarbazepine (TRILEPTAL) 150 MG tablet Take 1 tablet by mouth 2 times daily 180 tablet 3    buPROPion (WELLBUTRIN XL) 300 MG extended release tablet Take 1 tablet by mouth every morning 90 tablet 3    insulin glargine (LANTUS SOLOSTAR) 100 UNIT/ML injection pen Inject 40 Units into the skin nightly 15 pen 3    vitamin B-12 (CYANOCOBALAMIN) 1000 MCG tablet Take 1,000 mcg by mouth daily      MIRENA, 52 MG, IUD 52 mg 1 Dose by Intrauterine route once      Multiple Vitamins-Minerals (BARIATRIC FUSION) CHEW Take 4 tablets by mouth daily       b complex vitamins capsule Take 1 capsule by mouth daily      fluticasone (FLONASE) 50 MCG/ACT nasal spray PLACE TWO SPRAYS IN EACH NOSTRIL ONCE DAILY 1 Bottle 12    aspirin 81 MG EC tablet Take 81 mg by mouth daily. No current facility-administered medications for this visit. Allergies   Allergen Reactions    Effexor Xr [Venlafaxine Hcl Er] Other (See Comments)     Didn't respond well to it. Review of Systems:  I have reviewed the clinically relevant past medical history, medications, allergies, family history, social history, and 13 point Review of Systems from the patient's recent history form & documented any details relevant to today's presenting complaints in the history above. The patient's self-reported past medical history, medications, allergies, family history, social history, and Review of Systems form from 10/19/21 have been scanned into the chart under the \"Media\" tab. Physical Examination:     Vital signs:   Resp 16   Ht 5' 6\" (1.676 m)   BMI 43.74 kg/m²     General:  alert, appears stated age, cooperative and no distress   Gait:  Antalgic. The patient can bear weight on the injured extremity.      Right Knee  Alignment:  neutral ROM:  0 degrees extension to 120 degrees flexion   Bilateral knees   Crepitus:  no   Joint Tenderness:  lateral joint line   Effusion:   20-30 cc   Patellar excursion:  2 of 4 quadrants    Patellar tilt test:  positive   Patellar facet tenderness:  negative medial   negative lateral   Patellar apprehension test:  negative   Lachman test:  negative   Left knee: negative   Anterior drawer test:  negative   Left knee: negative   Posterior drawer:   negative    Left knee: negative   Varus laxity at 30 degrees:  negative   Left knee: negative   Valgus laxity at 30 degrees:   negative   Left knee: negative   Brandon's test:  positive   Left knee: negative     There is not any cellulitis, lymphedema or cutaneous lesions noted in the lower extremities. Motor exam of the lower extremities show quadriceps, hamstrings, foot dorsiflexion and plantarflexion grossly intact. Sensation to both feet is grossly intact to light touch. The bilateral lower extremities are warm and well-perfused with brisk capillary refill. Imaging:  Right Knee X-Ray: 3 view x-rays of the knee, including bilateral AP and sunrise and lateral of the affected side were obtained and reviewed. No fracture or dislocation. ? Effusion     Right Knee MRI: ordered, but not yet obtained        Hgb A1c 8/2/2021: 6.7      Assessment:     Right knee internal derangement, possible lateral meniscus tear  Right knee contusion, resolved  Morbid obesity  Diabetes  Depression  Anxiety  Sleep apnea  Gastric ulcer      Plan:     Natural history and expected course discussed. Questions answered. MRI of right knee to evaluate lateral meniscus. Procedures    [employer] Short Runner WrapAround Knee Brace     Patient was prescribed a [employer] Shortrunner. The right knee will require stabilization / immobilization from this semi-rigid / rigid orthosis to improve their function.   The orthosis will assist in protecting the affected area, provide functional support and facilitate healing. The patient was educated and fit by a healthcare professional with expert knowledge and specialization in brace application while under the direct supervision of the physician. Verbal and written instructions for the use of and application of this item were provided. They were instructed to contact the office immediately should the brace result in increased pain, decreased sensation, increased swelling or worsening of the condition. Ice as needed. Follow-up after MRI. Adarsh Mathur. Nai Giraldo MD  Orthopaedic Surgery and Sports Medicine     Disclaimer: This note was generated with use of a verbal recognition program and an attempt was made to check for errors. It is possible that there are still dictated errors within this office note. If so, please bring any significant errors to my attention for an addendum. All efforts were made to ensure that this office note is accurate.

## 2021-10-20 ENCOUNTER — TELEPHONE (OUTPATIENT)
Dept: ORTHOPEDIC SURGERY | Age: 51
End: 2021-10-20

## 2021-10-27 ENCOUNTER — HOSPITAL ENCOUNTER (OUTPATIENT)
Dept: MRI IMAGING | Age: 51
Discharge: HOME | End: 2021-10-27
Payer: COMMERCIAL

## 2021-10-27 DIAGNOSIS — M25.561 ACUTE PAIN OF RIGHT KNEE: ICD-10-CM

## 2021-10-27 PROCEDURE — 73721 MRI JNT OF LWR EXTRE W/O DYE: CPT

## 2021-11-01 ENCOUNTER — OFFICE VISIT (OUTPATIENT)
Dept: ORTHOPEDIC SURGERY | Age: 51
End: 2021-11-01
Payer: COMMERCIAL

## 2021-11-01 VITALS — BODY MASS INDEX: 41.62 KG/M2 | WEIGHT: 259 LBS | HEIGHT: 66 IN | RESPIRATION RATE: 16 BRPM

## 2021-11-01 DIAGNOSIS — S83.91XA SPRAIN OF RIGHT KNEE, UNSPECIFIED LIGAMENT, INITIAL ENCOUNTER: Primary | ICD-10-CM

## 2021-11-01 DIAGNOSIS — S83.241A OTHER TEAR OF MEDIAL MENISCUS OF RIGHT KNEE AS CURRENT INJURY, INITIAL ENCOUNTER: ICD-10-CM

## 2021-11-01 PROCEDURE — 20610 DRAIN/INJ JOINT/BURSA W/O US: CPT | Performed by: ORTHOPAEDIC SURGERY

## 2021-11-01 PROCEDURE — 99213 OFFICE O/P EST LOW 20 MIN: CPT | Performed by: ORTHOPAEDIC SURGERY

## 2021-11-01 RX ORDER — LIDOCAINE HYDROCHLORIDE 10 MG/ML
4 INJECTION, SOLUTION INFILTRATION; PERINEURAL ONCE
Status: COMPLETED | OUTPATIENT
Start: 2021-11-01 | End: 2021-11-01

## 2021-11-01 RX ORDER — BUPIVACAINE HYDROCHLORIDE 2.5 MG/ML
4 INJECTION, SOLUTION INFILTRATION; PERINEURAL ONCE
Status: COMPLETED | OUTPATIENT
Start: 2021-11-01 | End: 2021-11-01

## 2021-11-01 RX ORDER — TRIAMCINOLONE ACETONIDE 40 MG/ML
40 INJECTION, SUSPENSION INTRA-ARTICULAR; INTRAMUSCULAR ONCE
Status: COMPLETED | OUTPATIENT
Start: 2021-11-01 | End: 2021-11-01

## 2021-11-01 RX ADMIN — LIDOCAINE HYDROCHLORIDE 4 ML: 10 INJECTION, SOLUTION INFILTRATION; PERINEURAL at 11:35

## 2021-11-01 RX ADMIN — TRIAMCINOLONE ACETONIDE 40 MG: 40 INJECTION, SUSPENSION INTRA-ARTICULAR; INTRAMUSCULAR at 11:35

## 2021-11-01 RX ADMIN — BUPIVACAINE HYDROCHLORIDE 10 MG: 2.5 INJECTION, SOLUTION INFILTRATION; PERINEURAL at 11:34

## 2021-11-01 NOTE — PROGRESS NOTES
CHIEF COMPLAINT: Right knee pain. History:   Amina Brennan is a 46 y.o. female here for right knee follow-up. Initial history: referred by Keon Hurtado MD for Sports Medicine consultation for evaluation and treatment of right knee pain / injury. The patient complains of right knee pain. This is evaluated as a personal injury. The pain began 2 weeks ago. Rate pain 8/10. There was a history of injury. She tripped and fell and landed directly on her knee 2 weeks ago. Her knee was gradually improving, especially after a course of oral steroids until 2 days ago. She was getting out of the bathtub and twisted her knee and felt a pop. She has had increased pain since that time. The pain is located anterior and lateral aspect of her knee. Symptoms are worse with getting up from sitting, stairs, kneeling, and twisting. The knee has not given out or felt unstable. The patient can bend and straighten the knee fully. There is swelling in the knee. There was catching / locking of the knee. The patient has not had PT. The patient has not had an injection. The patient cannot take NSAIDs because of GI issues. The patient has not tried ice. The patient's occupation is . Interval History: Her knee had been doing better until she fell in the shower this weekend. She rates pain 5/10. Pain is still mostly lateral.  She has been wearing her knee brace.       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 Right 7/1/2020    INCISION AND DRAINAGE OF RIGHT HAND WOUND; SECONDARY CLOSURE OF WOUND performed by Sudeep Berman MD at [address] [facility]  05/25/2018    EGD    [place] LAP,STOMACH,OTHER,W/O TUBE N/A 7/31/2018    ROBOTIC ASSISTED LAPAROSCOPIC SLEEVE GASTRECTOMY  performed by Brendon Mattson DO at [facility] History   Problem Relation Age of Onset    Cervical Cancer Mother     Diabetes Father     Stroke Father         25s    Hypertension Father     Coronary Art Dis Father     Other Father         CHIVO    Cancer Father         melanoma    Glaucoma Father     Diabetes Sister     Diabetes Maternal Grandmother     Cancer Maternal Grandmother         melanoma    Arthritis Maternal Grandmother     Diabetes Paternal Grandmother     Stroke Paternal Grandmother        Social History     Socioeconomic History    Marital status:      Spouse name: None    Number of children: None    Years of education: None    Highest education level: None   Occupational History    None   Tobacco Use    Smoking status: Never Smoker    Smokeless tobacco: Never Used   Vaping Use    Vaping Use: Never used   Substance and Sexual Activity    Alcohol use: Yes     Comment: infrequent, few times a month    Drug use: No    Sexual activity: Yes     Partners: Male   Other Topics Concern    None   Social History Narrative    None     Social Determinants of Health     Financial Resource Strain: Medium Risk    Difficulty of Paying Living Expenses: Somewhat hard   Food Insecurity: No Food Insecurity    Worried About Running Out of Food in the Last Year: Never true    Guilherme of Food in the Last Year: Never true   Transportation Needs:     Lack of Transportation (Medical):      Lack of Transportation (Non-Medical):    Physical Activity:     Days of Exercise per Week:     Minutes of Exercise per Session:    Stress:     Feeling of Stress :    Social Connections:     Frequency of Communication with Friends and Family:     Frequency of Social Gatherings with Friends and Family:     Attends [employer] Services:     Active Member of Clubs or Organizations:     Attends Club or Organization Meetings:     Marital Status:    Intimate Partner Violence:     Fear of Current or Ex-Partner:     Emotionally Abused:     Physically Abused:     Sexually Abused:        Current Outpatient Medications   Medication Sig Dispense Refill    sertraline (ZOLOFT) 100 MG tablet TAKE 1 AND 1/2 TABLETS BY  MOUTH DAILY 135 tablet 3    methylPREDNISolone (MEDROL, MINAL,) 4 MG tablet Take as directed on package insert 1 kit 0    Continuous Blood Gluc Sensor (FREESTYLE FRACISCO 2 SENSOR) MISC 1 each by Does not apply route every 14 days 6 each 3    atorvastatin (LIPITOR) 40 MG tablet TAKE 1 TABLET BY MOUTH  DAILY 90 tablet 1    gabapentin (NEURONTIN) 300 MG capsule TAKE 1 TO 2 CAPSULES BY  MOUTH AT NIGHT 180 capsule 1    Continuous Blood Gluc  (FREESTYLE FRACISCO 2 READER) ANDRIA 1 each by Does not apply route 4 times daily 1 each 5    tolterodine (DETROL LA) 4 MG extended release capsule TAKE ONE CAPSULE BY MOUTH DAILY 90 capsule 1    busPIRone (BUSPAR) 10 MG tablet TAKE ONE TABLET BY MOUTH THREE TIMES A  tablet 1    FARXIGA 10 MG tablet TAKE 1 TABLET BY MOUTH IN  THE MORNING 90 tablet 1    TRULICITY 1.5 HY/5.4XB SOPN INJECT THE CONTENTS OF 1  PEN SUBCUTANEOUSLY ONCE  WEEKLY AS DIRECTED 6 mL 3    TURMERIC PO Take 1 tablet by mouth daily      lisdexamfetamine (VYVANSE) 70 MG capsule Take 1 capsule by mouth every morning for 90 days.  90 capsule 0    cyclobenzaprine (FLEXERIL) 10 MG tablet TAKE 1 TABLET BY MOUTH 3  TIMES DAILY AS NEEDED FOR  MUSCLE SPASM(S) 90 tablet 2    albuterol sulfate  (90 Base) MCG/ACT inhaler USE 2 INHALATIONS BY MOUTH  EVERY 4 HOURS AS NEEDED FOR WHEEZING 20.1 g 1    hydrOXYzine (ATARAX) 25 MG tablet TAKE 1 TABLET BY MOUTH  EVERY 6 HOURS AS NEEDED FOR ANXIETY 180 tablet 1    ARIPiprazole (ABILIFY) 2 MG tablet Take 1 tablet by mouth daily (Patient taking differently: Take 5 mg by mouth daily ) 30 tablet 2    montelukast (SINGULAIR) 10 MG tablet Take 1 tablet by mouth daily 30 tablet 5    OXcarbazepine (TRILEPTAL) 150 MG tablet Take 1 tablet by mouth 2 times daily 180 tablet 3    buPROPion (WELLBUTRIN XL) 300 MG extended release tablet Take 1 tablet by mouth every morning 90 tablet 3    insulin glargine (LANTUS SOLOSTAR) 100 UNIT/ML injection pen Inject 40 Units into the skin nightly 15 pen 3    vitamin B-12 (CYANOCOBALAMIN) 1000 MCG tablet Take 1,000 mcg by mouth daily      MIRENA, 52 MG, IUD 52 mg 1 Dose by Intrauterine route once      Multiple Vitamins-Minerals (BARIATRIC FUSION) CHEW Take 4 tablets by mouth daily       b complex vitamins capsule Take 1 capsule by mouth daily      fluticasone (FLONASE) 50 MCG/ACT nasal spray PLACE TWO SPRAYS IN EACH NOSTRIL ONCE DAILY 1 Bottle 12    aspirin 81 MG EC tablet Take 81 mg by mouth daily. No current facility-administered medications for this visit. Allergies   Allergen Reactions    Effexor Xr [Venlafaxine Hcl Er] Other (See Comments)     Didn't respond well to it. Review of Systems:  I have reviewed the clinically relevant past medical history, medications, allergies, family history, social history, and 13 point Review of Systems from the patient's recent history form & documented any details relevant to today's presenting complaints in the history above. The patient's self-reported past medical history, medications, allergies, family history, social history, and Review of Systems form from 10/19/21 have been scanned into the chart under the \"Media\" tab.       Physical Examination:     Vital signs:   Resp 16   Ht 5' 6\" (1.676 m)   Wt 259 lb (117.5 kg)   BMI 41.80 kg/m²     General:  alert, appears stated age, cooperative and no distress       Imaging:  Right Knee X-Ray 10/19/21: No fracture or dislocation. ? Effusion     Right Knee MRI 10/27/21: I independently reviewed the images, as well as the radiology report. 1. Subtle leak tear is identified at the root of the posterior horn of the medial meniscus which contacts the inferior articular surface. 2. There is some mild edema identified about the intact fibers of the medial collateral ligament. This may reflect a mild medial collateral ligament sprain. 3. There is edema identified within the popliteal fossa as well as the posterior capsule of the knee joint. This can be seen with a capsular sprain. 4. Mild chondrosis is identified at the medial compartment and patellofemoral compartment of the knee. 5. There is edema identified within the posterior aspect of the tibial eminence. This may reflect an osseous contusion or reactive edema from suspected nearby are radial tear of the root of the posterior horn of the medial meniscus. 6. There is a small 6 mm x 4 mm x 2 mm loose body identified anteriorly within the intercondylar notch. 7. Large joint effusion. Hgb A1c 8/2/2021: 6.7      Assessment:     Right knee subtle/small medial meniscus tear  Right knee effusion  Right knee capsular sprain  Morbid obesity  Diabetes  Depression  Anxiety  Sleep apnea  Gastric ulcer      Plan:     Natural history and expected course discussed. Questions answered. Reviewed the MRI images and discussed the findings. She does have a very small inferior tear of the posterior horn/root of the medial meniscus. She however does not have any medial knee pain. She also has some mild sprain of her MCL and posterior capsular sprain. Discussed that I would treat all this nonoperatively initially. Ice as needed. Brace prn.     The risks and benefits of an injection were discussed with the patient. The patient had full opportunity to ask questions and all were answered. The patient then provided verbal informed consent. The skin was then prepped with betadine solution and alcohol. Under aseptic conditions, the  right knee was injected with 4cc of 1% xylocaine, 4cc of 0.25% marcaine, and 1cc of Kenalog (40mg/ml). There were no immediate complications following the injection. The patient was advised of the possibility of injection site reaction and instructed to apply ice to the area and take NSAIDs if able. Follow-up 2 months prn. Ryan Lucio. Antoinette Blake MD  Orthopaedic Surgery and Sports Medicine     Disclaimer: This note was generated with use of a verbal recognition program and an attempt was made to check for errors. It is possible that there are still dictated errors within this office note. If so, please bring any significant errors to my attention for an addendum. All efforts were made to ensure that this office note is accurate.

## 2021-11-05 DIAGNOSIS — F31.61 BIPOLAR DISORDER, CURRENT EPISODE MIXED, MILD (HCC): ICD-10-CM

## 2021-11-05 RX ORDER — OXCARBAZEPINE 150 MG/1
TABLET, FILM COATED ORAL
Qty: 180 TABLET | Refills: 0 | Status: SHIPPED | OUTPATIENT
Start: 2021-11-05 | End: 2021-12-29

## 2021-11-05 RX ORDER — HYDROXYZINE HYDROCHLORIDE 25 MG/1
25 TABLET, FILM COATED ORAL EVERY 6 HOURS PRN
Qty: 360 TABLET | Refills: 0 | Status: SHIPPED | OUTPATIENT
Start: 2021-11-05

## 2021-11-05 NOTE — TELEPHONE ENCOUNTER
Requested Prescriptions     Pending Prescriptions Disp Refills    hydrOXYzine (ATARAX) 25 MG tablet [Pharmacy Med Name: HYDROXYZINE HYDROCHLORIDE  25MG  TAB] 360 tablet      Sig: TAKE 1 TABLET BY MOUTH  EVERY 6 HOURS AS NEEDED FOR ANXIETY    OXcarbazepine (TRILEPTAL) 150 MG tablet [Pharmacy Med Name: OXCARBAZEPINE  150MG  TAB] 180 tablet 3     Sig: TAKE 1 TABLET BY MOUTH  TWICE DAILY     Last ov 10/11/2021  Last labs 8/2/21

## 2021-11-11 RX ORDER — CYCLOBENZAPRINE HCL 10 MG
TABLET ORAL
Qty: 90 TABLET | Refills: 2 | Status: SHIPPED | OUTPATIENT
Start: 2021-11-11 | End: 2022-05-24

## 2021-11-11 NOTE — TELEPHONE ENCOUNTER
Requested Prescriptions     Pending Prescriptions Disp Refills    cyclobenzaprine (FLEXERIL) 10 MG tablet [Pharmacy Med Name: CYCLOBENZAPRINE  10MG  TAB] 90 tablet 2     Sig: TAKE 1 TABLET BY MOUTH 3  TIMES DAILY AS NEEDED FOR  MUSCLE SPASM(S)       LOV 10/11/2021  NO F/U  LABS 8/2/21

## 2021-11-22 DIAGNOSIS — F41.0 PANIC ATTACKS: ICD-10-CM

## 2021-11-22 DIAGNOSIS — F41.8 DEPRESSION WITH ANXIETY: Chronic | ICD-10-CM

## 2021-11-22 RX ORDER — FLUTICASONE PROPIONATE 50 MCG
2 SPRAY, SUSPENSION (ML) NASAL DAILY
Qty: 3 EACH | Refills: 3 | Status: SHIPPED | OUTPATIENT
Start: 2021-11-22

## 2021-11-22 RX ORDER — ARIPIPRAZOLE 2 MG/1
2 TABLET ORAL DAILY
Qty: 90 TABLET | Refills: 1 | Status: SHIPPED | OUTPATIENT
Start: 2021-11-22 | End: 2022-03-18 | Stop reason: ALTCHOICE

## 2021-11-22 RX ORDER — ALBUTEROL SULFATE 90 UG/1
2 AEROSOL, METERED RESPIRATORY (INHALATION) EVERY 4 HOURS PRN
Qty: 20.1 G | Refills: 1 | Status: SHIPPED | OUTPATIENT
Start: 2021-11-22 | End: 2022-01-17

## 2021-11-22 NOTE — TELEPHONE ENCOUNTER
Requested Prescriptions     Pending Prescriptions Disp Refills    fluticasone (FLONASE) 50 MCG/ACT nasal spray      ARIPiprazole (ABILIFY) 2 MG tablet 90 tablet 1     Sig: Take 1 tablet by mouth daily    albuterol sulfate  (90 Base) MCG/ACT inhaler 20.1 g 1       lov 10/11/2021  No f/u  Labs 8/2/21

## 2021-12-03 ENCOUNTER — E-VISIT (OUTPATIENT)
Dept: OTHER | Facility: CLINIC | Age: 51
End: 2021-12-03
Payer: COMMERCIAL

## 2021-12-03 DIAGNOSIS — N39.0 URINARY TRACT INFECTION WITHOUT HEMATURIA, SITE UNSPECIFIED: Primary | ICD-10-CM

## 2021-12-03 PROCEDURE — 99421 OL DIG E/M SVC 5-10 MIN: CPT | Performed by: FAMILY MEDICINE

## 2021-12-03 RX ORDER — SULFAMETHOXAZOLE AND TRIMETHOPRIM 800; 160 MG/1; MG/1
1 TABLET ORAL 2 TIMES DAILY
Qty: 14 TABLET | Refills: 0 | Status: SHIPPED | OUTPATIENT
Start: 2021-12-03 | End: 2021-12-06 | Stop reason: SDUPTHER

## 2021-12-06 ENCOUNTER — TELEPHONE (OUTPATIENT)
Dept: FAMILY MEDICINE CLINIC | Age: 51
End: 2021-12-06

## 2021-12-06 RX ORDER — SULFAMETHOXAZOLE AND TRIMETHOPRIM 800; 160 MG/1; MG/1
1 TABLET ORAL 2 TIMES DAILY
Qty: 14 TABLET | Refills: 0 | Status: SHIPPED | OUTPATIENT
Start: 2021-12-06 | End: 2021-12-13

## 2021-12-06 NOTE — TELEPHONE ENCOUNTER
Vika Ann called stating Dr. Isaac Santiago handled her e-visit from 12/3/2021 and ordered an abx for her uti. She called [facility] and they never received her script. The order is in [address], but there is no confirmation that the pharmacy received the script. Is our office able to reorder this for her? Do we need to send the message directly to Dr. Slim Smith? Please advise. Thanks.         Vika Ann [phone] (home)

## 2021-12-12 NOTE — PROGRESS NOTES
HPI: as per patient provided history  Exam: N/A (electronic visit)  ASSESSMENT/PLAN:  1. Urinary tract infection without hematuria, site unspecified  Bactrim sent. Patient instructed to call the office if worsens, or fails to improve as anticipated. 5-10 minutes were spent on the digital evaluation and management of this patient.

## 2022-01-15 ENCOUNTER — TELEPHONE (OUTPATIENT)
Dept: FAMILY MEDICINE CLINIC | Age: 52
End: 2022-01-15

## 2022-01-15 DIAGNOSIS — E11.9 TYPE 2 DIABETES MELLITUS WITHOUT COMPLICATION (HCC): ICD-10-CM

## 2022-01-15 DIAGNOSIS — E78.00 PURE HYPERCHOLESTEROLEMIA: ICD-10-CM

## 2022-01-15 DIAGNOSIS — I10 ESSENTIAL HYPERTENSION: Primary | ICD-10-CM

## 2022-01-17 RX ORDER — PEN NEEDLE, DIABETIC 31 GX5/16"
NEEDLE, DISPOSABLE MISCELLANEOUS
Qty: 90 EACH | Refills: 3 | Status: SHIPPED | OUTPATIENT
Start: 2022-01-17

## 2022-01-17 RX ORDER — ALBUTEROL SULFATE 90 UG/1
AEROSOL, METERED RESPIRATORY (INHALATION)
Qty: 40.2 G | Refills: 3 | Status: SHIPPED | OUTPATIENT
Start: 2022-01-17

## 2022-01-17 RX ORDER — INSULIN GLARGINE 100 [IU]/ML
INJECTION, SOLUTION SUBCUTANEOUS
Qty: 45 ML | Refills: 0 | Status: SHIPPED | OUTPATIENT
Start: 2022-01-17 | End: 2022-03-18 | Stop reason: SDUPTHER

## 2022-01-17 NOTE — TELEPHONE ENCOUNTER
Requested Prescriptions     Pending Prescriptions Disp Refills    albuterol sulfate  (90 Base) MCG/ACT inhaler [Pharmacy Med Name: ALBUTEROL HFA (PV)] 40.2 g 3     Sig: USE 2 INHALATIONS BY MOUTH  EVERY 4 HOURS AS NEEDED FOR WHEEZING       LOV 10/11/2021  No f/u  Labs 8/2/21

## 2022-01-17 NOTE — TELEPHONE ENCOUNTER
Requested Prescriptions     Pending Prescriptions Disp Refills    B-D UF III MINI PEN NEEDLES 31G X 5 MM MISC [Pharmacy Med Name: [id number]] 90 each      Sig: USE 1 PEN NEEDLE DAILY    LANTUS SOLOSTAR 100 UNIT/ML injection pen [Pharmacy Med Name: Lantus SoloStar 100 UNIT/ML Subcutaneous Solution Pen-injector] 45 mL 3     Sig: INJECT SUBCUTANEOUSLY 40  UNITS AT NIGHT       LOV 10/11/2021  No f/u  Labs 8/2/21

## 2022-01-21 RX ORDER — FLUOCINONIDE TOPICAL SOLUTION USP, 0.05% 0.5 MG/ML
SOLUTION TOPICAL
Qty: 60 ML | Refills: 1 | Status: SHIPPED | OUTPATIENT
Start: 2022-01-21 | End: 2022-05-24

## 2022-01-21 NOTE — TELEPHONE ENCOUNTER
Requested Prescriptions     Pending Prescriptions Disp Refills    fluocinonide (LIDEX) 0.05 % external solution [Pharmacy Med Name: FLUOCINONIDE  0.05%  SOL] 60 mL      Sig: APPLY TOPICALLY TWICE DAILY       LOV 10/11/2021  No f/u  Labs 8/2/21

## 2022-03-11 DIAGNOSIS — E78.00 PURE HYPERCHOLESTEROLEMIA: ICD-10-CM

## 2022-03-11 DIAGNOSIS — E11.9 TYPE 2 DIABETES MELLITUS WITHOUT COMPLICATION (HCC): ICD-10-CM

## 2022-03-11 DIAGNOSIS — I10 ESSENTIAL HYPERTENSION: ICD-10-CM

## 2022-03-11 LAB
A/G RATIO: 1.6 (ref 1.1–2.2)
ALBUMIN SERPL-MCNC: 4.2 G/DL (ref 3.4–5)
ALP BLD-CCNC: 90 U/L (ref 40–129)
ALT SERPL-CCNC: 23 U/L (ref 10–40)
ANION GAP SERPL CALCULATED.3IONS-SCNC: 15 MMOL/L (ref 3–16)
AST SERPL-CCNC: 16 U/L (ref 15–37)
BILIRUB SERPL-MCNC: 0.3 MG/DL (ref 0–1)
BUN BLDV-MCNC: 14 MG/DL (ref 7–20)
CALCIUM SERPL-MCNC: 8.9 MG/DL (ref 8.3–10.6)
CHLORIDE BLD-SCNC: 105 MMOL/L (ref 99–110)
CHOLESTEROL, TOTAL: 119 MG/DL (ref 0–199)
CO2: 24 MMOL/L (ref 21–32)
CREAT SERPL-MCNC: 1.3 MG/DL (ref 0.6–1.1)
GFR AFRICAN AMERICAN: 52
GFR NON-AFRICAN AMERICAN: 43
GLUCOSE BLD-MCNC: 143 MG/DL (ref 70–99)
HDLC SERPL-MCNC: 67 MG/DL (ref 40–60)
LDL CHOLESTEROL CALCULATED: 41 MG/DL
POTASSIUM SERPL-SCNC: 4.4 MMOL/L (ref 3.5–5.1)
SODIUM BLD-SCNC: 144 MMOL/L (ref 136–145)
TOTAL PROTEIN: 6.8 G/DL (ref 6.4–8.2)
TRIGL SERPL-MCNC: 55 MG/DL (ref 0–150)
TSH REFLEX: 3.91 UIU/ML (ref 0.27–4.2)
VLDLC SERPL CALC-MCNC: 11 MG/DL

## 2022-03-12 LAB
ESTIMATED AVERAGE GLUCOSE: 182.9 MG/DL
HBA1C MFR BLD: 8 %

## 2022-03-13 PROBLEM — R87.7 LOW GRADE SQUAMOUS INTRAEPITHELIAL LESION (LGSIL) ON BIOPSY OF CERVIX: Status: ACTIVE | Noted: 2022-03-13
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
## 2022-03-18 ENCOUNTER — OFFICE VISIT (OUTPATIENT)
Dept: FAMILY MEDICINE CLINIC | Age: 52
End: 2022-03-18
Payer: COMMERCIAL

## 2022-03-18 VITALS
BODY MASS INDEX: 46.52 KG/M2 | TEMPERATURE: 97.5 F | RESPIRATION RATE: 16 BRPM | HEART RATE: 74 BPM | DIASTOLIC BLOOD PRESSURE: 60 MMHG | OXYGEN SATURATION: 98 % | SYSTOLIC BLOOD PRESSURE: 124 MMHG | WEIGHT: 288.2 LBS

## 2022-03-18 DIAGNOSIS — R63.2 BINGE EATING: ICD-10-CM

## 2022-03-18 DIAGNOSIS — G47.33 OBSTRUCTIVE SLEEP APNEA SYNDROME: ICD-10-CM

## 2022-03-18 DIAGNOSIS — R87.7 LOW GRADE SQUAMOUS INTRAEPITHELIAL LESION (LGSIL) ON BIOPSY OF CERVIX: ICD-10-CM

## 2022-03-18 DIAGNOSIS — I10 ESSENTIAL HYPERTENSION: ICD-10-CM

## 2022-03-18 DIAGNOSIS — F31.81 BIPOLAR II DISORDER, MILD, HYPOMANIC, WITH MIXED FEATURES, IN PARTIAL REMISSION (HCC): ICD-10-CM

## 2022-03-18 DIAGNOSIS — E11.3293 TYPE 2 DIABETES MELLITUS WITH BOTH EYES AFFECTED BY MILD NONPROLIFERATIVE RETINOPATHY WITHOUT MACULAR EDEMA, WITHOUT LONG-TERM CURRENT USE OF INSULIN (HCC): Primary | ICD-10-CM

## 2022-03-18 DIAGNOSIS — E66.09 CLASS 2 OBESITY DUE TO EXCESS CALORIES WITHOUT SERIOUS COMORBIDITY WITH BODY MASS INDEX (BMI) OF 38.0 TO 38.9 IN ADULT: ICD-10-CM

## 2022-03-18 DIAGNOSIS — E78.00 PURE HYPERCHOLESTEROLEMIA: ICD-10-CM

## 2022-03-18 PROCEDURE — 3052F HG A1C>EQUAL 8.0%<EQUAL 9.0%: CPT | Performed by: FAMILY MEDICINE

## 2022-03-18 PROCEDURE — 99214 OFFICE O/P EST MOD 30 MIN: CPT | Performed by: FAMILY MEDICINE

## 2022-03-18 RX ORDER — ORAL SEMAGLUTIDE 3 MG/1
3 TABLET ORAL DAILY
Qty: 30 TABLET | Refills: 0 | Status: SHIPPED | OUTPATIENT
Start: 2022-03-18 | End: 2022-04-13 | Stop reason: SDUPTHER

## 2022-03-18 RX ORDER — INSULIN GLARGINE 100 [IU]/ML
INJECTION, SOLUTION SUBCUTANEOUS
Qty: 45 ML | Refills: 0
Start: 2022-03-18 | End: 2022-03-21

## 2022-03-18 ASSESSMENT — PATIENT HEALTH QUESTIONNAIRE - PHQ9
6. FEELING BAD ABOUT YOURSELF - OR THAT YOU ARE A FAILURE OR HAVE LET YOURSELF OR YOUR FAMILY DOWN: 2
10. IF YOU CHECKED OFF ANY PROBLEMS, HOW DIFFICULT HAVE THESE PROBLEMS MADE IT FOR YOU TO DO YOUR WORK, TAKE CARE OF THINGS AT HOME, OR GET ALONG WITH OTHER PEOPLE: 1
SUM OF ALL RESPONSES TO PHQ9 QUESTIONS 1 & 2: 1
4. FEELING TIRED OR HAVING LITTLE ENERGY: 1
3. TROUBLE FALLING OR STAYING ASLEEP: 1
7. TROUBLE CONCENTRATING ON THINGS, SUCH AS READING THE NEWSPAPER OR WATCHING TELEVISION: 2
1. LITTLE INTEREST OR PLEASURE IN DOING THINGS: 1
8. MOVING OR SPEAKING SO SLOWLY THAT OTHER PEOPLE COULD HAVE NOTICED. OR THE OPPOSITE, BEING SO FIGETY OR RESTLESS THAT YOU HAVE BEEN MOVING AROUND A LOT MORE THAN USUAL: 0
9. THOUGHTS THAT YOU WOULD BE BETTER OFF DEAD, OR OF HURTING YOURSELF: 0
SUM OF ALL RESPONSES TO PHQ QUESTIONS 1-9: 10
SUM OF ALL RESPONSES TO PHQ QUESTIONS 1-9: 10
5. POOR APPETITE OR OVEREATING: 3
SUM OF ALL RESPONSES TO PHQ QUESTIONS 1-9: 10
2. FEELING DOWN, DEPRESSED OR HOPELESS: 0
SUM OF ALL RESPONSES TO PHQ QUESTIONS 1-9: 10

## 2022-03-20 DIAGNOSIS — E11.3293 TYPE 2 DIABETES MELLITUS WITH BOTH EYES AFFECTED BY MILD NONPROLIFERATIVE RETINOPATHY WITHOUT MACULAR EDEMA, WITHOUT LONG-TERM CURRENT USE OF INSULIN (HCC): ICD-10-CM

## 2022-03-20 PROCEDURE — 3052F HG A1C>EQUAL 8.0%<EQUAL 9.0%: CPT | Performed by: FAMILY MEDICINE

## 2022-03-21 RX ORDER — INSULIN GLARGINE 100 [IU]/ML
INJECTION, SOLUTION SUBCUTANEOUS
Qty: 45 ML | Refills: 3 | Status: SHIPPED | OUTPATIENT
Start: 2022-03-21 | End: 2022-03-29 | Stop reason: SDUPTHER

## 2022-03-21 NOTE — TELEPHONE ENCOUNTER
Requested Prescriptions     Pending Prescriptions Disp Refills    insulin glargine (LANTUS SOLOSTAR) 100 UNIT/ML injection pen [Pharmacy Med Name: Lantus SoloStar 100 UNIT/ML Subcutaneous Solution Pen-injector] 45 mL 3     Sig: INJECT SUBCUTANEOUSLY 40  UNITS AT NIGHT     Last OV-3/18/2022  Labs- 3/11/22  FOV-6/17/22

## 2022-03-22 DIAGNOSIS — F41.8 DEPRESSION WITH ANXIETY: Chronic | ICD-10-CM

## 2022-03-22 DIAGNOSIS — F41.0 PANIC ATTACKS: ICD-10-CM

## 2022-03-23 RX ORDER — ARIPIPRAZOLE 2 MG/1
2 TABLET ORAL DAILY
Qty: 90 TABLET | Refills: 3 | OUTPATIENT
Start: 2022-03-23

## 2022-03-23 NOTE — TELEPHONE ENCOUNTER
Requested Prescriptions     Pending Prescriptions Disp Refills    ARIPiprazole (ABILIFY) 2 MG tablet [Pharmacy Med Name: ARIPIPRAZOLE  2MG  TAB] 90 tablet 3     Sig: TAKE 1 TABLET BY MOUTH  DAILY       Last ov; 3/18/2022  Last labs; 03/11/2022

## 2022-03-28 NOTE — PROGRESS NOTES
Preoperative Consultation      Manuel Edwards  YOB: 1970    Date of Service:  3/29/2022    Vitals:    03/29/22 1614   BP: 116/84   Pulse: 78   Resp: 14   Temp: 97.1 °F (36.2 °C)   TempSrc: Temporal   SpO2: 98%   Weight: 287 lb 9.6 oz (130.5 kg)   Height: 5' 6\" (1.676 m)      Wt Readings from Last 2 Encounters:   03/29/22 287 lb 9.6 oz (130.5 kg)   03/18/22 288 lb 3.2 oz (130.7 kg)     BP Readings from Last 3 Encounters:   03/29/22 116/84   03/18/22 124/60   10/11/21 128/68        Chief Complaint   Patient presents with    Pre-op Exam     4/1/22, Dr. Carol Tejeda, trigger finger, [facility]     Allergies   Allergen Reactions    Effexor Xr [Venlafaxine Hcl Er] Other (See Comments)     Didn't respond well to it. Outpatient Medications Marked as Taking for the 3/29/22 encounter (Office Visit) with Hayley Lopez MD   Medication Sig Dispense Refill    insulin glargine (LANTUS SOLOSTAR) 100 UNIT/ML injection pen INJECT SUBCUTANEOUSLY 40  UNITS AT NIGHT 45 mL 3    [START ON 5/17/2022] lisdexamfetamine (VYVANSE) 70 MG capsule Take 1 capsule by mouth every morning for 90 days. 90 capsule 0    [START ON 4/17/2022] lisdexamfetamine (VYVANSE) 70 MG capsule Take 1 capsule by mouth every morning for 30 days. 30 capsule 0    lisdexamfetamine (VYVANSE) 70 MG capsule Take 1 capsule by mouth every morning for 30 days.  30 capsule 0    Semaglutide (RYBELSUS) 3 MG TABS Take 3 mg by mouth daily Start Rybelsus 7 mg after 30 d on the 3 mg 30 tablet 0    fluocinonide (LIDEX) 0.05 % external solution APPLY TOPICALLY TWICE DAILY 60 mL 1    B-D UF III MINI PEN NEEDLES 31G X 5 MM MISC USE 1 PEN NEEDLE DAILY 90 each 3    albuterol sulfate  (90 Base) MCG/ACT inhaler USE 2 INHALATIONS BY MOUTH  EVERY 4 HOURS AS NEEDED FOR WHEEZING 40.2 g 3    buPROPion (WELLBUTRIN XL) 300 MG extended release tablet TAKE 1 TABLET BY MOUTH IN  THE MORNING 90 tablet 1    pramipexole (MIRAPEX) 0.5 MG tablet TAKE 2 TABLETS BY MOUTH AT  NIGHT 180 tablet 1    gabapentin (NEURONTIN) 300 MG capsule TAKE 1 TO 2 CAPSULES BY  MOUTH AT NIGHT 180 capsule 1    OXcarbazepine (TRILEPTAL) 150 MG tablet TAKE 1 TABLET BY MOUTH  TWICE DAILY 180 tablet 1    FARXIGA 10 MG tablet TAKE 1 TABLET BY MOUTH IN  THE MORNING 90 tablet 1    fluticasone (FLONASE) 50 MCG/ACT nasal spray 2 sprays by Nasal route daily 3 each 3    cyclobenzaprine (FLEXERIL) 10 MG tablet TAKE 1 TABLET BY MOUTH 3  TIMES DAILY AS NEEDED FOR  MUSCLE SPASM(S) 90 tablet 2    hydrOXYzine (ATARAX) 25 MG tablet TAKE 1 TABLET BY MOUTH  EVERY 6 HOURS AS NEEDED FOR ANXIETY 360 tablet 0    sertraline (ZOLOFT) 100 MG tablet TAKE 1 AND 1/2 TABLETS BY  MOUTH DAILY 135 tablet 3    Continuous Blood Gluc Sensor (FREESTYLE FRACISCO 2 SENSOR) MISC 1 each by Does not apply route every 14 days 6 each 3    atorvastatin (LIPITOR) 40 MG tablet TAKE 1 TABLET BY MOUTH  DAILY 90 tablet 1    Continuous Blood Gluc  (FREESTYLE FRACISCO 2 READER) [place] 1 each by Does not apply route 4 times daily 1 each 5    tolterodine (DETROL LA) 4 MG extended release capsule TAKE ONE CAPSULE BY MOUTH DAILY 90 capsule 1    TURMERIC PO Take 1 tablet by mouth daily      montelukast (SINGULAIR) 10 MG tablet Take 1 tablet by mouth daily 30 tablet 5    vitamin B-12 (CYANOCOBALAMIN) 1000 MCG tablet Take 1,000 mcg by mouth daily      MIRENA, 52 MG, IUD 52 mg 1 Dose by Intrauterine route once      Multiple Vitamins-Minerals (BARIATRIC FUSION) CHEW Take 4 tablets by mouth daily       b complex vitamins capsule Take 1 capsule by mouth daily      aspirin 81 MG EC tablet Take 81 mg by mouth daily.            This patient presents to the office today for a preoperative consultation at the request of surgeon, Dr. Geovanny Lubin, who plans on performing LEFT LONG FINGER A1 PULLEY RELEASE on April 1  And the right middle finger 3 weeks later at [place] [id number].  The current problem began several  years ago, and symptoms have been worsening with time. Conservative therapy: N/A.     Planned anesthesia: Local and IV sedation   Known anesthesia problems: None   Bleeding risk: No recent or remote history of abnormal bleeding  Personal or FH of DVT/PE: No    Patient objection to receiving blood products: No    Patient Active Problem List   Diagnosis    PCOS (polycystic ovarian syndrome)    Pure hypercholesterolemia    Common migraine    Obstructive sleep apnea syndrome    Herpes simplex    Binge eating    Pedal edema    FH: melanoma    Essential hypertension    Hiatal hernia with GERD and esophagitis    Class 2 obesity without serious comorbidity with body mass index (BMI) of 38.0 to 38.9 in adult    Type 2 diabetes mellitus with mild nonproliferative retinopathy of both eyes without macular edema (HCC)    Panic attacks    Bipolar II disorder, mild, hypomanic, with mixed features, in partial remission (HCC)    Restless leg syndrome    Low grade squamous intraepithelial lesion (LGSIL) on biopsy of cervix       Past Medical History:   Diagnosis Date    Anxiety     Chronic gastric ulcer without hemorrhage and without perforation     CTS (carpal tunnel syndrome) 2/10/2015    Depression     Diabetes mellitus [facility] [address]     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 ([facility] [address] 1/29/2016    DUB (dysfunctional uterine bleeding) 8/22/2019    ETD (eustachian tube dysfunction) 5/16/2014    Herpes simplex     Lumbar strain 4/19/2017    PCOS (polycystic ovarian syndrome)     Sleep apnea     TMJ syndrome      Past Surgical History:   Procedure Laterality Date    ENDOSCOPY, COLON, DIAGNOSTIC      FINGER TRIGGER RELEASE Right 6/12/2020    RIGHT INDEX FINGER TRIGGER FINGER RELEASE performed by Laila Mckeon MD at [facility] HAND SURGERY Right 7/1/2020    INCISION AND DRAINAGE OF RIGHT HAND WOUND; SECONDARY CLOSURE OF WOUND performed by Laila Mckeon MD at [address] [place] [address] HISTORY  05/25/2018    EGD    DE LAP,STOMACH,OTHER,W/O TUBE N/A 7/31/2018    ROBOTIC ASSISTED LAPAROSCOPIC SLEEVE GASTRECTOMY  performed by Zainab Lopes DO at [facility] History   Problem Relation Age of Onset    Cervical Cancer Mother     Diabetes Father     Stroke Father         25s    Hypertension Father     Coronary Art Dis Father     Other Father         CHIVO    Cancer Father         melanoma    Glaucoma Father     Diabetes Sister     Diabetes Maternal Grandmother     Cancer Maternal Grandmother         melanoma    Arthritis Maternal Grandmother     Diabetes Paternal Grandmother     Stroke Paternal Grandmother      Social History     Socioeconomic History    Marital status:      Spouse name: Not on file    Number of children: Not on file    Years of education: Not on file    Highest education level: Not on file   Occupational History    Not on file   Tobacco Use    Smoking status: Never Smoker    Smokeless tobacco: Never Used   Vaping Use    Vaping Use: Never used   Substance and Sexual Activity    Alcohol use: Yes     Comment: infrequent, few times a month    Drug use: No    Sexual activity: Yes     Partners: Male   Other Topics Concern    Not on file   Social History Narrative    Not on file     Social Determinants of Health     Financial Resource Strain: Medium Risk    Difficulty of Paying Living Expenses: Somewhat hard   Food Insecurity: No Food Insecurity    Worried About Running Out of Food in the Last Year: Never true    [address] in the Last Year: Never true   Transportation Needs:     Lack of Transportation (Medical): Not on file    Lack of Transportation (Non-Medical):  Not on file   Physical Activity:     Days of Exercise per Week: Not on file    Minutes of Exercise per Session: Not on file   Stress:     Feeling of Stress : Not on file   Social Connections:     Frequency of Communication with Friends and Family: Not on file    Frequency of Social Gatherings with Friends and Psychiatric: She has a normal mood and affect. Her behavior is normal.       Lab Review   Lab Results   Component Value Date     03/11/2022    K 4.4 03/11/2022    K 4.1 08/01/2018     03/11/2022    CO2 24 03/11/2022    BUN 14 03/11/2022    CREATININE 1.3 03/11/2022    GLUCOSE 143 03/11/2022    CALCIUM 8.9 03/11/2022      Lab Results   Component Value Date    LABA1C 8.0 03/11/2022     Lab Results   Component Value Date    .9 03/11/2022           Assessment:       46 y.o. patient with planned surgery as above. Known risk factors for perioperative complications: Diabetes mellitus, Hypertension  Current medications which may produce withdrawal symptoms if withheld perioperatively: none      Plan:     1. Preoperative workup as follows: ECG, electrolytes, creatinine, glucose  2. Change in medication regimen before surgery: Take 50% dose of insulin and Oxcarbazepine on morning of surgery, hold all other medications. 3. Prophylaxis for cardiac events with perioperative beta-blockers: Not indicated  ACC/AHA indications for pre-operative beta-blocker use:    · Vascular surgery with history of postitive stress test  · Intermediate or high risk surgery with history of CAD   · Intermediate or high risk surgery with multiple clinical predictors of CAD- 2 of the following: history of compensated or prior heart failure, history of cerebrovascular disease, DM, or renal insufficiency    Routine administration of higher-dose, long-acting metoprolol in beta-blockernaïve patients on the day of surgery, and in the absence of dose titration is associated with an overall increase in mortality. Beta-blockers should be started days to weeks prior to surgery and titrated to pulse < 70.  4. Deep vein thrombosis prophylaxis: not indicated  5.  No contraindications to planned surgery

## 2022-03-28 NOTE — PROGRESS NOTES
Place patient label inside box (if no patient label, complete below)  Name:  :  MR#:   Josie Escalante / PROCEDURE  1. I (we), ZANDER VELAZCO (Patient Name) authorize Beau Tee MD (Provider / Justyna Red) and/or such assistants as may be selected by him/her, to perform the following operation/procedure(s): LEFT LONG FINGER A1 PULLEY RELEASE, lEFT CARPAL TUNNEL RELEASE        Note: If unable to obtain consent prior to an emergent procedure, document the emergent reason in the medical record. This procedure has been explained to my (our) satisfaction and included in the explanation was:  A) The intended benefit, nature, and extent of the procedure to be performed;  B) The significant risks involved and the probability of success;  C) Alternative procedures and methods of treatment;  D) The dangers and probable consequences of such alternatives (including no procedure or treatment); E) The expected consequences of the procedure on my future health;  F) Whether other qualified individuals would be performing important surgical tasks and/or whether  would be present to advise or support the procedure. I (we) understand that there are other risks of infection and other serious complications in the pre-operative/procedural and postoperative/procedural stages of my (our) care. I (we) have asked all of the questions which I (we) thought were important in deciding whether or not to undergo treatment or diagnosis. These questions have been answered to my (our) satisfaction. I (we) understand that no assurance can be given that the procedure will be a success, and no guarantee or warranty of success has been given to me (us).     2. It has been explained to me (us) that during the course of the operation/procedure, unforeseen conditions may be revealed that necessitate extension of the original procedure(s) or different procedure(s) than those set forth in Paragraph 1. I (we) authorize and request that the above-named physician, his/her assistants or his/her designees, perform procedures as necessary and desirable if deemed to be in my (our) best interest.     Revised 8/2/2021                                                                          Page 1 of 2       3. I acknowledge that health care personnel may be observing this procedure for the purpose of medical education or other specified purposes as may be necessary as requested and/or approved by my (our) physician. 4. I (we) consent to the disposal by the hospital Pathologist of the removed tissue, parts or organs in accordance with hospital policy. 5. I do ____ do not ____ consent to the use of a local infiltration pain blocking agent that will be used by my provider/surgical provider to help alleviate pain during my procedure. 6. I do ____ do not ____ consent to an emergent blood transfusion in the case of a life-threatening situation that requires blood components to be administered. This consent is valid for 24 hours from the beginning of the procedure. 7. This patient does ____ or does not ____ currently have a DNR status/order. If DNR order is in place, obtain Addendum to the Surgical Consent for ALL Patients with a DNR Order to address tish-operative status for limited intervention or DNR suspension.      8. I have read and fully understand the above Consent for Operation/Procedure and that all blanks were completed before I signed the consent.   _____________________________       _____________________      ____/____am/pm  Signature of Patient or legal representative      Printed Name / Relationship            Date / Time   ____________________________       _____________________      ____/____am/pm  Witness to Signature                                    Printed Name                    Date / Time     If patient is unable to sign or is a minor, complete the following)  Patient is a minor, ____ years of age, or unable to sign because:   ______________________________________________________________________________________________    [employer] If a phone consent is obtained, consent will be documented by using two health care professionals, each affirming that the consenting party has no questions and gives consent for the procedure discussed with the physician/provider.   _____________________          ____________________       _____/_____am/pm   2nd witness to phone consent        Printed name           Date / Time    Informed Consent:  I have provided the explanation described above in section 1 to the patient and/or legal representative.  I have provided the patient and/or legal representative with an opportunity to ask any questions about the proposed operation/procedure.   ___________________________          ____________________         ____/____am/pm  Provider / Proceduralist                            Printed name            Date / Time  Revised 8/2/2021                                                                      Page 2 of 2

## 2022-03-29 ENCOUNTER — OFFICE VISIT (OUTPATIENT)
Dept: FAMILY MEDICINE CLINIC | Age: 52
End: 2022-03-29
Payer: COMMERCIAL

## 2022-03-29 VITALS
SYSTOLIC BLOOD PRESSURE: 116 MMHG | TEMPERATURE: 97.1 F | HEIGHT: 66 IN | OXYGEN SATURATION: 98 % | DIASTOLIC BLOOD PRESSURE: 84 MMHG | HEART RATE: 78 BPM | RESPIRATION RATE: 14 BRPM | WEIGHT: 287.6 LBS | BODY MASS INDEX: 46.22 KG/M2

## 2022-03-29 DIAGNOSIS — E11.3293 TYPE 2 DIABETES MELLITUS WITH BOTH EYES AFFECTED BY MILD NONPROLIFERATIVE RETINOPATHY WITHOUT MACULAR EDEMA, WITHOUT LONG-TERM CURRENT USE OF INSULIN (HCC): ICD-10-CM

## 2022-03-29 DIAGNOSIS — Z01.818 PREOP EXAMINATION: Primary | ICD-10-CM

## 2022-03-29 DIAGNOSIS — I10 ESSENTIAL HYPERTENSION: ICD-10-CM

## 2022-03-29 DIAGNOSIS — M65.331 TRIGGER MIDDLE FINGER OF RIGHT HAND: ICD-10-CM

## 2022-03-29 DIAGNOSIS — M65.332 TRIGGER FINGER, LEFT MIDDLE FINGER: ICD-10-CM

## 2022-03-29 PROCEDURE — 99215 OFFICE O/P EST HI 40 MIN: CPT | Performed by: FAMILY MEDICINE

## 2022-03-29 PROCEDURE — 3052F HG A1C>EQUAL 8.0%<EQUAL 9.0%: CPT | Performed by: FAMILY MEDICINE

## 2022-03-29 RX ORDER — INSULIN GLARGINE 100 [IU]/ML
INJECTION, SOLUTION SUBCUTANEOUS
Qty: 25 PEN | Refills: 3 | Status: SHIPPED | OUTPATIENT
Start: 2022-03-29 | End: 2022-05-25 | Stop reason: DRUGHIGH

## 2022-04-01 ENCOUNTER — HOSPITAL ENCOUNTER (OUTPATIENT)
Age: 52
Setting detail: OUTPATIENT SURGERY
Discharge: HOME | End: 2022-04-01
Attending: ORTHOPAEDIC SURGERY | Admitting: ORTHOPAEDIC SURGERY
Payer: COMMERCIAL

## 2022-04-01 VITALS
HEART RATE: 71 BPM | RESPIRATION RATE: 16 BRPM | DIASTOLIC BLOOD PRESSURE: 78 MMHG | TEMPERATURE: 97 F | BODY MASS INDEX: 46.25 KG/M2 | WEIGHT: 287.8 LBS | SYSTOLIC BLOOD PRESSURE: 145 MMHG | OXYGEN SATURATION: 99 % | HEIGHT: 66 IN

## 2022-04-01 LAB
GLUCOSE BLD-MCNC: 135 MG/DL (ref 70–99)
PERFORMED ON: ABNORMAL

## 2022-04-01 PROCEDURE — 7100000011 HC PHASE II RECOVERY - ADDTL 15 MIN: Performed by: ORTHOPAEDIC SURGERY

## 2022-04-01 PROCEDURE — 2580000003 HC RX 258: Performed by: ORTHOPAEDIC SURGERY

## 2022-04-01 PROCEDURE — 3600000004 HC SURGERY LEVEL 4 BASE: Performed by: ORTHOPAEDIC SURGERY

## 2022-04-01 PROCEDURE — 6370000000 HC RX 637 (ALT 250 FOR IP): Performed by: ORTHOPAEDIC SURGERY

## 2022-04-01 PROCEDURE — 2500000003 HC RX 250 WO HCPCS: Performed by: ORTHOPAEDIC SURGERY

## 2022-04-01 PROCEDURE — 2709999900 HC NON-CHARGEABLE SUPPLY: Performed by: ORTHOPAEDIC SURGERY

## 2022-04-01 PROCEDURE — 6360000002 HC RX W HCPCS: Performed by: ORTHOPAEDIC SURGERY

## 2022-04-01 PROCEDURE — 7100000010 HC PHASE II RECOVERY - FIRST 15 MIN: Performed by: ORTHOPAEDIC SURGERY

## 2022-04-01 PROCEDURE — 3600000014 HC SURGERY LEVEL 4 ADDTL 15MIN: Performed by: ORTHOPAEDIC SURGERY

## 2022-04-01 PROCEDURE — A4217 STERILE WATER/SALINE, 500 ML: HCPCS | Performed by: ORTHOPAEDIC SURGERY

## 2022-04-01 RX ORDER — MAGNESIUM HYDROXIDE 1200 MG/15ML
LIQUID ORAL CONTINUOUS PRN
Status: COMPLETED | OUTPATIENT
Start: 2022-04-01 | End: 2022-04-01

## 2022-04-01 RX ORDER — SODIUM CHLORIDE, SODIUM LACTATE, POTASSIUM CHLORIDE, CALCIUM CHLORIDE 600; 310; 30; 20 MG/100ML; MG/100ML; MG/100ML; MG/100ML
INJECTION, SOLUTION INTRAVENOUS CONTINUOUS
Status: DISCONTINUED | OUTPATIENT
Start: 2022-04-01 | End: 2022-04-01

## 2022-04-01 RX ORDER — SODIUM CHLORIDE, SODIUM LACTATE, POTASSIUM CHLORIDE, CALCIUM CHLORIDE 600; 310; 30; 20 MG/100ML; MG/100ML; MG/100ML; MG/100ML
INJECTION, SOLUTION INTRAVENOUS CONTINUOUS
Status: DISCONTINUED | OUTPATIENT
Start: 2022-04-01 | End: 2022-04-01 | Stop reason: HOSPADM

## 2022-04-01 RX ORDER — GINSENG 100 MG
CAPSULE ORAL PRN
Status: DISCONTINUED | OUTPATIENT
Start: 2022-04-01 | End: 2022-04-01 | Stop reason: ALTCHOICE

## 2022-04-01 RX ADMIN — CEFAZOLIN SODIUM 2000 MG: 10 INJECTION, POWDER, FOR SOLUTION INTRAVENOUS at 15:55

## 2022-04-01 RX ADMIN — SODIUM CHLORIDE, POTASSIUM CHLORIDE, SODIUM LACTATE AND CALCIUM CHLORIDE: 600; 310; 30; 20 INJECTION, SOLUTION INTRAVENOUS at 14:11

## 2022-04-01 ASSESSMENT — PAIN SCALES - GENERAL
PAINLEVEL_OUTOF10: 0
PAINLEVEL_OUTOF10: 1

## 2022-04-01 NOTE — OP NOTE
[facility]  Procedure Note  [address]  3/25/2022    PREOPERATIVE DIAGNOSIS(ES)   Left Carpal Tunnel Syndrome; Left Middle Finger trigger digit    POSTOPERATIVE DIAGNOSIS(ES)   Same    PROCEDURE(S)     1. Left Carpal Tunnel Release   2. Left Middle Finger trigger release       SURGEON Fei Bland MD    ANESTHESIA Local WALANT    COMPLICATIONS None immediate apparent  Estimated blood loss: less than 5ml     The left bladimir well as left long  finger were marked in preop holding by Dr Mateo Soto discussing the surgical procedures once again with the patient.  All questions and concerns were addressed.  Informed consent was on the chart.  The patient was brought to the operating and room and placed in the supine position.  prior to local anesthesia a standard time-out was performed.       Description of the Procedure:     We verified that antibiotics had been given.  The left upper extremity was prepped and draped in normal sterile fashion.  Final timeout was held.  no tourniquet was used for this procedure. .  A standard 1.5 cm incision was made in the mid palm.  This was carried down through the subcutaneous tissues and palmar fascia to the transverse carpal ligament.  The distal one half of the transverse carpal ligament was incised longitudinally under direct vision using a #15 blade.  the sentinel fat distally was visualized and release was then completed proximally by protecting the contents of the carpal tunnel with a freer elevator and using a 15 blade as well as littler scissors to complete the release.   No other abnormalities were noted.  The nerve was nicely decompressed and healthy-appearing.  The wound was copiously irrigated and the skin closed using horizontal mattress #4-0 nylon suture.       Finally we proceeded to the left long finger trigger finger.  A standard 1.5 cm oblique incision was created over the A1 pulley through skin only, overlying the left long  finger . Blunt scissor dissection was taken to the subcutaneous tissues down to the flexor tendon sheath. The A1 pulley was identified and isolated. Blunt retractors safely and gently retracted the neurovascular bundles on either side. Under direct visualization, the A1 pulley was released completely. The A0 pulley fibers were also identified and released. The A2 pulley was protected. At this time the FDP and FDS had excellent excursion up through the wound. No other abnormality was noted. The wound was copiously irrigated with normal saline irrigation and closure was performed with 4.0 Nylon suture.      all fingers were warm and well perfused.  Large sterile dressing was placed on the upper extremity.  Patient  tolerated the case well, was taken to the post anesthesia recovery unit in good condition.  No complications.     Findings:  No aberrant motor branch     Intervention:  1% Xylocaine, with epinephrine as local injection     Other Notes: Follow-up in 7-10 days for wound inspection and suture removal.  Progressive range of motion of the elbow, wrist, and hand.  Progressive activities.  Referral to the hand therapist for a carpal tunnel and trigger finger program, including scar treatments.           Napoleon Lovely, MD Laury Gaucher, MD

## 2022-04-01 NOTE — PROGRESS NOTES
Ambulatory Surgery/Procedure Discharge Note    Vitals:    04/01/22 1623   BP: (!) 145/78   Pulse: 71   Resp: 16   Temp: 97 °F (36.1 °C)   SpO2: 99%       No intake/output data recorded. Restroom use offered before discharge. yes    Pain assessment:  pain  Pain Level: 0        Patient discharged to home/self care.  Patient discharged via wheel chair by transporter to waiting family/S.O.       4/1/2022 4:54 PM

## 2022-04-01 NOTE — BRIEF OP NOTE
Brief Postoperative Note      Patient: Carole Mchugh  YOB: 1970  MRN: [id number]    Date of Procedure: 4/1/2022    Pre-Op Diagnosis: LEFT LONG FINGER TRIGGER FINGER  Left carpal tunnel syndrome    Post-Op Diagnosis: Same       Procedure(s):  LEFT LONG FINGER A1 PULLEY RELEASE, LEFT CARPAL TUNNEL RELEASE    Surgeon(s):  Billi Severs, MD    Assistant:  Surgical Assistant: Dora Townsend    Anesthesia: Render Carpenter    Estimated Blood Loss (mL): less than 5ml    Complications: None immediate apparent    Specimens:   none  Implants:  none    Drains: none    Findings: see fully dictated operative report    Electronically signed by Bonnie Judd MD on 4/1/2022 at 4:31 PM

## 2022-04-01 NOTE — H&P
I have reviewed the history and physical and examined the patient and find no relevant changes. I have reviewed with the patient and/or family the risks, benefits, and alternatives to the procedure. I have spoken to the patient and we have planned to perform left carpal tunnel release in addition to left long finger A1 pulley release. We have had discussions in the past regarding surgical intervention for left carpal tunnel syndrome, the patient has attempted conservative management for carpal tunnel syndrome with transient benefit but continued symptoms.        Linn Sanchez MD  4/1/2022

## 2022-04-01 NOTE — H&P
Yahir Peterson    [id number]    [facility] Same Day Surgery Update H & P  Department of General Surgery   Surgical Service   Pre-operative History and Physical  Last H & P within the last 30 days. DIAGNOSIS:   Trigger finger, left middle finger [M65.332]    Procedure(s):  LEFT LONG FINGER A1 PULLEY RELEASE     History obtained from: Patient interview and EHR     HISTORY OF PRESENT ILLNESS:   The patient is a 46 y.o. female with c/o left 3rd finger pain and triggering with extension. Their symptoms have been recalcitrant to conservative treatment and the patient presents today for the above procedure. Illness Screening: Patient denies fever, chills, worsening cough, or close contact with sick individuals. Past Medical History:        Diagnosis Date    Anxiety     Chronic gastric ulcer without hemorrhage and without perforation     CTS (carpal tunnel syndrome) 2/10/2015    Depression     Diabetes mellitus ([facility] [address]     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 ([facility] [address] 1/29/2016    DUB (dysfunctional uterine bleeding) 8/22/2019    ETD (eustachian tube dysfunction) 5/16/2014    Herpes simplex     Lumbar strain 4/19/2017    PCOS (polycystic ovarian syndrome)     Sleep apnea     TMJ syndrome      Past Surgical History:        Procedure Laterality Date    ENDOSCOPY, COLON, DIAGNOSTIC      FINGER TRIGGER RELEASE Right 6/12/2020    RIGHT INDEX FINGER TRIGGER FINGER RELEASE performed by Madison Scruggs MD at [address] [facility] HAND SURGERY Right 7/1/2020    INCISION AND DRAINAGE OF RIGHT HAND WOUND; SECONDARY CLOSURE OF WOUND performed by Madison Scruggs MD at [address] [facility] [place]  05/25/2018    EGD    [place] LAP,STOMACH,OTHER,W/O TUBE N/A 7/31/2018    ROBOTIC ASSISTED LAPAROSCOPIC SLEEVE GASTRECTOMY  performed by Ani Carmona DO at [facility] OR           Medications Prior to Admission:      Prior to Admission medications    Medication Sig Start Date End Date Taking?  Authorizing Provider insulin glargine (LANTUS SOLOSTAR) 100 UNIT/ML injection pen INJECT SUBCUTANEOUSLY 84  UNITS AT NIGHT 3/29/22   Heath Jolly MD   lisdexamfetamine (VYVANSE) 70 MG capsule Take 1 capsule by mouth every morning for 90 days. 5/17/22 8/15/22  Heath Jolly MD   lisdexamfetamine (VYVANSE) 70 MG capsule Take 1 capsule by mouth every morning for 30 days. 4/17/22 5/17/22  Heath Jolly MD   lisdexamfetamine (VYVANSE) 70 MG capsule Take 1 capsule by mouth every morning for 30 days.  3/18/22 4/17/22  Heath Jolly MD   Semaglutide (RYBELSUS) 3 MG TABS Take 3 mg by mouth daily Start Rybelsus 7 mg after 30 d on the 3 mg 3/18/22   Heath Jolly MD   fluocinonide (LIDEX) 0.05 % external solution APPLY TOPICALLY TWICE DAILY 1/21/22   Heath Jolly MD   B-D UF III MINI PEN NEEDLES 31G X 5 MM MISC USE 1 PEN NEEDLE DAILY 1/17/22   Heath Jolly MD   albuterol sulfate  (90 Base) MCG/ACT inhaler USE 2 INHALATIONS BY MOUTH  EVERY 4 HOURS AS NEEDED FOR WHEEZING 1/17/22   Heath Jolly MD   buPROPion (WELLBUTRIN XL) 300 MG extended release tablet TAKE 1 TABLET BY MOUTH IN  THE MORNING 1/13/22   Heath Jolly MD   pramipexole (MIRAPEX) 0.5 MG tablet TAKE 2 TABLETS BY MOUTH AT  NIGHT 12/29/21   Heath Jolly MD   gabapentin (NEURONTIN) 300 MG capsule TAKE 1 TO 2 CAPSULES BY  MOUTH AT NIGHT 12/29/21 6/27/22  Heath Jolly MD   OXcarbazepine (TRILEPTAL) 150 MG tablet TAKE 1 TABLET BY MOUTH  TWICE DAILY 12/29/21   Heath Jolly MD   FARXIGA 10 MG tablet TAKE 1 TABLET BY MOUTH IN  THE MORNING 12/29/21   Heath Jolly MD   fluticasone [facility]) 50 MCG/ACT nasal spray 2 sprays by Nasal route daily 11/22/21   Heath Jolly MD   cyclobenzaprine (FLEXERIL) 10 MG tablet TAKE 1 TABLET BY MOUTH 3  TIMES DAILY AS NEEDED FOR  MUSCLE SPASM(S) 11/11/21   Heath Jolly MD   hydrOXYzine (ATARAX) 25 MG tablet TAKE 1 TABLET BY MOUTH  EVERY 6 HOURS AS NEEDED FOR ANXIETY 11/5/21 Inna Robertson MD   sertraline (ZOLOFT) 100 MG tablet TAKE 1 AND 1/2 TABLETS BY  MOUTH DAILY 10/15/21   Freda Fuentes MD   Continuous Blood Gluc Sensor (FREESTYLE FRACISCO 2 SENSOR) MISC 1 each by Does not apply route every 14 days 9/7/21   Freda Fuentes MD   atorvastatin (LIPITOR) 40 MG tablet TAKE 1 TABLET BY MOUTH  DAILY 8/12/21   Freda Fuentes MD   Continuous Blood Gluc  (FREESTYLE FRACISCO 2 READER) [place] 1 each by Does not apply route 4 times daily 8/2/21   Freda Fuentes MD   tolterodine (DETROL LA) 4 MG extended release capsule TAKE ONE CAPSULE BY MOUTH DAILY 6/23/21   Freda Fuentes MD   TURMERIC PO Take 1 tablet by mouth daily    Historical Provider, MD   montelukast (SINGULAIR) 10 MG tablet Take 1 tablet by mouth daily 3/11/21   Freda Fuentes MD   vitamin B-12 (CYANOCOBALAMIN) 1000 MCG tablet Take 1,000 mcg by mouth daily    Historical Provider, MD   MIRENA, 52 MG, IUD 52 mg 1 Dose by Intrauterine route once 10/29/19   Historical Provider, MD   Multiple Vitamins-Minerals (BARIATRIC FUSION) CHEW Take 4 tablets by mouth daily     Historical Provider, MD   b complex vitamins capsule Take 1 capsule by mouth daily    Historical Provider, MD   aspirin 81 MG EC tablet Take 81 mg by mouth daily. Historical Provider, MD         Allergies:  Effexor xr [venlafaxine hcl er]    PHYSICAL EXAM:      BP (!) 146/74   Pulse 86   Temp 97 °F (36.1 °C) (Temporal)   Resp 16   Ht 5' 6\" (1.676 m)   Wt 255 lb (115.7 kg)   SpO2 100%   BMI 46.42 kg/m²      Airway:  Airway patent with no audible stridor    Heart:  Regular rate and rhythm, No murmur noted    Lungs:  No increased work of breathing, good air exchange, clear to auscultation bilaterally, no crackles or wheezing    Abdomen:  Soft, non-distended, non-tender, no masses palpated    ASSESSMENT AND PLAN    Patient is a 46 y.o. female with above specified procedure planned.     1.  The patients history and physical was obtained and signed off by the pre-admission testing department. Patient seen and focused exam done today- no new changes since last physical exam on 3/29/22    2. Access to ancillary services are available per request of the provider.     LAI Varma - CNP     4/1/2022

## 2022-04-04 DIAGNOSIS — E78.00 PURE HYPERCHOLESTEROLEMIA: Chronic | ICD-10-CM

## 2022-04-05 RX ORDER — ATORVASTATIN CALCIUM 40 MG/1
40 TABLET, FILM COATED ORAL DAILY
Qty: 90 TABLET | Refills: 3 | Status: SHIPPED | OUTPATIENT
Start: 2022-04-05

## 2022-04-05 NOTE — TELEPHONE ENCOUNTER
Requested Prescriptions     Pending Prescriptions Disp Refills    atorvastatin (LIPITOR) 40 MG tablet [Pharmacy Med Name: Atorvastatin Calcium 40 MG Oral Tablet] 90 tablet 3     Sig: TAKE 1 TABLET BY MOUTH  DAILY     Last OV- 3/29/2022  Labs- 3/11/22  FOV-6/17/22

## 2022-04-07 DIAGNOSIS — F41.8 DEPRESSION WITH ANXIETY: Chronic | ICD-10-CM

## 2022-04-07 DIAGNOSIS — F41.0 PANIC ATTACKS: ICD-10-CM

## 2022-04-08 RX ORDER — ARIPIPRAZOLE 2 MG/1
2 TABLET ORAL DAILY
Qty: 90 TABLET | Refills: 3 | OUTPATIENT
Start: 2022-04-08

## 2022-04-08 NOTE — TELEPHONE ENCOUNTER
Requested Prescriptions     Pending Prescriptions Disp Refills    ARIPiprazole (ABILIFY) 2 MG tablet [Pharmacy Med Name: ARIPIPRAZOLE  2MG  TAB] 90 tablet 3     Sig: TAKE 1 TABLET BY MOUTH  DAILY       LOV 3/29/2022  NOV 6/17/22  LABS 3/11/22

## 2022-04-13 ENCOUNTER — OFFICE VISIT (OUTPATIENT)
Dept: BARIATRICS/WEIGHT MGMT | Age: 52
End: 2022-04-13
Payer: COMMERCIAL

## 2022-04-13 VITALS
BODY MASS INDEX: 46.28 KG/M2 | WEIGHT: 288 LBS | HEIGHT: 66 IN | SYSTOLIC BLOOD PRESSURE: 141 MMHG | HEART RATE: 89 BPM | DIASTOLIC BLOOD PRESSURE: 68 MMHG

## 2022-04-13 DIAGNOSIS — Z79.4 TYPE 2 DIABETES MELLITUS WITH BOTH EYES AFFECTED BY MILD NONPROLIFERATIVE RETINOPATHY WITHOUT MACULAR EDEMA, WITH LONG-TERM CURRENT USE OF INSULIN (HCC): ICD-10-CM

## 2022-04-13 DIAGNOSIS — E11.3293 TYPE 2 DIABETES MELLITUS WITH BOTH EYES AFFECTED BY MILD NONPROLIFERATIVE RETINOPATHY WITHOUT MACULAR EDEMA, WITHOUT LONG-TERM CURRENT USE OF INSULIN (HCC): ICD-10-CM

## 2022-04-13 DIAGNOSIS — E66.01 MORBID OBESITY WITH BMI OF 45.0-49.9, ADULT (HCC): Primary | ICD-10-CM

## 2022-04-13 DIAGNOSIS — E11.3293 TYPE 2 DIABETES MELLITUS WITH BOTH EYES AFFECTED BY MILD NONPROLIFERATIVE RETINOPATHY WITHOUT MACULAR EDEMA, WITH LONG-TERM CURRENT USE OF INSULIN (HCC): ICD-10-CM

## 2022-04-13 DIAGNOSIS — Z98.84 S/P LAPAROSCOPIC SLEEVE GASTRECTOMY: ICD-10-CM

## 2022-04-13 PROCEDURE — 99213 OFFICE O/P EST LOW 20 MIN: CPT | Performed by: SURGERY

## 2022-04-13 RX ORDER — FLASH GLUCOSE SENSOR
1 KIT MISCELLANEOUS
Qty: 6 EACH | Refills: 3 | Status: SHIPPED | OUTPATIENT
Start: 2022-04-13

## 2022-04-13 RX ORDER — ORAL SEMAGLUTIDE 3 MG/1
3 TABLET ORAL DAILY
Qty: 30 TABLET | Refills: 0 | Status: SHIPPED | OUTPATIENT
Start: 2022-04-13 | End: 2022-05-23 | Stop reason: ALTCHOICE

## 2022-04-13 NOTE — TELEPHONE ENCOUNTER
Requested Prescriptions     Pending Prescriptions Disp Refills    Semaglutide (RYBELSUS) 3 MG TABS 30 tablet 0     Sig: Take 3 mg by mouth daily Start Rybelsus 7 mg after 30 d on the 3 mg    Continuous Blood Gluc Sensor (FREESTYLE FRACISCO 2 SENSOR) MISC 6 each 3     Si each by Does not apply route every 14 days       LOV 3/29/2022  Nov 22  Labs 3/11/22

## 2022-04-13 NOTE — PROGRESS NOTES
[facility]) Physicians   General & Laparoscopic Surgery  [employer]       HPI:     Macy Gallardo is a very pleasant 46 y.o. obese female , Body mass index is 47.2 kg/m². Kane Edwardo And multiple medical problems who is presenting for bariatric follow up care. Macy Gallardo is s/p laparoscopic sleeve gastrectomy by me   Comes today to the clinic without any complaints. Patient denies any nausea, vomiting, fevers, chills, shortness of breath, chest pain, constipation or urinary symptoms. Denies any heartburn nor dysphagia.    Patient is has had personal/social factors that have contributed to her getting off track, but now motivated to get back on track        Past Medical History:   Diagnosis Date    Anxiety     Bipolar 1 disorder ([facility] [address]     Chronic gastric ulcer without hemorrhage and without perforation     CTS (carpal tunnel syndrome) 2/10/2015    Depression     Diabetes mellitus ([facility] [address]     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 ([facility] [address] 1/29/2016    DUB (dysfunctional uterine bleeding) 8/22/2019    ETD (eustachian tube dysfunction) 5/16/2014    Herpes simplex     Lumbar strain 4/19/2017    Meniscus degeneration, right     PCOS (polycystic ovarian syndrome)     Sleep apnea     TMJ syndrome      Past Surgical History:   Procedure Laterality Date    ENDOSCOPY, COLON, DIAGNOSTIC      FINGER TRIGGER RELEASE Right 6/12/2020    RIGHT INDEX FINGER TRIGGER FINGER RELEASE performed by Ulises Aguilar MD at [address] Left 4/1/2022    LEFT LONG FINGER A1 PULLEY RELEASE, LEFT CARPAL TUNNEL RELEASE performed by Ulises Aguilar MD at [address] Right 7/1/2020    INCISION AND DRAINAGE OF RIGHT HAND WOUND; SECONDARY CLOSURE OF WOUND performed by Ulises Aguilar MD at [address] [facility] [place]  05/25/2018    EGD    [place] LAP,STOMACH,OTHER,W/O TUBE N/A 7/31/2018    ROBOTIC ASSISTED LAPAROSCOPIC SLEEVE GASTRECTOMY  performed by Balbir Kaufman DO at [address] Family History   Problem Relation Age of Onset    Cervical Cancer Mother     Diabetes Father     Stroke Father         25s    Hypertension Father     Coronary Art Dis Father     Other Father         CHIVO    Cancer Father         melanoma    Glaucoma Father     Diabetes Sister     Diabetes Maternal Grandmother     Cancer Maternal Grandmother         melanoma    Arthritis Maternal Grandmother     Diabetes Paternal Grandmother     Stroke Paternal Grandmother      Social History     Tobacco Use    Smoking status: Never Smoker    Smokeless tobacco: Never Used   Substance Use Topics    Alcohol use: Yes     Comment: infrequent, few times a month     I counseled the patient on the importance of not smoking and risks of ETOH. Allergies   Allergen Reactions    Effexor Xr [Venlafaxine Hcl Er] Other (See Comments)     Didn't respond well to it. Vitals:    04/13/22 0812   BP: (!) 141/68   Pulse: 89   Weight: 288 lb (130.6 kg)   Height: 5' 5.5\" (1.664 m)       Body mass index is 47.2 kg/m². Current Outpatient Medications:     Multiple Vitamins-Minerals (CENTRUM WOMEN PO), Take by mouth, Disp: , Rfl:     atorvastatin (LIPITOR) 40 MG tablet, TAKE 1 TABLET BY MOUTH  DAILY, Disp: 90 tablet, Rfl: 3    insulin glargine (LANTUS SOLOSTAR) 100 UNIT/ML injection pen, INJECT SUBCUTANEOUSLY 84  UNITS AT NIGHT, Disp: 25 pen, Rfl: 3    [START ON 5/17/2022] lisdexamfetamine (VYVANSE) 70 MG capsule, Take 1 capsule by mouth every morning for 90 days. , Disp: 90 capsule, Rfl: 0    [START ON 4/17/2022] lisdexamfetamine (VYVANSE) 70 MG capsule, Take 1 capsule by mouth every morning for 30 days. , Disp: 30 capsule, Rfl: 0    lisdexamfetamine (VYVANSE) 70 MG capsule, Take 1 capsule by mouth every morning for 30 days. , Disp: 30 capsule, Rfl: 0    Semaglutide (RYBELSUS) 3 MG TABS, Take 3 mg by mouth daily Start Rybelsus 7 mg after 30 d on the 3 mg, Disp: 30 tablet, Rfl: 0    fluocinonide (LIDEX) 0.05 % external solution, APPLY TOPICALLY TWICE DAILY, Disp: 60 mL, Rfl: 1    B-D UF III MINI PEN NEEDLES 31G X 5 MM MISC, USE 1 PEN NEEDLE DAILY, Disp: 90 each, Rfl: 3    albuterol sulfate  (90 Base) MCG/ACT inhaler, USE 2 INHALATIONS BY MOUTH  EVERY 4 HOURS AS NEEDED FOR WHEEZING, Disp: 40.2 g, Rfl: 3    buPROPion (WELLBUTRIN XL) 300 MG extended release tablet, TAKE 1 TABLET BY MOUTH IN  THE MORNING, Disp: 90 tablet, Rfl: 1    pramipexole (MIRAPEX) 0.5 MG tablet, TAKE 2 TABLETS BY MOUTH AT  NIGHT, Disp: 180 tablet, Rfl: 1    gabapentin (NEURONTIN) 300 MG capsule, TAKE 1 TO 2 CAPSULES BY  MOUTH AT NIGHT, Disp: 180 capsule, Rfl: 1    OXcarbazepine (TRILEPTAL) 150 MG tablet, TAKE 1 TABLET BY MOUTH  TWICE DAILY, Disp: 180 tablet, Rfl: 1    FARXIGA 10 MG tablet, TAKE 1 TABLET BY MOUTH IN  THE MORNING, Disp: 90 tablet, Rfl: 1    fluticasone (FLONASE) 50 MCG/ACT nasal spray, 2 sprays by Nasal route daily, Disp: 3 each, Rfl: 3    cyclobenzaprine (FLEXERIL) 10 MG tablet, TAKE 1 TABLET BY MOUTH 3  TIMES DAILY AS NEEDED FOR  MUSCLE SPASM(S), Disp: 90 tablet, Rfl: 2    hydrOXYzine (ATARAX) 25 MG tablet, TAKE 1 TABLET BY MOUTH  EVERY 6 HOURS AS NEEDED FOR ANXIETY, Disp: 360 tablet, Rfl: 0    sertraline (ZOLOFT) 100 MG tablet, TAKE 1 AND 1/2 TABLETS BY  MOUTH DAILY, Disp: 135 tablet, Rfl: 3    Continuous Blood Gluc Sensor (FREESTYLE FRACISCO 2 SENSOR) [facility], 1 each by Does not apply route every 14 days, Disp: 6 each, Rfl: 3    Continuous Blood Gluc  (FREESTYLE FRACISCO 2 READER) [facility], 1 each by Does not apply route 4 times daily, Disp: 1 each, Rfl: 5    tolterodine (DETROL LA) 4 MG extended release capsule, TAKE ONE CAPSULE BY MOUTH DAILY, Disp: 90 capsule, Rfl: 1    TURMERIC PO, Take 1 tablet by mouth daily, Disp: , Rfl:     montelukast (SINGULAIR) 10 MG tablet, Take 1 tablet by mouth daily, Disp: 30 tablet, Rfl: 5    vitamin B-12 (CYANOCOBALAMIN) 1000 MCG tablet, Take 1,000 mcg by mouth daily, Disp: , Rfl:    MIRENA, 52 MG, IUD 52 mg, 1 Dose by Intrauterine route once, Disp: , Rfl:     b complex vitamins capsule, Take 1 capsule by mouth daily, Disp: , Rfl:     aspirin 81 MG EC tablet, Take 81 mg by mouth daily. , Disp: , Rfl:       Review of Systems - History obtained from the patient  General ROS: negative  Psychological ROS: negative  Hematological and Lymphatic ROS: negative  Endocrine ROS: negative  Respiratory ROS: negative  Cardiovascular ROS: negative  Gastrointestinal ROS:negative  Genito-Urinary ROS: negative  Musculoskeletal ROS: negative   Skin ROS: negative    Physical Exam   Vitals Reviewed   Constitutional: Patient is oriented to person, place, and time. Patient appears well-developed and well-nourished. Patient is active and cooperative. Non-toxic appearance. No distress. Neck: Trachea normal and normal range of motion. No JVD present. Pulmonary/Chest: Effort normal. No accessory muscle usage or stridor. No apnea. No respiratory distress. Cardiovascular: Normal rate and no JVD. Abdominal: Normal appearance. Patient exhibits no distension. Abdomen is soft, obese, non tender. Musculoskeletal: Normal range of motion. Patient exhibits no edema. Neurological: Patient is alert and oriented to person, place, and time. Patient has normal strength. GCS eye subscore is 4. GCS verbal subscore is 5. GCS motor subscore is 6. Skin: Skin is warm and dry. No abrasion and no rash noted. Patient is not diaphoretic. No cyanosis or erythema. Psychiatric: Patient has a normal mood and affect. Speech is normal and behavior is normal. Cognition and memory are normal.         Man [place] is 46 y.o. female , now with Body mass index is 47.2 kg/m². s/p Sleeve gastrectomy, has lost gained 43 lbs since last visit, total of 37 lbs weight loss. The patient underwent dietary counseling with registered dietician. I have reviewed, discussed and agree with the dietary plan.  Patient is trying hard to keep good dietary and behavior modifications. Patient is monitoring portion sizes, food choices and liquid calories. We discussed how her weight affects her overall health including:  Tamanna Du was seen today for bariatrics post op follow up. Diagnoses and all orders for this visit:    Morbid obesity with BMI of 45.0-49.9, adult ([facility] [address]    S/P laparoscopic sleeve gastrectomy       and importance of weight loss to alleviate those co morbid conditions. I encouraged the patient to continue exercise and keeping healthy eating habits. Also counseled the patient extensively on post surgery care. Total encounter time:  20 minutes including any number of the following: review of labs, imaging, provider notes, outside hospital records; performing examination/evaluation; counseling patient and family; ordering medications/tests; placing referrals and communication with referring physicians; coordination of care, and documentation in the EHR. RTC in 3 months  Diet and Exercise      Patient advised that its their responsibility to follow up for studies and/or labs ordered today.

## 2022-04-13 NOTE — PATIENT INSTRUCTIONS
Patient received dietary handouts and education.     Goals:   - Eat 4-5 planned meals/day   - Avoid sweets and snacking when not hungry  - Take vitamins per handout

## 2022-04-13 NOTE — PROGRESS NOTES
Dietary Assessment Note      Vitals:   Vitals:    04/13/22 0812   BP: (!) 141/68   Pulse: 89   Weight: 288 lb (130.6 kg)   Height: 5' 5.5\" (1.664 m)    Patient gained 43.2 lbs over 1 year 8 months. Total Weight Loss: 37.5 lbs    Labs reviewed: labs reviewed, I note that   Ref. Range 3/11/2022 10:17   Creatinine Latest Ref Range: 0.6 - 1.1 mg/dL 1.3 (H)   Anion Gap Latest Ref Range: 3 - 16  15   GFR Non- Latest Ref Range: >60  43 (A)   GFR  Latest Ref Range: >60  52 (A)   GLUCOSE, FASTING,GF Latest Ref Range: 70 - 99 mg/dL 143 (H)      Ref. Range 3/11/2022 10:17   GLUCOSE, FASTING,GF Latest Ref Range: 70 - 99 mg/dL 143 (H)       Protein intake: Patient not tracking     Fluid intake: 48-64 oz/day water, unsweet tea, CL + OJ in AM, [employer] refreshers    Multivitamin/mineral intake: yes [employer] womens 50+ 1/day    Calcium intake: no    Other: Iron every other day, Vit C, \"brain boost\"    Exercise: Walking more on weekends    Nutrition Assessment: 3 year 8 month post-op visit. [facility] 8-4:30 5 days/week. Wake up 7 AM  Breakfast: Oatmeal made w/ water   Snack: 10 AM: snack cake OR cookie   Lunch: 12:30 PM: Chix, no sides  Snack: snack cake OR banana  Dinner: 5-6 PM: Steak + Shake double cheese burger - didn't finish  Snack: 9-10 PM: Sweets  Bed around 10-11 PM, may struggle to fall asleep, wears CPAP    Amount able to eat per sitting: ~1 cup    Following 30/30/30 rule: Eating over 30-40 minutes.  Will sip after meals    Food Intolerances/issues: broccoli/cauliflower cause gas/bloating/ over full    Client Concerns: weight gain, get off insulin  Handouts: 1200 kcal LC MP, MVI alternative, cooking recourses, protein shakes,     Goals:   - Eat 4-5 planned meals/day   - Avoid sweets and snacking when not hungry  - Take vitamins per handout    Plan: Follow up at 4 years post op and as needed    Lovie Denver, RD, LD

## 2022-04-13 NOTE — Clinical Note
Lost 100# initially but has regained 60 due to depression/social factors mostly. Trying to work with her through non-surgical means to get her back on track.     Thanks,    Nany Andrews

## 2022-05-17 RX ORDER — GABAPENTIN 300 MG/1
CAPSULE ORAL
Qty: 180 CAPSULE | Refills: 1 | Status: SHIPPED | OUTPATIENT
Start: 2022-05-17 | End: 2022-11-03

## 2022-05-22 DIAGNOSIS — G47.61 PERIODIC LIMB MOVEMENT DISORDER: ICD-10-CM

## 2022-05-22 DIAGNOSIS — E11.3293 TYPE 2 DIABETES MELLITUS WITH BOTH EYES AFFECTED BY MILD NONPROLIFERATIVE RETINOPATHY WITHOUT MACULAR EDEMA, WITHOUT LONG-TERM CURRENT USE OF INSULIN (HCC): ICD-10-CM

## 2022-05-23 RX ORDER — ORAL SEMAGLUTIDE 3 MG/1
TABLET ORAL
Qty: 30 TABLET | Refills: 11 | OUTPATIENT
Start: 2022-05-23

## 2022-05-23 RX ORDER — PRAMIPEXOLE DIHYDROCHLORIDE 0.5 MG/1
TABLET ORAL
Qty: 180 TABLET | Refills: 3 | Status: SHIPPED | OUTPATIENT
Start: 2022-05-23

## 2022-05-23 RX ORDER — ORAL SEMAGLUTIDE 7 MG/1
7 TABLET ORAL DAILY
Qty: 90 TABLET | Refills: 1 | Status: SHIPPED | OUTPATIENT
Start: 2022-05-23 | End: 2022-10-03

## 2022-05-23 NOTE — TELEPHONE ENCOUNTER
Requested Prescriptions     Pending Prescriptions Disp Refills    pramipexole (MIRAPEX) 0.5 MG tablet [Pharmacy Med Name: PRAMIPEXOLE  0.5MG  TAB] 180 tablet 3     Sig: TAKE 2 TABLETS BY MOUTH AT  NIGHT    RYBELSUS 3 MG TABS [Pharmacy Med Name: Kenia Dykes  TAB] 30 tablet 11     Sig: TAKE 1 TABLET (3MG) BY  MOUTH DAILY (START RYBELSUS 7MG AFTER 30 DAYS ON THE  3MG)     Last ov 3/18/22  Last lab 3/11/22  Next ov 6/17/22

## 2022-05-24 DIAGNOSIS — R63.2 BINGE EATING: ICD-10-CM

## 2022-05-24 DIAGNOSIS — F41.8 DEPRESSION WITH ANXIETY: Chronic | ICD-10-CM

## 2022-05-24 RX ORDER — BUPROPION HYDROCHLORIDE 300 MG/1
TABLET ORAL
Qty: 90 TABLET | Refills: 3 | Status: SHIPPED | OUTPATIENT
Start: 2022-05-24

## 2022-05-24 RX ORDER — CYCLOBENZAPRINE HCL 10 MG
TABLET ORAL
Qty: 90 TABLET | Refills: 2 | Status: SHIPPED | OUTPATIENT
Start: 2022-05-24

## 2022-05-24 RX ORDER — FLUOCINONIDE TOPICAL SOLUTION USP, 0.05% 0.5 MG/ML
SOLUTION TOPICAL
Qty: 60 ML | Refills: 1 | Status: SHIPPED | OUTPATIENT
Start: 2022-05-24

## 2022-05-24 NOTE — TELEPHONE ENCOUNTER
Requested Prescriptions     Pending Prescriptions Disp Refills    cyclobenzaprine (FLEXERIL) 10 MG tablet [Pharmacy Med Name: Cyclobenzaprine HCl 10 MG Oral Tablet] 90 tablet 2     Sig: TAKE 1 TABLET BY MOUTH 3  TIMES DAILY AS NEEDED FOR  MUSCLE SPASM(S)    buPROPion (WELLBUTRIN XL) 300 MG extended release tablet [Pharmacy Med Name: buPROPion HCl ER (XL) 300 MG Oral Tablet Extended Release 24 Hour] 90 tablet 3     Sig: TAKE 1 TABLET BY MOUTH IN  THE MORNING    fluocinonide (LIDEX) 0.05 % external solution [Pharmacy Med Name: FLUOCINONIDE  0.05%  SOL] 60 mL 1     Sig: APPLY TOPICALLY TWICE DAILY     Last OV; 3/29/2022  Last labs; 3/11/2022  F/U 6/17/2022

## 2022-05-24 NOTE — PROGRESS NOTES
Preoperative Consultation      Eugenia Edwards  YOB: 1970    Date of Service:  5/25/2022    Vitals:    05/25/22 1315   BP: 132/70   Pulse: 96   Temp: 96.8 °F (36 °C)   SpO2: 97%   Weight: 291 lb 9.6 oz (132.3 kg)      Wt Readings from Last 2 Encounters:   05/25/22 291 lb 9.6 oz (132.3 kg)   04/13/22 288 lb (130.6 kg)     BP Readings from Last 3 Encounters:   05/25/22 132/70   04/13/22 (!) 141/68   04/01/22 (!) 145/78        Chief Complaint   Patient presents with   [facility] Pre-op Exam     carpel tunnel and fingery surgery 6/6/22- Dr. Amaya Copper     Allergies   Allergen Reactions    Effexor Xr [Venlafaxine Hcl Er] Other (See Comments)     Didn't respond well to it. Outpatient Medications Marked as Taking for the 5/25/22 encounter (Office Visit) with Azam Magaña MD   Medication Sig Dispense Refill    cyclobenzaprine (FLEXERIL) 10 mg tablet TAKE 1 TABLET BY MOUTH 3  TIMES DAILY AS NEEDED FOR  MUSCLE SPASM(S) 90 tablet 2    buPROPion (WELLBUTRIN XL) 300 MG extended release tablet TAKE 1 TABLET BY MOUTH IN  THE MORNING 90 tablet 3    fluocinonide (LIDEX) 0.05 % external solution APPLY TOPICALLY TWICE DAILY 60 mL 1    pramipexole (MIRAPEX) 0.5 MG tablet TAKE 2 TABLETS BY MOUTH AT  NIGHT 180 tablet 3    Semaglutide (RYBELSUS) 7 MG TABS Take 7 mg by mouth daily Start after taking Rybelsus 3 mg for 30 d. 90 tablet 1    gabapentin (NEURONTIN) 300 MG capsule TAKE 1 TO 2 CAPSULES BY  MOUTH AT NIGHT 180 capsule 1    Multiple Vitamins-Minerals (CENTRUM WOMEN PO) Take by mouth      Continuous Blood Gluc Sensor (FREESTYLE FRACISCO 2 SENSOR) MISC 1 each by Does not apply route every 14 days 6 each 3    atorvastatin (LIPITOR) 40 MG tablet TAKE 1 TABLET BY MOUTH  DAILY 90 tablet 3    insulin glargine (LANTUS SOLOSTAR) 100 UNIT/ML injection pen INJECT SUBCUTANEOUSLY 84  UNITS AT NIGHT 25 pen 3    lisdexamfetamine (VYVANSE) 70 MG capsule Take 1 capsule by mouth every morning for 90 days.  90 capsule 0    B-D UF syndrome)    Pure hypercholesterolemia    Common migraine    Obstructive sleep apnea syndrome    Herpes simplex    Binge eating    Pedal edema    FH: melanoma    Essential hypertension    Hiatal hernia with GERD and esophagitis    Class 2 obesity without serious comorbidity with body mass index (BMI) of 38.0 to 38.9 in adult    Type 2 diabetes mellitus with mild nonproliferative retinopathy of both eyes without macular edema (HCC)    Panic attacks    Bipolar II disorder, mild, hypomanic, with mixed features, in partial remission (HCC)    Restless leg syndrome    Low grade squamous intraepithelial lesion (LGSIL) on biopsy of cervix       Past Medical History:   Diagnosis Date    Anxiety     Bipolar 1 disorder (HCC)     Chronic gastric ulcer without hemorrhage and without perforation     CTS (carpal tunnel syndrome) 2/10/2015    Depression     Diabetes mellitus ([facility] [address]     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 ([facility] [address] 1/29/2016    DUB (dysfunctional uterine bleeding) 8/22/2019    ETD (eustachian tube dysfunction) 5/16/2014    Herpes simplex     Lumbar strain 4/19/2017    Meniscus degeneration, right     PCOS (polycystic ovarian syndrome)     Sleep apnea     TMJ syndrome      Past Surgical History:   Procedure Laterality Date    ENDOSCOPY, COLON, DIAGNOSTIC      FINGER TRIGGER RELEASE Right 6/12/2020    RIGHT INDEX FINGER TRIGGER FINGER RELEASE performed by Aisha Quesada MD at [address] [facility] Left 4/1/2022    LEFT LONG FINGER A1 PULLEY RELEASE, LEFT CARPAL TUNNEL RELEASE performed by Aisha Quesada MD at [address] [facility] HAND SURGERY Right 7/1/2020    INCISION AND DRAINAGE OF RIGHT HAND WOUND; SECONDARY CLOSURE OF WOUND performed by Aisha Quesada MD at [address] [place], [place] [address]way HISTORY  05/25/2018    EGD    TX LAP,STOMACH,OTHER,W/O TUBE N/A 7/31/2018    ROBOTIC ASSISTED LAPAROSCOPIC SLEEVE GASTRECTOMY  performed by Kandice Garay DO at [facility] OR     Family History Problem Relation Age of Onset    Cervical Cancer Mother     Diabetes Father     Stroke Father         25s    Hypertension Father     Coronary Art Dis Father     Other Father         CHIVO    Cancer Father         melanoma    Glaucoma Father     Diabetes Sister     Diabetes Maternal Grandmother     Cancer Maternal Grandmother         melanoma    Arthritis Maternal Grandmother     Diabetes Paternal Grandmother     Stroke Paternal Grandmother      Social History     Socioeconomic History    Marital status:      Spouse name: Not on file    Number of children: Not on file    Years of education: Not on file    Highest education level: Not on file   Occupational History    Not on file   Tobacco Use    Smoking status: Never Smoker    Smokeless tobacco: Never Used   Vaping Use    Vaping Use: Never used   Substance and Sexual Activity    Alcohol use: Yes     Comment: infrequent, few times a month    Drug use: No    Sexual activity: Yes     Partners: Male   Other Topics Concern    Not on file   Social History Narrative    Not on file     Social Determinants of Health     Financial Resource Strain:     Difficulty of Paying Living Expenses: Not on file   Food Insecurity:     Worried About Running Out of Food in the Last Year: Not on file    Guilherme of Food in the Last Year: Not on file   Transportation Needs:     Lack of Transportation (Medical): Not on file    Lack of Transportation (Non-Medical):  Not on file   Physical Activity:     Days of Exercise per Week: Not on file    Minutes of Exercise per Session: Not on file   Stress:     Feeling of Stress : Not on file   Social Connections:     Frequency of Communication with Friends and Family: Not on file    Frequency of Social Gatherings with Friends and Family: Not on file    Attends Oriental orthodox Services: Not on file    Active Member of Clubs or Organizations: Not on file    Attends Club or Organization Meetings: Not on file   Laureano and no mass. There is no hepatosplenomegaly. No tenderness. Musculoskeletal: She exhibits no edema and no tenderness. Neurological: She is alert and oriented to person, place, and time. She has normal strength. No cranial nerve deficit or sensory deficit. Coordination and gait normal.   Skin: Skin is warm and dry. No rash noted. No erythema. Psychiatric: She has a normal mood and affect. Her behavior is normal.         Lab Review   Lab Results   Component Value Date     03/11/2022    K 4.4 03/11/2022    K 4.1 08/01/2018     03/11/2022    CO2 24 03/11/2022    BUN 14 03/11/2022    CREATININE 1.3 03/11/2022    GLUCOSE 143 03/11/2022    CALCIUM 8.9 03/11/2022        Lab Results   Component Value Date    WBC 6.8 08/02/2021    HGB 12.4 08/02/2021    HCT 37.8 08/02/2021    MCV 82.7 08/02/2021     08/02/2021     Lab Results   Component Value Date    LABA1C 8.0 03/11/2022     Lab Results   Component Value Date    .9 03/11/2022       Assessment:       46 y.o. patient with planned surgery as above. Known risk factors for perioperative complications: Diabetes mellitus, Hypertension, Obstructive sleep apnea  Current medications which may produce withdrawal symptoms if withheld perioperatively: none      Plan:     1. Preoperative workup as follows: none  2. Change in medication regimen before surgery: take 50% of insulin dose at HS and hold all AM medications.    3. Prophylaxis for cardiac events with perioperative beta-blockers: Not indicated  ACC/AHA indications for pre-operative beta-blocker use:    · Vascular surgery with history of postitive stress test  · Intermediate or high risk surgery with history of CAD   · Intermediate or high risk surgery with multiple clinical predictors of CAD- 2 of the following: history of compensated or prior heart failure, history of cerebrovascular disease, DM, or renal insufficiency    Routine administration of higher-dose, long-acting metoprolol in beta-blocker-naïve patients on the day of surgery, and in the absence of dose titration is associated with an overall increase in mortality. Beta-blockers should be started days to weeks prior to surgery and titrated to pulse < 70.  4. Deep vein thrombosis prophylaxis: not indicated  5.  No contraindications to planned surgeryy

## 2022-05-25 ENCOUNTER — TELEPHONE (OUTPATIENT)
Dept: FAMILY MEDICINE CLINIC | Age: 52
End: 2022-05-25

## 2022-05-25 ENCOUNTER — OFFICE VISIT (OUTPATIENT)
Dept: FAMILY MEDICINE CLINIC | Age: 52
End: 2022-05-25
Payer: COMMERCIAL

## 2022-05-25 VITALS
WEIGHT: 291.6 LBS | SYSTOLIC BLOOD PRESSURE: 132 MMHG | HEART RATE: 96 BPM | BODY MASS INDEX: 47.79 KG/M2 | OXYGEN SATURATION: 97 % | DIASTOLIC BLOOD PRESSURE: 70 MMHG | TEMPERATURE: 96.8 F

## 2022-05-25 DIAGNOSIS — G56.01 CARPAL TUNNEL SYNDROME OF RIGHT WRIST: ICD-10-CM

## 2022-05-25 DIAGNOSIS — R63.2 BINGE EATING: ICD-10-CM

## 2022-05-25 DIAGNOSIS — E11.3293 TYPE 2 DIABETES MELLITUS WITH BOTH EYES AFFECTED BY MILD NONPROLIFERATIVE RETINOPATHY WITHOUT MACULAR EDEMA, WITHOUT LONG-TERM CURRENT USE OF INSULIN (HCC): ICD-10-CM

## 2022-05-25 DIAGNOSIS — M65.331 TRIGGER MIDDLE FINGER OF RIGHT HAND: ICD-10-CM

## 2022-05-25 DIAGNOSIS — Z01.818 PREOP EXAMINATION: Primary | ICD-10-CM

## 2022-05-25 DIAGNOSIS — Z23 NEED FOR 23-POLYVALENT PNEUMOCOCCAL POLYSACCHARIDE VACCINE: ICD-10-CM

## 2022-05-25 LAB
ANION GAP SERPL CALCULATED.3IONS-SCNC: 15 MMOL/L (ref 3–16)
BUN BLDV-MCNC: 20 MG/DL (ref 7–20)
CALCIUM SERPL-MCNC: 9.3 MG/DL (ref 8.3–10.6)
CHLORIDE BLD-SCNC: 106 MMOL/L (ref 99–110)
CO2: 24 MMOL/L (ref 21–32)
CREAT SERPL-MCNC: 1.2 MG/DL (ref 0.6–1.1)
CREATININE URINE POCT: ABNORMAL
GFR AFRICAN AMERICAN: 57
GFR NON-AFRICAN AMERICAN: 47
GLUCOSE BLD-MCNC: 93 MG/DL (ref 70–99)
MICROALBUMIN/CREAT 24H UR: ABNORMAL MG/G{CREAT}
MICROALBUMIN/CREAT UR-RTO: ABNORMAL
POTASSIUM SERPL-SCNC: 4.1 MMOL/L (ref 3.5–5.1)
SODIUM BLD-SCNC: 145 MMOL/L (ref 136–145)

## 2022-05-25 PROCEDURE — 90471 IMMUNIZATION ADMIN: CPT | Performed by: FAMILY MEDICINE

## 2022-05-25 PROCEDURE — 82044 UR ALBUMIN SEMIQUANTITATIVE: CPT | Performed by: FAMILY MEDICINE

## 2022-05-25 PROCEDURE — 3052F HG A1C>EQUAL 8.0%<EQUAL 9.0%: CPT | Performed by: FAMILY MEDICINE

## 2022-05-25 PROCEDURE — 90732 PPSV23 VACC 2 YRS+ SUBQ/IM: CPT | Performed by: FAMILY MEDICINE

## 2022-05-25 PROCEDURE — 99215 OFFICE O/P EST HI 40 MIN: CPT | Performed by: FAMILY MEDICINE

## 2022-05-25 RX ORDER — INSULIN GLARGINE 100 [IU]/ML
INJECTION, SOLUTION SUBCUTANEOUS
Qty: 25 PEN | Refills: 3 | Status: SHIPPED
Start: 2022-05-25

## 2022-05-25 SDOH — ECONOMIC STABILITY: FOOD INSECURITY: WITHIN THE PAST 12 MONTHS, YOU WORRIED THAT YOUR FOOD WOULD RUN OUT BEFORE YOU GOT MONEY TO BUY MORE.: NEVER TRUE

## 2022-05-25 SDOH — ECONOMIC STABILITY: FOOD INSECURITY: WITHIN THE PAST 12 MONTHS, THE FOOD YOU BOUGHT JUST DIDN'T LAST AND YOU DIDN'T HAVE MONEY TO GET MORE.: NEVER TRUE

## 2022-05-25 ASSESSMENT — SOCIAL DETERMINANTS OF HEALTH (SDOH): HOW HARD IS IT FOR YOU TO PAY FOR THE VERY BASICS LIKE FOOD, HOUSING, MEDICAL CARE, AND HEATING?: NOT HARD AT ALL

## 2022-05-25 NOTE — TELEPHONE ENCOUNTER
Please call Dr. Arin Chen' office for fax info for preop - pt is not sure what the name of the surgery center is.  Thanks

## 2022-05-26 DIAGNOSIS — N32.81 OAB (OVERACTIVE BLADDER): ICD-10-CM

## 2022-05-26 LAB
ESTIMATED AVERAGE GLUCOSE: 217.3 MG/DL
HBA1C MFR BLD: 9.2 %

## 2022-05-26 RX ORDER — TOLTERODINE 4 MG/1
CAPSULE, EXTENDED RELEASE ORAL
Qty: 90 CAPSULE | Refills: 1 | Status: SHIPPED | OUTPATIENT
Start: 2022-05-26 | End: 2022-09-13

## 2022-05-26 NOTE — TELEPHONE ENCOUNTER
Requested Prescriptions     Pending Prescriptions Disp Refills    tolterodine (DETROL LA) 4 MG extended release capsule 90 capsule 1     Last OV; 5/25/2022  Last labs; 5/25/2022  F/U 6/17/2022

## 2022-07-19 DIAGNOSIS — E11.9 TYPE 2 DIABETES MELLITUS WITHOUT COMPLICATION, WITHOUT LONG-TERM CURRENT USE OF INSULIN (HCC): ICD-10-CM

## 2022-07-19 RX ORDER — DULAGLUTIDE 1.5 MG/.5ML
INJECTION, SOLUTION SUBCUTANEOUS
Qty: 6 ML | Refills: 3 | OUTPATIENT
Start: 2022-07-19

## 2022-07-27 DIAGNOSIS — E11.9 TYPE 2 DIABETES MELLITUS WITHOUT COMPLICATION, WITHOUT LONG-TERM CURRENT USE OF INSULIN (HCC): ICD-10-CM

## 2022-07-27 DIAGNOSIS — F31.61 BIPOLAR DISORDER, CURRENT EPISODE MIXED, MILD (HCC): ICD-10-CM

## 2022-07-27 RX ORDER — OXCARBAZEPINE 150 MG/1
TABLET, FILM COATED ORAL
Qty: 180 TABLET | Refills: 1 | Status: SHIPPED | OUTPATIENT
Start: 2022-07-27

## 2022-07-27 RX ORDER — DAPAGLIFLOZIN 10 MG/1
TABLET, FILM COATED ORAL
Qty: 90 TABLET | Refills: 1 | Status: SHIPPED | OUTPATIENT
Start: 2022-07-27

## 2022-07-27 NOTE — TELEPHONE ENCOUNTER
Requested Prescriptions     Pending Prescriptions Disp Refills    OXcarbazepine (TRILEPTAL) 150 MG tablet [Pharmacy Med Name: OXCARBAZEPINE  150MG  TAB] 180 tablet 3     Sig: TAKE 1 TABLET BY MOUTH  TWICE DAILY    FARXIGA 10 MG tablet [Pharmacy Med Name: FARXIGA  10MG  TAB] 90 tablet 3     Sig: TAKE 1 TABLET BY MOUTH IN  THE MORNING     Last ov 5/25/22  Last lab 3/11/22  Next ov n/a

## 2022-09-11 DIAGNOSIS — F41.0 PANIC ATTACKS: ICD-10-CM

## 2022-09-11 DIAGNOSIS — F41.8 DEPRESSION WITH ANXIETY: Chronic | ICD-10-CM

## 2022-09-12 RX ORDER — SERTRALINE HYDROCHLORIDE 100 MG/1
TABLET, FILM COATED ORAL
Qty: 135 TABLET | Refills: 3 | Status: SHIPPED | OUTPATIENT
Start: 2022-09-12

## 2022-09-12 NOTE — TELEPHONE ENCOUNTER
Requested Prescriptions     Pending Prescriptions Disp Refills    sertraline (ZOLOFT) 100 MG tablet [Pharmacy Med Name: Sertraline HCl 100 MG Oral Tablet] 135 tablet 3     Sig: TAKE 1 AND 1/2 TABLETS BY  MOUTH DAILY     Last OV; 5/25/2022  Last labs; 5/25/2022  No f/u

## 2022-09-13 DIAGNOSIS — N32.81 OAB (OVERACTIVE BLADDER): ICD-10-CM

## 2022-09-13 DIAGNOSIS — E11.9 TYPE 2 DIABETES MELLITUS WITHOUT COMPLICATION, WITHOUT LONG-TERM CURRENT USE OF INSULIN (HCC): ICD-10-CM

## 2022-09-13 RX ORDER — TOLTERODINE 4 MG/1
CAPSULE, EXTENDED RELEASE ORAL
Qty: 90 CAPSULE | Refills: 3 | Status: SHIPPED | OUTPATIENT
Start: 2022-09-13

## 2022-09-13 RX ORDER — DULAGLUTIDE 1.5 MG/.5ML
INJECTION, SOLUTION SUBCUTANEOUS
Qty: 6 ML | Refills: 3 | OUTPATIENT
Start: 2022-09-13

## 2022-10-02 DIAGNOSIS — E11.3293 TYPE 2 DIABETES MELLITUS WITH BOTH EYES AFFECTED BY MILD NONPROLIFERATIVE RETINOPATHY WITHOUT MACULAR EDEMA, WITHOUT LONG-TERM CURRENT USE OF INSULIN (HCC): ICD-10-CM

## 2022-10-03 RX ORDER — ORAL SEMAGLUTIDE 7 MG/1
TABLET ORAL
Qty: 90 TABLET | Refills: 0 | Status: SHIPPED | OUTPATIENT
Start: 2022-10-03 | End: 2022-11-11 | Stop reason: DRUGHIGH

## 2022-10-03 NOTE — TELEPHONE ENCOUNTER
Requested Prescriptions     Pending Prescriptions Disp Refills    RYBELSUS 7 MG TABS [Pharmacy Med Name: Kwadwo Mckeon  TAB] 90 tablet 3     Sig: TAKE 1 TABLET BY MOUTH  DAILY START AFTER TAKING  RYBELSUS 3 MG FOR 30 DAYS     Last OV; 5/25/2022  Lat labs; 5/25/2022  No f/u

## 2022-10-10 ENCOUNTER — E-VISIT (OUTPATIENT)
Dept: FAMILY MEDICINE CLINIC | Age: 52
End: 2022-10-10
Payer: COMMERCIAL

## 2022-10-10 DIAGNOSIS — J01.00 ACUTE NON-RECURRENT MAXILLARY SINUSITIS: Primary | ICD-10-CM

## 2022-10-10 DIAGNOSIS — E11.3293 TYPE 2 DIABETES MELLITUS WITH BOTH EYES AFFECTED BY MILD NONPROLIFERATIVE RETINOPATHY WITHOUT MACULAR EDEMA, WITHOUT LONG-TERM CURRENT USE OF INSULIN (HCC): ICD-10-CM

## 2022-10-10 PROCEDURE — 99421 OL DIG E/M SVC 5-10 MIN: CPT | Performed by: FAMILY MEDICINE

## 2022-10-10 RX ORDER — AMOXICILLIN AND CLAVULANATE POTASSIUM 875; 125 MG/1; MG/1
1 TABLET, FILM COATED ORAL 2 TIMES DAILY
Qty: 20 TABLET | Refills: 0 | Status: SHIPPED | OUTPATIENT
Start: 2022-10-10 | End: 2022-10-20

## 2022-10-10 ASSESSMENT — LIFESTYLE VARIABLES: SMOKING_STATUS: NO, I'VE NEVER SMOKED

## 2022-10-10 NOTE — PROGRESS NOTES
Patient Active Problem List   Diagnosis    PCOS (polycystic ovarian syndrome)    Pure hypercholesterolemia    Common migraine    Obstructive sleep apnea syndrome    Herpes simplex    Binge eating    Pedal edema    FH: melanoma    Essential hypertension    Hiatal hernia with GERD and esophagitis    Class 2 obesity without serious comorbidity with body mass index (BMI) of 38.0 to 38.9 in adult    Type 2 diabetes mellitus with mild nonproliferative retinopathy of both eyes without macular edema (HCC)    Panic attacks    Bipolar II disorder, mild, hypomanic, with mixed features, in partial remission (HCC)    Restless leg syndrome    Low grade squamous intraepithelial lesion (LGSIL) on biopsy of cervix      Past Medical History:   Diagnosis Date    Anxiety     Bipolar 1 disorder (HCC)     Chronic gastric ulcer without hemorrhage and without perforation     CTS (carpal tunnel syndrome) 2/10/2015    Depression     Diabetes mellitus ([facility] [address]     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 ([facility] [address] 1/29/2016    DUB (dysfunctional uterine bleeding) 8/22/2019    ETD (eustachian tube dysfunction) 5/16/2014    Herpes simplex     Lumbar strain 4/19/2017    Meniscus degeneration, right     PCOS (polycystic ovarian syndrome)     Sleep apnea     TMJ syndrome      Social History     Tobacco Use    Smoking status: Never    Smokeless tobacco: Never   Vaping Use    Vaping Use: Never used   Substance Use Topics    Alcohol use: Yes     Comment: infrequent, few times a month    Drug use: No      Allergies   Allergen Reactions    Effexor Xr [Venlafaxine Hcl Er] Other (See Comments)     Didn't respond well to it.        Current Outpatient Medications on File Prior to Visit   Medication Sig Dispense Refill    RYBELSUS 7 MG TABS TAKE 1 TABLET BY MOUTH  DAILY START AFTER TAKING  RYBELSUS 3 MG FOR 30 DAYS 90 tablet 0    tolterodine (DETROL LA) 4 MG extended release capsule TAKE 1 CAPSULE BY MOUTH  DAILY 90 capsule 3    sertraline (ZOLOFT) 100 MG tablet TAKE 1 AND 1/2 TABLETS BY  MOUTH DAILY 135 tablet 3    OXcarbazepine (TRILEPTAL) 150 MG tablet TAKE 1 TABLET BY MOUTH  TWICE DAILY 180 tablet 1    FARXIGA 10 MG tablet TAKE 1 TABLET BY MOUTH IN  THE MORNING 90 tablet 1    lisdexamfetamine (VYVANSE) 70 MG capsule Take 1 capsule by mouth every morning for 30 days. 30 capsule 0    insulin glargine (LANTUS SOLOSTAR) 100 UNIT/ML injection pen INJECT SUBCUTANEOUSLY 30 unit QHS and 60 U QAM 25 pen 3    lisdexamfetamine (VYVANSE) 70 MG capsule Take 1 capsule by mouth every morning for 30 days. 30 capsule 0    lisdexamfetamine (VYVANSE) 70 MG capsule Take 1 capsule by mouth every morning for 30 days.  30 capsule 0    cyclobenzaprine (FLEXERIL) 10 mg tablet TAKE 1 TABLET BY MOUTH 3  TIMES DAILY AS NEEDED FOR  MUSCLE SPASM(S) 90 tablet 2    buPROPion (WELLBUTRIN XL) 300 MG extended release tablet TAKE 1 TABLET BY MOUTH IN  THE MORNING 90 tablet 3    fluocinonide (LIDEX) 0.05 % external solution APPLY TOPICALLY TWICE DAILY 60 mL 1    pramipexole (MIRAPEX) 0.5 MG tablet TAKE 2 TABLETS BY MOUTH AT  NIGHT 180 tablet 3    gabapentin (NEURONTIN) 300 MG capsule TAKE 1 TO 2 CAPSULES BY  MOUTH AT NIGHT 180 capsule 1    Multiple Vitamins-Minerals (CENTRUM WOMEN PO) Take by mouth      Continuous Blood Gluc Sensor (FREESTYLE FRACISCO 2 SENSOR) MISC 1 each by Does not apply route every 14 days 6 each 3    atorvastatin (LIPITOR) 40 MG tablet TAKE 1 TABLET BY MOUTH  DAILY 90 tablet 3    B-D UF III MINI PEN NEEDLES 31G X 5 MM MISC USE 1 PEN NEEDLE DAILY 90 each 3    albuterol sulfate  (90 Base) MCG/ACT inhaler USE 2 INHALATIONS BY MOUTH  EVERY 4 HOURS AS NEEDED FOR WHEEZING 40.2 g 3    fluticasone (FLONASE) 50 MCG/ACT nasal spray 2 sprays by Nasal route daily 3 each 3    hydrOXYzine (ATARAX) 25 MG tablet TAKE 1 TABLET BY MOUTH  EVERY 6 HOURS AS NEEDED FOR ANXIETY 360 tablet 0    Continuous Blood Gluc  (FREESTYLE FRACISCO 2 READER) [place]RI[place] 1 each by Does not apply route 4 times daily 1 each 5    TURMERIC PO Take 1 tablet by mouth daily      montelukast (SINGULAIR) 10 MG tablet Take 1 tablet by mouth daily 30 tablet 5    vitamin B-12 (CYANOCOBALAMIN) 1000 MCG tablet Take 1,000 mcg by mouth daily      MIRENA, 52 MG, IUD 52 mg 1 Dose by Intrauterine route once      b complex vitamins capsule Take 1 capsule by mouth daily      aspirin 81 MG EC tablet Take 81 mg by mouth daily. No current facility-administered medications on file prior to visit. Lab Results   Component Value Date/Time     05/25/2022 07:24 AM    K 4.1 05/25/2022 07:24 AM    K 4.1 08/01/2018 05:38 AM     05/25/2022 07:24 AM    CO2 24 05/25/2022 07:24 AM    BUN 20 05/25/2022 07:24 AM    CREATININE 1.2 05/25/2022 07:24 AM    GLUCOSE 93 05/25/2022 07:24 AM    CALCIUM 9.3 05/25/2022 07:24 AM       Hemoglobin A1C   Date Value Ref Range Status   05/25/2022 9.2 See comment % Final     Comment:     Comment:  Diagnosis of Diabetes: > or = 6.5%  Increased risk of diabetes (Prediabetes): 5.7-6.4%  Glycemic Control: Nonpregnant Adults: <7.0%                    Pregnant: <6.0%             Diagnosis Orders   1. Type 2 diabetes mellitus with both eyes affected by mild nonproliferative retinopathy without macular edema, without long-term current use of insulin ([facility])  Comprehensive Metabolic Panel    Hemoglobin A1C      2. Acute non-recurrent maxillary sinusitis  amoxicillin-clavulanate (AUGMENTIN) 875-125 MG per tablet      5-10 minutes were spent on the digital evaluation and management of this patient.

## 2022-10-30 PROBLEM — N18.30 CHRONIC RENAL INSUFFICIENCY, STAGE 3 (MODERATE) (HCC): Status: ACTIVE | Noted: 2022-10-30

## 2022-11-03 RX ORDER — GABAPENTIN 300 MG/1
CAPSULE ORAL
Qty: 180 CAPSULE | Refills: 3 | Status: SHIPPED | OUTPATIENT
Start: 2022-11-03 | End: 2023-05-02

## 2022-11-03 NOTE — TELEPHONE ENCOUNTER
Requested Prescriptions     Pending Prescriptions Disp Refills    gabapentin (NEURONTIN) 300 MG capsule [Pharmacy Med Name: Gabapentin 300 MG Oral Capsule] 180 capsule 3     Sig: TAKE 1 TO 2 CAPSULES BY  MOUTH AT NIGHT     Last ov 10/10/2022  Last labs 5/25/22

## 2022-11-08 DIAGNOSIS — E11.3293 TYPE 2 DIABETES MELLITUS WITH BOTH EYES AFFECTED BY MILD NONPROLIFERATIVE RETINOPATHY WITHOUT MACULAR EDEMA, WITHOUT LONG-TERM CURRENT USE OF INSULIN (HCC): ICD-10-CM

## 2022-11-08 LAB
A/G RATIO: 1.6 (ref 1.1–2.2)
ALBUMIN SERPL-MCNC: 4.1 G/DL (ref 3.4–5)
ALP BLD-CCNC: 105 U/L (ref 40–129)
ALT SERPL-CCNC: 16 U/L (ref 10–40)
ANION GAP SERPL CALCULATED.3IONS-SCNC: 17 MMOL/L (ref 3–16)
AST SERPL-CCNC: 12 U/L (ref 15–37)
BILIRUB SERPL-MCNC: 0.3 MG/DL (ref 0–1)
BUN BLDV-MCNC: 14 MG/DL (ref 7–20)
CALCIUM SERPL-MCNC: 9.1 MG/DL (ref 8.3–10.6)
CHLORIDE BLD-SCNC: 104 MMOL/L (ref 99–110)
CO2: 23 MMOL/L (ref 21–32)
CREAT SERPL-MCNC: 1.2 MG/DL (ref 0.6–1.1)
GFR SERPL CREATININE-BSD FRML MDRD: 54 ML/MIN/{1.73_M2}
GLUCOSE BLD-MCNC: 247 MG/DL (ref 70–99)
POTASSIUM SERPL-SCNC: 4.2 MMOL/L (ref 3.5–5.1)
SODIUM BLD-SCNC: 144 MMOL/L (ref 136–145)
TOTAL PROTEIN: 6.7 G/DL (ref 6.4–8.2)

## 2022-11-09 LAB
ESTIMATED AVERAGE GLUCOSE: 286.2 MG/DL
HBA1C MFR BLD: 11.6 %
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
## 2022-11-11 ENCOUNTER — OFFICE VISIT (OUTPATIENT)
Dept: FAMILY MEDICINE CLINIC | Age: 52
End: 2022-11-11
Payer: COMMERCIAL

## 2022-11-11 VITALS
HEART RATE: 90 BPM | HEIGHT: 66 IN | WEIGHT: 293 LBS | BODY MASS INDEX: 47.09 KG/M2 | OXYGEN SATURATION: 93 % | SYSTOLIC BLOOD PRESSURE: 124 MMHG | DIASTOLIC BLOOD PRESSURE: 70 MMHG

## 2022-11-11 DIAGNOSIS — E78.00 PURE HYPERCHOLESTEROLEMIA: ICD-10-CM

## 2022-11-11 DIAGNOSIS — R63.2 BINGE EATING: ICD-10-CM

## 2022-11-11 DIAGNOSIS — N18.30 CHRONIC RENAL INSUFFICIENCY, STAGE 3 (MODERATE) (HCC): ICD-10-CM

## 2022-11-11 DIAGNOSIS — F31.81 BIPOLAR II DISORDER, MILD, HYPOMANIC, WITH MIXED FEATURES, IN PARTIAL REMISSION (HCC): ICD-10-CM

## 2022-11-11 DIAGNOSIS — I10 ESSENTIAL HYPERTENSION: ICD-10-CM

## 2022-11-11 DIAGNOSIS — E11.3293 TYPE 2 DIABETES MELLITUS WITH BOTH EYES AFFECTED BY MILD NONPROLIFERATIVE RETINOPATHY WITHOUT MACULAR EDEMA, WITHOUT LONG-TERM CURRENT USE OF INSULIN (HCC): Primary | ICD-10-CM

## 2022-11-11 LAB
CHP ED QC CHECK: NORMAL
GLUCOSE BLD-MCNC: 474 MG/DL

## 2022-11-11 PROCEDURE — 3074F SYST BP LT 130 MM HG: CPT | Performed by: FAMILY MEDICINE

## 2022-11-11 PROCEDURE — 99214 OFFICE O/P EST MOD 30 MIN: CPT | Performed by: FAMILY MEDICINE

## 2022-11-11 PROCEDURE — 3046F HEMOGLOBIN A1C LEVEL >9.0%: CPT | Performed by: FAMILY MEDICINE

## 2022-11-11 PROCEDURE — 3078F DIAST BP <80 MM HG: CPT | Performed by: FAMILY MEDICINE

## 2022-11-11 PROCEDURE — 82962 GLUCOSE BLOOD TEST: CPT | Performed by: FAMILY MEDICINE

## 2022-11-11 RX ORDER — ARIPIPRAZOLE 5 MG/1
5 TABLET ORAL DAILY
Qty: 30 TABLET | Refills: 5 | Status: SHIPPED | OUTPATIENT
Start: 2022-11-11

## 2022-11-11 RX ORDER — INSULIN GLARGINE 300 U/ML
70 INJECTION, SOLUTION SUBCUTANEOUS DAILY
Qty: 30 ADJUSTABLE DOSE PRE-FILLED PEN SYRINGE | Refills: 5 | Status: SHIPPED | OUTPATIENT
Start: 2022-11-11 | End: 2022-11-21 | Stop reason: SDUPTHER

## 2022-11-14 RX ORDER — FLUOCINONIDE TOPICAL SOLUTION USP, 0.05% 0.5 MG/ML
SOLUTION TOPICAL
Qty: 120 ML | Refills: 1 | Status: SHIPPED | OUTPATIENT
Start: 2022-11-14

## 2022-11-14 NOTE — TELEPHONE ENCOUNTER
Requested Prescriptions     Pending Prescriptions Disp Refills    fluocinonide (LIDEX) 0.05 % external solution [Pharmacy Med Name: FLUOCINONIDE  0.05%  SOL] 120 mL      Sig: APPLY TOPICALLY TWICE DAILY         Last OV; 11/11/2022   Last labs; 11/8/2022  F/u 12/13/2022

## 2022-11-18 ENCOUNTER — TELEPHONE (OUTPATIENT)
Dept: FAMILY MEDICINE CLINIC | Age: 52
End: 2022-11-18

## 2022-11-18 DIAGNOSIS — E11.3293 TYPE 2 DIABETES MELLITUS WITH BOTH EYES AFFECTED BY MILD NONPROLIFERATIVE RETINOPATHY WITHOUT MACULAR EDEMA, WITHOUT LONG-TERM CURRENT USE OF INSULIN (HCC): ICD-10-CM

## 2022-11-21 ENCOUNTER — TELEPHONE (OUTPATIENT)
Dept: FAMILY MEDICINE CLINIC | Age: 52
End: 2022-11-21

## 2022-11-21 RX ORDER — INSULIN GLARGINE 300 U/ML
70 INJECTION, SOLUTION SUBCUTANEOUS DAILY
Qty: 21 ADJUSTABLE DOSE PRE-FILLED PEN SYRINGE | Refills: 1 | Status: SHIPPED | OUTPATIENT
Start: 2022-11-21

## 2022-11-21 NOTE — TELEPHONE ENCOUNTER
Pateint was returning a call from the office. She would like a call back as soon as possible.     [phone] (home)

## 2022-12-11 NOTE — PROGRESS NOTES
Subjective:      Patient ID: Mayela Portillogsfedouglas 45 y.o. HPI  E[facility] The primary encounter diagnosis was Type 2 diabetes mellitus with both eyes affected by mild nonproliferative retinopathy without macular edema, without long-term current use of insulin ([address] Diagnoses of Essential hypertension, Pure hypercholesterolemia, Chronic renal insufficiency, stage 3 (moderate) (HCC), Bipolar II disorder, mild, hypomanic, with mixed features, in partial remission ([address] and Low grade squamous intraepithelial lesion (LGSIL) on biopsy of cervix were also pertinent to this visit. PAP: hx LGSIL. Last PAP by Dr. Alaina Jovel in 2019.  + ASCUS with + HPV other. She has not scheduled yet; will do sol   Vaccines: due COVID booster, flu, Hep B. She thinks she had hep B vaccine in the past    BIPOLAR II:  had stopped taking Abilify; was restarted in November. Had been stable and doing well when on it previously. Today: still has some anxiety. Mild panic attacks few times a week. Sleeping well and appetite is fine. Head picking has resolved with Abilify. No mood swings, racing thoughts, decreased need for sleep. Still has impulsive spending. Not suicidal.  Has appt with Dr. Alex Jackson     Treatment Adherence:   Medication compliance:  in November she was taking insulin only once daily instead of BID. Was started on Toujeo instead of Lantus. compliant most of the time  Diet compliance:  compliant most of the time  Weight trend: stable  Current exercise: no regular exercise. She plans to resume swimming. Barriers: lack of motivation    Diabetes Mellitus Type 2: Current symptoms/problems include blurry vision, polyuria, and polydipsia. Home blood sugar records: fasting range: 138 - 250, postprandial range: 250 - 350  Any episodes of hypoglycemia? no  Eye exam current (within one year): yes  few months ago. Normal.  Tobacco history: She  reports that she has never smoked.  She has never used smokeless tobacco.   Daily Aspirin? Yes    Hypertension:  Home blood pressure monitoring: No.  She is adherent to a low sodium diet. Patient denies shortness of breath, peripheral edema, and palpitations. + dyspnea with exercise for the past few months. No associated chest pain. If uses inhaler before exercise is better. Antihypertensive medication side effects: no medication side effects noted. Use of agents associated with hypertension: none. Hyperlipidemia:  No new myalgias or GI upset on atorvastatin (Lipitor). The ASCVD Risk score (Teresa DK, et al., 2019) failed to calculate for the following reasons: The valid total cholesterol range is 130 to 320 mg/dL     Lab Results   Component Value Date    LABA1C 11.6 11/08/2022    LABA1C 9.2 05/25/2022    LABA1C 8.0 03/11/2022     Lab Results   Component Value Date    LABMICR <1.20 05/20/2021    CREATININE 1.2 (H) 11/08/2022     Lab Results   Component Value Date    ALT 16 11/08/2022    AST 12 (L) 11/08/2022     Lab Results   Component Value Date    CHOL 119 03/11/2022    TRIG 55 03/11/2022    HDL 67 (H) 03/11/2022    LDLCALC 41 03/11/2022         Labs Renal Latest Ref Rng & Units 11/8/2022 5/25/2022 3/11/2022 8/2/2021 6/9/2021   BUN 7 - 20 mg/dL 14 20 14 20 23(H)   Cr 0.6 - 1.1 mg/dL 1.2(H) 1.2(H) 1. 3(H) 1.1 1.4(H)   K 3.5 - 5.1 mmol/L 4.2 4.1 4.4 4.4 4.6   Na 136 - 145 mmol/L 144 145 144 143 139      Outpatient Medications Marked as Taking for the 12/13/22 encounter (Office Visit) with Mariela Pleitez MD   Medication Sig Dispense Refill    Insulin Glargine, 2 Unit Dial, (TOUJEO MAX SOLOSTAR) 300 UNIT/ML SOPN Inject 70 Units into the skin daily (Patient taking differently: Inject 80 Units into the skin daily) 21 Adjustable Dose Pre-filled Pen Syringe 1    fluocinonide (LIDEX) 0.05 % external solution APPLY TOPICALLY TWICE DAILY 120 mL 1    ARIPiprazole (ABILIFY) 5 MG tablet Take 1 tablet by mouth daily 30 tablet 5    Semaglutide 14 MG TABS Take 14 mg by mouth daily 90 tablet 1    gabapentin (NEURONTIN) 300 MG capsule TAKE 1 TO 2 CAPSULES BY  MOUTH AT NIGHT 180 capsule 3    tolterodine (DETROL LA) 4 MG extended release capsule TAKE 1 CAPSULE BY MOUTH  DAILY 90 capsule 3    sertraline (ZOLOFT) 100 MG tablet TAKE 1 AND 1/2 TABLETS BY  MOUTH DAILY 135 tablet 3    OXcarbazepine (TRILEPTAL) 150 MG tablet TAKE 1 TABLET BY MOUTH  TWICE DAILY 180 tablet 1    FARXIGA 10 MG tablet TAKE 1 TABLET BY MOUTH IN  THE MORNING 90 tablet 1    cyclobenzaprine (FLEXERIL) 10 mg tablet TAKE 1 TABLET BY MOUTH 3  TIMES DAILY AS NEEDED FOR  MUSCLE SPASM(S) 90 tablet 2    buPROPion (WELLBUTRIN XL) 300 MG extended release tablet TAKE 1 TABLET BY MOUTH IN  THE MORNING 90 tablet 3    pramipexole (MIRAPEX) 0.5 MG tablet TAKE 2 TABLETS BY MOUTH AT  NIGHT 180 tablet 3    Multiple Vitamins-Minerals (CENTRUM WOMEN PO) Take by mouth      Continuous Blood Gluc Sensor (FREESTYLE FRACISCO 2 SENSOR) MISC 1 each by Does not apply route every 14 days 6 each 3    atorvastatin (LIPITOR) 40 MG tablet TAKE 1 TABLET BY MOUTH  DAILY 90 tablet 3    B-D UF III MINI PEN NEEDLES 31G X 5 MM MISC USE 1 PEN NEEDLE DAILY 90 each 3    albuterol sulfate  (90 Base) MCG/ACT inhaler USE 2 INHALATIONS BY MOUTH  EVERY 4 HOURS AS NEEDED FOR WHEEZING 40.2 g 3    fluticasone (FLONASE) 50 MCG/ACT nasal spray 2 sprays by Nasal route daily 3 each 3    hydrOXYzine (ATARAX) 25 MG tablet TAKE 1 TABLET BY MOUTH  EVERY 6 HOURS AS NEEDED FOR ANXIETY 360 tablet 0    Continuous Blood Gluc  (FREESTYLE FRACISCO 2 READER) ANDRIA 1 each by Does not apply route 4 times daily 1 each 5    TURMERIC PO Take 1 tablet by mouth daily      montelukast (SINGULAIR) 10 MG tablet Take 1 tablet by mouth daily 30 tablet 5    vitamin B-12 (CYANOCOBALAMIN) 1000 MCG tablet Take 1,000 mcg by mouth daily      MIRENA, 52 MG, IUD 52 mg 1 Dose by Intrauterine route once      b complex vitamins capsule Take 1 capsule by mouth daily      aspirin 81 MG EC tablet Take 81 mg by mouth daily. Allergies   Allergen Reactions    Effexor Xr [Venlafaxine Hcl Er] Other (See Comments)     Didn't respond well to it.          Patient Active Problem List   Diagnosis    PCOS (polycystic ovarian syndrome)    Pure hypercholesterolemia    Common migraine    Obstructive sleep apnea syndrome    Herpes simplex    Binge eating    Pedal edema    FH: melanoma    Essential hypertension    Hiatal hernia with GERD and esophagitis    Class 2 obesity without serious comorbidity with body mass index (BMI) of 38.0 to 38.9 in adult    Type 2 diabetes mellitus with mild nonproliferative retinopathy of both eyes without macular edema (HCC)    Panic attacks    Bipolar II disorder, mild, hypomanic, with mixed features, in partial remission (HCC)    Restless leg syndrome    Low grade squamous intraepithelial lesion (LGSIL) on biopsy of cervix    Chronic renal insufficiency, stage 3 (moderate) ([facility])        Past Medical History:   Diagnosis Date    Anxiety     Bipolar 1 disorder (HCC)     Chronic gastric ulcer without hemorrhage and without perforation     CTS (carpal tunnel syndrome) 2/10/2015    Depression     Diabetes mellitus ([facility]     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 ([facility] 1/29/2016    DUB (dysfunctional uterine bleeding) 8/22/2019    ETD (eustachian tube dysfunction) 5/16/2014    Herpes simplex     Lumbar strain 4/19/2017    Meniscus degeneration, right     PCOS (polycystic ovarian syndrome)     Sleep apnea     TMJ syndrome        Past Surgical History:   Procedure Laterality Date    ENDOSCOPY, COLON, DIAGNOSTIC      FINGER TRIGGER RELEASE Right 6/12/2020    RIGHT INDEX FINGER TRIGGER FINGER RELEASE performed by Loretta Alberto MD at [address] Left 4/1/2022    LEFT LONG FINGER A1 PULLEY RELEASE, LEFT CARPAL TUNNEL RELEASE performed by Loretta Alberto MD at [address] [facility] Right 7/1/2020    INCISION AND DRAINAGE OF RIGHT HAND WOUND; SECONDARY CLOSURE OF WOUND performed by Annie Hennessy MD at [facility]  05/25/2018    EGD    ID LAP,STOMACH,OTHER,W/O TUBE N/A 7/31/2018    ROBOTIC ASSISTED LAPAROSCOPIC SLEEVE GASTRECTOMY  performed by Nickolas Torres DO at [facility] OR        Social History     Tobacco Use    Smoking status: Never    Smokeless tobacco: Never   Vaping Use    Vaping Use: Never used   Substance Use Topics    Alcohol use: Yes     Comment: infrequent, few times a month    Drug use: No        Family History   Problem Relation Age of Onset    Cervical Cancer Mother     Diabetes Father     Stroke Father         25s    Hypertension Father     Coronary Art Dis Father     Other Father         CHIVO    Cancer Father         melanoma    Glaucoma Father     Heart Attack Sister 48    Diabetes Sister     Heart Attack Sister 36    Diabetes Maternal Grandmother     Cancer Maternal Grandmother         melanoma    Arthritis Maternal Grandmother     Diabetes Paternal Grandmother     Stroke Paternal Grandmother         Review of Systems  Review of Systems    Objective:   Physical Exam  Wt Readings from Last 3 Encounters:   12/13/22 293 lb 6.4 oz (133.1 kg)   11/11/22 293 lb 6.4 oz (133.1 kg)   05/25/22 291 lb 9.6 oz (132.3 kg)     Temp Readings from Last 3 Encounters:   12/13/22 98.3 °F (36.8 °C) (Temporal)   05/25/22 96.8 °F (36 °C)   04/01/22 97 °F (36.1 °C) (Temporal)     BP Readings from Last 3 Encounters:   12/13/22 126/60   11/11/22 124/70   05/25/22 132/70     Pulse Readings from Last 3 Encounters:   12/13/22 87   11/11/22 90   05/25/22 96      NAD   Skin is warm and dry. Well hydrated. No rash. Mood +, affect is normal.   The neck is supple and free of adenopathy or masses, the thyroid is normal without enlargement or nodules. Chest: clear with no wheezes or rales. No retractions, or use of accessory muscles noted. Cardiovascular: PMI is not displaced, and no thrill noted. Regular rate and rhythm with no rub, murmur or gallop.   No peripheral edema.   The abdomen is soft without tenderness, guarding, mass, rebound or organomegaly. Aorta, femoral, DP and PT pulses intact. Sensation normal to monofilament feet bilaterally. Feet in good repair, without significant callouses and without ulceration or deformity. Assessment / Plan:        Diagnosis Orders   1. Type 2 diabetes mellitus with both eyes affected by mild nonproliferative retinopathy without macular edema, without long-term current use of insulin ([facility])  insulin aspart (NOVOLOG FLEXPEN) 100 UNIT/ML injection pen    Continuous Blood Gluc Sensor (FREESTYLE FRACISCO 3 SENSOR) [facility]  Continue to titrate Toujeo every 3 days for PP sugars < 160. Add Novolog at supper. Titration addressed  Discussed diet, exercise and weight loss strategies       2. Essential hypertension  At goal < 130/80. Continue current meds      3. Pure hypercholesterolemia  LDL goal < 100  Continue statin      4. Chronic renal insufficiency, stage 3 (moderate) ([facility])  continue to avoid nephrotoxins and maintain hydration       5. Bipolar II disorder, mild, hypomanic, with mixed features, in partial remission ([facility])  ARIPiprazole (ABILIFY) 10 MG tablet      6. Low grade squamous intraepithelial lesion (LGSIL) on biopsy of cervix  Risk of malignancy addressed   She agrees to scheduel       7. Mild intermittent asthma without complication  budesonide-formoterol (SYMBICORT) 80-4.5 MCG/ACT AERO      8. Immunity status testing  Hepatitis B Surface Antibody          Side effects of current medications reviewed and questions answered. Follow up in 3 months or prn.

## 2022-12-13 ENCOUNTER — OFFICE VISIT (OUTPATIENT)
Dept: FAMILY MEDICINE CLINIC | Age: 52
End: 2022-12-13
Payer: COMMERCIAL

## 2022-12-13 VITALS
DIASTOLIC BLOOD PRESSURE: 60 MMHG | OXYGEN SATURATION: 98 % | BODY MASS INDEX: 48.08 KG/M2 | SYSTOLIC BLOOD PRESSURE: 126 MMHG | RESPIRATION RATE: 14 BRPM | HEART RATE: 87 BPM | WEIGHT: 293 LBS | TEMPERATURE: 98.3 F

## 2022-12-13 DIAGNOSIS — E11.3293 TYPE 2 DIABETES MELLITUS WITH BOTH EYES AFFECTED BY MILD NONPROLIFERATIVE RETINOPATHY WITHOUT MACULAR EDEMA, WITHOUT LONG-TERM CURRENT USE OF INSULIN (HCC): Primary | ICD-10-CM

## 2022-12-13 DIAGNOSIS — J45.20 MILD INTERMITTENT ASTHMA WITHOUT COMPLICATION: ICD-10-CM

## 2022-12-13 DIAGNOSIS — F31.81 BIPOLAR II DISORDER, MILD, HYPOMANIC, WITH MIXED FEATURES, IN PARTIAL REMISSION (HCC): ICD-10-CM

## 2022-12-13 DIAGNOSIS — I10 ESSENTIAL HYPERTENSION: ICD-10-CM

## 2022-12-13 DIAGNOSIS — N18.30 CHRONIC RENAL INSUFFICIENCY, STAGE 3 (MODERATE) (HCC): ICD-10-CM

## 2022-12-13 DIAGNOSIS — Z01.84 IMMUNITY STATUS TESTING: ICD-10-CM

## 2022-12-13 DIAGNOSIS — E78.00 PURE HYPERCHOLESTEROLEMIA: ICD-10-CM

## 2022-12-13 DIAGNOSIS — R87.7 LOW GRADE SQUAMOUS INTRAEPITHELIAL LESION (LGSIL) ON BIOPSY OF CERVIX: ICD-10-CM

## 2022-12-13 PROCEDURE — 99214 OFFICE O/P EST MOD 30 MIN: CPT | Performed by: FAMILY MEDICINE

## 2022-12-13 PROCEDURE — 3046F HEMOGLOBIN A1C LEVEL >9.0%: CPT | Performed by: FAMILY MEDICINE

## 2022-12-13 PROCEDURE — 3078F DIAST BP <80 MM HG: CPT | Performed by: FAMILY MEDICINE

## 2022-12-13 PROCEDURE — 3074F SYST BP LT 130 MM HG: CPT | Performed by: FAMILY MEDICINE

## 2022-12-13 RX ORDER — INSULIN ASPART 100 [IU]/ML
INJECTION, SOLUTION INTRAVENOUS; SUBCUTANEOUS
Qty: 5 ADJUSTABLE DOSE PRE-FILLED PEN SYRINGE | Refills: 3 | Status: SHIPPED | OUTPATIENT
Start: 2022-12-13

## 2022-12-13 RX ORDER — ARIPIPRAZOLE 10 MG/1
10 TABLET ORAL DAILY
Qty: 90 TABLET | Refills: 1 | Status: SHIPPED | OUTPATIENT
Start: 2022-12-13

## 2022-12-13 RX ORDER — BUDESONIDE AND FORMOTEROL FUMARATE DIHYDRATE 80; 4.5 UG/1; UG/1
2 AEROSOL RESPIRATORY (INHALATION) 2 TIMES DAILY
Qty: 10.2 G | Refills: 5 | Status: SHIPPED | OUTPATIENT
Start: 2022-12-13

## 2022-12-13 RX ORDER — BLOOD-GLUCOSE SENSOR
1 EACH MISCELLANEOUS
Qty: 6 EACH | Refills: 3 | Status: SHIPPED | OUTPATIENT
Start: 2022-12-13

## 2022-12-14 ENCOUNTER — TELEPHONE (OUTPATIENT)
Dept: ADMINISTRATIVE | Age: 52
End: 2022-12-14

## 2022-12-14 NOTE — TELEPHONE ENCOUNTER
Submitted PA for NovoLOG FlexPen 100UNIT/ML pen-injectors. Key: [id number]. Via CMM STATUS: This medication or product is on your plan's list of covered drugs. Prior authorization is not required at this time. If your pharmacy has questions regarding the processing of your prescription, please have them call the pharmacy help desk at [phone]. **Please note: This request was submitted electronically. Formulary lowering, tiering exception, cost reduction and/or pre-benefit determination review (including prospective Medicare hospice reviews) requests cannot be requested using this method of submission. Providers contact us at [phone] for further assistance. If this requires a response please respond to the pool ( [facility] [address]). Thank you please advise patient.

## 2022-12-16 ENCOUNTER — TELEPHONE (OUTPATIENT)
Dept: ADMINISTRATIVE | Age: 52
End: 2022-12-16

## 2022-12-16 DIAGNOSIS — E11.3293 TYPE 2 DIABETES MELLITUS WITH BOTH EYES AFFECTED BY MILD NONPROLIFERATIVE RETINOPATHY WITHOUT MACULAR EDEMA, WITHOUT LONG-TERM CURRENT USE OF INSULIN (HCC): Primary | ICD-10-CM

## 2022-12-22 ENCOUNTER — TELEPHONE (OUTPATIENT)
Dept: FAMILY MEDICINE CLINIC | Age: 52
End: 2022-12-22

## 2022-12-22 ENCOUNTER — TELEPHONE (OUTPATIENT)
Dept: ADMINISTRATIVE | Age: 52
End: 2022-12-22

## 2022-12-22 DIAGNOSIS — E11.3293 TYPE 2 DIABETES MELLITUS WITH BOTH EYES AFFECTED BY MILD NONPROLIFERATIVE RETINOPATHY WITHOUT MACULAR EDEMA, WITHOUT LONG-TERM CURRENT USE OF INSULIN (HCC): Primary | ICD-10-CM

## 2022-12-22 RX ORDER — INSULIN LISPRO 100 [IU]/ML
INJECTION, SOLUTION INTRAVENOUS; SUBCUTANEOUS
Qty: 5 ADJUSTABLE DOSE PRE-FILLED PEN SYRINGE | Refills: 3 | Status: SHIPPED | OUTPATIENT
Start: 2022-12-22

## 2022-12-22 NOTE — TELEPHONE ENCOUNTER
\"The request medication and/or diagnosis are not a covered benefit and excluded from coverage in accordance with the terms and conditions of your plan benefits. \"      Appeal letter?

## 2022-12-22 NOTE — TELEPHONE ENCOUNTER
Pt was advised    Pt still has some of the insulin the was denied, can the pt finish this one and then  the Humalog later?     Thank you

## 2023-01-03 RX ORDER — CYCLOBENZAPRINE HCL 10 MG
TABLET ORAL
Qty: 90 TABLET | Refills: 2 | Status: SHIPPED | OUTPATIENT
Start: 2023-01-03

## 2023-01-03 NOTE — TELEPHONE ENCOUNTER
Requested Prescriptions     Pending Prescriptions Disp Refills    cyclobenzaprine (FLEXERIL) 10 MG tablet [Pharmacy Med Name: Cyclobenzaprine HCl 10 MG Oral Tablet] 90 tablet 2     Sig: TAKE 1 TABLET BY MOUTH 3  TIMES DAILY AS NEEDED FOR  MUSCLE SPASM(S)         Last OV; 12/13/2022   Last labs; 11/08/2022  F/u 3/13/2023

## 2023-01-10 ENCOUNTER — OFFICE VISIT (OUTPATIENT)
Dept: GYNECOLOGY | Age: 53
End: 2023-01-10
Payer: COMMERCIAL

## 2023-01-10 VITALS
TEMPERATURE: 96.8 F | DIASTOLIC BLOOD PRESSURE: 74 MMHG | HEART RATE: 86 BPM | WEIGHT: 288.5 LBS | BODY MASS INDEX: 47.28 KG/M2 | OXYGEN SATURATION: 97 % | SYSTOLIC BLOOD PRESSURE: 131 MMHG

## 2023-01-10 DIAGNOSIS — Z87.410 HISTORY OF CERVICAL DYSPLASIA: ICD-10-CM

## 2023-01-10 DIAGNOSIS — Z97.5 PRESENCE OF 52 MG LEVONORGESTREL-RELEASING INTRAUTERINE DEVICE (IUD): ICD-10-CM

## 2023-01-10 DIAGNOSIS — Z01.419 WELL WOMAN EXAM WITH ROUTINE GYNECOLOGICAL EXAM: Primary | ICD-10-CM

## 2023-01-10 DIAGNOSIS — Z12.31 BREAST CANCER SCREENING BY MAMMOGRAM: ICD-10-CM

## 2023-01-10 PROCEDURE — 3078F DIAST BP <80 MM HG: CPT | Performed by: OBSTETRICS & GYNECOLOGY

## 2023-01-10 PROCEDURE — 3075F SYST BP GE 130 - 139MM HG: CPT | Performed by: OBSTETRICS & GYNECOLOGY

## 2023-01-10 PROCEDURE — 99386 PREV VISIT NEW AGE 40-64: CPT | Performed by: OBSTETRICS & GYNECOLOGY

## 2023-01-10 NOTE — PROGRESS NOTES
Cortes Ye is an 45y.o. year old woman who presents for annual gyn exam.    REVIEW OF SYSTEMS:  No complaints of symptoms involving:  Constitutional: there has been no unanticipated weight loss. There's been no change in activity level. Negative for fever, chills. Eyes: No visual changes, double vision, or scotomata. No scleral icterus. HENT: No Headaches, hearing loss or vertigo. No sore throat, ear pain or nasal congestion  Respiratory: no cough or wheezing, no sputum production, no hemoptysis. Gastrointestinal: No abdominal pain, appetite loss, blood in stools. No change in bowel habits. Genitourinary: No dysuria, trouble voiding, or hematuria. No change in bladder habits. Musculoskeletal:  No gait disturbance,no weakness or joint complaints. Skin: No rash or pruritis. Neurological: No headache, vision changes, change in muscle strength, numbness or tingling. No change in gait, balance, coordination. Psychiatric: No anxiety, or depression. No change in mood or behavior. Endocrine: No temperature intolerance. No excessive thirst, fluid intake, or urination. No tremor. Hematologic/Lymphatic: No abnormal bruising or bleeding, blood clots or swollen lymph nodes.        Patient Active Problem List   Diagnosis    PCOS (polycystic ovarian syndrome)    Pure hypercholesterolemia    Common migraine    Obstructive sleep apnea syndrome    Herpes simplex    Binge eating    Pedal edema    FH: melanoma    Essential hypertension    Hiatal hernia with GERD and esophagitis    Class 2 obesity without serious comorbidity with body mass index (BMI) of 38.0 to 38.9 in adult    Type 2 diabetes mellitus with mild nonproliferative retinopathy of both eyes without macular edema (HCC)    Panic attacks    Bipolar II disorder, mild, hypomanic, with mixed features, in partial remission (HCC)    Restless leg syndrome    Low grade squamous intraepithelial lesion (LGSIL) on biopsy of cervix    Chronic renal insufficiency, stage 3 (moderate) (HCC)     Current Outpatient Medications   Medication Sig Dispense Refill    cyclobenzaprine (FLEXERIL) 10 MG tablet TAKE 1 TABLET BY MOUTH 3  TIMES DAILY AS NEEDED FOR  MUSCLE SPASM(S) 90 tablet 2    insulin lispro, 1 Unit Dial, (HUMALOG KWIKPEN) 100 UNIT/ML SOPN Inject 5-10 u subcut before supper 5 Adjustable Dose Pre-filled Pen Syringe 3    Continuous Blood Gluc Sensor (FREESTYLE FRACISCO 3 SENSOR) MISC 1 each by Does not apply route every 14 days 6 each 3    ARIPiprazole (ABILIFY) 10 MG tablet Take 1 tablet by mouth daily 90 tablet 1    budesonide-formoterol (SYMBICORT) 80-4.5 MCG/ACT AERO Inhale 2 puffs into the lungs 2 times daily 10.2 g 5    Insulin Glargine, 2 Unit Dial, (TOUJEO MAX SOLOSTAR) 300 UNIT/ML SOPN Inject 70 Units into the skin daily (Patient taking differently: Inject 80 Units into the skin daily) 21 Adjustable Dose Pre-filled Pen Syringe 1    fluocinonide (LIDEX) 0.05 % external solution APPLY TOPICALLY TWICE DAILY 120 mL 1    Semaglutide 14 MG TABS Take 14 mg by mouth daily 90 tablet 1    gabapentin (NEURONTIN) 300 MG capsule TAKE 1 TO 2 CAPSULES BY  MOUTH AT NIGHT 180 capsule 3    tolterodine (DETROL LA) 4 MG extended release capsule TAKE 1 CAPSULE BY MOUTH  DAILY 90 capsule 3    sertraline (ZOLOFT) 100 MG tablet TAKE 1 AND 1/2 TABLETS BY  MOUTH DAILY 135 tablet 3    OXcarbazepine (TRILEPTAL) 150 MG tablet TAKE 1 TABLET BY MOUTH  TWICE DAILY 180 tablet 1    FARXIGA 10 MG tablet TAKE 1 TABLET BY MOUTH IN  THE MORNING 90 tablet 1    buPROPion (WELLBUTRIN XL) 300 MG extended release tablet TAKE 1 TABLET BY MOUTH IN  THE MORNING 90 tablet 3    pramipexole (MIRAPEX) 0.5 MG tablet TAKE 2 TABLETS BY MOUTH AT  NIGHT 180 tablet 3    Multiple Vitamins-Minerals (CENTRUM WOMEN PO) Take by mouth      Continuous Blood Gluc Sensor (FREESTYLE FRACISCO 2 SENSOR) MISC 1 each by Does not apply route every 14 days 6 each 3    atorvastatin (LIPITOR) 40 MG tablet TAKE 1 TABLET BY MOUTH  DAILY 90 tablet 3 B-D UF III MINI PEN NEEDLES 31G X 5 MM MISC USE 1 PEN NEEDLE DAILY 90 each 3    albuterol sulfate  (90 Base) MCG/ACT inhaler USE 2 INHALATIONS BY MOUTH  EVERY 4 HOURS AS NEEDED FOR WHEEZING 40.2 g 3    fluticasone (FLONASE) 50 MCG/ACT nasal spray 2 sprays by Nasal route daily 3 each 3    hydrOXYzine (ATARAX) 25 MG tablet TAKE 1 TABLET BY MOUTH  EVERY 6 HOURS AS NEEDED FOR ANXIETY 360 tablet 0    Continuous Blood Gluc  (FREESTYLE FRACISCO 2 READER) ANDRIA 1 each by Does not apply route 4 times daily 1 each 5    TURMERIC PO Take 1 tablet by mouth daily      montelukast (SINGULAIR) 10 MG tablet Take 1 tablet by mouth daily 30 tablet 5    vitamin B-12 (CYANOCOBALAMIN) 1000 MCG tablet Take 1,000 mcg by mouth daily      MIRENA, 52 MG, IUD 52 mg 1 Dose by Intrauterine route once      b complex vitamins capsule Take 1 capsule by mouth daily      aspirin 81 MG EC tablet Take 81 mg by mouth daily. No current facility-administered medications for this visit.      Past Medical History:   Diagnosis Date    Anxiety     Bipolar 1 disorder ([address]     Chronic gastric ulcer without hemorrhage and without perforation     CTS (carpal tunnel syndrome) 2/10/2015    Depression     Diabetes mellitus ([facility]     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 ([facility] 1/29/2016    DUB (dysfunctional uterine bleeding) 8/22/2019    ETD (eustachian tube dysfunction) 5/16/2014    Herpes simplex     Lumbar strain 4/19/2017    Meniscus degeneration, right     PCOS (polycystic ovarian syndrome)     Sleep apnea     TMJ syndrome      Past Surgical History:   Procedure Laterality Date    ENDOSCOPY, COLON, DIAGNOSTIC      FINGER TRIGGER RELEASE Right 6/12/2020    RIGHT INDEX FINGER TRIGGER FINGER RELEASE performed by Elder Sanchez MD at [address] [place] [address] Left 4/1/2022    LEFT LONG FINGER A1 PULLEY RELEASE, LEFT CARPAL TUNNEL RELEASE performed by Elder Sanchez MD at [facility] Right 7/1/2020    INCISION AND DRAINAGE OF RIGHT HAND WOUND; SECONDARY CLOSURE OF WOUND performed by Ricardo Bradford MD at [address]  2018    EGD    MT UNLISTED LAPAROSCOPY PROCEDURE STOMACH N/A 2018    ROBOTIC ASSISTED LAPAROSCOPIC SLEEVE GASTRECTOMY  performed by Melly White DO at [facility] History     Tobacco Use    Smoking status: Never    Smokeless tobacco: Never   Substance Use Topics    Alcohol use: Yes     Comment: infrequent, few times a month     OB History          0    Para   0    Term   0       0    AB   0    Living   0         SAB   0    IAB   0    Ectopic   0    Molar   0    Multiple   0    Live Births   0              Family History   Problem Relation Age of Onset    Cervical Cancer Mother     Diabetes Father     Stroke Father         25s    Hypertension Father     Coronary Art Dis Father     Other Father         CHIVO    Cancer Father         melanoma    Glaucoma Father     Heart Attack Sister 48    Diabetes Sister     Heart Attack Sister 36    Diabetes Maternal Grandmother     Cancer Maternal Grandmother         melanoma    Arthritis Maternal Grandmother     Diabetes Paternal Grandmother     Stroke Paternal Grandmother        OBJECTIVE:  /74   Pulse 86   Temp 96.8 °F (36 °C)   Wt 288 lb 8 oz (130.9 kg)   SpO2 97%   BMI 47.28 kg/m²   Body mass index is 47.28 kg/m². General appearance: alert,calm,pleasant, no acute distress, non-toxic  Skin:  no lesions,no rashes  Neck: no thyromegaly,no adenopathy,no masses  Abdominal: Abdomen soft, non-tender. No rebound or guarding. Obese. the examination is limited by her body habitus/obesity   Breasts: Inspection negative. No skin changes such as dipples, edema, or retractions. No nipple discharge or bleeding. No mass palpated. Non tender.  No supraclavicular, infraclavicular, or axillary lymphadenopathy  Genitourinary:  Vulva:  no external lesions,normal hair distribution,no inguinal adenopathy  Vagina:  pink, atrophic changes,no discharge  Bladder without mass, nontender. Urethra, and Urethral meatus grossly normal without mass and nontender  Cervix:  smooth,pink,no lesions. IUD strings  Uterus:  normal size, shape and consistency,mobile,nontender  Adnexa:  no masses,no tenderness  the examination is limited by her body habitus/obesity Rectum/anus:  no external hemorrhoids,no lesions  History and Examination chaperoned by Medical Assistant    ASSESSMENT AND PLAN:   Diagnosis Orders   1. Well woman exam with routine gynecological exam  PAP SMEAR      2. History of cervical dysplasia  PAP SMEAR      3. Presence of 52 mg levonorgestrel-releasing intrauterine device (IUD)        4.  Breast cancer screening by mammogram  LORENZO DIGITAL SCREEN W OR WO CAD BILATERAL        follow up 1 yr for rpt pap and to discuss IUD management    SBE monthly and return annually or prn  Explained current pap smear and mammogram guidelines  Patient counseling:  SBE demonstrated she is instructed on getting her mammogram performed

## 2023-01-10 NOTE — LETTER
[facility] Gyne[facility]  [address] [facility]  Phone: [phone]  Fax: [phone]    Jo Ann Tinajero MD    January 10, 2023     [place] [id number]    Patient: Lisa Hewitt   MR Number: [id number]   YOB: 1970   Date of Visit: 1/10/2023       Dear Jackson Figueroa:    Thank you for referring Yelena Alberts to me for evaluation/treatment. Below are the relevant portions of my assessment and plan of care. King Johnson seen today for well woman gynecologic care and Pap smear. As you know she has had abnormal Pap smear back in 2019 with a diagnosis of HPV and ANDRA-1. Pap smear with HPV cotesting is sent. She is advised on annual Pap smears. She is instructed on getting her mammogram performed. When she follows up next year, we can discussed ongoing management of her IUD that she has had since September 2019. If you have questions, please do not hesitate to call me. I look forward to following [employer] along with you.     Sincerely,      Jo Ann Tinajero MD

## 2023-01-10 NOTE — PROGRESS NOTES
The sensitive parts of this examination where performed with Claudette Ortiz as a chaperone      Finesse Roman was present during the entirety of the sensitive parts of the examination

## 2023-01-11 ENCOUNTER — TELEMEDICINE (OUTPATIENT)
Dept: PSYCHOLOGY | Age: 53
End: 2023-01-11
Payer: COMMERCIAL

## 2023-01-11 DIAGNOSIS — F31.32 BIPOLAR AFFECTIVE DISORDER, CURRENTLY DEPRESSED, MODERATE (HCC): Primary | ICD-10-CM

## 2023-01-11 DIAGNOSIS — F41.9 ANXIETY DISORDER, UNSPECIFIED TYPE: ICD-10-CM

## 2023-01-11 PROCEDURE — 90832 PSYTX W PT 30 MINUTES: CPT | Performed by: PSYCHOLOGIST
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
## 2023-01-14 LAB
HPV COMMENT: NORMAL
HPV TYPE 16: NOT DETECTED
HPV TYPE 18: NOT DETECTED
HPVOH (OTHER TYPES): NOT DETECTED

## 2023-01-30 ENCOUNTER — HOSPITAL ENCOUNTER (OUTPATIENT)
Dept: MAMMOGRAPHY | Age: 53
Discharge: HOME | End: 2023-01-30
Payer: COMMERCIAL

## 2023-01-30 VITALS — WEIGHT: 286 LBS | BODY MASS INDEX: 45.96 KG/M2 | HEIGHT: 66 IN

## 2023-01-30 DIAGNOSIS — Z12.31 BREAST CANCER SCREENING BY MAMMOGRAM: ICD-10-CM

## 2023-01-30 PROCEDURE — 77067 SCR MAMMO BI INCL CAD: CPT

## 2023-02-02 DIAGNOSIS — E11.3293 TYPE 2 DIABETES MELLITUS WITH BOTH EYES AFFECTED BY MILD NONPROLIFERATIVE RETINOPATHY WITHOUT MACULAR EDEMA, WITHOUT LONG-TERM CURRENT USE OF INSULIN (HCC): ICD-10-CM

## 2023-02-02 RX ORDER — INSULIN ASPART 100 [IU]/ML
INJECTION, SOLUTION INTRAVENOUS; SUBCUTANEOUS
Qty: 5 ADJUSTABLE DOSE PRE-FILLED PEN SYRINGE | Refills: 3 | Status: SHIPPED | OUTPATIENT
Start: 2023-02-02

## 2023-02-02 NOTE — TELEPHONE ENCOUNTER
Requested Prescriptions     Pending Prescriptions Disp Refills    insulin aspart (NOVOLOG FLEXPEN) 100 UNIT/ML injection pen 5 Adjustable Dose Pre-filled Pen Syringe 3     Sig: 10-20 units SQ before supper          Last Office Visit: 12/13/2022     Next Office Visit: 3/13/2023     Last Labs: 12/13/22

## 2023-02-14 ENCOUNTER — PATIENT MESSAGE (OUTPATIENT)
Dept: FAMILY MEDICINE CLINIC | Age: 53
End: 2023-02-14

## 2023-02-14 ENCOUNTER — OFFICE VISIT (OUTPATIENT)
Dept: FAMILY MEDICINE CLINIC | Age: 53
End: 2023-02-14
Payer: COMMERCIAL

## 2023-02-14 ENCOUNTER — TELEMEDICINE (OUTPATIENT)
Dept: PSYCHOLOGY | Age: 53
End: 2023-02-14
Payer: COMMERCIAL

## 2023-02-14 VITALS
TEMPERATURE: 97.3 F | OXYGEN SATURATION: 98 % | RESPIRATION RATE: 12 BRPM | BODY MASS INDEX: 47.61 KG/M2 | DIASTOLIC BLOOD PRESSURE: 76 MMHG | WEIGHT: 293 LBS | SYSTOLIC BLOOD PRESSURE: 120 MMHG | HEART RATE: 82 BPM

## 2023-02-14 DIAGNOSIS — F31.32 BIPOLAR AFFECTIVE DISORDER, CURRENTLY DEPRESSED, MODERATE (HCC): Primary | ICD-10-CM

## 2023-02-14 DIAGNOSIS — F31.81 BIPOLAR II DISORDER, MILD, HYPOMANIC, WITH MIXED FEATURES, IN PARTIAL REMISSION (HCC): Primary | ICD-10-CM

## 2023-02-14 DIAGNOSIS — F41.9 ANXIETY DISORDER, UNSPECIFIED TYPE: ICD-10-CM

## 2023-02-14 DIAGNOSIS — E11.3293 TYPE 2 DIABETES MELLITUS WITH BOTH EYES AFFECTED BY MILD NONPROLIFERATIVE RETINOPATHY WITHOUT MACULAR EDEMA, WITHOUT LONG-TERM CURRENT USE OF INSULIN (HCC): ICD-10-CM

## 2023-02-14 DIAGNOSIS — F31.81 BIPOLAR II DISORDER, MILD, HYPOMANIC, WITH MIXED FEATURES, IN PARTIAL REMISSION (HCC): ICD-10-CM

## 2023-02-14 PROCEDURE — 90832 PSYTX W PT 30 MINUTES: CPT | Performed by: PSYCHOLOGIST

## 2023-02-14 PROCEDURE — 3074F SYST BP LT 130 MM HG: CPT | Performed by: FAMILY MEDICINE

## 2023-02-14 PROCEDURE — 3078F DIAST BP <80 MM HG: CPT | Performed by: FAMILY MEDICINE

## 2023-02-14 PROCEDURE — 99214 OFFICE O/P EST MOD 30 MIN: CPT | Performed by: FAMILY MEDICINE

## 2023-02-14 RX ORDER — INSULIN LISPRO 100 [IU]/ML
INJECTION, SOLUTION INTRAVENOUS; SUBCUTANEOUS
Qty: 5 ADJUSTABLE DOSE PRE-FILLED PEN SYRINGE | Refills: 3 | Status: SHIPPED
Start: 2023-02-14

## 2023-02-14 SDOH — ECONOMIC STABILITY: INCOME INSECURITY: HOW HARD IS IT FOR YOU TO PAY FOR THE VERY BASICS LIKE FOOD, HOUSING, MEDICAL CARE, AND HEATING?: NOT HARD AT ALL

## 2023-02-14 SDOH — ECONOMIC STABILITY: FOOD INSECURITY: WITHIN THE PAST 12 MONTHS, THE FOOD YOU BOUGHT JUST DIDN'T LAST AND YOU DIDN'T HAVE MONEY TO GET MORE.: NEVER TRUE

## 2023-02-14 SDOH — ECONOMIC STABILITY: FOOD INSECURITY: WITHIN THE PAST 12 MONTHS, YOU WORRIED THAT YOUR FOOD WOULD RUN OUT BEFORE YOU GOT MONEY TO BUY MORE.: NEVER TRUE

## 2023-02-14 SDOH — ECONOMIC STABILITY: HOUSING INSECURITY
IN THE LAST 12 MONTHS, WAS THERE A TIME WHEN YOU DID NOT HAVE A STEADY PLACE TO SLEEP OR SLEPT IN A SHELTER (INCLUDING NOW)?: NO

## 2023-02-14 NOTE — PROGRESS NOTES
Behavioral Health Consultation  Jayshree Phillip Psy.D. Psychologist  2/14/2023  9:30-10 AM      Time spent with Patient: 30 minutes  This is patient's fifth [facility] appointment. Her last episode of care ended in 2021. Reason for Consult: Anxiety, bipolar symptoms  Referring Provider: Darleene Goodpasture, MD [address] [address]    TELEHEALTH VISIT -- [address] (During XIIVF-95 public health emergency)    Tana Pham was evaluated through a synchronous (real-time) audio-video encounter using HIPAA-compliant technology. The patient (or guardian, if applicable) is aware that this is a billable service, which includes applicable co-pays. This Virtual Visit was conducted with patient's (and/or legal guardian's) consent. The visit was conducted pursuant to the emergency declaration under the [address] [place] [address] and the [employer] General Act. Patient identification was verified, and a caregiver was present when appropriate. The patient was located in a state where the provider was licensed to provide care. Conducted a risk-benefit analysis and determined that the patient's presenting problems are consistent with the use of telepsychology. Determined that the patient has sufficient knowledge and skills in the use of technology enabling them to adequately benefit from telepsychology. It was determined that this patient was able to be properly treated without an in-person session. Patient verified current location at the beginning of the visit. Verified the following information:  Patient's identification: Yes  Patient location: [place] @ [facility] on [address] [place] [address].  ([facility])  Patient's call back number: [phone]  Patient's emergency contact's name and number, as well as permission to contact them if needed:  Extended Emergency Contact Information  Primary Emergency Contact: [place], [address] Phone: [phone]  Mobile Phone: [phone]  Relation: Brother/Sister    Provider location: [place], [place]      S:  Pt's depression has been \"blah\" and her anxiety has been higher for the past couple weeks. Feeling guilty for not being with her sisters Danilo Nix and Keron Granados when they . More tearful. Unable to concentrate at work. Worried about losing her job. Gets sidetracked. Trying to stay busy with crafts, coloring, journaling. 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  -Provided psychoeducation re: IFS and parts work  -Recommended No Bad Parts and an IFS podcast      A:  MSE:  Appearance: good hygiene  and appropriate attire  Attitude: cooperative and friendly  Consciousness: alert  Orientation: oriented to person, place, time, general circumstance  Memory: recent and remote memory intact  Attention/Concentration: intact during session  Psychomotor Activity: normal  Eye Contact: normal  Speech: normal rate and volume, well-articulated  Mood: anxious, dysphoric  Affect: congruent  Perception: within normal limits  Thought Content: within normal limits  Thought Process: logical, coherent and goal-directed  Insight: good  Judgment: intact  Ability to understand instructions: Yes  Ability to respond meaningfully: Yes  Morbid Ideation: no   Suicide Assessment: no suicidal ideation, plan, or intent. Appropriate for outpatient / telehealth care at this time. Homicidal Ideation: no    JARET 7 SCORE 2021   JARET-7 Total Score 21     Interpretation of JARET-7 score: 5-9 = mild anxiety, 10-14 = moderate anxiety, 15+ = severe anxiety. Recommend referral to behavioral health for scores 10 or greater.     PHQ Scores 3/18/2022 2021 2019 2018 2017 2015   PHQ2 Score 1 4 0 0 0 2   PHQ9 Score 10 19 0 0 0 2     Interpretation of Total Score Depression Severity: 1-4 = Minimal depression, 5-9 = Mild depression, 10-14 = Moderate depression, 15-19 = Moderately severe depression, 20-27 = Severe depression      Diagnosis:    1. Bipolar affective disorder, currently depressed, moderate ([address]    2. Anxiety disorder, unspecified type            Patient Active Problem List   Diagnosis    PCOS (polycystic ovarian syndrome)    Pure hypercholesterolemia    Common migraine    Obstructive sleep apnea syndrome    Herpes simplex    Binge eating    Pedal edema    FH: melanoma    Essential hypertension    Hiatal hernia with GERD and esophagitis    Class 2 obesity without serious comorbidity with body mass index (BMI) of 38.0 to 38.9 in adult    Type 2 diabetes mellitus with mild nonproliferative retinopathy of both eyes without macular edema (HCC)    Panic attacks    Bipolar II disorder, mild, hypomanic, with mixed features, in partial remission (HCC)    Restless leg syndrome    Low grade squamous intraepithelial lesion (LGSIL) on biopsy of cervix    Chronic renal insufficiency, stage 3 (moderate) ([facility])         Plan:  Pt interventions:  Supportive techniques, Emphasized self-care as important for managing overall health, Cognitive strategies to target balanced thinking, and Provided pt book recommendation. Pt Behavioral Change Plan:  Pt set the following goals:  1. Practice diaphragmatic breathing throughout the day  2. Practice being mindful - paying attention to the present moment on purpose and in a nonjudgmental way  3. Try delaying your worries, planning a set worry time for later in the day, and/or worrying for a set amount of time and then shifting to something else when that time period ends  4. To help you fall asleep, try the Counting One exercise. Close your eyes. Take slow breaths. Each time you exhale, repeat \"one\" to yourself in your mind, drawing the word out to match your exhale.  You are not counting, just saying \"one\" to yourself. 5. To help you not pick at your scalp, consider the following: wear a hat, wear a glove, tape your fingernails, occupy your hands by coloring or writing  6. Consider listening to [employer] podcast about Internal Family Systems (IFS). Specifically, the episode called \"IFS and Solo IFS with Elisa\"  7. Consider reading No Bad Parts: Healing Trauma and Restoring Wholeness with the Internal Family Systems Model by James Guerin    Pt scheduled a F/U virtual visit.

## 2023-02-14 NOTE — TELEPHONE ENCOUNTER
From: Ngozi Covarrubias  To: Dr. Martínez Simpler: 2/14/2023 4:20 PM EST  Subject: Zuleyma Rehman never received the prescription for the vrylar.

## 2023-02-14 NOTE — PROGRESS NOTES
Subjective:      Patient ID: Eugenia Edwards 52 y.o. female. is here for evaluation for depression      HPI    Depressed for the past few weeks.  Work and financial stress.  Ilana a few times a week, lasts a few hours.  Impulsive shopping, borrowing money from her sister. Wants to sleep all the time.  Never feels that she does not need to sleep.  No trouble getting out of bed in the AM.  Works from home, naps a lot.  Not functioning well with work.  Has second appointment with Dr. García today, thinks it is helpful.   Is not eating well, eating a lot of quick foods.  Does eat some healthy meals. Not suicidal.  No substance abuse. Drinks a Margarite occasionally.       Blood sugars 110 - 350.  The CGM helps her to be more aware.  Fasting 140-150.   Taking short acting 14 u brkfst only; this is her largest meal.  If has oatmeal PP sugar is 250, if eats eggs is 140 - 150.   Sugars after lunch and supper sugars are 250.        Outpatient Medications Marked as Taking for the 2/14/23 encounter (Office Visit) with Chandni Figueroa MD   Medication Sig Dispense Refill    insulin aspart (NOVOLOG FLEXPEN) 100 UNIT/ML injection pen 10-20 units SQ before supper 5 Adjustable Dose Pre-filled Pen Syringe 3    cyclobenzaprine (FLEXERIL) 10 MG tablet TAKE 1 TABLET BY MOUTH 3  TIMES DAILY AS NEEDED FOR  MUSCLE SPASM(S) 90 tablet 2    insulin lispro, 1 Unit Dial, (HUMALOG KWIKPEN) 100 UNIT/ML SOPN Inject 5-10 u subcut before supper 5 Adjustable Dose Pre-filled Pen Syringe 3    Continuous Blood Gluc Sensor (FREESTYLE FRACISCO 3 SENSOR) MISC 1 each by Does not apply route every 14 days 6 each 3    ARIPiprazole (ABILIFY) 10 MG tablet Take 1 tablet by mouth daily 90 tablet 1    budesonide-formoterol (SYMBICORT) 80-4.5 MCG/ACT AERO Inhale 2 puffs into the lungs 2 times daily 10.2 g 5    Insulin Glargine, 2 Unit Dial, (TOUJEO MAX SOLOSTAR) 300 UNIT/ML SOPN Inject 70 Units into the skin daily (Patient taking differently: Inject 80 Units into  the skin daily) 21 Adjustable Dose Pre-filled Pen Syringe 1    fluocinonide (LIDEX) 0.05 % external solution APPLY TOPICALLY TWICE DAILY 120 mL 1    Semaglutide 14 MG TABS Take 14 mg by mouth daily 90 tablet 1    gabapentin (NEURONTIN) 300 MG capsule TAKE 1 TO 2 CAPSULES BY  MOUTH AT NIGHT 180 capsule 3    tolterodine (DETROL LA) 4 MG extended release capsule TAKE 1 CAPSULE BY MOUTH  DAILY 90 capsule 3    sertraline (ZOLOFT) 100 MG tablet TAKE 1 AND 1/2 TABLETS BY  MOUTH DAILY (Patient taking differently: 100 mg) 135 tablet 3    OXcarbazepine (TRILEPTAL) 150 MG tablet TAKE 1 TABLET BY MOUTH  TWICE DAILY 180 tablet 1    FARXIGA 10 MG tablet TAKE 1 TABLET BY MOUTH IN  THE MORNING 90 tablet 1    buPROPion (WELLBUTRIN XL) 300 MG extended release tablet TAKE 1 TABLET BY MOUTH IN  THE MORNING 90 tablet 3    pramipexole (MIRAPEX) 0.5 MG tablet TAKE 2 TABLETS BY MOUTH AT  NIGHT 180 tablet 3    Multiple Vitamins-Minerals (CENTRUM WOMEN PO) Take by mouth      Continuous Blood Gluc Sensor (FREESTYLE FRACISCO 2 SENSOR) MISC 1 each by Does not apply route every 14 days 6 each 3    atorvastatin (LIPITOR) 40 MG tablet TAKE 1 TABLET BY MOUTH  DAILY 90 tablet 3    B-D UF III MINI PEN NEEDLES 31G X 5 MM MISC USE 1 PEN NEEDLE DAILY 90 each 3    fluticasone (FLONASE) 50 MCG/ACT nasal spray 2 sprays by Nasal route daily 3 each 3    hydrOXYzine (ATARAX) 25 MG tablet TAKE 1 TABLET BY MOUTH  EVERY 6 HOURS AS NEEDED FOR ANXIETY 360 tablet 0    Continuous Blood Gluc  (FREESTYLE FRACISCO 2 READER) ANDRIA 1 each by Does not apply route 4 times daily 1 each 5    TURMERIC PO Take 1 tablet by mouth daily      vitamin B-12 (CYANOCOBALAMIN) 1000 MCG tablet Take 1,000 mcg by mouth daily      b complex vitamins capsule Take 1 capsule by mouth daily      aspirin 81 MG EC tablet Take 81 mg by mouth daily. Allergies   Allergen Reactions    Effexor Xr [Venlafaxine Hcl Er] Other (See Comments)     Didn't respond well to it.         Patient Active Problem List   Diagnosis    PCOS (polycystic ovarian syndrome)    Pure hypercholesterolemia    Common migraine    Obstructive sleep apnea syndrome    Herpes simplex    Binge eating    Pedal edema    FH: melanoma    Essential hypertension    Hiatal hernia with GERD and esophagitis    Class 2 obesity without serious comorbidity with body mass index (BMI) of 38.0 to 38.9 in adult    Type 2 diabetes mellitus with mild nonproliferative retinopathy of both eyes without macular edema (HCC)    Panic attacks    Bipolar II disorder, mild, hypomanic, with mixed features, in partial remission (HCC)    Restless leg syndrome    Low grade squamous intraepithelial lesion (LGSIL) on biopsy of cervix    Chronic renal insufficiency, stage 3 (moderate) (HCC)       Past Medical History:   Diagnosis Date    Anxiety     Bipolar 1 disorder (HCC)     Chronic gastric ulcer without hemorrhage and without perforation     CTS (carpal tunnel syndrome) 02/10/2015    Depression     Diabetes mellitus ([facility]     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 ([facility] 01/29/2016    DUB (dysfunctional uterine bleeding) 08/22/2019    ETD (eustachian tube dysfunction) 05/16/2014    Herpes simplex     Lumbar strain 04/19/2017    Meniscus degeneration, right     PCOS (polycystic ovarian syndrome)     Sleep apnea     TMJ syndrome        Past Surgical History:   Procedure Laterality Date    ENDOSCOPY, COLON, DIAGNOSTIC      FINGER TRIGGER RELEASE Right 06/12/2020    RIGHT INDEX FINGER TRIGGER FINGER RELEASE performed by Bethanie Silveira MD at [address] Left 04/01/2022    LEFT LONG FINGER A1 PULLEY RELEASE, LEFT CARPAL TUNNEL RELEASE [facility] Right 07/01/2020    INCISION AND DRAINAGE OF RIGHT HAND WOUND; SECONDARY CLOSURE OF WOUND performed by Bethanie Silveira MD at [place] [address]  05/25/2018    EGD    ID UNLISTED LAPAROSCOPY PROCEDURE STOMACH N/A 07/31/2018 ROBOTIC ASSISTED LAPAROSCOPIC SLEEVE GASTRECTOMY  performed by Joe Morgan DO at [facility] OR        Family History   Problem Relation Age of Onset    Cervical Cancer Mother     Diabetes Father     Stroke Father         25s    Hypertension Father     Coronary Art Dis Father     Other Father         CHIVO    Cancer Father         melanoma    Glaucoma Father     Heart Attack Sister 48    Diabetes Sister     Heart Attack Sister 36    Diabetes Maternal Grandmother     Cancer Maternal Grandmother         melanoma    Arthritis Maternal Grandmother     Diabetes Paternal Grandmother     Stroke Paternal Grandmother        Social History     Tobacco Use    Smoking status: Never    Smokeless tobacco: Never   Vaping Use    Vaping Use: Never used   Substance Use Topics    Alcohol use: Yes     Comment: infrequent, few times a month    Drug use: No            Review of Systems  Review of Systems    Objective:   Physical Exam  Vitals:    02/14/23 0820   BP: 120/76   Pulse: 82   Resp: 12   Temp: 97.3 °F (36.3 °C)   TempSrc: Temporal   SpO2: 98%   Weight: 295 lb (133.8 kg)     Wt Readings from Last 3 Encounters:   02/14/23 295 lb (133.8 kg)   01/30/23 286 lb (129.7 kg)   01/10/23 288 lb 8 oz (130.9 kg)      Physical Exam  NAD  Skin is warm and dry. Mood and affect are normal.  No agitation or psychomotor retardation. No pressured speech, grandiosity or tangential thoughts. Insight and judgement are intact. Not suicidal.   The neck is supple and free of adenopathy or masses, the thyroid is normal without enlargement or nodules. Chest is clear, no wheezing or rales. Normal symmetric air entry throughout both lung fields. Heart regular with normal rate, no murmer or gallop     Assessment:       Diagnosis Orders   1. Bipolar II disorder, mild, hypomanic, with mixed features, in partial remission (HCC)  cariprazine hcl (VRAYLAR) 4.5 MG CAPS capsule. Stop Abilify and switch to [place] Post to   [facility]. If has appt can follow up with them; otherwise see me in 4 weeks. Off work this week to see Dr. Toni Sheppard and adjust medication. 2. Type 2 diabetes mellitus with both eyes affected by mild nonproliferative retinopathy without macular edema, without long-term current use of insulin (HCC)  insulin lispro, 1 Unit Dial, (HUMALOG KWIKPEN) 100 UNIT/ML SOPN - increase meal time insulin to TID. Plan:      Side effects of current medications reviewed and questions answered. Follow up in 4 weeks or prn.

## 2023-02-14 NOTE — PATIENT INSTRUCTIONS
Goals: 1. Practice diaphragmatic breathing throughout the day  2. Practice being mindful - paying attention to the present moment on purpose and in a nonjudgmental way  3. Try delaying your worries, planning a set worry time for later in the day, and/or worrying for a set amount of time and then shifting to something else when that time period ends  4. To help you fall asleep, try the Counting One exercise. Close your eyes. Take slow breaths. Each time you exhale, repeat \"one\" to yourself in your mind, drawing the word out to match your exhale. You are not counting, just saying \"one\" to yourself. 5. To help you not pick at your scalp, consider the following: wear a hat, wear a glove, tape your fingernails, occupy your hands by coloring or writing  6. Consider listening to [employer] podcast about Internal Family Systems (IFS). Specifically, the episode called \"IFS and Solo IFS with Elisa\"  7.  Consider reading No Bad Parts: Healing Trauma and Restoring Wholeness with the Internal Family Systems Model by Bridger Yeager

## 2023-02-14 NOTE — LETTER
[address] [address]  Phone: [phone]  Fax: [phone]    Holland Petit MD        February 14, 2023     Patient: Dulce López   YOB: 1970   Date of Visit: 2/14/2023       To Whom It May Concern: It is my medical opinion that Michaelle Gruber will be unable to work 2/14/23 through 2/19/23. She may return to work 2/20/23    If you have any questions or concerns, please don't hesitate to call.     Sincerely,        Holland Petit MD

## 2023-02-16 DIAGNOSIS — E78.00 PURE HYPERCHOLESTEROLEMIA: ICD-10-CM

## 2023-02-16 DIAGNOSIS — E11.3293 TYPE 2 DIABETES MELLITUS WITH BOTH EYES AFFECTED BY MILD NONPROLIFERATIVE RETINOPATHY WITHOUT MACULAR EDEMA, WITHOUT LONG-TERM CURRENT USE OF INSULIN (HCC): ICD-10-CM

## 2023-02-16 DIAGNOSIS — Z01.84 IMMUNITY STATUS TESTING: ICD-10-CM

## 2023-02-16 LAB
A/G RATIO: 1.4 (ref 1.1–2.2)
ALBUMIN SERPL-MCNC: 3.8 G/DL (ref 3.4–5)
ALP BLD-CCNC: 95 U/L (ref 40–129)
ALT SERPL-CCNC: 26 U/L (ref 10–40)
ANION GAP SERPL CALCULATED.3IONS-SCNC: 14 MMOL/L (ref 3–16)
AST SERPL-CCNC: 16 U/L (ref 15–37)
BILIRUB SERPL-MCNC: 0.3 MG/DL (ref 0–1)
BUN BLDV-MCNC: 24 MG/DL (ref 7–20)
CALCIUM SERPL-MCNC: 9 MG/DL (ref 8.3–10.6)
CHLORIDE BLD-SCNC: 101 MMOL/L (ref 99–110)
CHOLESTEROL, TOTAL: 125 MG/DL (ref 0–199)
CO2: 22 MMOL/L (ref 21–32)
CREAT SERPL-MCNC: 1.2 MG/DL (ref 0.6–1.1)
GFR SERPL CREATININE-BSD FRML MDRD: 54 ML/MIN/{1.73_M2}
GLUCOSE BLD-MCNC: 296 MG/DL (ref 70–99)
HBV SURFACE AB TITR SER: <3.5 MIU/ML
HDLC SERPL-MCNC: 53 MG/DL (ref 40–60)
LDL CHOLESTEROL CALCULATED: 48 MG/DL
POTASSIUM SERPL-SCNC: 4.1 MMOL/L (ref 3.5–5.1)
SODIUM BLD-SCNC: 137 MMOL/L (ref 136–145)
TOTAL PROTEIN: 6.5 G/DL (ref 6.4–8.2)
TRIGL SERPL-MCNC: 122 MG/DL (ref 0–150)
TSH REFLEX: 2.97 UIU/ML (ref 0.27–4.2)
VLDLC SERPL CALC-MCNC: 24 MG/DL

## 2023-02-16 NOTE — PROGRESS NOTES
Diagnosis: [x] CHIVO (G47.33) [] CSA (G47.31) [] Apnea (G47.30)   Length of Need: [x] 15 Months [] 99 Months [] Other:   Machine (HORACIO!): [] Respironics Dream Station      Auto [] ResMed AirSense     Auto [] Other:     []  CPAP () [] Bilevel ()   Mode: [] Auto [] Spontaneous    Mode: [] Auto [] Spontaneous            Comfort Settings:      Humidifier: [] Heated ()        [x] Water chamber replacement ()/ 1 per 6 months        Mask:   [x] Nasal () /1 per 3 months [] Full Face () /1 per 3 months   [x] Patient choice -Size and fit mask [] Patient Choice - Size and fit mask   [] Dispense: [] Dispense:   [x] Headgear () / 1 per 3 months [] Headgear () / 1 per 3 months   [x] Replacement Nasal Cushion ()/2 per month [] Interface Replacement ()/1 per month   [] Replacement Nasal Pillows ()/2 per month         Tubing: [x] Heated ()/1 per 3 months    [] Standard ()/1 per 3 months [] Other:           Filters: [x] Non-disposable ()/1 per 6 months     [x] Ultra-Fine, Disposable ()/2 per month        Miscellaneous: [] Chin Strap ()/ 1 per 6 months [] O2 bleed-in:        LPM   [] Oxymetry on CPAP/Bilevel []  Other:         Start Order Date: 02/16/23    MEDICAL JUSTIFICATION:  I, the undersigned, certify that the above prescribed supplies are medically necessary for this patients wellbeing. In my opinion, the supplies are both reasonable and necessary in reference to accepted standards of medicalpractice in treatment of this patients condition. Dejah Maki NP    NPI: [id number]       Order Signed Date: 02/16/23  [facility]  Pulmonary, Sleep, and Critical Care    Pulmonary, Sleep, and Critical Care  [address] [place] [address].  [facility], [facility] , [facility]  Phone: [phone]    Fax: [phone]  1970  [address] [place] [address]  [phone] (home)   [phone] (mobile)      Insurance Info (confirm with patient if correct):  Payer/Plan Subscr  Sex Relation Sub.  Ins. ID Effective Group Num

## 2023-02-17 LAB
ESTIMATED AVERAGE GLUCOSE: 228.8 MG/DL
HBA1C MFR BLD: 9.6 %

## 2023-03-02 NOTE — PROGRESS NOTES
Subjective:      Patient ID: Jon Hillman 45 y.o. HPI  Eugenia Edwards The primary encounter diagnosis was Type 2 diabetes mellitus with both eyes affected by mild nonproliferative retinopathy without macular edema, without long-term current use of insulin ([facility] [address] Diagnoses of Essential hypertension, Chronic renal insufficiency, stage 3 (moderate) ([facility]), Pure hypercholesterolemia, Bipolar II disorder, mild, hypomanic, with mixed features, in partial remission ([address] and Need for vaccination were also pertinent to this visit. Bipolar disorder:   seeing Dr. Clem Fritz was stopped and Vraylar added for depression and hypomania last month. Was referred to psychiatry. Was not able to get appt with psychiatry. Today she is feeling better. Irritability is resolved. She still has 2 days a week that she feels she does not need to sleep. No impulsive behavior. No depressed mood, suicidal ideation, change in appetite. Mild anxiety. No panic attacks. FMLA off work 2/14 - 2/18. Anticipates missing work 3-4 times a month for 1-2 days if mood interferes with ability ot stay on task. She had COVID vaccine few months ago. Treatment Adherence:   Medication compliance:  compliant most of the time  Diet compliance:  compliant most of the time  Weight trend: stable  Current exercise: intermittently going to the gym  Barriers: fear of failure and fear of being judged. Diabetes Mellitus Type 2: Current symptoms/problems include none. Home blood sugar records: fasting range: 67 - 102.  , postprandial range: 148 - 250, mostly 160  Any episodes of hypoglycemia? yes - once or twice a week  Eye exam current (within one year): yes  Tobacco history: She  reports that she has never smoked. She has never used smokeless tobacco.   Daily Aspirin? Yes  The ASCVD Risk score (Teresa DK, et al., 2019) failed to calculate for the following reasons:     The valid total cholesterol range is 130 to 320 mg/dL     Hypertension/renal insufficiency:  Home blood pressure monitoring: Yes - 128/60-70. She is adherent to a low sodium diet. Patient denies chest pain, lightheadedness, peripheral edema, and palpitations. Short of breath if forgets inhaler. Antihypertensive medication side effects: no medication side effects noted. Use of agents associated with hypertension: none. Hyperlipidemia:  No new myalgias or GI upset on atorvastatin (Lipitor). Lab Results   Component Value Date    LABA1C 9.6 02/16/2023    LABA1C 11.6 11/08/2022    LABA1C 9.2 05/25/2022     Lab Results   Component Value Date    LABMICR <1.20 05/20/2021    CREATININE 1.2 (H) 02/16/2023     Lab Results   Component Value Date    ALT 26 02/16/2023    AST 16 02/16/2023     Lab Results   Component Value Date    CHOL 125 02/16/2023    TRIG 122 02/16/2023    HDL 53 02/16/2023    LDLCALC 48 02/16/2023         Labs Renal Latest Ref Rng & Units 2/16/2023 11/8/2022 5/25/2022 3/11/2022 8/2/2021   BUN 7 - 20 mg/dL 24(H) 14 20 14 20   Cr 0.6 - 1.1 mg/dL 1.2(H) 1.2(H) 1.2(H) 1. 3(H) 1.1   K 3.5 - 5.1 mmol/L 4.1 4.2 4.1 4.4 4.4   Na 136 - 145 mmol/L 137 144 145 144 143      Microalb/cr ratio < 7.5  ( 5/25/22)    Outpatient Medications Marked as Taking for the 3/3/23 encounter (Office Visit) with Chemo Cochran MD   Medication Sig Dispense Refill    insulin lispro, 1 Unit Dial, (HUMALOG KWIKPEN) 100 UNIT/ML SOPN Inject 5-14 u subcut before meals TID 5 Adjustable Dose Pre-filled Pen Syringe 3    cariprazine hcl (VRAYLAR) 4.5 MG CAPS capsule Take 1 capsule by mouth daily 30 capsule 2    insulin aspart (NOVOLOG FLEXPEN) 100 UNIT/ML injection pen 10-20 units SQ before supper 5 Adjustable Dose Pre-filled Pen Syringe 3    cyclobenzaprine (FLEXERIL) 10 MG tablet TAKE 1 TABLET BY MOUTH 3  TIMES DAILY AS NEEDED FOR  MUSCLE SPASM(S) 90 tablet 2    Continuous Blood Gluc Sensor (FREESTYLE FRACISCO 3 SENSOR) MISC 1 each by Does not apply route every 14 days 6 each 3 budesonide-formoterol (SYMBICORT) 80-4.5 MCG/ACT AERO Inhale 2 puffs into the lungs 2 times daily 10.2 g 5    Insulin Glargine, 2 Unit Dial, (TOUJEO MAX SOLOSTAR) 300 UNIT/ML SOPN Inject 70 Units into the skin daily (Patient taking differently: Inject 80 Units into the skin daily) 21 Adjustable Dose Pre-filled Pen Syringe 1    fluocinonide (LIDEX) 0.05 % external solution APPLY TOPICALLY TWICE DAILY 120 mL 1    Semaglutide 14 MG TABS Take 14 mg by mouth daily 90 tablet 1    gabapentin (NEURONTIN) 300 MG capsule TAKE 1 TO 2 CAPSULES BY  MOUTH AT NIGHT 180 capsule 3    tolterodine (DETROL LA) 4 MG extended release capsule TAKE 1 CAPSULE BY MOUTH  DAILY 90 capsule 3    sertraline (ZOLOFT) 100 MG tablet TAKE 1 AND 1/2 TABLETS BY  MOUTH DAILY (Patient taking differently: 100 mg) 135 tablet 3    OXcarbazepine (TRILEPTAL) 150 MG tablet TAKE 1 TABLET BY MOUTH  TWICE DAILY 180 tablet 1    FARXIGA 10 MG tablet TAKE 1 TABLET BY MOUTH IN  THE MORNING 90 tablet 1    buPROPion (WELLBUTRIN XL) 300 MG extended release tablet TAKE 1 TABLET BY MOUTH IN  THE MORNING 90 tablet 3    pramipexole (MIRAPEX) 0.5 MG tablet TAKE 2 TABLETS BY MOUTH AT  NIGHT 180 tablet 3    Multiple Vitamins-Minerals (CENTRUM WOMEN PO) Take by mouth      Continuous Blood Gluc Sensor (FREESTYLE FRACISCO 2 SENSOR) MISC 1 each by Does not apply route every 14 days 6 each 3    atorvastatin (LIPITOR) 40 MG tablet TAKE 1 TABLET BY MOUTH  DAILY 90 tablet 3    B-D UF III MINI PEN NEEDLES 31G X 5 MM MISC USE 1 PEN NEEDLE DAILY 90 each 3    fluticasone (FLONASE) 50 MCG/ACT nasal spray 2 sprays by Nasal route daily 3 each 3    hydrOXYzine (ATARAX) 25 MG tablet TAKE 1 TABLET BY MOUTH  EVERY 6 HOURS AS NEEDED FOR ANXIETY 360 tablet 0    Continuous Blood Gluc  (FREESTYLE FRACISCO 2 READER) ANDRIA 1 each by Does not apply route 4 times daily 1 each 5    TURMERIC PO Take 1 tablet by mouth daily      montelukast (SINGULAIR) 10 MG tablet Take 1 tablet by mouth daily 30 tablet 5 vitamin B-12 (CYANOCOBALAMIN) 1000 MCG tablet Take 1,000 mcg by mouth daily      MIRENA, 52 MG, IUD 52 mg 1 Dose by IntraUTERine route once      b complex vitamins capsule Take 1 capsule by mouth daily      aspirin 81 MG EC tablet Take 81 mg by mouth daily. Allergies   Allergen Reactions    Effexor Xr [Venlafaxine Hcl Er] Other (See Comments)     Didn't respond well to it.          Patient Active Problem List   Diagnosis    PCOS (polycystic ovarian syndrome)    Pure hypercholesterolemia    Common migraine    Obstructive sleep apnea syndrome    Herpes simplex    Binge eating    Pedal edema    FH: melanoma    Essential hypertension    Hiatal hernia with GERD and esophagitis    Class 2 obesity without serious comorbidity with body mass index (BMI) of 38.0 to 38.9 in adult    Type 2 diabetes mellitus with mild nonproliferative retinopathy of both eyes without macular edema (HCC)    Panic attacks    Bipolar II disorder, mild, hypomanic, with mixed features, in partial remission (HCC)    Restless leg syndrome    Low grade squamous intraepithelial lesion (LGSIL) on biopsy of cervix    Chronic renal insufficiency, stage 3 (moderate) (HCC)        Past Medical History:   Diagnosis Date    Anxiety     Bipolar 1 disorder (HCC)     Chronic gastric ulcer without hemorrhage and without perforation     CTS (carpal tunnel syndrome) 02/10/2015    Depression     Diabetes mellitus ([facility] [address]     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 ([facility] [address] 01/29/2016    DUB (dysfunctional uterine bleeding) 08/22/2019    ETD (eustachian tube dysfunction) 05/16/2014    Herpes simplex     Lumbar strain 04/19/2017    Meniscus degeneration, right     PCOS (polycystic ovarian syndrome)     Sleep apnea     TMJ syndrome        Past Surgical History:   Procedure Laterality Date    ENDOSCOPY, COLON, DIAGNOSTIC      FINGER TRIGGER RELEASE Right 06/12/2020    RIGHT INDEX FINGER TRIGGER FINGER RELEASE performed by Elvira Preciado MD at [address] FINGER TRIGGER RELEASE Left 04/01/2022    LEFT LONG FINGER A1 PULLEY RELEASE, LEFT CARPAL TUNNEL RELEASE performed by Glendy Titus MD at [address] [facility] Right 07/01/2020    INCISION AND DRAINAGE OF RIGHT HAND WOUND; SECONDARY CLOSURE OF WOUND performed by Glendy Titus MD at [address]  05/25/2018    EGD    [place] UNLISTED LAPAROSCOPY PROCEDURE STOMACH N/A 07/31/2018    ROBOTIC ASSISTED LAPAROSCOPIC SLEEVE GASTRECTOMY  performed by Edgardo Nettles DO at [address] History     Tobacco Use    Smoking status: Never    Smokeless tobacco: Never   Vaping Use    Vaping Use: Never used   Substance Use Topics    Alcohol use: Yes     Comment: infrequent, few times a month    Drug use: No        Family History   Problem Relation Age of Onset    Cervical Cancer Mother     Diabetes Father     Stroke Father         25s    Hypertension Father     Coronary Art Dis Father     Other Father         CHIVO    Cancer Father         melanoma    Glaucoma Father     Heart Attack Sister 48    Diabetes Sister     Heart Attack Sister 36    Diabetes Maternal Grandmother     Cancer Maternal Grandmother         melanoma    Arthritis Maternal Grandmother     Diabetes Paternal Grandmother     Stroke Paternal Grandmother         Review of Systems  Review of Systems    Objective:   Physical Exam  Wt Readings from Last 3 Encounters:   03/03/23 295 lb 6.4 oz (134 kg)   02/16/23 292 lb 3.2 oz (132.5 kg)   02/14/23 295 lb (133.8 kg)     Temp Readings from Last 3 Encounters:   03/03/23 97.9 °F (36.6 °C) (Temporal)   02/14/23 97.3 °F (36.3 °C) (Temporal)   01/10/23 96.8 °F (36 °C)     BP Readings from Last 3 Encounters:   03/03/23 128/60   02/16/23 130/70   02/14/23 120/76     Pulse Readings from Last 3 Encounters:   03/03/23 81   02/16/23 85   02/14/23 82      NAD   Skin is warm and dry. Well hydrated. No rash.     Mood +, affect is normal.   The neck is supple and free of adenopathy or masses, the thyroid is normal without enlargement or nodules. Chest: clear with no wheezes or rales.  No retractions, or use of accessory muscles noted.   Cardiovascular: PMI is not displaced, and no thrill noted.  Regular rate and rhythm with no rub, murmur or gallop.  No peripheral edema.   The abdomen is soft without tenderness, guarding, mass, rebound or organomegaly. Aorta, femoral, DP and PT pulses intact.    Diabetic foot exam:   Left Foot:   Visual Exam: normal   Pulse DP: 2+ (normal)   Filament test: 6/6     Right Foot:   Visual Exam: normal   Pulse DP: 2+ (normal)   Filament test: 6/6        Assessment / Plan:        Diagnosis Orders   1. Type 2 diabetes mellitus with both eyes affected by mild nonproliferative retinopathy without macular edema, without long-term current use of insulin ([facility])  Hemoglobin A1C    Comprehensive Metabolic Panel    Microalbumin / Creatinine Urine Ratio    Insulin Glargine, 2 Unit Dial, (TOUJEO MAX SOLOSTAR) 300 UNIT/ML SOPN    [facility] Individual Diabetes Education (Non Care Coord Patient), [facility]    Better control.  Refer to diabetic educator for meal time insulin adjustment.   Discussed diet, exercise and weight loss strategies  Decrease Toujeo to prevent AM hypoglycemia      2. Essential hypertension  At goal < 130/80  Continue same rs.       3. Chronic renal insufficiency, stage 3 (moderate) ([facility])  continue to avoid nephrotoxins and maintain hydration       4. Pure hypercholesterolemia  LDL goal < 100  Continue statin      5. Bipolar II disorder, mild, hypomanic, with mixed features, in partial remission ([facility])  Cariprazine HCl (VRAYLAR) 6 MG CAPS capsule  Improved.  Increase Vraylar for persistent hypomania  Continue follow up with Dr. García.       6. Need for vaccination  Boostrix             Side effects of current medications reviewed and questions answered.  Follow up in 3 months or prn.

## 2023-03-03 ENCOUNTER — OFFICE VISIT (OUTPATIENT)
Dept: FAMILY MEDICINE CLINIC | Age: 53
End: 2023-03-03
Payer: COMMERCIAL

## 2023-03-03 ENCOUNTER — TELEPHONE (OUTPATIENT)
Dept: ORTHOPEDIC SURGERY | Age: 53
End: 2023-03-03

## 2023-03-03 VITALS
TEMPERATURE: 97.9 F | WEIGHT: 293 LBS | BODY MASS INDEX: 47.68 KG/M2 | RESPIRATION RATE: 14 BRPM | OXYGEN SATURATION: 99 % | SYSTOLIC BLOOD PRESSURE: 128 MMHG | HEART RATE: 81 BPM | DIASTOLIC BLOOD PRESSURE: 60 MMHG

## 2023-03-03 DIAGNOSIS — Z23 NEED FOR VACCINATION: ICD-10-CM

## 2023-03-03 DIAGNOSIS — J45.20 MILD INTERMITTENT ASTHMA WITHOUT COMPLICATION: ICD-10-CM

## 2023-03-03 DIAGNOSIS — F31.81 BIPOLAR II DISORDER, MILD, HYPOMANIC, WITH MIXED FEATURES, IN PARTIAL REMISSION (HCC): ICD-10-CM

## 2023-03-03 DIAGNOSIS — E78.00 PURE HYPERCHOLESTEROLEMIA: ICD-10-CM

## 2023-03-03 DIAGNOSIS — E11.3293 TYPE 2 DIABETES MELLITUS WITH BOTH EYES AFFECTED BY MILD NONPROLIFERATIVE RETINOPATHY WITHOUT MACULAR EDEMA, WITHOUT LONG-TERM CURRENT USE OF INSULIN (HCC): Primary | ICD-10-CM

## 2023-03-03 DIAGNOSIS — I10 ESSENTIAL HYPERTENSION: ICD-10-CM

## 2023-03-03 DIAGNOSIS — N18.30 CHRONIC RENAL INSUFFICIENCY, STAGE 3 (MODERATE) (HCC): ICD-10-CM

## 2023-03-03 PROCEDURE — 90471 IMMUNIZATION ADMIN: CPT | Performed by: FAMILY MEDICINE

## 2023-03-03 PROCEDURE — 99214 OFFICE O/P EST MOD 30 MIN: CPT | Performed by: FAMILY MEDICINE

## 2023-03-03 PROCEDURE — 3074F SYST BP LT 130 MM HG: CPT | Performed by: FAMILY MEDICINE

## 2023-03-03 PROCEDURE — 3078F DIAST BP <80 MM HG: CPT | Performed by: FAMILY MEDICINE

## 2023-03-03 PROCEDURE — 3046F HEMOGLOBIN A1C LEVEL >9.0%: CPT | Performed by: FAMILY MEDICINE

## 2023-03-03 PROCEDURE — 90715 TDAP VACCINE 7 YRS/> IM: CPT | Performed by: FAMILY MEDICINE

## 2023-03-03 RX ORDER — INSULIN GLARGINE 300 U/ML
76 INJECTION, SOLUTION SUBCUTANEOUS DAILY
Qty: 21 ADJUSTABLE DOSE PRE-FILLED PEN SYRINGE | Refills: 1
Start: 2023-03-03

## 2023-03-03 RX ORDER — BUDESONIDE AND FORMOTEROL FUMARATE DIHYDRATE 80; 4.5 UG/1; UG/1
2 AEROSOL RESPIRATORY (INHALATION) 2 TIMES DAILY
Qty: 3 EACH | Refills: 3 | Status: SHIPPED | OUTPATIENT
Start: 2023-03-03

## 2023-03-03 SDOH — ECONOMIC STABILITY: INCOME INSECURITY: HOW HARD IS IT FOR YOU TO PAY FOR THE VERY BASICS LIKE FOOD, HOUSING, MEDICAL CARE, AND HEATING?: SOMEWHAT HARD

## 2023-03-03 SDOH — ECONOMIC STABILITY: TRANSPORTATION INSECURITY
IN THE PAST 12 MONTHS, HAS LACK OF TRANSPORTATION KEPT YOU FROM MEETINGS, WORK, OR FROM GETTING THINGS NEEDED FOR DAILY LIVING?: YES

## 2023-03-03 SDOH — ECONOMIC STABILITY: FOOD INSECURITY: WITHIN THE PAST 12 MONTHS, THE FOOD YOU BOUGHT JUST DIDN'T LAST AND YOU DIDN'T HAVE MONEY TO GET MORE.: NEVER TRUE

## 2023-03-03 SDOH — ECONOMIC STABILITY: FOOD INSECURITY: WITHIN THE PAST 12 MONTHS, YOU WORRIED THAT YOUR FOOD WOULD RUN OUT BEFORE YOU GOT MONEY TO BUY MORE.: NEVER TRUE

## 2023-03-03 ASSESSMENT — PATIENT HEALTH QUESTIONNAIRE - PHQ9
SUM OF ALL RESPONSES TO PHQ9 QUESTIONS 1 & 2: 0
1. LITTLE INTEREST OR PLEASURE IN DOING THINGS: 0
SUM OF ALL RESPONSES TO PHQ QUESTIONS 1-9: 12
SUM OF ALL RESPONSES TO PHQ QUESTIONS 1-9: 12
5. POOR APPETITE OR OVEREATING: 3
6. FEELING BAD ABOUT YOURSELF - OR THAT YOU ARE A FAILURE OR HAVE LET YOURSELF OR YOUR FAMILY DOWN: 2
4. FEELING TIRED OR HAVING LITTLE ENERGY: 0
SUM OF ALL RESPONSES TO PHQ QUESTIONS 1-9: 12
SUM OF ALL RESPONSES TO PHQ QUESTIONS 1-9: 12
10. IF YOU CHECKED OFF ANY PROBLEMS, HOW DIFFICULT HAVE THESE PROBLEMS MADE IT FOR YOU TO DO YOUR WORK, TAKE CARE OF THINGS AT HOME, OR GET ALONG WITH OTHER PEOPLE: 1
2. FEELING DOWN, DEPRESSED OR HOPELESS: 0
3. TROUBLE FALLING OR STAYING ASLEEP: 1
9. THOUGHTS THAT YOU WOULD BE BETTER OFF DEAD, OR OF HURTING YOURSELF: 0
7. TROUBLE CONCENTRATING ON THINGS, SUCH AS READING THE NEWSPAPER OR WATCHING TELEVISION: 3
8. MOVING OR SPEAKING SO SLOWLY THAT OTHER PEOPLE COULD HAVE NOTICED. OR THE OPPOSITE, BEING SO FIGETY OR RESTLESS THAT YOU HAVE BEEN MOVING AROUND A LOT MORE THAN USUAL: 3

## 2023-03-03 NOTE — TELEPHONE ENCOUNTER
Please inititate PA for Vraylar. She has a coupon to get it free for 2 months and then will need insurance to pick it up. She is doing well on it.   Thanks

## 2023-03-03 NOTE — TELEPHONE ENCOUNTER
Requested Prescriptions     Pending Prescriptions Disp Refills    budesonide-formoterol (SYMBICORT) 80-4.5 MCG/ACT AERO 10.2 g 5     Sig: Inhale 2 puffs into the lungs 2 times daily         Last OV; 3/3/2023   Last labs; 2/16/2023  No F/u

## 2023-03-03 NOTE — TELEPHONE ENCOUNTER
Requested Prescriptions     Pending Prescriptions Disp Refills    Cariprazine HCl (VRAYLAR) 6 MG CAPS capsule 30 capsule 2     Sig: Take 1 capsule by mouth daily     Please start PA

## 2023-03-08 ENCOUNTER — PATIENT MESSAGE (OUTPATIENT)
Dept: FAMILY MEDICINE CLINIC | Age: 53
End: 2023-03-08

## 2023-03-08 NOTE — TELEPHONE ENCOUNTER
From: Yolanda Arguello  To: Dr. Mazin Fuller: 3/8/2023 8:12 AM EST  Subject: Paperwork    Checking to see if my [facility] paperwork is ready. I'm close to the deadline. I still need to fill out my portion.

## 2023-03-12 DIAGNOSIS — E78.00 PURE HYPERCHOLESTEROLEMIA: Chronic | ICD-10-CM

## 2023-03-13 RX ORDER — ATORVASTATIN CALCIUM 40 MG/1
40 TABLET, FILM COATED ORAL DAILY
Qty: 90 TABLET | Refills: 3 | Status: SHIPPED | OUTPATIENT
Start: 2023-03-13

## 2023-03-16 ENCOUNTER — TELEMEDICINE (OUTPATIENT)
Dept: PSYCHOLOGY | Age: 53
End: 2023-03-16
Payer: COMMERCIAL

## 2023-03-16 DIAGNOSIS — F31.32 BIPOLAR AFFECTIVE DISORDER, CURRENTLY DEPRESSED, MODERATE (HCC): Primary | ICD-10-CM

## 2023-03-16 PROCEDURE — 90832 PSYTX W PT 30 MINUTES: CPT | Performed by: PSYCHOLOGIST

## 2023-03-16 NOTE — PROGRESS NOTES
Behavioral Health Consultation  Stephanie Tomlinson Psy.D. Psychologist  3/16/2023  9-9:30 AM      Time spent with Patient: 30 minutes  This is patient's sixth [facility] appointment. Her last episode of care ended in 2021. Reason for Consult: Anxiety, bipolar symptoms  Referring Provider: MD Elena Delgado [address] [address]    TELEHEALTH VISIT -- [address] (During [id number] public health emergency)    Danelle Houser was evaluated through a synchronous (real-time) audio-video encounter using HIPAA-compliant technology. The patient (or guardian, if applicable) is aware that this is a billable service, which includes applicable co-pays. This Virtual Visit was conducted with patient's (and/or legal guardian's) consent. The visit was conducted pursuant to the emergency declaration under the [address] [place] [address], [address] [place] [address] authority and the [place] [employer] Act. Patient identification was verified, and a caregiver was present when appropriate. The patient was located in a state where the provider was licensed to provide care. Conducted a risk-benefit analysis and determined that the patient's presenting problems are consistent with the use of telepsychology. Determined that the patient has sufficient knowledge and skills in the use of technology enabling them to adequately benefit from telepsychology. It was determined that this patient was able to be properly treated without an in-person session. Patient verified current location at the beginning of the visit.     Verified the following information:  Patient's identification: Yes  Patient location: [facility] [address] [place] [address]  Patient's call back number: [phone]  Patient's emergency contact's name and number, as well as permission to contact them if needed:  Extended Emergency Contact Information  Primary Emergency Contact: Lawyer Camacho [place], [address]  Phone: [phone]  Mobile Phone: [phone]  Relation: Brother/Sister    Provider location: [address]:  Pt is looking for a new place to live because her new landlord is not pleasant. She is looking at a mobile home that is closer to her sister. Feeling anxious about the change. Benefiting from [place], but concerned about whether she'll be able to afford it once her coupons run out. Pt is noticing herself feeling more balanced in her mood. Not feeling manic. Able to control spending habits. She benefits from journaling and connecting with others. Discussed frustration with her father, who doesn't understand pt's [facility] [employer] [facility] struggles. Misses being able to concentrate well enough to read a book. Gets sidetracked at work. Trying to do better with nutrition intake. O:  Interventions:  -Supportive techniques  -Processed experiences / stressors / concerns  -Reinforced efforts towards self-care and maintaining balance   -Explored mental health and family dynamics  -Discussed strategies that have been helping with managing stress/anxiety and keeping herself on track at work      A:  MSE:  Appearance: good hygiene  and appropriate attire  Attitude: cooperative and friendly  Consciousness: alert  Orientation: oriented to person, place, time, general circumstance  Memory: recent and remote memory intact  Attention/Concentration: intact during session  Psychomotor Activity: normal  Eye Contact: normal  Speech: normal rate and volume, well-articulated  Mood: anxious, mildly dysphoric  Affect: congruent  Perception: within normal limits  Thought Content: within normal limits  Thought Process: logical, coherent and goal-directed  Insight: good  Judgment: intact  Ability to understand instructions: Yes  Ability to respond meaningfully: Yes  Morbid Ideation: no   Suicide Assessment: no suicidal ideation, plan, or intent.  Appropriate for outpatient / telehealth care at this time. Homicidal Ideation: no    JARET 7 SCORE 1/22/2021   JARET-7 Total Score 21     Interpretation of JARET-7 score: 5-9 = mild anxiety, 10-14 = moderate anxiety, 15+ = severe anxiety. Recommend referral to behavioral health for scores 10 or greater. PHQ Scores 3/3/2023 3/18/2022 1/22/2021 4/1/2019 7/12/2018 4/19/2017 1/21/2015   PHQ2 Score 0 1 4 0 0 0 2   PHQ9 Score 12 10 19 0 0 0 2     Interpretation of Total Score Depression Severity: 1-4 = Minimal depression, 5-9 = Mild depression, 10-14 = Moderate depression, 15-19 = Moderately severe depression, 20-27 = Severe depression      Diagnosis:    1. Bipolar affective disorder, currently depressed, moderate ([facility]          Patient Active Problem List   Diagnosis    PCOS (polycystic ovarian syndrome)    Pure hypercholesterolemia    Common migraine    Obstructive sleep apnea syndrome    Herpes simplex    Binge eating    Pedal edema    FH: melanoma    Essential hypertension    Hiatal hernia with GERD and esophagitis    Class 2 obesity without serious comorbidity with body mass index (BMI) of 38.0 to 38.9 in adult    Type 2 diabetes mellitus with mild nonproliferative retinopathy of both eyes without macular edema (HCC)    Panic attacks    Bipolar II disorder, mild, hypomanic, with mixed features, in partial remission (HCC)    Restless leg syndrome    Low grade squamous intraepithelial lesion (LGSIL) on biopsy of cervix    Chronic renal insufficiency, stage 3 (moderate) (HCC)         Plan:  Pt interventions:  Supportive techniques, Emphasized self-care as important for managing overall health, and Cognitive strategies to target balanced thinking . Pt Behavioral Change Plan:  Pt set the following goals:  1. Practice diaphragmatic breathing throughout the day  2. Practice being mindful - paying attention to the present moment on purpose and in a nonjudgmental way  3.  Try delaying your worries, planning a set worry time for later in the day, and/or worrying for a set amount of time and then shifting to something else when that time period ends  4. To help you fall asleep, try the Counting One exercise. Close your eyes. Take slow breaths. Each time you exhale, repeat \"one\" to yourself in your mind, drawing the word out to match your exhale. You are not counting, just saying \"one\" to yourself.  5. To help you not pick at your scalp, consider the following: wear a hat, wear a glove, tape your fingernails, occupy your hands by coloring or writing  6. Consider listening to [employer] podcast about Internal Family Systems (IFS). Specifically, the episode called \"IFS and Solo IFS with Elisa\"  7. Consider reading No Bad Parts: Healing Trauma and Restoring Wholeness with the Internal Family Systems Model by Yuri Bull    Pt scheduled a F/U virtual visit.

## 2023-03-16 NOTE — PATIENT INSTRUCTIONS
Goals: 1. Practice diaphragmatic breathing throughout the day  2. Practice being mindful - paying attention to the present moment on purpose and in a nonjudgmental way  3. Try delaying your worries, planning a set worry time for later in the day, and/or worrying for a set amount of time and then shifting to something else when that time period ends  4. To help you fall asleep, try the Counting One exercise. Close your eyes. Take slow breaths. Each time you exhale, repeat \"one\" to yourself in your mind, drawing the word out to match your exhale. You are not counting, just saying \"one\" to yourself. 5. To help you not pick at your scalp, consider the following: wear a hat, wear a glove, tape your fingernails, occupy your hands by coloring or writing  6. Consider listening to [employer] podcast about Internal Family Systems (IFS). Specifically, the episode called \"IFS and Solo IFS with Elisa\"  7.  Consider reading No Bad Parts: Healing Trauma and Restoring Wholeness with the Internal Family Systems Model by Julita Jin

## 2023-03-22 DIAGNOSIS — E11.3293 TYPE 2 DIABETES MELLITUS WITH BOTH EYES AFFECTED BY MILD NONPROLIFERATIVE RETINOPATHY WITHOUT MACULAR EDEMA, WITHOUT LONG-TERM CURRENT USE OF INSULIN (HCC): ICD-10-CM

## 2023-03-22 DIAGNOSIS — F31.61 BIPOLAR DISORDER, CURRENT EPISODE MIXED, MILD (HCC): ICD-10-CM

## 2023-03-22 DIAGNOSIS — E11.9 TYPE 2 DIABETES MELLITUS WITHOUT COMPLICATION, WITHOUT LONG-TERM CURRENT USE OF INSULIN (HCC): ICD-10-CM

## 2023-03-22 RX ORDER — ORAL SEMAGLUTIDE 14 MG/1
TABLET ORAL
Qty: 90 TABLET | Refills: 1 | Status: SHIPPED | OUTPATIENT
Start: 2023-03-22

## 2023-03-22 RX ORDER — OXCARBAZEPINE 150 MG/1
TABLET, FILM COATED ORAL
Qty: 180 TABLET | Refills: 1 | Status: SHIPPED | OUTPATIENT
Start: 2023-03-22 | End: 2023-05-26 | Stop reason: ALTCHOICE

## 2023-03-22 RX ORDER — DAPAGLIFLOZIN 10 MG/1
TABLET, FILM COATED ORAL
Qty: 90 TABLET | Refills: 1 | Status: SHIPPED | OUTPATIENT
Start: 2023-03-22

## 2023-03-27 ENCOUNTER — TELEPHONE (OUTPATIENT)
Dept: FAMILY MEDICINE CLINIC | Age: 53
End: 2023-03-27

## 2023-03-27 DIAGNOSIS — F31.81 BIPOLAR II DISORDER, MILD, HYPOMANIC, WITH MIXED FEATURES, IN PARTIAL REMISSION (HCC): ICD-10-CM

## 2023-03-27 NOTE — TELEPHONE ENCOUNTER
Called and spoke to [place] due to prior denial of this medication. I included Psychology note as requested. Henry reyna confirmed receipt of this new information. Case# [id number].     Submitted PA for Vraylar Via KIRAN Key: [id number] STATUS: PENDING

## 2023-03-28 NOTE — TELEPHONE ENCOUNTER
The medication is APPROVED through 03/27/2024. If this requires a response please respond to the pool ( P [facility] [address] [facility]). Thank you please advise patient.

## 2023-03-29 ENCOUNTER — TELEPHONE (OUTPATIENT)
Dept: FAMILY MEDICINE CLINIC | Age: 53
End: 2023-03-29

## 2023-04-19 ENCOUNTER — TELEMEDICINE (OUTPATIENT)
Dept: PSYCHOLOGY | Age: 53
End: 2023-04-19
Payer: COMMERCIAL

## 2023-04-19 DIAGNOSIS — F31.32 BIPOLAR AFFECTIVE DISORDER, CURRENTLY DEPRESSED, MODERATE (HCC): Primary | ICD-10-CM

## 2023-04-19 PROCEDURE — 90832 PSYTX W PT 30 MINUTES: CPT | Performed by: PSYCHOLOGIST

## 2023-04-19 NOTE — PROGRESS NOTES
Interpretation of JARET-7 score: 5-9 = mild anxiety, 10-14 = moderate anxiety, 15+ = severe anxiety. Recommend referral to behavioral health for scores 10 or greater. PHQ Scores 3/3/2023 3/18/2022 1/22/2021 4/1/2019 7/12/2018 4/19/2017 1/21/2015   PHQ2 Score 0 1 4 0 0 0 2   PHQ9 Score 12 10 19 0 0 0 2     Interpretation of Total Score Depression Severity: 1-4 = Minimal depression, 5-9 = Mild depression, 10-14 = Moderate depression, 15-19 = Moderately severe depression, 20-27 = Severe depression      Diagnosis:    1. Bipolar affective disorder, currently depressed, moderate ([facility] [address]            Patient Active Problem List   Diagnosis    PCOS (polycystic ovarian syndrome)    Pure hypercholesterolemia    Common migraine    Obstructive sleep apnea syndrome    Herpes simplex    Binge eating    Pedal edema    FH: melanoma    Essential hypertension    Hiatal hernia with GERD and esophagitis    Class 2 obesity without serious comorbidity with body mass index (BMI) of 38.0 to 38.9 in adult    Type 2 diabetes mellitus with mild nonproliferative retinopathy of both eyes without macular edema (HCC)    Panic attacks    Bipolar II disorder, mild, hypomanic, with mixed features, in partial remission (HCC)    Restless leg syndrome    Low grade squamous intraepithelial lesion (LGSIL) on biopsy of cervix    Chronic renal insufficiency, stage 3 (moderate) (HCC)         Plan:  Pt interventions:  Supportive techniques, Emphasized self-care as important for managing overall health, and Cognitive strategies to target balanced thinking . Pt Behavioral Change Plan:  Pt set the following goals:  1. Practice diaphragmatic breathing throughout the day  2. Practice being mindful - paying attention to the present moment on purpose and in a nonjudgmental way  3.  Try delaying your worries, planning a set worry time for later in the day, and/or worrying for a set amount of time and then shifting to something else when that time period

## 2023-04-19 NOTE — PATIENT INSTRUCTIONS
Goals: 1. Practice diaphragmatic breathing throughout the day  2. Practice being mindful - paying attention to the present moment on purpose and in a nonjudgmental way  3. Try delaying your worries, planning a set worry time for later in the day, and/or worrying for a set amount of time and then shifting to something else when that time period ends  4. To help you fall asleep, try the Counting One exercise. Close your eyes. Take slow breaths. Each time you exhale, repeat \"one\" to yourself in your mind, drawing the word out to match your exhale. You are not counting, just saying \"one\" to yourself. 5. To help you not pick at your scalp, consider the following: wear a hat, wear a glove, tape your fingernails, occupy your hands by coloring or writing  6. Consider listening to [employer] podcast about Internal Family Systems (IFS). Specifically, the episode called \"IFS and Solo IFS with Elisa\"  7.  Consider reading No Bad Parts: Healing Trauma and Restoring Wholeness with the Internal Family Systems Model by Alexandra Houser

## 2023-05-02 PROBLEM — Z79.4 TYPE 2 DIABETES MELLITUS WITH BOTH EYES AFFECTED BY MILD NONPROLIFERATIVE RETINOPATHY WITHOUT MACULAR EDEMA, WITH LONG-TERM CURRENT USE OF INSULIN (HCC): Status: ACTIVE | Noted: 2020-06-08

## 2023-05-26 PROBLEM — N32.81 OAB (OVERACTIVE BLADDER): Status: ACTIVE | Noted: 2023-05-26

## 2023-06-01 ENCOUNTER — TELEMEDICINE (OUTPATIENT)
Dept: PSYCHOLOGY | Age: 53
End: 2023-06-01
Payer: COMMERCIAL

## 2023-06-01 DIAGNOSIS — F31.32 BIPOLAR AFFECTIVE DISORDER, CURRENTLY DEPRESSED, MODERATE (HCC): Primary | ICD-10-CM

## 2023-06-01 DIAGNOSIS — F41.9 ANXIETY DISORDER, UNSPECIFIED TYPE: ICD-10-CM

## 2023-06-01 PROCEDURE — 90832 PSYTX W PT 30 MINUTES: CPT | Performed by: PSYCHOLOGIST

## 2023-06-01 NOTE — PROGRESS NOTES
Behavioral Health Consultation  Franco Vásquez Psy.D. Psychologist  6/1/2023  9-9:30 AM      Time spent with Patient: 30 minutes  This is patient's eighth [facility] appointment. Her last episode of care ended in 2021. Reason for Consult: Anxiety, bipolar symptoms  Referring Provider: Morgan Phelan MD  [address] [place] [id number]    TELEHEALTH VISIT -- Audio/Visual    Helga Edwards was evaluated through a synchronous (real-time) audio-video encounter using HIPAA-compliant technology. The patient (or guardian, if applicable) is aware that this is a billable service, which includes applicable co-pays. This Virtual Visit was conducted with patient's (and/or legal guardian's) consent. Patient identification was verified, and a caregiver was present when appropriate. The patient was located in a state where the provider was licensed to provide care. Conducted a risk-benefit analysis and determined that the patient's presenting problems are consistent with the use of telepsychology. Determined that the patient has sufficient knowledge and skills in the use of technology enabling them to adequately benefit from telepsychology. It was determined that this patient was able to be properly treated without an in-person session. Patient verified current location at the beginning of the visit. Verified the following information:  Patient's identification: Yes  Patient location: [address] [place] [address]  Patient's call back number: [phone]  Patient's emergency contact's name and number, as well as permission to contact them if needed:  Extended Emergency Contact Information  Primary Emergency Contact: Jaylene Comas, [address] [place] [address] Phone: [phone]  Mobile Phone: [phone]  Relation: Brother/Sister    Provider location: [place], [place]      S:  Pt has been feeling better. More in control of her emotions. Better able to concentrate.  Believes

## 2023-06-01 NOTE — PATIENT INSTRUCTIONS
Goals: 1. Practice diaphragmatic breathing throughout the day  2. Practice being mindful - paying attention to the present moment on purpose and in a nonjudgmental way  3. Try delaying your worries, planning a set worry time for later in the day, and/or worrying for a set amount of time and then shifting to something else when that time period ends  4. To help you fall asleep, try the Counting One exercise. Close your eyes. Take slow breaths. Each time you exhale, repeat \"one\" to yourself in your mind, drawing the word out to match your exhale. You are not counting, just saying \"one\" to yourself. 5. To help you not pick at your scalp, consider the following: wear a hat, wear a glove, tape your fingernails, occupy your hands by coloring or writing  6. Consider listening to The One Inside podcast about Internal Family Systems (IFS). Specifically, the episode called \"IFS and Solo IFS with Elisa\"  7.  Consider reading No Bad Parts: Healing Trauma and Restoring Wholeness with the Internal Family Systems Model by Argenis Figueroa

## 2023-06-17 DIAGNOSIS — F31.81 BIPOLAR II DISORDER, MILD, HYPOMANIC, WITH MIXED FEATURES, IN PARTIAL REMISSION (HCC): ICD-10-CM

## 2023-06-19 RX ORDER — CARIPRAZINE 6 MG/1
CAPSULE, GELATIN COATED ORAL
Qty: 90 CAPSULE | Refills: 1 | Status: SHIPPED | OUTPATIENT
Start: 2023-06-19 | End: 2023-07-07 | Stop reason: SDUPTHER

## 2023-06-19 NOTE — TELEPHONE ENCOUNTER
Requested Prescriptions     Pending Prescriptions Disp Refills    VRAYLAR 6 MG CAPS capsule [Pharmacy Med Name: Inez Rhett 6 MG CAPSULE] 30 capsule 2     Sig: TAKE ONE CAPSULE BY MOUTH DAILY         Last OV; 5/26/2023  Last labs; 5/24/2023  F/u 8/28/2023

## 2023-07-06 ENCOUNTER — TELEPHONE (OUTPATIENT)
Age: 53
End: 2023-07-06

## 2023-07-06 NOTE — TELEPHONE ENCOUNTER
Patient is wanting to change from quarterly payments to monthly. Angela MONTANO stated doctor would need approval for this. Would this be ok? Please let patient know.      Thank you

## 2023-07-07 ENCOUNTER — TELEPHONE (OUTPATIENT)
Dept: PRIMARY CARE CLINIC | Age: 53
End: 2023-07-07

## 2023-07-07 DIAGNOSIS — F31.81 BIPOLAR II DISORDER, MILD, HYPOMANIC, WITH MIXED FEATURES, IN PARTIAL REMISSION (HCC): ICD-10-CM

## 2023-07-07 NOTE — TELEPHONE ENCOUNTER
Requested Prescriptions     Pending Prescriptions Disp Refills    Cariprazine HCl (VRAYLAR) 6 MG CAPS capsule 90 capsule 1     Sig: Take 1 capsule by mouth daily         Last OV: 5/26/2023    Last labs: 5/26/2023     F/u: 8/28/23

## 2023-07-10 NOTE — TELEPHONE ENCOUNTER
The medication is APPROVED through 7/7/2024. If this requires a response please respond to the pool ( [phone] [phone]). Thank you please advise patient.

## 2023-07-19 ENCOUNTER — PATIENT MESSAGE (OUTPATIENT)
Age: 53
End: 2023-07-19

## 2023-07-19 DIAGNOSIS — E11.3293 TYPE 2 DIABETES MELLITUS WITH BOTH EYES AFFECTED BY MILD NONPROLIFERATIVE RETINOPATHY WITHOUT MACULAR EDEMA, WITHOUT LONG-TERM CURRENT USE OF INSULIN (HCC): ICD-10-CM

## 2023-07-19 RX ORDER — INSULIN ASPART 100 [IU]/ML
10-30 INJECTION, SOLUTION INTRAVENOUS; SUBCUTANEOUS
Qty: 10 ADJUSTABLE DOSE PRE-FILLED PEN SYRINGE | Refills: 2 | Status: SHIPPED | OUTPATIENT
Start: 2023-07-19

## 2023-07-19 NOTE — TELEPHONE ENCOUNTER
From: Ara Michael  To: Dr. James Six: 7/19/2023 1:22 PM EDT  Subject: Jorge Lopes     I ran out of Novalog again. My insurance won't refill it until the 26th. Can I take extra Toujeo?

## 2023-07-20 NOTE — TELEPHONE ENCOUNTER
Spoke to pt. And let her know Rx was sent to [facility] and  I have called the novolog Rx into [place] on [address].

## 2023-07-26 DIAGNOSIS — J30.1 SEASONAL ALLERGIC RHINITIS DUE TO POLLEN: ICD-10-CM

## 2023-07-26 RX ORDER — MONTELUKAST SODIUM 10 MG/1
10 TABLET ORAL DAILY
Qty: 90 TABLET | Refills: 3 | Status: SHIPPED | OUTPATIENT
Start: 2023-07-26

## 2023-07-26 NOTE — TELEPHONE ENCOUNTER
Pharmacy requesting refill of     montelukast (SINGULAIR) 10 MG tablet [[id number]]     Order Details  Dose: 10 mg Route: Oral Frequency: DAILY   Dispense Quantity: 30 tablet Refills: 5          Sig: Take 1 tablet by mouth daily         Start Date: 05/26/23 End Date: --   Written Date: 05/26/23 Expiration Date: 05/25/24       Diagnosis Association: Seasonal allergic rhinitis due to pollen (J30.1)   Original Order:  montelukast (SINGULAIR) 10 MG tablet [[id number]]       Last ov: 05/26/2023  Last labs: 05/24/2023  Next ov: 08/28/2023    Thank you

## 2023-07-26 NOTE — TELEPHONE ENCOUNTER
Pharmacy requesting refill of     montelukast (SINGULAIR) 10 MG tablet [[id number]]     Order Details  Dose: 10 mg Route: Oral Frequency: DAILY   Dispense Quantity: 30 tablet Refills: 5          Sig: Take 1 tablet by mouth daily         Start Date: 05/26/23 End Date: --   Written Date: 05/26/23 Expiration Date: 05/25/24       Diagnosis Association: Seasonal allergic rhinitis due to pollen (J30.1)   Original Order:  montelukast (SINGULAIR) 10 MG tablet [[id number]]       Last ov: 05/26/2023  Last labs: 05/24/2023  Next ov: 08/28/2023    Thank you

## 2023-07-27 ENCOUNTER — PATIENT MESSAGE (OUTPATIENT)
Age: 53
End: 2023-07-27

## 2023-07-27 DIAGNOSIS — Z79.4 TYPE 2 DIABETES MELLITUS WITH BOTH EYES AFFECTED BY MILD NONPROLIFERATIVE RETINOPATHY WITHOUT MACULAR EDEMA, WITH LONG-TERM CURRENT USE OF INSULIN (HCC): Primary | ICD-10-CM

## 2023-07-27 DIAGNOSIS — E11.3293 TYPE 2 DIABETES MELLITUS WITH BOTH EYES AFFECTED BY MILD NONPROLIFERATIVE RETINOPATHY WITHOUT MACULAR EDEMA, WITH LONG-TERM CURRENT USE OF INSULIN (HCC): Primary | ICD-10-CM

## 2023-07-27 RX ORDER — BLOOD SUGAR DIAGNOSTIC
1 STRIP MISCELLANEOUS 4 TIMES DAILY
Qty: 400 EACH | Refills: 3 | Status: SHIPPED | OUTPATIENT
Start: 2023-07-27

## 2023-07-27 RX ORDER — BLOOD-GLUCOSE METER
1 EACH MISCELLANEOUS 4 TIMES DAILY
Qty: 1 KIT | Refills: 1 | Status: SHIPPED | OUTPATIENT
Start: 2023-07-27

## 2023-07-27 NOTE — TELEPHONE ENCOUNTER
From: Amara Hartmann  To: Dr. Ruffin  2:03 AM EDT  Subject: Diabetes test kit    The Lisa Vela 3 keep falling off before they should. Can you write a prescription for a new test kit and strips. GeAccu-Chek Guide Me or Accu-Chek Guide meter and 1 for Accu-Chek Guide test strips    I have a coupon to get the above meter for free.

## 2023-08-03 ENCOUNTER — TELEMEDICINE (OUTPATIENT)
Dept: PSYCHOLOGY | Age: 53
End: 2023-08-03
Payer: COMMERCIAL

## 2023-08-03 DIAGNOSIS — F31.31 BIPOLAR AFFECTIVE DISORDER, CURRENTLY DEPRESSED, MILD (HCC): Primary | ICD-10-CM

## 2023-08-03 DIAGNOSIS — F41.9 ANXIETY DISORDER, UNSPECIFIED TYPE: ICD-10-CM

## 2023-08-03 PROCEDURE — 90832 PSYTX W PT 30 MINUTES: CPT | Performed by: PSYCHOLOGIST

## 2023-08-03 NOTE — PROGRESS NOTES
counting, just saying \"one\" to yourself. 5. To help you not pick at your scalp, consider the following: wear a hat, wear a glove, tape your fingernails, occupy your hands by coloring or writing  6. Consider listening to The One Inside podcast about Internal Family Systems (IFS). Specifically, the episode called \"IFS and Solo IFS with Elisa\"  7. Consider reading No Bad Parts: Healing Trauma and Restoring Wholeness with the Internal Family Systems Model by Rada Closs    Pt scheduled a F/U virtual visit.

## 2023-08-08 NOTE — PROGRESS NOTES
Martha Sparks today at length regarding her symptoms. Although her pain has continued to improve she does have persistent localized swelling. We discussed possible causes including an indolent infection although besides some of the hyperemia and swelling she does not have pain any radiating symptoms consistent with flexor tenosynovitis and no obvious signs or symptoms of an abscess. I think at this point the decision will have to be made regarding considering a repeat exploration of her wound which would come with the possibility of further scarring and stiffness or continuing to closely monitor and work on finger range of motion and function which she has been making some slow and steady progress. After long discussion we will continue with close monitoring and plan for follow-up in 2 weeks to evaluate whether her swelling continues to improve. She understands that if she begins to worsen during that timeframe certainly to call so that we can reevaluate sooner. She has completed a course of antibiotics at this time and has had one incision and drainage thus far. We discussed also strict and stringent glucose control during this time.   As well None

## 2023-08-08 NOTE — PATIENT INSTRUCTIONS
Goals: 1. Practice diaphragmatic breathing throughout the day  2. Practice being mindful - paying attention to the present moment on purpose and in a nonjudgmental way  3. Try delaying your worries, planning a set worry time for later in the day, and/or worrying for a set amount of time and then shifting to something else when that time period ends  4. To help you fall asleep, try the Counting One exercise. Close your eyes. Take slow breaths. Each time you exhale, repeat \"one\" to yourself in your mind, drawing the word out to match your exhale. You are not counting, just saying \"one\" to yourself. 5. To help you not pick at your scalp, consider the following: wear a hat, wear a glove, tape your fingernails, occupy your hands by coloring or writing  6. Consider listening to The One Inside podcast about Internal Family Systems (IFS). Specifically, the episode called \"IFS and Solo IFS with Elisa\"  7.  Consider reading No Bad Parts: Healing Trauma and Restoring Wholeness with the Internal Family Systems Model by Rebekah Burdick

## 2023-08-21 DIAGNOSIS — Z79.4 TYPE 2 DIABETES MELLITUS WITH BOTH EYES AFFECTED BY MILD NONPROLIFERATIVE RETINOPATHY WITHOUT MACULAR EDEMA, WITH LONG-TERM CURRENT USE OF INSULIN (HCC): ICD-10-CM

## 2023-08-21 DIAGNOSIS — E61.1 IRON DEFICIENCY: ICD-10-CM

## 2023-08-21 DIAGNOSIS — E11.3293 TYPE 2 DIABETES MELLITUS WITH BOTH EYES AFFECTED BY MILD NONPROLIFERATIVE RETINOPATHY WITHOUT MACULAR EDEMA, WITH LONG-TERM CURRENT USE OF INSULIN (HCC): ICD-10-CM

## 2023-08-21 LAB
ANION GAP SERPL CALCULATED.3IONS-SCNC: 13 MMOL/L (ref 3–16)
BUN SERPL-MCNC: 18 MG/DL (ref 7–20)
CALCIUM SERPL-MCNC: 8.8 MG/DL (ref 8.3–10.6)
CHLORIDE SERPL-SCNC: 110 MMOL/L (ref 99–110)
CO2 SERPL-SCNC: 26 MMOL/L (ref 21–32)
CREAT SERPL-MCNC: 1.3 MG/DL (ref 0.6–1.1)
DEPRECATED RDW RBC AUTO: 18.6 % (ref 12.4–15.4)
FERRITIN SERPL IA-MCNC: 21.1 NG/ML (ref 15–150)
GFR SERPLBLD CREATININE-BSD FMLA CKD-EPI: 49 ML/MIN/{1.73_M2}
GLUCOSE SERPL-MCNC: 96 MG/DL (ref 70–99)
HCT VFR BLD AUTO: 37.8 % (ref 36–48)
HGB BLD-MCNC: 12.1 G/DL (ref 12–16)
IRON SATN MFR SERPL: 12 % (ref 15–50)
IRON SERPL-MCNC: 42 UG/DL (ref 37–145)
MCH RBC QN AUTO: 26.6 PG (ref 26–34)
MCHC RBC AUTO-ENTMCNC: 31.9 G/DL (ref 31–36)
MCV RBC AUTO: 83.3 FL (ref 80–100)
PLATELET # BLD AUTO: 209 K/UL (ref 135–450)
PMV BLD AUTO: 7.8 FL (ref 5–10.5)
POTASSIUM SERPL-SCNC: 3.9 MMOL/L (ref 3.5–5.1)
RBC # BLD AUTO: 4.54 M/UL (ref 4–5.2)
SODIUM SERPL-SCNC: 149 MMOL/L (ref 136–145)
TIBC SERPL-MCNC: 360 UG/DL (ref 260–445)
WBC # BLD AUTO: 7.2 K/UL (ref 4–11)

## 2023-08-22 LAB
EST. AVERAGE GLUCOSE BLD GHB EST-MCNC: 162.8 MG/DL
HBA1C MFR BLD: 7.3 %

## 2023-08-24 NOTE — PROGRESS NOTES
Glargine, 2 Unit Dial, (TOUJEO MAX SOLOSTAR) 300 UNIT/ML SOPN Inject 74 Units into the skin daily 21 Adjustable Dose Pre-filled Pen Syringe 1    FARXIGA 10 MG tablet TAKE 1 TABLET BY MOUTH IN  THE MORNING 90 tablet 1    RYBELSUS 14 MG TABS TAKE 1 TABLET BY MOUTH DAILY 90 tablet 1    atorvastatin (LIPITOR) 40 MG tablet TAKE 1 TABLET BY MOUTH  DAILY 90 tablet 3    budesonide-formoterol (SYMBICORT) 80-4.5 MCG/ACT AERO Inhale 2 puffs into the lungs 2 times daily 3 each 3    insulin lispro, 1 Unit Dial, (HUMALOG KWIKPEN) 100 UNIT/ML SOPN Inject 5-14 u subcut before meals TID 5 Adjustable Dose Pre-filled Pen Syringe 3    cyclobenzaprine (FLEXERIL) 10 MG tablet TAKE 1 TABLET BY MOUTH 3  TIMES DAILY AS NEEDED FOR  MUSCLE SPASM(S) 90 tablet 2    Continuous Blood Gluc Sensor (FREESTYLE FRACISCO 3 SENSOR) MISC 1 each by Does not apply route every 14 days 6 each 3    fluocinonide (LIDEX) 0.05 % external solution APPLY TOPICALLY TWICE DAILY 120 mL 1    sertraline (ZOLOFT) 100 MG tablet TAKE 1 AND 1/2 TABLETS BY  MOUTH DAILY (Patient taking differently: 1 tablet) 135 tablet 3    buPROPion (WELLBUTRIN XL) 300 MG extended release tablet TAKE 1 TABLET BY MOUTH IN  THE MORNING 90 tablet 3    Multiple Vitamins-Minerals (CENTRUM WOMEN PO) Take by mouth      Continuous Blood Gluc Sensor (FREESTYLE FRACISCO 2 SENSOR) MISC 1 each by Does not apply route every 14 days 6 each 3    B-D UF III MINI PEN NEEDLES 31G X 5 MM MISC USE 1 PEN NEEDLE DAILY 90 each 3    fluticasone (FLONASE) 50 MCG/ACT nasal spray 2 sprays by Nasal route daily 3 each 3    hydrOXYzine (ATARAX) 25 MG tablet TAKE 1 TABLET BY MOUTH  EVERY 6 HOURS AS NEEDED FOR ANXIETY 360 tablet 0    Continuous Blood Gluc  (FREESTYLE FRACISCO 2 READER) ANDRIA 1 each by Does not apply route 4 times daily 1 each 5    TURMERIC PO Take 1 tablet by mouth daily      vitamin B-12 (CYANOCOBALAMIN) 1000 MCG tablet Take 1 tablet by mouth daily      MIRENA, 52 MG, IUD 52 mg 1 Dose by IntraUTERine route

## 2023-08-28 ENCOUNTER — OFFICE VISIT (OUTPATIENT)
Age: 53
End: 2023-08-28
Payer: COMMERCIAL

## 2023-08-28 VITALS
WEIGHT: 293 LBS | BODY MASS INDEX: 47.09 KG/M2 | DIASTOLIC BLOOD PRESSURE: 74 MMHG | HEIGHT: 66 IN | RESPIRATION RATE: 14 BRPM | TEMPERATURE: 97.7 F | HEART RATE: 68 BPM | SYSTOLIC BLOOD PRESSURE: 118 MMHG | OXYGEN SATURATION: 97 %

## 2023-08-28 DIAGNOSIS — R14.0 BLOATING: ICD-10-CM

## 2023-08-28 DIAGNOSIS — E11.3293 TYPE 2 DIABETES MELLITUS WITH BOTH EYES AFFECTED BY MILD NONPROLIFERATIVE RETINOPATHY WITHOUT MACULAR EDEMA, WITH LONG-TERM CURRENT USE OF INSULIN (HCC): ICD-10-CM

## 2023-08-28 DIAGNOSIS — Z00.00 WELL ADULT EXAM: Primary | ICD-10-CM

## 2023-08-28 DIAGNOSIS — M92.61 HAGLUND'S DEFORMITY OF RIGHT HEEL: ICD-10-CM

## 2023-08-28 DIAGNOSIS — F31.61 BIPOLAR DISORDER, CURRENT EPISODE MIXED, MILD (HCC): ICD-10-CM

## 2023-08-28 DIAGNOSIS — G47.33 OBSTRUCTIVE SLEEP APNEA SYNDROME: ICD-10-CM

## 2023-08-28 DIAGNOSIS — Z79.4 TYPE 2 DIABETES MELLITUS WITH BOTH EYES AFFECTED BY MILD NONPROLIFERATIVE RETINOPATHY WITHOUT MACULAR EDEMA, WITH LONG-TERM CURRENT USE OF INSULIN (HCC): ICD-10-CM

## 2023-08-28 DIAGNOSIS — I10 ESSENTIAL HYPERTENSION: ICD-10-CM

## 2023-08-28 DIAGNOSIS — E78.00 PURE HYPERCHOLESTEROLEMIA: Chronic | ICD-10-CM

## 2023-08-28 DIAGNOSIS — M76.61 ACHILLES TENDINITIS OF RIGHT LOWER EXTREMITY: ICD-10-CM

## 2023-08-28 DIAGNOSIS — R63.2 BINGE EATING: Chronic | ICD-10-CM

## 2023-08-28 PROCEDURE — 3074F SYST BP LT 130 MM HG: CPT | Performed by: FAMILY MEDICINE

## 2023-08-28 PROCEDURE — 3078F DIAST BP <80 MM HG: CPT | Performed by: FAMILY MEDICINE

## 2023-08-28 PROCEDURE — 99396 PREV VISIT EST AGE 40-64: CPT | Performed by: FAMILY MEDICINE

## 2023-08-28 RX ORDER — OXCARBAZEPINE 150 MG/1
150 TABLET, FILM COATED ORAL 2 TIMES DAILY
Qty: 180 TABLET | Refills: 1
Start: 2023-08-28

## 2023-08-28 RX ORDER — GABAPENTIN 300 MG/1
CAPSULE ORAL
Qty: 180 CAPSULE | Refills: 1 | Status: SHIPPED | OUTPATIENT
Start: 2023-08-28 | End: 2024-02-27

## 2023-08-28 RX ORDER — L.RHAMNOSUS/B.ANIMALIS(LACTIS) 3B CELL
1 CAPSULE ORAL DAILY
Qty: 30 CAPSULE | Refills: 2 | COMMUNITY
Start: 2023-08-28

## 2023-08-28 ASSESSMENT — ENCOUNTER SYMPTOMS
ABDOMINAL PAIN: 0
CHOKING: 0
NAUSEA: 0
BACK PAIN: 0
VOICE CHANGE: 0
ANAL BLEEDING: 0
COUGH: 0
WHEEZING: 0
SHORTNESS OF BREATH: 0
CONSTIPATION: 0
SORE THROAT: 0
CHEST TIGHTNESS: 0
TROUBLE SWALLOWING: 0
DIARRHEA: 0
BLOOD IN STOOL: 0

## 2023-08-30 ENCOUNTER — TELEPHONE (OUTPATIENT)
Dept: ADMINISTRATIVE | Age: 53
End: 2023-08-30

## 2023-08-30 DIAGNOSIS — Z79.4 TYPE 2 DIABETES MELLITUS WITH BOTH EYES AFFECTED BY MILD NONPROLIFERATIVE RETINOPATHY WITHOUT MACULAR EDEMA, WITH LONG-TERM CURRENT USE OF INSULIN (HCC): ICD-10-CM

## 2023-08-30 DIAGNOSIS — E11.3293 TYPE 2 DIABETES MELLITUS WITH BOTH EYES AFFECTED BY MILD NONPROLIFERATIVE RETINOPATHY WITHOUT MACULAR EDEMA, WITH LONG-TERM CURRENT USE OF INSULIN (HCC): ICD-10-CM

## 2023-08-30 NOTE — TELEPHONE ENCOUNTER
Submitted PA for Mounjaro 2.5MG/0.5ML pen-injectors  Via [facility] Key: [id number] STATUS: APPROVED. Please notify patient. Thank you.

## 2023-08-31 NOTE — TELEPHONE ENCOUNTER
Nayeli Leaks a coupon for the following medication and for  the coupon the pharmacy needs a diagnosis code. Pt stated the office would need to call the pharmacy and give them the code.      [address] [facility] [id number] [address]

## 2023-09-01 NOTE — TELEPHONE ENCOUNTER
Spoke w/ pharmacy informed of dx code. PA approval. Ran again and rx said not able to be filled until 9/20. Pharmacy will reach out to patient.

## 2023-09-26 DIAGNOSIS — Z79.4 TYPE 2 DIABETES MELLITUS WITH BOTH EYES AFFECTED BY MILD NONPROLIFERATIVE RETINOPATHY WITHOUT MACULAR EDEMA, WITH LONG-TERM CURRENT USE OF INSULIN (HCC): ICD-10-CM

## 2023-09-26 DIAGNOSIS — E11.3293 TYPE 2 DIABETES MELLITUS WITH BOTH EYES AFFECTED BY MILD NONPROLIFERATIVE RETINOPATHY WITHOUT MACULAR EDEMA, WITH LONG-TERM CURRENT USE OF INSULIN (HCC): ICD-10-CM

## 2023-09-26 RX ORDER — TIRZEPATIDE 2.5 MG/.5ML
INJECTION, SOLUTION SUBCUTANEOUS
Qty: 6 ML | Refills: 1 | Status: SHIPPED | OUTPATIENT
Start: 2023-09-26 | End: 2023-10-25 | Stop reason: SDUPTHER

## 2023-09-26 NOTE — TELEPHONE ENCOUNTER
Requested Prescriptions     Pending Prescriptions Disp Refills    MOUNJARO 2.5 MG/0.5ML SOPN SC injection [Pharmacy Med Name: Michael Camarillo PEN 2.5MG/0.5ML] 2 mL 11     Sig: INJECT THE CONTENTS OF ONE PEN  SUBCUTANEOUSLY WEEKLY AS  DIRECTED        Last OV: 8/28/23    Last labs: 5/24/23     F/u: 11/28/23

## 2023-10-05 ENCOUNTER — TELEMEDICINE (OUTPATIENT)
Dept: PSYCHOLOGY | Age: 53
End: 2023-10-05
Payer: COMMERCIAL

## 2023-10-05 DIAGNOSIS — F31.31 BIPOLAR AFFECTIVE DISORDER, CURRENTLY DEPRESSED, MILD (HCC): Primary | ICD-10-CM

## 2023-10-05 PROCEDURE — 90832 PSYTX W PT 30 MINUTES: CPT | Performed by: PSYCHOLOGIST

## 2023-10-05 NOTE — PROGRESS NOTES
symptoms. Work is the same but pt is handling it better. Pt attributes her vacation to [facility] as a source of improvement. 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 Pt expressed concern about her aging father, who is not getting around as well but is also resistant to having that pointed out. He's forgetful. Pt worries about him. O:  Interventions:  -Supportive techniques  -Processed experiences / stressors / concerns  -Reinforced any efforts toward self-care and maintaining balance   -Discussed concerns about her father and family dynamics  -Highlighted areas of progress and opportunity      A:  MSE:  Appearance: good hygiene  and appropriate attire  Attitude: cooperative and friendly  Consciousness: alert  Orientation: oriented to person, place, time, general circumstance  Memory: recent and remote memory intact  Attention/Concentration: intact during session  Psychomotor Activity: normal  Eye Contact: normal  Speech: normal rate and volume, well-articulated  Mood: mildly dysphoric and anxious  Affect: congruent  Perception: within normal limits  Thought Content: within normal limits  Thought Process: logical, coherent and goal-directed  Insight: good  Judgment: intact  Ability to understand instructions: Yes  Ability to respond meaningfully: Yes  Morbid Ideation: no   Suicide Assessment: no suicidal ideation, plan, or intent. Appropriate for outpatient / telehealth care at this time. Homicidal Ideation: no        1/22/2021     3:57 PM   JARET 7 SCORE   JARET-7 Total Score 21     Interpretation of JARET-7 score: 5-9 = mild anxiety, 10-14 = moderate anxiety, 15+ = severe anxiety. Recommend referral to behavioral health for scores 10 or greater.         3/3/2023     8:17 AM 3/18/2022     3:45 PM 1/22/2021     4:00 PM 4/1/2019     9:08 AM 7/12/2018     8:54 AM 4/19/2017     7:52 AM 1/21/2015     1:00 PM   PHQ Scores   PHQ2 Score 0 1 4 0 0 0 2   PHQ9 Score 12 10 19 0 0 0 2

## 2023-10-24 ENCOUNTER — PATIENT MESSAGE (OUTPATIENT)
Age: 53
End: 2023-10-24

## 2023-10-24 DIAGNOSIS — E11.3293 TYPE 2 DIABETES MELLITUS WITH BOTH EYES AFFECTED BY MILD NONPROLIFERATIVE RETINOPATHY WITHOUT MACULAR EDEMA, WITHOUT LONG-TERM CURRENT USE OF INSULIN (HCC): ICD-10-CM

## 2023-10-24 DIAGNOSIS — Z79.4 TYPE 2 DIABETES MELLITUS WITH BOTH EYES AFFECTED BY MILD NONPROLIFERATIVE RETINOPATHY WITHOUT MACULAR EDEMA, WITH LONG-TERM CURRENT USE OF INSULIN (HCC): ICD-10-CM

## 2023-10-24 DIAGNOSIS — E11.3293 TYPE 2 DIABETES MELLITUS WITH BOTH EYES AFFECTED BY MILD NONPROLIFERATIVE RETINOPATHY WITHOUT MACULAR EDEMA, WITH LONG-TERM CURRENT USE OF INSULIN (HCC): ICD-10-CM

## 2023-10-24 NOTE — TELEPHONE ENCOUNTER
From: Alfa Law  To: Dr. Timmy Keith: 10/24/2023 1:41 PM EDT  Subject: My Doctor    I have been trying to make a go of this but I am unable to keep making the payments. I will need to find a new Dr.  Chanel Kraus you recommend one? Can you see one last time to make sure I am ready to go, prescriptions etc? If not I understand. I wish I could make it work but as it is I'm already taking g minimal doses of my insulin. I rally like you as my DrMel And I tried to make it work. Thank you.   [place]

## 2023-10-25 RX ORDER — TIRZEPATIDE 2.5 MG/.5ML
INJECTION, SOLUTION SUBCUTANEOUS
Qty: 6 ML | Refills: 1 | Status: SHIPPED | OUTPATIENT
Start: 2023-10-25

## 2023-10-25 RX ORDER — INSULIN ASPART 100 [IU]/ML
10-30 INJECTION, SOLUTION INTRAVENOUS; SUBCUTANEOUS
Qty: 10 ADJUSTABLE DOSE PRE-FILLED PEN SYRINGE | Refills: 2 | Status: SHIPPED | OUTPATIENT
Start: 2023-10-25

## 2023-10-25 RX ORDER — INSULIN GLARGINE 300 U/ML
74 INJECTION, SOLUTION SUBCUTANEOUS DAILY
Qty: 21 ADJUSTABLE DOSE PRE-FILLED PEN SYRINGE | Refills: 1 | Status: SHIPPED | OUTPATIENT
Start: 2023-10-25

## 2023-10-25 NOTE — TELEPHONE ENCOUNTER
Requested Prescriptions     Pending Prescriptions Disp Refills    Tirzepatide (MOUNJARO) 2.5 MG/0.5ML SOPN SC injection 6 mL 1    insulin aspart (NOVOLOG FLEXPEN) 100 UNIT/ML injection pen 10 Adjustable Dose Pre-filled Pen Syringe 2     Sig: Inject 10-30 Units into the skin 3 times daily (before meals)    Insulin Glargine, 2 Unit Dial, (TOUJEO MAX SOLOSTAR) 300 UNIT/ML SOPN 21 Adjustable Dose Pre-filled Pen Syringe 1     Sig: Inject 74 Units into the skin daily         Last OV: 8/28/2023    Last labs: 8/21/23     F/u: no see message below

## 2023-11-27 SDOH — HEALTH STABILITY: PHYSICAL HEALTH: ON AVERAGE, HOW MANY DAYS PER WEEK DO YOU ENGAGE IN MODERATE TO STRENUOUS EXERCISE (LIKE A BRISK WALK)?: 0 DAYS

## 2023-11-28 ENCOUNTER — OFFICE VISIT (OUTPATIENT)
Dept: FAMILY MEDICINE CLINIC | Age: 53
End: 2023-11-28
Payer: COMMERCIAL

## 2023-11-28 VITALS
DIASTOLIC BLOOD PRESSURE: 78 MMHG | OXYGEN SATURATION: 99 % | HEART RATE: 69 BPM | WEIGHT: 293 LBS | HEIGHT: 66 IN | BODY MASS INDEX: 47.09 KG/M2 | TEMPERATURE: 97.1 F | SYSTOLIC BLOOD PRESSURE: 134 MMHG

## 2023-11-28 DIAGNOSIS — N18.30 CHRONIC RENAL INSUFFICIENCY, STAGE 3 (MODERATE) (HCC): Primary | ICD-10-CM

## 2023-11-28 DIAGNOSIS — E11.3293 TYPE 2 DIABETES MELLITUS WITH BOTH EYES AFFECTED BY MILD NONPROLIFERATIVE RETINOPATHY WITHOUT MACULAR EDEMA, WITH LONG-TERM CURRENT USE OF INSULIN (HCC): ICD-10-CM

## 2023-11-28 DIAGNOSIS — I10 ESSENTIAL HYPERTENSION: ICD-10-CM

## 2023-11-28 DIAGNOSIS — M76.61 ACHILLES TENDINITIS OF RIGHT LOWER EXTREMITY: ICD-10-CM

## 2023-11-28 DIAGNOSIS — F31.81 BIPOLAR II DISORDER, MILD, HYPOMANIC, WITH MIXED FEATURES, IN PARTIAL REMISSION (HCC): ICD-10-CM

## 2023-11-28 DIAGNOSIS — Z79.4 TYPE 2 DIABETES MELLITUS WITH BOTH EYES AFFECTED BY MILD NONPROLIFERATIVE RETINOPATHY WITHOUT MACULAR EDEMA, WITH LONG-TERM CURRENT USE OF INSULIN (HCC): ICD-10-CM

## 2023-11-28 PROCEDURE — 3078F DIAST BP <80 MM HG: CPT | Performed by: FAMILY MEDICINE

## 2023-11-28 PROCEDURE — 3051F HG A1C>EQUAL 7.0%<8.0%: CPT | Performed by: FAMILY MEDICINE

## 2023-11-28 PROCEDURE — 99214 OFFICE O/P EST MOD 30 MIN: CPT | Performed by: FAMILY MEDICINE

## 2023-11-28 PROCEDURE — 3074F SYST BP LT 130 MM HG: CPT | Performed by: FAMILY MEDICINE

## 2023-11-28 PROCEDURE — 90674 CCIIV4 VAC NO PRSV 0.5 ML IM: CPT | Performed by: FAMILY MEDICINE

## 2023-11-28 PROCEDURE — 90471 IMMUNIZATION ADMIN: CPT | Performed by: FAMILY MEDICINE

## 2023-11-28 RX ORDER — TIRZEPATIDE 2.5 MG/.5ML
INJECTION, SOLUTION SUBCUTANEOUS
Qty: 6 ML | Refills: 1 | Status: SHIPPED | OUTPATIENT
Start: 2023-11-28

## 2023-11-28 ASSESSMENT — PATIENT HEALTH QUESTIONNAIRE - PHQ9
1. LITTLE INTEREST OR PLEASURE IN DOING THINGS: 1
2. FEELING DOWN, DEPRESSED OR HOPELESS: 0
5. POOR APPETITE OR OVEREATING: 0
10. IF YOU CHECKED OFF ANY PROBLEMS, HOW DIFFICULT HAVE THESE PROBLEMS MADE IT FOR YOU TO DO YOUR WORK, TAKE CARE OF THINGS AT HOME, OR GET ALONG WITH OTHER PEOPLE: 1
SUM OF ALL RESPONSES TO PHQ9 QUESTIONS 1 & 2: 1
9. THOUGHTS THAT YOU WOULD BE BETTER OFF DEAD, OR OF HURTING YOURSELF: 0
SUM OF ALL RESPONSES TO PHQ QUESTIONS 1-9: 13
8. MOVING OR SPEAKING SO SLOWLY THAT OTHER PEOPLE COULD HAVE NOTICED. OR THE OPPOSITE, BEING SO FIGETY OR RESTLESS THAT YOU HAVE BEEN MOVING AROUND A LOT MORE THAN USUAL: 3
4. FEELING TIRED OR HAVING LITTLE ENERGY: 3
SUM OF ALL RESPONSES TO PHQ QUESTIONS 1-9: 13
7. TROUBLE CONCENTRATING ON THINGS, SUCH AS READING THE NEWSPAPER OR WATCHING TELEVISION: 3
6. FEELING BAD ABOUT YOURSELF - OR THAT YOU ARE A FAILURE OR HAVE LET YOURSELF OR YOUR FAMILY DOWN: 0
SUM OF ALL RESPONSES TO PHQ QUESTIONS 1-9: 13
3. TROUBLE FALLING OR STAYING ASLEEP: 3
SUM OF ALL RESPONSES TO PHQ QUESTIONS 1-9: 13

## 2023-11-28 ASSESSMENT — COLUMBIA-SUICIDE SEVERITY RATING SCALE - C-SSRS
4. HAVE YOU HAD THESE THOUGHTS AND HAD SOME INTENTION OF ACTING ON THEM?: NO
7. DID THIS OCCUR IN THE LAST THREE MONTHS: NO
5. HAVE YOU STARTED TO WORK OUT OR WORKED OUT THE DETAILS OF HOW TO KILL YOURSELF? DO YOU INTEND TO CARRY OUT THIS PLAN?: NO
3. HAVE YOU BEEN THINKING ABOUT HOW YOU MIGHT KILL YOURSELF?: NO

## 2023-11-30 RX ORDER — LISINOPRIL 10 MG/1
10 TABLET ORAL DAILY
Qty: 90 TABLET | Refills: 1 | Status: SHIPPED | OUTPATIENT
Start: 2023-11-30

## 2023-12-13 DIAGNOSIS — F41.8 DEPRESSION WITH ANXIETY: Chronic | ICD-10-CM

## 2023-12-14 RX ORDER — BUPROPION HYDROCHLORIDE 300 MG/1
300 TABLET ORAL EVERY MORNING
Qty: 90 TABLET | Refills: 1 | Status: SHIPPED | OUTPATIENT
Start: 2023-12-14

## 2023-12-14 RX ORDER — GABAPENTIN 300 MG/1
CAPSULE ORAL
Qty: 180 CAPSULE | Refills: 1 | Status: SHIPPED | OUTPATIENT
Start: 2023-12-14 | End: 2024-06-13

## 2023-12-14 RX ORDER — CYCLOBENZAPRINE HCL 10 MG
TABLET ORAL
Qty: 90 TABLET | Refills: 2 | Status: SHIPPED | OUTPATIENT
Start: 2023-12-14

## 2023-12-31 DIAGNOSIS — E11.3293 TYPE 2 DIABETES MELLITUS WITH BOTH EYES AFFECTED BY MILD NONPROLIFERATIVE RETINOPATHY WITHOUT MACULAR EDEMA, WITHOUT LONG-TERM CURRENT USE OF INSULIN (HCC): ICD-10-CM

## 2024-01-02 RX ORDER — BLOOD-GLUCOSE SENSOR
1 EACH MISCELLANEOUS
Qty: 6 EACH | Refills: 3 | Status: SHIPPED | OUTPATIENT
Start: 2024-01-02

## 2024-01-04 DIAGNOSIS — E11.3293 TYPE 2 DIABETES MELLITUS WITH BOTH EYES AFFECTED BY MILD NONPROLIFERATIVE RETINOPATHY WITHOUT MACULAR EDEMA, WITHOUT LONG-TERM CURRENT USE OF INSULIN (HCC): ICD-10-CM

## 2024-01-04 RX ORDER — INSULIN GLARGINE 300 U/ML
INJECTION, SOLUTION SUBCUTANEOUS
Qty: 24 ML | Refills: 3 | Status: SHIPPED | OUTPATIENT
Start: 2024-01-04

## 2024-01-09 DIAGNOSIS — E11.3293 TYPE 2 DIABETES MELLITUS WITH BOTH EYES AFFECTED BY MILD NONPROLIFERATIVE RETINOPATHY WITHOUT MACULAR EDEMA, WITHOUT LONG-TERM CURRENT USE OF INSULIN (HCC): ICD-10-CM

## 2024-01-09 RX ORDER — INSULIN ASPART 100 [IU]/ML
INJECTION, SOLUTION INTRAVENOUS; SUBCUTANEOUS
Qty: 90 ML | Refills: 3 | Status: SHIPPED | OUTPATIENT
Start: 2024-01-09

## 2024-01-24 DIAGNOSIS — J30.1 SEASONAL ALLERGIC RHINITIS DUE TO POLLEN: ICD-10-CM

## 2024-01-24 RX ORDER — MONTELUKAST SODIUM 10 MG/1
10 TABLET ORAL DAILY
Qty: 90 TABLET | Refills: 1 | Status: SHIPPED | OUTPATIENT
Start: 2024-01-24

## 2024-01-24 NOTE — TELEPHONE ENCOUNTER
Requested Prescriptions     Pending Prescriptions Disp Refills    montelukast (SINGULAIR) 10 MG tablet [Pharmacy Med Name: MONTELUKAST SOD 10 MG TABLET] 30 tablet      Sig: TAKE 1 TABLET BY MOUTH DAILY         Last OV: 11/28/2023  Last labs: 11/28/2023  F/u: 12/27/2024

## 2024-02-27 ENCOUNTER — OFFICE VISIT (OUTPATIENT)
Dept: FAMILY MEDICINE CLINIC | Age: 54
End: 2024-02-27
Payer: COMMERCIAL

## 2024-02-27 VITALS
TEMPERATURE: 97.1 F | DIASTOLIC BLOOD PRESSURE: 84 MMHG | SYSTOLIC BLOOD PRESSURE: 136 MMHG | BODY MASS INDEX: 47.09 KG/M2 | HEART RATE: 88 BPM | HEIGHT: 66 IN | OXYGEN SATURATION: 99 % | WEIGHT: 293 LBS

## 2024-02-27 DIAGNOSIS — N18.30 CHRONIC RENAL INSUFFICIENCY, STAGE 3 (MODERATE) (HCC): ICD-10-CM

## 2024-02-27 DIAGNOSIS — M25.571 CHRONIC PAIN OF RIGHT ANKLE: Primary | ICD-10-CM

## 2024-02-27 DIAGNOSIS — F31.31 BIPOLAR AFFECTIVE DISORDER, CURRENTLY DEPRESSED, MILD (HCC): ICD-10-CM

## 2024-02-27 DIAGNOSIS — I10 ESSENTIAL HYPERTENSION: ICD-10-CM

## 2024-02-27 DIAGNOSIS — Z79.4 TYPE 2 DIABETES MELLITUS WITH BOTH EYES AFFECTED BY MILD NONPROLIFERATIVE RETINOPATHY WITHOUT MACULAR EDEMA, WITH LONG-TERM CURRENT USE OF INSULIN (HCC): ICD-10-CM

## 2024-02-27 DIAGNOSIS — E11.3293 TYPE 2 DIABETES MELLITUS WITH BOTH EYES AFFECTED BY MILD NONPROLIFERATIVE RETINOPATHY WITHOUT MACULAR EDEMA, WITH LONG-TERM CURRENT USE OF INSULIN (HCC): ICD-10-CM

## 2024-02-27 DIAGNOSIS — E11.9 TYPE 2 DIABETES MELLITUS WITHOUT COMPLICATION, WITHOUT LONG-TERM CURRENT USE OF INSULIN (HCC): ICD-10-CM

## 2024-02-27 DIAGNOSIS — E78.2 MIXED HYPERLIPIDEMIA: ICD-10-CM

## 2024-02-27 DIAGNOSIS — G89.29 CHRONIC PAIN OF RIGHT ANKLE: Primary | ICD-10-CM

## 2024-02-27 PROCEDURE — 99214 OFFICE O/P EST MOD 30 MIN: CPT | Performed by: FAMILY MEDICINE

## 2024-02-27 PROCEDURE — 3079F DIAST BP 80-89 MM HG: CPT | Performed by: FAMILY MEDICINE

## 2024-02-27 PROCEDURE — 3075F SYST BP GE 130 - 139MM HG: CPT | Performed by: FAMILY MEDICINE

## 2024-02-27 RX ORDER — TIRZEPATIDE 5 MG/.5ML
5 INJECTION, SOLUTION SUBCUTANEOUS WEEKLY
Qty: 2 ML | Refills: 0 | Status: SHIPPED | OUTPATIENT
Start: 2024-02-27

## 2024-02-27 RX ORDER — DAPAGLIFLOZIN 10 MG/1
10 TABLET, FILM COATED ORAL EVERY MORNING
Qty: 90 TABLET | Refills: 1 | Status: SHIPPED | OUTPATIENT
Start: 2024-02-27

## 2024-02-27 ASSESSMENT — PATIENT HEALTH QUESTIONNAIRE - PHQ9
8. MOVING OR SPEAKING SO SLOWLY THAT OTHER PEOPLE COULD HAVE NOTICED. OR THE OPPOSITE, BEING SO FIGETY OR RESTLESS THAT YOU HAVE BEEN MOVING AROUND A LOT MORE THAN USUAL: 0
5. POOR APPETITE OR OVEREATING: 0
1. LITTLE INTEREST OR PLEASURE IN DOING THINGS: 0
SUM OF ALL RESPONSES TO PHQ QUESTIONS 1-9: 0
10. IF YOU CHECKED OFF ANY PROBLEMS, HOW DIFFICULT HAVE THESE PROBLEMS MADE IT FOR YOU TO DO YOUR WORK, TAKE CARE OF THINGS AT HOME, OR GET ALONG WITH OTHER PEOPLE: 0
SUM OF ALL RESPONSES TO PHQ QUESTIONS 1-9: 0
2. FEELING DOWN, DEPRESSED OR HOPELESS: 0
4. FEELING TIRED OR HAVING LITTLE ENERGY: 0
SUM OF ALL RESPONSES TO PHQ QUESTIONS 1-9: 0
SUM OF ALL RESPONSES TO PHQ9 QUESTIONS 1 & 2: 0
6. FEELING BAD ABOUT YOURSELF - OR THAT YOU ARE A FAILURE OR HAVE LET YOURSELF OR YOUR FAMILY DOWN: 0
7. TROUBLE CONCENTRATING ON THINGS, SUCH AS READING THE NEWSPAPER OR WATCHING TELEVISION: 0
SUM OF ALL RESPONSES TO PHQ QUESTIONS 1-9: 0
9. THOUGHTS THAT YOU WOULD BE BETTER OFF DEAD, OR OF HURTING YOURSELF: 0
3. TROUBLE FALLING OR STAYING ASLEEP: 0

## 2024-02-27 NOTE — PROGRESS NOTES
Portions of this chart may have been created with voice recognition software. Occasional wrong-word or \"sound-alike\" substitutions may have occurred due to the inherent limitations of voice recognition software. Read the chart carefully and recognize, using context, where these substitutions have occurred        Chief Complaint     3 Month Follow-Up               SUBJECTIVE    Eugenia Edwards is a 53 y.o. female here for 3 Month Follow-Up           1. Chronic pain of right ankle  Patient continues to have chronic pain of right ankle especially at the insertion of the Achilles tendon.  Will refer to podiatry for further evaluation at this time.  No swelling any numbness tingling of this time.  She has tried over-the-counter medications for anti-inflammatory medications.  Without any relief  - BLAYNE - Isaac Mora DPM, Podiatry, [facility]    2. Type 2 diabetes mellitus without complication, without long-term current use of insulin ([facility])  3. Type 2 diabetes mellitus with both eyes affected by mild nonproliferative retinopathy without macular edema, with long-term current use of insulin ([facility])      Diabetic ROS -   medication compliance: compliant most of the time,   diabetic diet compliance: compliant most of the time,   home glucose monitoring: is not performed,   further diabetic ROS: no polyuria or polydipsia, no chest pain, dyspnea or TIA's, no numbness, tingling or pain in extremities, no unusual visual symptoms, no   hypoglycemia, no medication side effects noted,   Diabetic Eye and Foot Exams - encouraged annual diabetic eye exams and regular podiatrist appointments for diabetic foot care  New concerns: none.  Statin use- Compliant  ACE/ARB use- Compliant          - Comprehensive Metabolic Panel; Future  - CBC with Auto Differential; Future  - Lipid Panel; Future  - Hemoglobin A1C; Future  - TSH; Future  -  DIABETES FOOT EXAM  - Microalbumin / Creatinine Urine Ratio; Future  - dapagliflozin (FARXIGA)

## 2024-02-28 DIAGNOSIS — E11.3293 TYPE 2 DIABETES MELLITUS WITH BOTH EYES AFFECTED BY MILD NONPROLIFERATIVE RETINOPATHY WITHOUT MACULAR EDEMA, WITH LONG-TERM CURRENT USE OF INSULIN (HCC): ICD-10-CM

## 2024-02-28 DIAGNOSIS — Z79.4 TYPE 2 DIABETES MELLITUS WITH BOTH EYES AFFECTED BY MILD NONPROLIFERATIVE RETINOPATHY WITHOUT MACULAR EDEMA, WITH LONG-TERM CURRENT USE OF INSULIN (HCC): ICD-10-CM

## 2024-02-28 LAB
ALBUMIN SERPL-MCNC: 4.3 G/DL (ref 3.4–5)
ALBUMIN/GLOB SERPL: 1.7 {RATIO} (ref 1.1–2.2)
ALP SERPL-CCNC: 101 U/L (ref 40–129)
ALT SERPL-CCNC: 28 U/L (ref 10–40)
ANION GAP SERPL CALCULATED.3IONS-SCNC: 14 MMOL/L (ref 3–16)
AST SERPL-CCNC: 19 U/L (ref 15–37)
BASOPHILS # BLD: 0.1 K/UL (ref 0–0.2)
BASOPHILS NFR BLD: 1 %
BILIRUB SERPL-MCNC: 0.5 MG/DL (ref 0–1)
BUN SERPL-MCNC: 15 MG/DL (ref 7–20)
CALCIUM SERPL-MCNC: 9.2 MG/DL (ref 8.3–10.6)
CHLORIDE SERPL-SCNC: 102 MMOL/L (ref 99–110)
CHOLEST SERPL-MCNC: 111 MG/DL (ref 0–199)
CO2 SERPL-SCNC: 25 MMOL/L (ref 21–32)
CREAT SERPL-MCNC: 1.3 MG/DL (ref 0.6–1.1)
CREAT UR-MCNC: 257.4 MG/DL (ref 28–259)
DEPRECATED RDW RBC AUTO: 14.4 % (ref 12.4–15.4)
EOSINOPHIL # BLD: 0 K/UL (ref 0–0.6)
EOSINOPHIL NFR BLD: 0.7 %
GFR SERPLBLD CREATININE-BSD FMLA CKD-EPI: 48 ML/MIN/{1.73_M2}
GLUCOSE SERPL-MCNC: 386 MG/DL (ref 70–99)
HCT VFR BLD AUTO: 41.3 % (ref 36–48)
HDLC SERPL-MCNC: 46 MG/DL (ref 40–60)
HGB BLD-MCNC: 13.9 G/DL (ref 12–16)
LDLC SERPL CALC-MCNC: 41 MG/DL
LYMPHOCYTES # BLD: 1.4 K/UL (ref 1–5.1)
LYMPHOCYTES NFR BLD: 25.4 %
MCH RBC QN AUTO: 29.8 PG (ref 26–34)
MCHC RBC AUTO-ENTMCNC: 33.6 G/DL (ref 31–36)
MCV RBC AUTO: 88.8 FL (ref 80–100)
MICROALBUMIN UR DL<=1MG/L-MCNC: 2 MG/DL
MICROALBUMIN/CREAT UR: 7.8 MG/G (ref 0–30)
MONOCYTES # BLD: 0.4 K/UL (ref 0–1.3)
MONOCYTES NFR BLD: 7.8 %
NEUTROPHILS # BLD: 3.5 K/UL (ref 1.7–7.7)
NEUTROPHILS NFR BLD: 65.1 %
PLATELET # BLD AUTO: 183 K/UL (ref 135–450)
PMV BLD AUTO: 8.9 FL (ref 5–10.5)
POTASSIUM SERPL-SCNC: 4.7 MMOL/L (ref 3.5–5.1)
PROT SERPL-MCNC: 6.9 G/DL (ref 6.4–8.2)
RBC # BLD AUTO: 4.65 M/UL (ref 4–5.2)
SODIUM SERPL-SCNC: 141 MMOL/L (ref 136–145)
TRIGL SERPL-MCNC: 122 MG/DL (ref 0–150)
TSH SERPL DL<=0.005 MIU/L-ACNC: 2.43 UIU/ML (ref 0.27–4.2)
VLDLC SERPL CALC-MCNC: 24 MG/DL
WBC # BLD AUTO: 5.4 K/UL (ref 4–11)

## 2024-02-29 LAB
EST. AVERAGE GLUCOSE BLD GHB EST-MCNC: 162.8 MG/DL
HBA1C MFR BLD: 7.3 %

## 2024-03-11 NOTE — PROGRESS NOTES
Emergency Contact: Lore Nieves, [address] Phone: [phone]  Mobile Phone: [phone]  Relation: Brother/Sister  Secondary Emergency Contact: teodoro valenzuela  Home Phone: [phone]  Relation: Other    Provider location: [facility] affirm this is an episode with an Established Patient who has not had a related appointment within my department in the past 7 days or scheduled within the next 24 hours. S:  Pt reported that she's feeling better some days, down and anxious other days. [place] stressed earlier this week after a work issue; managed by taking anxiety medication and taking a nap. Helps to write everything down. Believes additional increase in Zoloft dose to 200mg is helping somewhat. Hoping to get to a point with more normal days with stable mood. No manic episodes. Waking every night between 2-3am, then struggles to return to sleep. Digging at a sore spot on her scalp. Trying to resist the urge, not always successful. Coloring and doing puzzle books. O:  Interventions:  -Supportive techniques  -Processed recent stressors  -Discussed the Counting One exercise to help with sleep  -Explored the nature of her skin picking. Brainstormed ways to limit skin picking.       A:  MSE:  Appearance: good hygiene  and appropriate attire  Attitude: cooperative, friendly and mild distress  Consciousness: alert  Orientation: oriented to person, place, time, general circumstance  Memory: recent and remote memory intact  Attention/Concentration: intact during session  Psychomotor Activity: normal  Eye Contact: eyes were closed for much of the visit  Speech: normal rate and volume, well-articulated  Mood: anxious, dysphoric  Affect: congruent  Perception: within normal limits  Thought Content: within normal limits  Thought Process: logical, coherent and goal-directed  Insight: good  Judgment: intact  Ability to understand instructions: Yes  Ability to respond meaningfully: Yes  Morbid Ideation: no   Suicide Assessment: no suicidal ideation, plan, or intent. Appropriate for outpatient / telehealth care at this time. Homicidal Ideation: no    JARET 7 SCORE 1/22/2021   JARET-7 Total Score 21     Interpretation of JARET-7 score: 5-9 = mild anxiety, 10-14 = moderate anxiety, 15+ = severe anxiety. Recommend referral to behavioral health for scores 10 or greater. PHQ Scores 1/22/2021 4/1/2019 7/12/2018 4/19/2017 1/21/2015   PHQ2 Score 4 0 0 0 2   PHQ9 Score 19 0 0 0 2     Interpretation of Total Score Depression Severity: 1-4 = Minimal depression, 5-9 = Mild depression, 10-14 = Moderate depression, 15-19 = Moderately severe depression, 20-27 = Severe depression      Diagnosis:    1. Bipolar affective disorder, currently depressed, moderate ([address]    2. Anxiety disorder, unspecified type    3. 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 Pt Behavioral Change Plan:  Pt set the following goals:  1. Practice diaphragmatic breathing throughout the day  2. Practice being mindful - paying attention to the present moment on purpose and in a nonjudgmental way  3.  Try delaying your worries, planning a set worry time for later in the day, and/or worrying for a set amount of time and then shifting to something else when that time period ends  4. To help you fall asleep, try the Counting One exercise. Close your eyes. Take slow breaths. Each time you exhale, repeat \"one\" to yourself in your mind, drawing the word out to match your exhale. You are not counting, just saying \"one\" to yourself. 5. To help you not pick at your scalp, consider the following: wear a hat, wear a glove, tape your fingernails, occupy your hands by coloring or writing    Pt scheduled F/U virtual visit in 2-3 weeks. Quality 47: Advance Care Plan: Advance Care Planning discussed and documented in the medical record; patient did not wish or was not able to name a surrogate decision maker or provide an advance care plan. Quality 130: Documentation Of Current Medications In The Medical Record: Current Medications Documented Quality 226: Preventive Care And Screening: Tobacco Use: Screening And Cessation Intervention: Patient screened for tobacco use and is an ex/non-smoker Detail Level: Detailed

## 2024-03-18 ENCOUNTER — TELEPHONE (OUTPATIENT)
Dept: FAMILY MEDICINE CLINIC | Age: 54
End: 2024-03-18

## 2024-03-18 NOTE — TELEPHONE ENCOUNTER
Submitted PA for Dapagliflozin Propanediol 10MG tablets  Via [facility] Key: [id number] STATUS: PENDING.    Follow up done daily; if no decision with in three days we will refax.  If another three days goes by with no decision will call the insurance for status.

## 2024-03-18 NOTE — TELEPHONE ENCOUNTER
dapagliflozin (FARXIGA) 10 MG tablet  needs a PA. Sheet scanned in media 2/28. Did not see any previous telephone encounters regarding this

## 2024-03-19 NOTE — TELEPHONE ENCOUNTER
DENIAL for Dapagliflozin Propanediol 10MG tablets - the covered medication is Brand name Farxiga; letter attached.    If this requires a response please respond to the pool ( P [facility] MEDICATION PRE-AUTH).      Thank you please advise patient.

## 2024-03-20 DIAGNOSIS — F31.81 BIPOLAR II DISORDER, MILD, HYPOMANIC, WITH MIXED FEATURES, IN PARTIAL REMISSION (HCC): ICD-10-CM

## 2024-03-20 RX ORDER — DAPAGLIFLOZIN 10 MG/1
10 TABLET, FILM COATED ORAL EVERY MORNING
Qty: 30 TABLET | Refills: 3 | Status: SHIPPED | OUTPATIENT
Start: 2024-03-20 | End: 2024-03-20 | Stop reason: SDUPTHER

## 2024-03-20 RX ORDER — CARIPRAZINE 6 MG/1
6 CAPSULE, GELATIN COATED ORAL DAILY
Qty: 90 CAPSULE | Refills: 1 | Status: SHIPPED | OUTPATIENT
Start: 2024-03-20

## 2024-03-20 NOTE — TELEPHONE ENCOUNTER
Pt returned call and is afraid this will be stolen if delivered. Pt would like this to be resent to [facility] on [facility].

## 2024-03-20 NOTE — TELEPHONE ENCOUNTER
Pharmacy notified and stated this was in process. LMOM informing pt. Callback number left in case pt had any other questions

## 2024-03-21 RX ORDER — DAPAGLIFLOZIN 10 MG/1
10 TABLET, FILM COATED ORAL EVERY MORNING
Qty: 30 TABLET | Refills: 3 | Status: SHIPPED | OUTPATIENT
Start: 2024-03-21

## 2024-03-26 NOTE — TELEPHONE ENCOUNTER
Requested Prescriptions     Pending Prescriptions Disp Refills    MOUNJARO 5 MG/0.5ML SOPN SC injection [Pharmacy Med Name: MOUNJARO 5 MG/0.5 ML PEN] 2 mL 0     Sig: INJECT 5 MG UNDER THE SKIN ONCE WEEKLY     LOV 2.27.24

## 2024-03-27 RX ORDER — TIRZEPATIDE 5 MG/.5ML
INJECTION, SOLUTION SUBCUTANEOUS
Qty: 2 ML | Refills: 0 | Status: SHIPPED | OUTPATIENT
Start: 2024-03-27

## 2024-04-26 RX ORDER — TIRZEPATIDE 5 MG/.5ML
INJECTION, SOLUTION SUBCUTANEOUS
Qty: 2 ML | Refills: 0 | Status: SHIPPED | OUTPATIENT
Start: 2024-04-26

## 2024-04-26 NOTE — TELEPHONE ENCOUNTER
Requested Prescriptions     Pending Prescriptions Disp Refills    MOUNJARO 5 MG/0.5ML SOPN SC injection [Pharmacy Med Name: MOUNJARO 5 MG/0.5 ML PEN] 2 mL 0     Sig: INJECT 5 MG UNDER THE SKIN ONCE WEEKLY     LOV 2.27.24  No FOV scheduled

## 2024-05-13 RX ORDER — CYCLOBENZAPRINE HCL 10 MG
TABLET ORAL
Qty: 90 TABLET | Refills: 2 | Status: SHIPPED | OUTPATIENT
Start: 2024-05-13

## 2024-05-13 NOTE — TELEPHONE ENCOUNTER
Requested Prescriptions     Pending Prescriptions Disp Refills    cyclobenzaprine (FLEXERIL) 10 MG tablet [Pharmacy Med Name: CYCLOBENZAPRINE 10 MG TABLET] 90 tablet 2     Sig: TAKE ONE TABLET BY MOUTH THREE TIMES A DAY AS NEEDED FOR MUSCLE SPASMS     LOV 2.27.24

## 2024-05-19 SDOH — ECONOMIC STABILITY: FOOD INSECURITY: WITHIN THE PAST 12 MONTHS, THE FOOD YOU BOUGHT JUST DIDN'T LAST AND YOU DIDN'T HAVE MONEY TO GET MORE.: NEVER TRUE

## 2024-05-19 SDOH — ECONOMIC STABILITY: FOOD INSECURITY: WITHIN THE PAST 12 MONTHS, YOU WORRIED THAT YOUR FOOD WOULD RUN OUT BEFORE YOU GOT MONEY TO BUY MORE.: NEVER TRUE

## 2024-05-19 SDOH — ECONOMIC STABILITY: INCOME INSECURITY: HOW HARD IS IT FOR YOU TO PAY FOR THE VERY BASICS LIKE FOOD, HOUSING, MEDICAL CARE, AND HEATING?: NOT VERY HARD

## 2024-05-19 SDOH — ECONOMIC STABILITY: TRANSPORTATION INSECURITY
IN THE PAST 12 MONTHS, HAS LACK OF TRANSPORTATION KEPT YOU FROM MEETINGS, WORK, OR FROM GETTING THINGS NEEDED FOR DAILY LIVING?: NO

## 2024-05-22 ENCOUNTER — OFFICE VISIT (OUTPATIENT)
Dept: FAMILY MEDICINE CLINIC | Age: 54
End: 2024-05-22
Payer: COMMERCIAL

## 2024-05-22 ENCOUNTER — HOSPITAL ENCOUNTER (OUTPATIENT)
Dept: VASCULAR LAB | Age: 54
Discharge: HOME | End: 2024-05-24
Payer: COMMERCIAL

## 2024-05-22 VITALS
TEMPERATURE: 97.3 F | WEIGHT: 293 LBS | HEART RATE: 80 BPM | DIASTOLIC BLOOD PRESSURE: 70 MMHG | OXYGEN SATURATION: 97 % | HEIGHT: 66 IN | SYSTOLIC BLOOD PRESSURE: 124 MMHG | BODY MASS INDEX: 47.09 KG/M2

## 2024-05-22 DIAGNOSIS — E11.3293 TYPE 2 DIABETES MELLITUS WITH BOTH EYES AFFECTED BY MILD NONPROLIFERATIVE RETINOPATHY WITHOUT MACULAR EDEMA, WITH LONG-TERM CURRENT USE OF INSULIN (HCC): Primary | ICD-10-CM

## 2024-05-22 DIAGNOSIS — F31.61 BIPOLAR DISORDER, CURRENT EPISODE MIXED, MILD (HCC): ICD-10-CM

## 2024-05-22 DIAGNOSIS — M79.89 PAIN AND SWELLING OF LEFT LOWER LEG: ICD-10-CM

## 2024-05-22 DIAGNOSIS — N18.30 CHRONIC RENAL INSUFFICIENCY, STAGE 3 (MODERATE) (HCC): ICD-10-CM

## 2024-05-22 DIAGNOSIS — E11.3293 TYPE 2 DIABETES MELLITUS WITH BOTH EYES AFFECTED BY MILD NONPROLIFERATIVE RETINOPATHY WITHOUT MACULAR EDEMA, WITH LONG-TERM CURRENT USE OF INSULIN (HCC): ICD-10-CM

## 2024-05-22 DIAGNOSIS — M79.662 PAIN AND SWELLING OF LEFT LOWER LEG: ICD-10-CM

## 2024-05-22 DIAGNOSIS — Z79.4 TYPE 2 DIABETES MELLITUS WITH BOTH EYES AFFECTED BY MILD NONPROLIFERATIVE RETINOPATHY WITHOUT MACULAR EDEMA, WITH LONG-TERM CURRENT USE OF INSULIN (HCC): ICD-10-CM

## 2024-05-22 DIAGNOSIS — Z79.4 TYPE 2 DIABETES MELLITUS WITH BOTH EYES AFFECTED BY MILD NONPROLIFERATIVE RETINOPATHY WITHOUT MACULAR EDEMA, WITH LONG-TERM CURRENT USE OF INSULIN (HCC): Primary | ICD-10-CM

## 2024-05-22 LAB
ANION GAP SERPL CALCULATED.3IONS-SCNC: 12 MMOL/L (ref 3–16)
BUN SERPL-MCNC: 27 MG/DL (ref 7–20)
CALCIUM SERPL-MCNC: 9 MG/DL (ref 8.3–10.6)
CHLORIDE SERPL-SCNC: 104 MMOL/L (ref 99–110)
CO2 SERPL-SCNC: 25 MMOL/L (ref 21–32)
CREAT SERPL-MCNC: 1.5 MG/DL (ref 0.6–1.1)
ECHO BSA: 2.53 M2
GFR SERPLBLD CREATININE-BSD FMLA CKD-EPI: 41 ML/MIN/{1.73_M2}
GLUCOSE SERPL-MCNC: 153 MG/DL (ref 70–99)
POTASSIUM SERPL-SCNC: 4.6 MMOL/L (ref 3.5–5.1)
SODIUM SERPL-SCNC: 141 MMOL/L (ref 136–145)

## 2024-05-22 PROCEDURE — 3051F HG A1C>EQUAL 7.0%<8.0%: CPT | Performed by: FAMILY MEDICINE

## 2024-05-22 PROCEDURE — 99214 OFFICE O/P EST MOD 30 MIN: CPT | Performed by: FAMILY MEDICINE

## 2024-05-22 PROCEDURE — 3074F SYST BP LT 130 MM HG: CPT | Performed by: FAMILY MEDICINE

## 2024-05-22 PROCEDURE — 93971 EXTREMITY STUDY: CPT

## 2024-05-22 PROCEDURE — 3078F DIAST BP <80 MM HG: CPT | Performed by: FAMILY MEDICINE

## 2024-05-22 RX ORDER — OXCARBAZEPINE 150 MG/1
150 TABLET, FILM COATED ORAL 2 TIMES DAILY
Qty: 180 TABLET | Refills: 1 | Status: SHIPPED | OUTPATIENT
Start: 2024-05-22

## 2024-05-23 DIAGNOSIS — M25.462 PAIN AND SWELLING OF LEFT KNEE: ICD-10-CM

## 2024-05-23 DIAGNOSIS — M25.562 PAIN AND SWELLING OF LEFT KNEE: ICD-10-CM

## 2024-05-23 DIAGNOSIS — M25.562 LEFT KNEE PAIN, UNSPECIFIED CHRONICITY: Primary | ICD-10-CM

## 2024-05-23 LAB
EST. AVERAGE GLUCOSE BLD GHB EST-MCNC: 154.2 MG/DL
HBA1C MFR BLD: 7 %

## 2024-05-23 NOTE — PROGRESS NOTES
Portions of this chart may have been created with voice recognition software. Occasional wrong-word or \"sound-alike\" substitutions may have occurred due to the inherent limitations of voice recognition software. Read the chart carefully and recognize, using context, where these substitutions have occurred        Chief Complaint     Follow-up Chronic Condition and Leg Pain (Left leg pain behind the knee since Saturday. No recent falls or injuries. Pt has some swelling )           ASSESSMENT AND PLAN    Eugenia was seen today for follow-up chronic condition and leg pain.    Diagnoses and all orders for this visit:    Type 2 diabetes mellitus with both eyes affected by mild nonproliferative retinopathy without macular edema, with long-term current use of insulin ([facility])  -     Basic Metabolic Panel; Future  -     Hemoglobin A1C; Future    Bipolar disorder, current episode mixed, mild ([facility])  -     OXcarbazepine (TRILEPTAL) 150 MG tablet; Take 1 tablet by mouth 2 times daily  -     External Referral to Psychiatry    Pain and swelling of left lower leg  -     Vascular duplex lower extremity venous left; Future    Chronic renal insufficiency, stage 3 (moderate) ([facility])  -     AFL (Epic) - Louie Oro MD, Nephrology, [facility]         Return if symptoms worsen or fail to improve.     DISCHARGE MEDICATION LIST        Medication List            Accurate as of May 22, 2024 11:59 PM. If you have any questions, ask your nurse or doctor.                CHANGE how you take these medications      sertraline 100 MG tablet  Commonly known as: ZOLOFT  TAKE 1 AND 1/2 TABLETS BY  MOUTH DAILY  What changed:   how much to take  how to take this  when to take this            CONTINUE taking these medications      Accu-Chek Guide strip  Generic drug: blood glucose test strips  1 each by In Vitro route in the morning, at noon, in the evening, and at bedtime As needed.     aspirin 81 MG EC tablet     atorvastatin 40 MG

## 2024-05-27 DIAGNOSIS — G47.61 PERIODIC LIMB MOVEMENT DISORDER: ICD-10-CM

## 2024-05-28 RX ORDER — PRAMIPEXOLE DIHYDROCHLORIDE 0.5 MG/1
TABLET ORAL
Qty: 180 TABLET | Refills: 3 | OUTPATIENT
Start: 2024-05-28

## 2024-05-28 SDOH — HEALTH STABILITY: PHYSICAL HEALTH: ON AVERAGE, HOW MANY DAYS PER WEEK DO YOU ENGAGE IN MODERATE TO STRENUOUS EXERCISE (LIKE A BRISK WALK)?: 0 DAYS

## 2024-05-29 ENCOUNTER — OFFICE VISIT (OUTPATIENT)
Dept: ORTHOPEDIC SURGERY | Age: 54
End: 2024-05-29
Payer: COMMERCIAL

## 2024-05-29 VITALS — HEIGHT: 66 IN | BODY MASS INDEX: 47.09 KG/M2 | WEIGHT: 293 LBS

## 2024-05-29 DIAGNOSIS — M25.562 ACUTE PAIN OF LEFT KNEE: Primary | ICD-10-CM

## 2024-05-29 DIAGNOSIS — M17.12 LOCALIZED OSTEOARTHRITIS OF LEFT KNEE: ICD-10-CM

## 2024-05-29 PROCEDURE — 20611 DRAIN/INJ JOINT/BURSA W/US: CPT | Performed by: PHYSICIAN ASSISTANT

## 2024-05-29 PROCEDURE — 99214 OFFICE O/P EST MOD 30 MIN: CPT | Performed by: PHYSICIAN ASSISTANT

## 2024-05-29 RX ORDER — LIDOCAINE HYDROCHLORIDE 10 MG/ML
5 INJECTION, SOLUTION INFILTRATION; PERINEURAL ONCE
Status: COMPLETED | OUTPATIENT
Start: 2024-05-29 | End: 2024-05-29

## 2024-05-29 RX ORDER — BUPIVACAINE HYDROCHLORIDE 2.5 MG/ML
30 INJECTION, SOLUTION INFILTRATION; PERINEURAL ONCE
Status: COMPLETED | OUTPATIENT
Start: 2024-05-29 | End: 2024-05-29

## 2024-05-29 RX ORDER — TRIAMCINOLONE ACETONIDE 40 MG/ML
40 INJECTION, SUSPENSION INTRA-ARTICULAR; INTRAMUSCULAR ONCE
Status: COMPLETED | OUTPATIENT
Start: 2024-05-29 | End: 2024-05-29

## 2024-05-29 RX ADMIN — BUPIVACAINE HYDROCHLORIDE 75 MG: 2.5 INJECTION, SOLUTION INFILTRATION; PERINEURAL at 08:16

## 2024-05-29 RX ADMIN — LIDOCAINE HYDROCHLORIDE 5 ML: 10 INJECTION, SOLUTION INFILTRATION; PERINEURAL at 08:16

## 2024-05-29 RX ADMIN — TRIAMCINOLONE ACETONIDE 40 MG: 40 INJECTION, SUSPENSION INTRA-ARTICULAR; INTRAMUSCULAR at 08:16

## 2024-05-29 NOTE — PROGRESS NOTES
Dr Bib Enriquez      Date /Time 5/29/2024       7:55 AM EDT  Name Eugenia Edwards             1970   Location  [facility]  MRN [id number]                Chief Complaint   Patient presents with    Knee Pain     Np Left Knee        History of Present Illness  Eugenia Edwards is a 53 y.o. female who presents with  left knee pain, .    Sent in consultation by Mami Edwards MD, .      Injury Mechanism:  none.  Worker's Comp. & legal issues:   none.  Previous Treatments: Ice, Heat, and NSAIDs    Patient presents the office today for a new problem.  Patient here with a chief complaint of left knee pain.  Patient has had pain for approximately a week no specific injury or trauma.  Patient's pain symptoms are concentrated more posteriorly.  She is a diabetic.  She also has been having altered kidney functions and has an appointment with a nephrologist this week.    Past History  Past Medical History:   Diagnosis Date    Anxiety     Bipolar 1 disorder (HCC)     Chronic gastric ulcer without hemorrhage and without perforation     CTS (carpal tunnel syndrome) 02/10/2015    Depression     Diabetes mellitus (HCC)     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 ([facility]) 01/29/2016    DUB (dysfunctional uterine bleeding) 08/22/2019    ETD (eustachian tube dysfunction) 05/16/2014    Herpes simplex     Lumbar strain 04/19/2017    Meniscus degeneration, right     PCOS (polycystic ovarian syndrome)     Sleep apnea     TMJ syndrome      Past Surgical History:   Procedure Laterality Date    ENDOSCOPY, COLON, DIAGNOSTIC      FINGER TRIGGER RELEASE Right 06/12/2020    RIGHT INDEX FINGER TRIGGER FINGER RELEASE performed by Jg Subramanian MD at [facility] OR    FINGER TRIGGER RELEASE Left 04/01/2022    LEFT LONG FINGER A1 PULLEY RELEASE, LEFT CARPAL TUNNEL RELEASE performed by Jg Subramanian MD at [facility] OR    HAND SURGERY Right 07/01/2020    INCISION AND DRAINAGE OF RIGHT HAND WOUND; SECONDARY CLOSURE OF WOUND performed by Jg BRISENO

## 2024-05-30 DIAGNOSIS — G47.61 PERIODIC LIMB MOVEMENT DISORDER: ICD-10-CM

## 2024-05-31 RX ORDER — PRAMIPEXOLE DIHYDROCHLORIDE 0.5 MG/1
TABLET ORAL
Qty: 180 TABLET | Refills: 3 | Status: SHIPPED | OUTPATIENT
Start: 2024-05-31

## 2024-06-03 RX ORDER — GABAPENTIN 300 MG/1
CAPSULE ORAL
Qty: 180 CAPSULE | Refills: 1 | Status: SHIPPED | OUTPATIENT
Start: 2024-06-03 | End: 2024-11-30

## 2024-06-03 RX ORDER — LISINOPRIL 10 MG/1
10 TABLET ORAL DAILY
Qty: 90 TABLET | Refills: 1 | Status: SHIPPED | OUTPATIENT
Start: 2024-06-03

## 2024-06-06 DIAGNOSIS — E11.3293 TYPE 2 DIABETES MELLITUS WITH BOTH EYES AFFECTED BY MILD NONPROLIFERATIVE RETINOPATHY WITHOUT MACULAR EDEMA, WITHOUT LONG-TERM CURRENT USE OF INSULIN (HCC): ICD-10-CM

## 2024-06-07 RX ORDER — INSULIN GLARGINE 300 U/ML
INJECTION, SOLUTION SUBCUTANEOUS
Qty: 24 ML | Refills: 3 | Status: SHIPPED | OUTPATIENT
Start: 2024-06-07

## 2024-06-16 SDOH — HEALTH STABILITY: PHYSICAL HEALTH: ON AVERAGE, HOW MANY DAYS PER WEEK DO YOU ENGAGE IN MODERATE TO STRENUOUS EXERCISE (LIKE A BRISK WALK)?: 0 DAYS

## 2024-06-17 ENCOUNTER — OFFICE VISIT (OUTPATIENT)
Dept: FAMILY MEDICINE CLINIC | Age: 54
End: 2024-06-17
Payer: COMMERCIAL

## 2024-06-17 VITALS
SYSTOLIC BLOOD PRESSURE: 110 MMHG | BODY MASS INDEX: 49.74 KG/M2 | OXYGEN SATURATION: 97 % | HEART RATE: 82 BPM | WEIGHT: 293 LBS | DIASTOLIC BLOOD PRESSURE: 68 MMHG

## 2024-06-17 DIAGNOSIS — Z79.4 TYPE 2 DIABETES MELLITUS WITH BOTH EYES AFFECTED BY MILD NONPROLIFERATIVE RETINOPATHY WITHOUT MACULAR EDEMA, WITH LONG-TERM CURRENT USE OF INSULIN (HCC): ICD-10-CM

## 2024-06-17 DIAGNOSIS — F31.61 BIPOLAR DISORDER, CURRENT EPISODE MIXED, MILD (HCC): ICD-10-CM

## 2024-06-17 DIAGNOSIS — I10 ESSENTIAL HYPERTENSION: Primary | ICD-10-CM

## 2024-06-17 DIAGNOSIS — E78.00 PURE HYPERCHOLESTEROLEMIA: Chronic | ICD-10-CM

## 2024-06-17 DIAGNOSIS — N18.30 CHRONIC RENAL INSUFFICIENCY, STAGE 3 (MODERATE) (HCC): ICD-10-CM

## 2024-06-17 DIAGNOSIS — E11.3293 TYPE 2 DIABETES MELLITUS WITH BOTH EYES AFFECTED BY MILD NONPROLIFERATIVE RETINOPATHY WITHOUT MACULAR EDEMA, WITH LONG-TERM CURRENT USE OF INSULIN (HCC): ICD-10-CM

## 2024-06-17 PROCEDURE — 3051F HG A1C>EQUAL 7.0%<8.0%: CPT | Performed by: STUDENT IN AN ORGANIZED HEALTH CARE EDUCATION/TRAINING PROGRAM

## 2024-06-17 PROCEDURE — 3078F DIAST BP <80 MM HG: CPT | Performed by: STUDENT IN AN ORGANIZED HEALTH CARE EDUCATION/TRAINING PROGRAM

## 2024-06-17 PROCEDURE — 99213 OFFICE O/P EST LOW 20 MIN: CPT | Performed by: STUDENT IN AN ORGANIZED HEALTH CARE EDUCATION/TRAINING PROGRAM

## 2024-06-17 PROCEDURE — 3074F SYST BP LT 130 MM HG: CPT | Performed by: STUDENT IN AN ORGANIZED HEALTH CARE EDUCATION/TRAINING PROGRAM

## 2024-06-17 NOTE — PROGRESS NOTES
Chief Complaint   Patient presents with    New Patient          HPI: Eugenia Edwards is a 53 y.o. female who presents for New Patient     #HTN  #CKD  #DM2  #HLD  #Bipolar 1  -Currently on atorvastatin 40 mg nightly, Farxiga 10 mg daily, gabapentin q. nightly, Lantus 74 units daily, Mounjaro 5 mg q. Weekly, NovoLog sliding scale daily, labs below, last A1c 7.0%, also on lisinopril 10 mg daily  -Has a history of bipolar, referral placed to psychiatry at last visit, on Vraylar, Pramiopexole, pt taking meds as prescribed    -BP today 110/68, recently followed with Nephro, has more labs and workup pending  -Has not had a chance to see Psych yet, had followed in past but that provider, denies recent karen or SI or HI   - On Mounjaro, forgets to take sometimes, doing well on this without side effects       Lab Results   Component Value Date    CREATININE 1.5 (H) 05/22/2024    BUN 27 (H) 05/22/2024     05/22/2024    K 4.6 05/22/2024     05/22/2024    CO2 25 05/22/2024        Hemoglobin A1C   Date Value Ref Range Status   05/22/2024 7.0 See comment % Final     Comment:     Comment:  Diagnosis of Diabetes: > or = 6.5%  Increased risk of diabetes (Prediabetes): 5.7-6.4%  Glycemic Control: Nonpregnant Adults: <7.0%                    Pregnant: <6.0%            Lab Results   Component Value Date    CHOL 111 02/28/2024    TRIG 122 02/28/2024    HDL 46 02/28/2024    LDL 41 02/28/2024    VLDL 24 02/28/2024        Past Medical History:   Diagnosis Date    Anxiety     Bipolar 1 disorder (HCC)     Chronic gastric ulcer without hemorrhage and without perforation     CTS (carpal tunnel syndrome) 02/10/2015    Depression     Diabetes mellitus (HCC)     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) 01/29/2016    DUB (dysfunctional uterine bleeding) 08/22/2019    ETD (eustachian tube dysfunction) 05/16/2014    Herpes simplex     Lumbar strain 04/19/2017    Meniscus degeneration, right     PCOS (polycystic ovarian

## 2024-06-26 DIAGNOSIS — F41.8 DEPRESSION WITH ANXIETY: Chronic | ICD-10-CM

## 2024-06-26 RX ORDER — BUPROPION HYDROCHLORIDE 300 MG/1
300 TABLET ORAL EVERY MORNING
Qty: 90 TABLET | Refills: 1 | Status: SHIPPED | OUTPATIENT
Start: 2024-06-26

## 2024-07-10 ENCOUNTER — OFFICE VISIT (OUTPATIENT)
Dept: ORTHOPEDIC SURGERY | Age: 54
End: 2024-07-10
Payer: COMMERCIAL

## 2024-07-10 VITALS — HEIGHT: 66 IN | WEIGHT: 293 LBS | BODY MASS INDEX: 47.09 KG/M2

## 2024-07-10 DIAGNOSIS — M17.12 LOCALIZED OSTEOARTHRITIS OF LEFT KNEE: Primary | ICD-10-CM

## 2024-07-10 PROCEDURE — 99213 OFFICE O/P EST LOW 20 MIN: CPT | Performed by: PHYSICIAN ASSISTANT

## 2024-07-10 NOTE — PROGRESS NOTES
quad and hamstring strengthening exercises directed by myself.  Patient will follow-up in office once symptoms return.    I discussed with Eugenia Edwards that her history, symptoms, signs, and imaging are most consistent with knee arthritis.    We reviewed the natural history of these conditions and treatment options ranging from conservative measures (rest, icing, activity modification, physical therapy, pain meds) to surgical options.     In terms of treatment, I recommended continuing with rest, icing, avoidance of painful activities, NSAIDs or pain meds as tolerated, and physical therapy.     We discussed surgical options as well, should conservative measures fail.     Electronically signed by Doug Santiago PA-C on 7/10/2024 at 7:48 AM  This dictation was generated by voice recognition computer software.  Although all attempts are made to edit the dictation for accuracy, there may be errors in the transcription that are not intended.

## 2024-07-17 ENCOUNTER — OFFICE VISIT (OUTPATIENT)
Dept: FAMILY MEDICINE CLINIC | Age: 54
End: 2024-07-17
Payer: COMMERCIAL

## 2024-07-17 VITALS
WEIGHT: 293 LBS | HEART RATE: 80 BPM | OXYGEN SATURATION: 95 % | BODY MASS INDEX: 48.59 KG/M2 | SYSTOLIC BLOOD PRESSURE: 108 MMHG | DIASTOLIC BLOOD PRESSURE: 78 MMHG

## 2024-07-17 DIAGNOSIS — Z79.4 TYPE 2 DIABETES MELLITUS WITH BOTH EYES AFFECTED BY MILD NONPROLIFERATIVE RETINOPATHY WITHOUT MACULAR EDEMA, WITH LONG-TERM CURRENT USE OF INSULIN (HCC): ICD-10-CM

## 2024-07-17 DIAGNOSIS — Z00.00 HEALTHCARE MAINTENANCE: Primary | ICD-10-CM

## 2024-07-17 DIAGNOSIS — E11.3293 TYPE 2 DIABETES MELLITUS WITH BOTH EYES AFFECTED BY MILD NONPROLIFERATIVE RETINOPATHY WITHOUT MACULAR EDEMA, WITH LONG-TERM CURRENT USE OF INSULIN (HCC): ICD-10-CM

## 2024-07-17 PROCEDURE — 99213 OFFICE O/P EST LOW 20 MIN: CPT | Performed by: STUDENT IN AN ORGANIZED HEALTH CARE EDUCATION/TRAINING PROGRAM

## 2024-07-17 PROCEDURE — 3074F SYST BP LT 130 MM HG: CPT | Performed by: STUDENT IN AN ORGANIZED HEALTH CARE EDUCATION/TRAINING PROGRAM

## 2024-07-17 PROCEDURE — 3078F DIAST BP <80 MM HG: CPT | Performed by: STUDENT IN AN ORGANIZED HEALTH CARE EDUCATION/TRAINING PROGRAM

## 2024-07-17 PROCEDURE — 99396 PREV VISIT EST AGE 40-64: CPT | Performed by: STUDENT IN AN ORGANIZED HEALTH CARE EDUCATION/TRAINING PROGRAM

## 2024-07-17 RX ORDER — TIRZEPATIDE 7.5 MG/.5ML
7.5 INJECTION, SOLUTION SUBCUTANEOUS WEEKLY
Qty: 5 ML | Refills: 1 | Status: SHIPPED | OUTPATIENT
Start: 2024-07-17

## 2024-07-17 NOTE — PROGRESS NOTES
to palpation, non-distended   Neuro: Grossly intact, no focal deficits  MSK: Moves all extremities spontaneously, no acute joint swelling  Skin: Warm and dry, no acute lesions  Psych: Calm, able to answer questions and follow commands   : No external vaginal lesions seen, speculum inserted without difficulty, cervix visualized, had thin, white physiologic fluid in vaginal vault, sample taken of Pap smear without difficulty, no residual bleeding, no other lesions seen in vaginal vault, procedure chaperoned by SAGAR Nava        1. Healthcare maintenance  -Pap smear sample taken today, will follow-up result, rest of health maintenance up-to-date per HPI    2. Type 2 diabetes mellitus with both eyes affected by mild nonproliferative retinopathy without macular edema, with long-term current use of insulin ([facility])  -Tolerating dose of Mounjaro 5 mg q. weekly well, will up to 7.5 mg q. weekly and follow-up within 6 weeks to help with further weight loss  - Tirzepatide (MOUNJARO) 7.5 MG/0.5ML SOPN SC injection; Inject 0.5 mLs into the skin once a week  Dispense: 5 mL; Refill: 1       Return in about 6 weeks (around 8/28/2024) for Follow up .       This note was at least partially created using voice recognition software and may contain speech and/or medical errors.  For any questions please contact me directly  Leon Nieto MD

## 2024-07-19 LAB
HPV HR 12 DNA SPEC QL NAA+PROBE: NOT DETECTED
HPV16 DNA SPEC QL NAA+PROBE: NOT DETECTED
HPV16+18+H RISK 12 DNA SPEC-IMP: NORMAL
HPV18 DNA SPEC QL NAA+PROBE: NOT DETECTED

## 2024-07-22 DIAGNOSIS — E78.00 PURE HYPERCHOLESTEROLEMIA: Chronic | ICD-10-CM

## 2024-07-22 DIAGNOSIS — J45.20 MILD INTERMITTENT ASTHMA WITHOUT COMPLICATION: ICD-10-CM

## 2024-07-22 RX ORDER — ATORVASTATIN CALCIUM 40 MG/1
40 TABLET, FILM COATED ORAL DAILY
Qty: 90 TABLET | Refills: 3 | Status: SHIPPED | OUTPATIENT
Start: 2024-07-22

## 2024-07-22 RX ORDER — BUDESONIDE AND FORMOTEROL FUMARATE DIHYDRATE 80; 4.5 UG/1; UG/1
2 AEROSOL RESPIRATORY (INHALATION) 2 TIMES DAILY
Qty: 3 EACH | Refills: 3 | Status: SHIPPED | OUTPATIENT
Start: 2024-07-22

## 2024-07-22 NOTE — TELEPHONE ENCOUNTER
Requested Prescriptions     Pending Prescriptions Disp Refills    budesonide-formoterol (SYMBICORT) 80-4.5 MCG/ACT AERO 3 each 3     Sig: Inhale 2 puffs into the lungs 2 times daily     LOV 7.17.24  FOV 8.28.24    
29-Aug-2021 17:51

## 2024-07-22 NOTE — TELEPHONE ENCOUNTER
Requested Prescriptions     Pending Prescriptions Disp Refills    atorvastatin (LIPITOR) 40 MG tablet 90 tablet 3     Sig: Take 1 tablet by mouth daily         Last OV: 8/28/2023    Last labs: 2/28/2024     F/u: Visit date not found

## 2024-07-30 RX ORDER — HYDROXYZINE HYDROCHLORIDE 25 MG/1
25 TABLET, FILM COATED ORAL EVERY 6 HOURS PRN
Qty: 360 TABLET | Refills: 0 | Status: SHIPPED | OUTPATIENT
Start: 2024-07-30

## 2024-08-15 DIAGNOSIS — E11.3293 TYPE 2 DIABETES MELLITUS WITH BOTH EYES AFFECTED BY MILD NONPROLIFERATIVE RETINOPATHY WITHOUT MACULAR EDEMA, WITHOUT LONG-TERM CURRENT USE OF INSULIN (HCC): ICD-10-CM

## 2024-08-15 RX ORDER — INSULIN ASPART 100 [IU]/ML
INJECTION, SOLUTION INTRAVENOUS; SUBCUTANEOUS
Qty: 30 ML | Refills: 2 | Status: SHIPPED | OUTPATIENT
Start: 2024-08-15

## 2024-08-28 ENCOUNTER — OFFICE VISIT (OUTPATIENT)
Dept: FAMILY MEDICINE CLINIC | Age: 54
End: 2024-08-28
Payer: COMMERCIAL

## 2024-08-28 VITALS
TEMPERATURE: 97.6 F | HEART RATE: 79 BPM | DIASTOLIC BLOOD PRESSURE: 58 MMHG | SYSTOLIC BLOOD PRESSURE: 128 MMHG | WEIGHT: 293 LBS | HEIGHT: 66 IN | OXYGEN SATURATION: 96 % | BODY MASS INDEX: 47.09 KG/M2 | RESPIRATION RATE: 16 BRPM

## 2024-08-28 DIAGNOSIS — E11.3293 TYPE 2 DIABETES MELLITUS WITH BOTH EYES AFFECTED BY MILD NONPROLIFERATIVE RETINOPATHY WITHOUT MACULAR EDEMA, WITH LONG-TERM CURRENT USE OF INSULIN (HCC): Primary | ICD-10-CM

## 2024-08-28 DIAGNOSIS — I10 ESSENTIAL HYPERTENSION: ICD-10-CM

## 2024-08-28 DIAGNOSIS — E11.3293 TYPE 2 DIABETES MELLITUS WITH BOTH EYES AFFECTED BY MILD NONPROLIFERATIVE RETINOPATHY WITHOUT MACULAR EDEMA, WITH LONG-TERM CURRENT USE OF INSULIN (HCC): ICD-10-CM

## 2024-08-28 DIAGNOSIS — Z79.4 TYPE 2 DIABETES MELLITUS WITH BOTH EYES AFFECTED BY MILD NONPROLIFERATIVE RETINOPATHY WITHOUT MACULAR EDEMA, WITH LONG-TERM CURRENT USE OF INSULIN (HCC): Primary | ICD-10-CM

## 2024-08-28 DIAGNOSIS — Z79.4 TYPE 2 DIABETES MELLITUS WITH BOTH EYES AFFECTED BY MILD NONPROLIFERATIVE RETINOPATHY WITHOUT MACULAR EDEMA, WITH LONG-TERM CURRENT USE OF INSULIN (HCC): ICD-10-CM

## 2024-08-28 PROCEDURE — 3074F SYST BP LT 130 MM HG: CPT | Performed by: STUDENT IN AN ORGANIZED HEALTH CARE EDUCATION/TRAINING PROGRAM

## 2024-08-28 PROCEDURE — 3051F HG A1C>EQUAL 7.0%<8.0%: CPT | Performed by: STUDENT IN AN ORGANIZED HEALTH CARE EDUCATION/TRAINING PROGRAM

## 2024-08-28 PROCEDURE — 3078F DIAST BP <80 MM HG: CPT | Performed by: STUDENT IN AN ORGANIZED HEALTH CARE EDUCATION/TRAINING PROGRAM

## 2024-08-28 PROCEDURE — 99213 OFFICE O/P EST LOW 20 MIN: CPT | Performed by: STUDENT IN AN ORGANIZED HEALTH CARE EDUCATION/TRAINING PROGRAM

## 2024-08-28 RX ORDER — LAMOTRIGINE 100 MG/1
100 TABLET ORAL DAILY
COMMUNITY

## 2024-08-28 RX ORDER — ARIPIPRAZOLE 5 MG/1
TABLET ORAL
COMMUNITY
Start: 2024-08-21

## 2024-08-28 NOTE — PROGRESS NOTES
Chief Complaint   Patient presents with    Follow-up    Diabetes     Glucose this am was 112, has Dunia on          HPI: Eugenia Edwards is a 54 y.o. female who presents for DM2    #DM2  #HTN  -Is on Farxiga 10 mg daily, NovoLog sliding scale, glargine 74 units daily, lisinopril 10 mg daily, Mounjaro increased to 7.5 mg q. weekly at last visit, states BG in the AM are in the 110-130 range, denies side effects of med. Denies LH, dizziness, diastolic blood pressures in the past have been borderline   - Due for repeat A1c      Lab Results   Component Value Date    CREATININE 1.6 (H) 07/26/2024    BUN 31 (H) 07/26/2024     07/26/2024    K 4.4 07/26/2024     07/26/2024    CO2 27 07/26/2024      Hemoglobin A1C   Date Value Ref Range Status   05/22/2024 7.0 See comment % Final     Comment:     Comment:  Diagnosis of Diabetes: > or = 6.5%  Increased risk of diabetes (Prediabetes): 5.7-6.4%  Glycemic Control: Nonpregnant Adults: <7.0%                    Pregnant: <6.0%            Past Medical History:   Diagnosis Date    Anxiety     Bipolar 1 disorder (HCC)     Chronic gastric ulcer without hemorrhage and without perforation     CTS (carpal tunnel syndrome) 02/10/2015    Depression     Diabetes mellitus (HCC)     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 RELEASE performed by Jg Subramanian MD at [facility] OR    FINGER TRIGGER RELEASE Left 04/01/2022    LEFT LONG FINGER A1 PULLEY RELEASE, LEFT CARPAL TUNNEL RELEASE performed by Jg Subramanian MD at [facility] OR    HAND SURGERY Right

## 2024-08-29 LAB
ALBUMIN SERPL-MCNC: 4.3 G/DL (ref 3.4–5)
ALBUMIN/GLOB SERPL: 1.6 {RATIO} (ref 1.1–2.2)
ALP SERPL-CCNC: 80 U/L (ref 40–129)
ALT SERPL-CCNC: 39 U/L (ref 10–40)
ANION GAP SERPL CALCULATED.3IONS-SCNC: 13 MMOL/L (ref 3–16)
AST SERPL-CCNC: 23 U/L (ref 15–37)
BILIRUB SERPL-MCNC: 0.4 MG/DL (ref 0–1)
BUN SERPL-MCNC: 28 MG/DL (ref 7–20)
CALCIUM SERPL-MCNC: 9.2 MG/DL (ref 8.3–10.6)
CHLORIDE SERPL-SCNC: 104 MMOL/L (ref 99–110)
CO2 SERPL-SCNC: 26 MMOL/L (ref 21–32)
CREAT SERPL-MCNC: 1.7 MG/DL (ref 0.6–1.1)
EST. AVERAGE GLUCOSE BLD GHB EST-MCNC: 134.1 MG/DL
GFR SERPLBLD CREATININE-BSD FMLA CKD-EPI: 35 ML/MIN/{1.73_M2}
GLUCOSE SERPL-MCNC: 118 MG/DL (ref 70–99)
HBA1C MFR BLD: 6.3 %
POTASSIUM SERPL-SCNC: 4.3 MMOL/L (ref 3.5–5.1)
PROT SERPL-MCNC: 7 G/DL (ref 6.4–8.2)
SODIUM SERPL-SCNC: 143 MMOL/L (ref 136–145)

## 2024-09-16 ENCOUNTER — TELEPHONE (OUTPATIENT)
Dept: ADMINISTRATIVE | Age: 54
End: 2024-09-16

## 2024-09-16 DIAGNOSIS — E11.3293 TYPE 2 DIABETES MELLITUS WITH BOTH EYES AFFECTED BY MILD NONPROLIFERATIVE RETINOPATHY WITHOUT MACULAR EDEMA, WITH LONG-TERM CURRENT USE OF INSULIN (HCC): Primary | ICD-10-CM

## 2024-09-16 DIAGNOSIS — Z79.4 TYPE 2 DIABETES MELLITUS WITH BOTH EYES AFFECTED BY MILD NONPROLIFERATIVE RETINOPATHY WITHOUT MACULAR EDEMA, WITH LONG-TERM CURRENT USE OF INSULIN (HCC): Primary | ICD-10-CM

## 2024-09-16 NOTE — TELEPHONE ENCOUNTER
Submitted PA for Mounjaro 7.5MG/0.5ML pen-injectors  Via CMM Key: [id number] STATUS: PENDING.    Follow up done daily; if no decision with in three days we will refax.  If another three days goes by with no decision will call the insurance for status.

## 2024-09-17 NOTE — TELEPHONE ENCOUNTER
APPROVAL for Mounjaro 7.5MG/0.5ML pen-injectors through 09/16/2025; letter attached.    If this requires a response please respond to the pool ( [facility] MEDICATION PRE-AUTH).      Thank you please advise patient.

## 2024-09-17 NOTE — TELEPHONE ENCOUNTER
Left patient a message letting her know that the Mounjaro medication was approved and to contact her pharmacy to get that filled.

## 2024-12-02 ENCOUNTER — TELEPHONE (OUTPATIENT)
Dept: FAMILY MEDICINE CLINIC | Age: 54
End: 2024-12-02

## 2024-12-02 NOTE — TELEPHONE ENCOUNTER
LMOM TO CALL OFFICE BACK. MARTITA   
Pt calling in this morning requesting to speak with our . I confirmed pt's details and asked what she was calling about. Pt was originally scheduled for last Friday 11/29/24, but the office was closed which we had Lvm informing pt back in September when we were made aware her PCP would be off and the office closed. Pt said she knew that and had thought her appt was then cancelled but received an email and call reminder for the appt, so she called last Wednesday to confirm if we were closed or open. Pt claims she was told we were open, I explained it was most likely by our 3rd party call center, she agreed but said she showed up on Friday morning and we were closed. I apologized and said I would send a message, I know there is not really anything we can do.  
The patient is a 40y Male complaining of

## 2024-12-17 ENCOUNTER — TELEPHONE (OUTPATIENT)
Dept: FAMILY MEDICINE CLINIC | Age: 54
End: 2024-12-17

## 2024-12-17 DIAGNOSIS — Z79.4 TYPE 2 DIABETES MELLITUS WITH BOTH EYES AFFECTED BY MILD NONPROLIFERATIVE RETINOPATHY WITHOUT MACULAR EDEMA, WITH LONG-TERM CURRENT USE OF INSULIN (HCC): Primary | ICD-10-CM

## 2024-12-17 DIAGNOSIS — E11.3293 TYPE 2 DIABETES MELLITUS WITH BOTH EYES AFFECTED BY MILD NONPROLIFERATIVE RETINOPATHY WITHOUT MACULAR EDEMA, WITH LONG-TERM CURRENT USE OF INSULIN (HCC): Primary | ICD-10-CM

## 2024-12-17 RX ORDER — TIRZEPATIDE 7.5 MG/.5ML
7.5 INJECTION, SOLUTION SUBCUTANEOUS WEEKLY
Qty: 3 ML | Refills: 1 | Status: SHIPPED | OUTPATIENT
Start: 2024-12-17

## 2024-12-17 NOTE — TELEPHONE ENCOUNTER
LV 8/28/24 Emilia GALINDO not scheduled. Left My Chart message letting know a follow up appointment needs to be made     [facility] [address] is requesting Mounjaro 7.5mg/0.5mL pen

## 2025-01-07 ASSESSMENT — PATIENT HEALTH QUESTIONNAIRE - PHQ9
6. FEELING BAD ABOUT YOURSELF - OR THAT YOU ARE A FAILURE OR HAVE LET YOURSELF OR YOUR FAMILY DOWN: SEVERAL DAYS
1. LITTLE INTEREST OR PLEASURE IN DOING THINGS: SEVERAL DAYS
7. TROUBLE CONCENTRATING ON THINGS, SUCH AS READING THE NEWSPAPER OR WATCHING TELEVISION: SEVERAL DAYS
10. IF YOU CHECKED OFF ANY PROBLEMS, HOW DIFFICULT HAVE THESE PROBLEMS MADE IT FOR YOU TO DO YOUR WORK, TAKE CARE OF THINGS AT HOME, OR GET ALONG WITH OTHER PEOPLE: NOT DIFFICULT AT ALL
SUM OF ALL RESPONSES TO PHQ QUESTIONS 1-9: 6
2. FEELING DOWN, DEPRESSED OR HOPELESS: SEVERAL DAYS
3. TROUBLE FALLING OR STAYING ASLEEP: NOT AT ALL
7. TROUBLE CONCENTRATING ON THINGS, SUCH AS READING THE NEWSPAPER OR WATCHING TELEVISION: SEVERAL DAYS
2. FEELING DOWN, DEPRESSED OR HOPELESS: SEVERAL DAYS
SUM OF ALL RESPONSES TO PHQ QUESTIONS 1-9: 6
1. LITTLE INTEREST OR PLEASURE IN DOING THINGS: SEVERAL DAYS
8. MOVING OR SPEAKING SO SLOWLY THAT OTHER PEOPLE COULD HAVE NOTICED. OR THE OPPOSITE, BEING SO FIGETY OR RESTLESS THAT YOU HAVE BEEN MOVING AROUND A LOT MORE THAN USUAL: NOT AT ALL
SUM OF ALL RESPONSES TO PHQ QUESTIONS 1-9: 6
SUM OF ALL RESPONSES TO PHQ QUESTIONS 1-9: 6
5. POOR APPETITE OR OVEREATING: SEVERAL DAYS
4. FEELING TIRED OR HAVING LITTLE ENERGY: SEVERAL DAYS
SUM OF ALL RESPONSES TO PHQ QUESTIONS 1-9: 6
10. IF YOU CHECKED OFF ANY PROBLEMS, HOW DIFFICULT HAVE THESE PROBLEMS MADE IT FOR YOU TO DO YOUR WORK, TAKE CARE OF THINGS AT HOME, OR GET ALONG WITH OTHER PEOPLE: NOT DIFFICULT AT ALL
SUM OF ALL RESPONSES TO PHQ9 QUESTIONS 1 & 2: 2
3. TROUBLE FALLING OR STAYING ASLEEP: NOT AT ALL
9. THOUGHTS THAT YOU WOULD BE BETTER OFF DEAD, OR OF HURTING YOURSELF: NOT AT ALL
6. FEELING BAD ABOUT YOURSELF - OR THAT YOU ARE A FAILURE OR HAVE LET YOURSELF OR YOUR FAMILY DOWN: SEVERAL DAYS
4. FEELING TIRED OR HAVING LITTLE ENERGY: SEVERAL DAYS
8. MOVING OR SPEAKING SO SLOWLY THAT OTHER PEOPLE COULD HAVE NOTICED. OR THE OPPOSITE - BEING SO FIDGETY OR RESTLESS THAT YOU HAVE BEEN MOVING AROUND A LOT MORE THAN USUAL: NOT AT ALL
5. POOR APPETITE OR OVEREATING: SEVERAL DAYS
9. THOUGHTS THAT YOU WOULD BE BETTER OFF DEAD, OR OF HURTING YOURSELF: NOT AT ALL

## 2025-01-08 ENCOUNTER — OFFICE VISIT (OUTPATIENT)
Dept: FAMILY MEDICINE CLINIC | Age: 55
End: 2025-01-08

## 2025-01-08 VITALS
SYSTOLIC BLOOD PRESSURE: 126 MMHG | DIASTOLIC BLOOD PRESSURE: 68 MMHG | BODY MASS INDEX: 47.09 KG/M2 | WEIGHT: 293 LBS | HEIGHT: 66 IN | HEART RATE: 81 BPM | OXYGEN SATURATION: 96 %

## 2025-01-08 DIAGNOSIS — N18.32 STAGE 3B CHRONIC KIDNEY DISEASE (HCC): ICD-10-CM

## 2025-01-08 DIAGNOSIS — E11.3293 TYPE 2 DIABETES MELLITUS WITH BOTH EYES AFFECTED BY MILD NONPROLIFERATIVE RETINOPATHY WITHOUT MACULAR EDEMA, WITHOUT LONG-TERM CURRENT USE OF INSULIN (HCC): ICD-10-CM

## 2025-01-08 DIAGNOSIS — Z79.4 TYPE 2 DIABETES MELLITUS WITH BOTH EYES AFFECTED BY MILD NONPROLIFERATIVE RETINOPATHY WITHOUT MACULAR EDEMA, WITH LONG-TERM CURRENT USE OF INSULIN (HCC): Primary | ICD-10-CM

## 2025-01-08 DIAGNOSIS — I10 ESSENTIAL HYPERTENSION: ICD-10-CM

## 2025-01-08 DIAGNOSIS — F31.61 BIPOLAR DISORDER, CURRENT EPISODE MIXED, MILD (HCC): ICD-10-CM

## 2025-01-08 DIAGNOSIS — E11.3293 TYPE 2 DIABETES MELLITUS WITH BOTH EYES AFFECTED BY MILD NONPROLIFERATIVE RETINOPATHY WITHOUT MACULAR EDEMA, WITH LONG-TERM CURRENT USE OF INSULIN (HCC): Primary | ICD-10-CM

## 2025-01-08 DIAGNOSIS — J30.1 SEASONAL ALLERGIC RHINITIS DUE TO POLLEN: ICD-10-CM

## 2025-01-08 DIAGNOSIS — I50.9 CONGESTIVE HEART FAILURE, UNSPECIFIED HF CHRONICITY, UNSPECIFIED HEART FAILURE TYPE (HCC): ICD-10-CM

## 2025-01-08 RX ORDER — MONTELUKAST SODIUM 10 MG/1
10 TABLET ORAL DAILY
Qty: 90 TABLET | Refills: 0 | Status: SHIPPED | OUTPATIENT
Start: 2025-01-08

## 2025-01-08 RX ORDER — ACYCLOVIR 800 MG/1
1 TABLET ORAL
Qty: 6 EACH | Refills: 3 | Status: SHIPPED | OUTPATIENT
Start: 2025-01-08

## 2025-01-08 RX ORDER — INSULIN ASPART 100 [IU]/ML
30 INJECTION, SOLUTION INTRAVENOUS; SUBCUTANEOUS
Qty: 30 ML | Refills: 2 | Status: SHIPPED | OUTPATIENT
Start: 2025-01-08

## 2025-01-08 RX ORDER — INSULIN GLARGINE 300 U/ML
INJECTION, SOLUTION SUBCUTANEOUS
Qty: 24 ML | Refills: 3 | Status: SHIPPED | OUTPATIENT
Start: 2025-01-08

## 2025-01-08 SDOH — ECONOMIC STABILITY: FOOD INSECURITY: WITHIN THE PAST 12 MONTHS, YOU WORRIED THAT YOUR FOOD WOULD RUN OUT BEFORE YOU GOT MONEY TO BUY MORE.: NEVER TRUE

## 2025-01-08 SDOH — ECONOMIC STABILITY: FOOD INSECURITY: WITHIN THE PAST 12 MONTHS, THE FOOD YOU BOUGHT JUST DIDN'T LAST AND YOU DIDN'T HAVE MONEY TO GET MORE.: NEVER TRUE

## 2025-01-08 NOTE — PROGRESS NOTES
Follow up .       This note was at least partially created using voice recognition software and may contain speech and/or medical errors.  For any questions please contact me directly  Leon Nieto MD

## 2025-01-09 DIAGNOSIS — G89.29 CHRONIC PAIN OF RIGHT ANKLE: Primary | ICD-10-CM

## 2025-01-09 DIAGNOSIS — M25.571 CHRONIC PAIN OF RIGHT ANKLE: Primary | ICD-10-CM

## 2025-01-09 DIAGNOSIS — E11.9 TYPE 2 DIABETES MELLITUS WITHOUT COMPLICATION (HCC): ICD-10-CM

## 2025-01-09 RX ORDER — FLURBIPROFEN SODIUM 0.3 MG/ML
1 SOLUTION/ DROPS OPHTHALMIC DAILY
Qty: 90 EACH | Refills: 3 | Status: SHIPPED | OUTPATIENT
Start: 2025-01-09

## 2025-01-09 RX ORDER — CYCLOBENZAPRINE HCL 10 MG
TABLET ORAL
Qty: 90 TABLET | Refills: 1 | Status: SHIPPED | OUTPATIENT
Start: 2025-01-09

## 2025-01-09 RX ORDER — GABAPENTIN 300 MG/1
CAPSULE ORAL
Qty: 180 CAPSULE | Refills: 0 | Status: SHIPPED | OUTPATIENT
Start: 2025-01-09 | End: 2025-07-08

## 2025-02-17 RX ORDER — DAPAGLIFLOZIN 10 MG/1
10 TABLET, FILM COATED ORAL EVERY MORNING
Qty: 90 TABLET | Refills: 0 | Status: SHIPPED | OUTPATIENT
Start: 2025-02-17

## 2025-02-17 RX ORDER — LISINOPRIL 10 MG/1
10 TABLET ORAL DAILY
Qty: 90 TABLET | Refills: 0 | Status: SHIPPED | OUTPATIENT
Start: 2025-02-17

## 2025-02-24 DIAGNOSIS — E11.3293 TYPE 2 DIABETES MELLITUS WITH BOTH EYES AFFECTED BY MILD NONPROLIFERATIVE RETINOPATHY WITHOUT MACULAR EDEMA, WITH LONG-TERM CURRENT USE OF INSULIN (HCC): ICD-10-CM

## 2025-02-24 DIAGNOSIS — Z79.4 TYPE 2 DIABETES MELLITUS WITH BOTH EYES AFFECTED BY MILD NONPROLIFERATIVE RETINOPATHY WITHOUT MACULAR EDEMA, WITH LONG-TERM CURRENT USE OF INSULIN (HCC): ICD-10-CM

## 2025-02-24 RX ORDER — TIRZEPATIDE 7.5 MG/.5ML
7.5 INJECTION, SOLUTION SUBCUTANEOUS WEEKLY
Qty: 3 ML | Refills: 1 | Status: SHIPPED | OUTPATIENT
Start: 2025-02-24

## 2025-02-28 ENCOUNTER — TELEPHONE (OUTPATIENT)
Dept: FAMILY MEDICINE CLINIC | Age: 55
End: 2025-02-28

## 2025-02-28 NOTE — TELEPHONE ENCOUNTER
Pt feels she has a UTI  Going on for 3 days  Lower abdominal pain  No fever or chills  No pain during urination  No blood in urine  Urine is orange because she took Azo cranberry  No other symptoms per pt.

## 2025-03-03 ENCOUNTER — OFFICE VISIT (OUTPATIENT)
Dept: FAMILY MEDICINE CLINIC | Age: 55
End: 2025-03-03

## 2025-03-03 VITALS
BODY MASS INDEX: 47.09 KG/M2 | OXYGEN SATURATION: 96 % | HEART RATE: 89 BPM | WEIGHT: 293 LBS | SYSTOLIC BLOOD PRESSURE: 124 MMHG | HEIGHT: 66 IN | DIASTOLIC BLOOD PRESSURE: 64 MMHG

## 2025-03-03 DIAGNOSIS — M25.562 ACUTE PAIN OF BOTH KNEES: ICD-10-CM

## 2025-03-03 DIAGNOSIS — M25.561 ACUTE PAIN OF BOTH KNEES: ICD-10-CM

## 2025-03-03 DIAGNOSIS — R35.89 POLYURIA: Primary | ICD-10-CM

## 2025-03-03 DIAGNOSIS — Z79.4 TYPE 2 DIABETES MELLITUS WITH BOTH EYES AFFECTED BY MILD NONPROLIFERATIVE RETINOPATHY WITHOUT MACULAR EDEMA, WITH LONG-TERM CURRENT USE OF INSULIN (HCC): ICD-10-CM

## 2025-03-03 DIAGNOSIS — I10 ESSENTIAL HYPERTENSION: ICD-10-CM

## 2025-03-03 DIAGNOSIS — R35.89 POLYURIA: ICD-10-CM

## 2025-03-03 DIAGNOSIS — E11.3293 TYPE 2 DIABETES MELLITUS WITH BOTH EYES AFFECTED BY MILD NONPROLIFERATIVE RETINOPATHY WITHOUT MACULAR EDEMA, WITH LONG-TERM CURRENT USE OF INSULIN (HCC): ICD-10-CM

## 2025-03-03 RX ORDER — ARIPIPRAZOLE 2 MG/1
2 TABLET ORAL DAILY
COMMUNITY

## 2025-03-03 NOTE — PROGRESS NOTES
Chief Complaint   Patient presents with    Urinary Tract Infection     Patient c/o Uti symptoms. Urinary frequency, lower back pain and abdominal pain, no burning while urinating for 4 days.    Patient also c/o both knees swollen for 1 day. No injury           HPI: Eugenia Edwards is a 54 y.o. female who presents for Polyuria    #Polyuria  -Patient presenting today with polyuria, concerned for UTI, denies dysuria, pt with bilateral knee pain x1 day, denies injury, last A1c below, patient is on Mounjaro 7.5 mg q. weekly, lisinopril 10 mg daily, insulin glargine 74 units daily, aspart 30 units 3 times daily, Farxiga 10 mg daily, BP well controlled today. Pt states she is taking Long acting insulin at 60 units a day, states fasting BG around 140, knees have some swelling and pain, Tylenol helps it,     Lab Results   Component Value Date    CREATININE 1.7 (H) 08/28/2024    BUN 28 (H) 08/28/2024     08/28/2024    K 4.3 08/28/2024     08/28/2024    CO2 26 08/28/2024        Hemoglobin A1C   Date Value Ref Range Status   08/28/2024 6.3 See comment % Final     Comment:     Comment:  Diagnosis of Diabetes: > or = 6.5%  Increased risk of diabetes (Prediabetes): 5.7-6.4%  Glycemic Control: Nonpregnant Adults: <7.0%                    Pregnant: <6.0%            Past Medical History:   Diagnosis Date    Anxiety     Bipolar 1 disorder (HCC)     Chronic gastric ulcer without hemorrhage and without perforation     CTS (carpal tunnel syndrome) 02/10/2015    Depression     Diabetes mellitus (HCC)     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

## 2025-03-04 DIAGNOSIS — N39.0 URINARY TRACT INFECTION WITH HEMATURIA, SITE UNSPECIFIED: Primary | ICD-10-CM

## 2025-03-04 DIAGNOSIS — R31.9 URINARY TRACT INFECTION WITH HEMATURIA, SITE UNSPECIFIED: Primary | ICD-10-CM

## 2025-03-04 DIAGNOSIS — Z79.4 TYPE 2 DIABETES MELLITUS WITH BOTH EYES AFFECTED BY MILD NONPROLIFERATIVE RETINOPATHY WITHOUT MACULAR EDEMA, WITH LONG-TERM CURRENT USE OF INSULIN (HCC): Primary | ICD-10-CM

## 2025-03-04 DIAGNOSIS — E11.3293 TYPE 2 DIABETES MELLITUS WITH BOTH EYES AFFECTED BY MILD NONPROLIFERATIVE RETINOPATHY WITHOUT MACULAR EDEMA, WITH LONG-TERM CURRENT USE OF INSULIN (HCC): Primary | ICD-10-CM

## 2025-03-04 LAB
ALBUMIN SERPL-MCNC: 4.5 G/DL (ref 3.4–5)
ALBUMIN/GLOB SERPL: 1.5 {RATIO} (ref 1.1–2.2)
ALP SERPL-CCNC: 90 U/L (ref 40–129)
ALT SERPL-CCNC: 24 U/L (ref 10–40)
ANION GAP SERPL CALCULATED.3IONS-SCNC: 13 MMOL/L (ref 3–16)
AST SERPL-CCNC: 18 U/L (ref 15–37)
BACTERIA URNS QL MICRO: ABNORMAL /HPF
BILIRUB SERPL-MCNC: 0.3 MG/DL (ref 0–1)
BILIRUB UR QL STRIP.AUTO: ABNORMAL
BUDDING YEAST: PRESENT
BUN SERPL-MCNC: 21 MG/DL (ref 7–20)
CALCIUM SERPL-MCNC: 9.6 MG/DL (ref 8.3–10.6)
CHLORIDE SERPL-SCNC: 104 MMOL/L (ref 99–110)
CHOLEST SERPL-MCNC: 123 MG/DL (ref 0–199)
CLARITY UR: ABNORMAL
CO2 SERPL-SCNC: 26 MMOL/L (ref 21–32)
COLOR UR: ABNORMAL
CREAT SERPL-MCNC: 1.5 MG/DL (ref 0.6–1.1)
EPI CELLS #/AREA URNS AUTO: 13 /HPF (ref 0–5)
EST. AVERAGE GLUCOSE BLD GHB EST-MCNC: 180 MG/DL
GFR SERPLBLD CREATININE-BSD FMLA CKD-EPI: 40 ML/MIN/{1.73_M2}
GLUCOSE SERPL-MCNC: 112 MG/DL (ref 70–99)
GLUCOSE UR STRIP.AUTO-MCNC: >=1000 MG/DL
HBA1C MFR BLD: 7.9 %
HDLC SERPL-MCNC: 54 MG/DL (ref 40–60)
HGB UR QL STRIP.AUTO: NEGATIVE
HYALINE CASTS #/AREA URNS AUTO: 3 /LPF (ref 0–8)
KETONES UR STRIP.AUTO-MCNC: ABNORMAL MG/DL
LDLC SERPL CALC-MCNC: 48 MG/DL
LEUKOCYTE ESTERASE UR QL STRIP.AUTO: NEGATIVE
NITRITE UR QL STRIP.AUTO: POSITIVE
PH UR STRIP.AUTO: 5.5 [PH] (ref 5–8)
POTASSIUM SERPL-SCNC: 4.2 MMOL/L (ref 3.5–5.1)
PROT SERPL-MCNC: 7.6 G/DL (ref 6.4–8.2)
PROT UR STRIP.AUTO-MCNC: ABNORMAL MG/DL
RBC CLUMPS #/AREA URNS AUTO: 2 /HPF (ref 0–4)
SODIUM SERPL-SCNC: 143 MMOL/L (ref 136–145)
SP GR UR STRIP.AUTO: 1.04 (ref 1–1.03)
TRIGL SERPL-MCNC: 105 MG/DL (ref 0–150)
UA COMPLETE W REFLEX CULTURE PNL UR: ABNORMAL
UA DIPSTICK W REFLEX MICRO PNL UR: YES
URN SPEC COLLECT METH UR: ABNORMAL
UROBILINOGEN UR STRIP-ACNC: 1 E.U./DL
VLDLC SERPL CALC-MCNC: 21 MG/DL
WBC #/AREA URNS AUTO: 4 /HPF (ref 0–5)

## 2025-03-04 RX ORDER — SULFAMETHOXAZOLE AND TRIMETHOPRIM 800; 160 MG/1; MG/1
1 TABLET ORAL 2 TIMES DAILY
Qty: 6 TABLET | Refills: 0 | Status: SHIPPED | OUTPATIENT
Start: 2025-03-04 | End: 2025-03-07

## 2025-03-10 ENCOUNTER — TELEPHONE (OUTPATIENT)
Dept: FAMILY MEDICINE CLINIC | Age: 55
End: 2025-03-10

## 2025-03-12 NOTE — TELEPHONE ENCOUNTER
Submitted PA for Mounjaro 10MG/0.5ML auto-injectors  Via CMM Key: [id number] STATUS: PENDING.    Follow up done daily; if no decision with in three days we will refax.  If another three days goes by with no decision will call the insurance for status.

## 2025-03-13 NOTE — TELEPHONE ENCOUNTER
APPROVAL for Mounjaro 10MG/0.5ML auto-injectors through 03/12/2026; letter attached.    If this requires a response please respond to the pool ( [facility] MEDICATION PRE-AUTH).      Thank you please advise patient.

## 2025-04-06 DIAGNOSIS — J30.1 SEASONAL ALLERGIC RHINITIS DUE TO POLLEN: ICD-10-CM

## 2025-04-06 DIAGNOSIS — E11.9 TYPE 2 DIABETES MELLITUS WITHOUT COMPLICATION: ICD-10-CM

## 2025-04-07 RX ORDER — GABAPENTIN 300 MG/1
CAPSULE ORAL
Qty: 180 CAPSULE | Refills: 0 | Status: SHIPPED | OUTPATIENT
Start: 2025-04-07 | End: 2025-05-07

## 2025-04-07 RX ORDER — MONTELUKAST SODIUM 10 MG/1
10 TABLET ORAL DAILY
Qty: 90 TABLET | Refills: 0 | Status: SHIPPED | OUTPATIENT
Start: 2025-04-07

## 2025-04-18 DIAGNOSIS — E11.3293 TYPE 2 DIABETES MELLITUS WITH BOTH EYES AFFECTED BY MILD NONPROLIFERATIVE RETINOPATHY WITHOUT MACULAR EDEMA, WITH LONG-TERM CURRENT USE OF INSULIN (HCC): ICD-10-CM

## 2025-04-18 DIAGNOSIS — E11.9 TYPE 2 DIABETES MELLITUS WITHOUT COMPLICATION (HCC): ICD-10-CM

## 2025-04-18 DIAGNOSIS — M25.571 CHRONIC PAIN OF RIGHT ANKLE: ICD-10-CM

## 2025-04-18 DIAGNOSIS — G89.29 CHRONIC PAIN OF RIGHT ANKLE: ICD-10-CM

## 2025-04-18 DIAGNOSIS — Z79.4 TYPE 2 DIABETES MELLITUS WITH BOTH EYES AFFECTED BY MILD NONPROLIFERATIVE RETINOPATHY WITHOUT MACULAR EDEMA, WITH LONG-TERM CURRENT USE OF INSULIN (HCC): ICD-10-CM

## 2025-04-21 RX ORDER — CYCLOBENZAPRINE HCL 10 MG
TABLET ORAL
Qty: 90 TABLET | Refills: 0 | Status: SHIPPED | OUTPATIENT
Start: 2025-04-21

## 2025-04-21 NOTE — TELEPHONE ENCOUNTER
Left voicemail and sent My chart message with a scheduling ticket letting know a follow up needs to be made.

## 2025-04-28 ENCOUNTER — OFFICE VISIT (OUTPATIENT)
Dept: FAMILY MEDICINE CLINIC | Age: 55
End: 2025-04-28
Payer: COMMERCIAL

## 2025-04-28 VITALS
HEIGHT: 66 IN | TEMPERATURE: 97.3 F | SYSTOLIC BLOOD PRESSURE: 120 MMHG | BODY MASS INDEX: 47.09 KG/M2 | OXYGEN SATURATION: 94 % | HEART RATE: 77 BPM | WEIGHT: 293 LBS | DIASTOLIC BLOOD PRESSURE: 60 MMHG

## 2025-04-28 DIAGNOSIS — Z79.4 TYPE 2 DIABETES MELLITUS WITHOUT COMPLICATION, WITH LONG-TERM CURRENT USE OF INSULIN (HCC): Primary | ICD-10-CM

## 2025-04-28 DIAGNOSIS — E11.9 TYPE 2 DIABETES MELLITUS WITHOUT COMPLICATION, WITH LONG-TERM CURRENT USE OF INSULIN (HCC): Primary | ICD-10-CM

## 2025-04-28 DIAGNOSIS — I10 ESSENTIAL HYPERTENSION: ICD-10-CM

## 2025-04-28 PROCEDURE — 99214 OFFICE O/P EST MOD 30 MIN: CPT | Performed by: STUDENT IN AN ORGANIZED HEALTH CARE EDUCATION/TRAINING PROGRAM

## 2025-04-28 PROCEDURE — 3074F SYST BP LT 130 MM HG: CPT | Performed by: STUDENT IN AN ORGANIZED HEALTH CARE EDUCATION/TRAINING PROGRAM

## 2025-04-28 PROCEDURE — 3078F DIAST BP <80 MM HG: CPT | Performed by: STUDENT IN AN ORGANIZED HEALTH CARE EDUCATION/TRAINING PROGRAM

## 2025-04-28 PROCEDURE — 3051F HG A1C>EQUAL 7.0%<8.0%: CPT | Performed by: STUDENT IN AN ORGANIZED HEALTH CARE EDUCATION/TRAINING PROGRAM

## 2025-04-28 RX ORDER — LAMOTRIGINE 150 MG/1
150 TABLET ORAL DAILY
COMMUNITY

## 2025-04-28 RX ORDER — LISDEXAMFETAMINE DIMESYLATE 40 MG/1
40 CAPSULE ORAL EVERY MORNING
COMMUNITY
End: 2025-04-28

## 2025-04-28 RX ORDER — HYDROCHLOROTHIAZIDE 12.5 MG/1
CAPSULE ORAL
Qty: 1 EACH | Refills: 2 | Status: SHIPPED | OUTPATIENT
Start: 2025-04-28

## 2025-04-28 RX ORDER — LISDEXAMFETAMINE DIMESYLATE 30 MG/1
30 CAPSULE ORAL EVERY MORNING
COMMUNITY

## 2025-04-28 NOTE — PROGRESS NOTES
Chief Complaint   Patient presents with    Follow-up          HPI: Eugenia Edwards is a 54 y.o. female who presents for FUV      #DM2  #HTN  - Patient is on Farxiga 10 mg daily, atorvastatin 40 mg nightly, insulin aspart 30 units 3 times daily, insulin glargine 74 units daily, lisinopril 10 mg daily, Mounjaro 10 mg q. Weekly, A1c below, blood pressure well-controlled today, needs freestyle shivani 3+ reordered, states fasting blood sugars are around the 140s, tolerating current dose of Mounjaro well, denies side effects    Hemoglobin A1C   Date Value Ref Range Status   03/03/2025 7.9 See comment % Final     Comment:     Comment:  Diagnosis of Diabetes: > or = 6.5%  Increased risk of diabetes (Prediabetes): 5.7-6.4%  Glycemic Control: Nonpregnant Adults: <7.0%                    Pregnant: <6.0%            Past Medical History:   Diagnosis Date    Anxiety     Bipolar 1 disorder (HCC)     Chronic gastric ulcer without hemorrhage and without perforation     CTS (carpal tunnel syndrome) 02/10/2015    Depression     Diabetes mellitus (HCC)     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 RELEASE performed by Jg Subramanian MD at [facility] OR    FINGER TRIGGER RELEASE Left 04/01/2022    LEFT LONG FINGER A1 PULLEY RELEASE, LEFT CARPAL TUNNEL RELEASE performed by Jg Subramanian MD at [facility] OR    HAND SURGERY Right 07/01/2020    INCISION AND DRAINAGE OF RIGHT HAND WOUND; SECONDARY CLOSURE OF WOUND performed by Jg Subramanian MD at [facility] OR    OTHER SURGICAL HISTORY  05/25/2018    EGD    NM

## 2025-05-12 RX ORDER — LISINOPRIL 10 MG/1
10 TABLET ORAL DAILY
Qty: 90 TABLET | Refills: 0 | Status: SHIPPED | OUTPATIENT
Start: 2025-05-12

## 2025-05-12 RX ORDER — DAPAGLIFLOZIN 10 MG/1
10 TABLET, FILM COATED ORAL EVERY MORNING
Qty: 90 TABLET | Refills: 0 | Status: SHIPPED | OUTPATIENT
Start: 2025-05-12

## 2025-05-28 ENCOUNTER — OFFICE VISIT (OUTPATIENT)
Dept: FAMILY MEDICINE CLINIC | Age: 55
End: 2025-05-28
Payer: COMMERCIAL

## 2025-05-28 VITALS
WEIGHT: 293 LBS | HEIGHT: 66 IN | TEMPERATURE: 97.7 F | DIASTOLIC BLOOD PRESSURE: 60 MMHG | BODY MASS INDEX: 47.09 KG/M2 | OXYGEN SATURATION: 98 % | HEART RATE: 88 BPM | SYSTOLIC BLOOD PRESSURE: 110 MMHG

## 2025-05-28 DIAGNOSIS — E11.9 TYPE 2 DIABETES MELLITUS WITHOUT COMPLICATION, WITH LONG-TERM CURRENT USE OF INSULIN (HCC): ICD-10-CM

## 2025-05-28 DIAGNOSIS — Z00.00 HEALTHCARE MAINTENANCE: Primary | ICD-10-CM

## 2025-05-28 DIAGNOSIS — Z12.11 COLON CANCER SCREENING: ICD-10-CM

## 2025-05-28 DIAGNOSIS — Z12.31 BREAST CANCER SCREENING BY MAMMOGRAM: ICD-10-CM

## 2025-05-28 DIAGNOSIS — Z79.4 TYPE 2 DIABETES MELLITUS WITHOUT COMPLICATION, WITH LONG-TERM CURRENT USE OF INSULIN (HCC): ICD-10-CM

## 2025-05-28 PROCEDURE — 3078F DIAST BP <80 MM HG: CPT | Performed by: STUDENT IN AN ORGANIZED HEALTH CARE EDUCATION/TRAINING PROGRAM

## 2025-05-28 PROCEDURE — 99396 PREV VISIT EST AGE 40-64: CPT | Performed by: STUDENT IN AN ORGANIZED HEALTH CARE EDUCATION/TRAINING PROGRAM

## 2025-05-28 PROCEDURE — 3074F SYST BP LT 130 MM HG: CPT | Performed by: STUDENT IN AN ORGANIZED HEALTH CARE EDUCATION/TRAINING PROGRAM

## 2025-05-28 NOTE — PROGRESS NOTES
97.7 °F (36.5 °C)   TempSrc: Temporal   SpO2: 98%   Weight: 135.4 kg (298 lb 9.6 oz)   Height: 1.676 m (5' 6\")      Body mass index is 48.2 kg/m².   Physical Exam     General: Alert and oriented, cooperative and in no acute distress  Eyes: Clear sclera bl  HEENT: MMM  Cardio: S1, S2 heard, RRR, no murmurs appreciated  Resp: No acte respiratory distress, symmetric chest expansion,  CTAB  Abdomen: Soft, non-tender to palpation, non-distended   Neuro: Grossly intact, no focal deficits  MSK: Moves all extremities spontaneously, no acute joint swelling  Skin: Warm and dry, no acute lesions  Psych: Calm, able to answer questions and follow commands       1. Healthcare maintenance  -Patient states childhood vaccines are up-to-date, due for colon cancer screening, denies family history of colon cancer, patient like to opt for Cologuard, due for mammogram, will order, Pap smear done last year, rest of health maintenance up-to-date per HPI    2. Colon cancer screening  - Fecal DNA Colorectal cancer screening (Cologuard)    3. Breast cancer screening by mammogram  - [facility] DIGITAL SCREEN W OR WO CAD BILATERAL; Future    4. Type 2 diabetes mellitus without complication, with long-term current use of insulin (HCC)  -Chronic, stable, will place referral to podiatry, will get updated A1c once patient has been on new increased dose of Mounjaro for 1 month and follow-up then   - [facility] - Julisa Rajput DPM, Podiatry, [facility]  - Hemoglobin A1C; Future       Return in about 4 weeks (around 6/25/2025) for Follow up diabetes .       This note was at least partially created using voice recognition software and may contain speech and/or medical errors.  For any questions please contact me directly  Leon Nieto MD

## 2025-06-06 DIAGNOSIS — E11.3293 TYPE 2 DIABETES MELLITUS WITH BOTH EYES AFFECTED BY MILD NONPROLIFERATIVE RETINOPATHY WITHOUT MACULAR EDEMA, WITHOUT LONG-TERM CURRENT USE OF INSULIN (HCC): ICD-10-CM

## 2025-06-06 DIAGNOSIS — M25.571 CHRONIC PAIN OF RIGHT ANKLE: ICD-10-CM

## 2025-06-06 DIAGNOSIS — G89.29 CHRONIC PAIN OF RIGHT ANKLE: ICD-10-CM

## 2025-06-06 DIAGNOSIS — E11.9 TYPE 2 DIABETES MELLITUS WITHOUT COMPLICATION (HCC): ICD-10-CM

## 2025-06-06 RX ORDER — INSULIN ASPART 100 [IU]/ML
INJECTION, SOLUTION INTRAVENOUS; SUBCUTANEOUS
Qty: 30 ML | Refills: 0 | Status: SHIPPED | OUTPATIENT
Start: 2025-06-06

## 2025-06-06 RX ORDER — CYCLOBENZAPRINE HCL 10 MG
TABLET ORAL
Qty: 90 TABLET | Refills: 0 | Status: SHIPPED | OUTPATIENT
Start: 2025-06-06

## 2025-06-15 DIAGNOSIS — Z79.4 TYPE 2 DIABETES MELLITUS WITHOUT COMPLICATION, WITH LONG-TERM CURRENT USE OF INSULIN (HCC): ICD-10-CM

## 2025-06-15 DIAGNOSIS — E11.9 TYPE 2 DIABETES MELLITUS WITHOUT COMPLICATION, WITH LONG-TERM CURRENT USE OF INSULIN (HCC): ICD-10-CM

## 2025-06-16 RX ORDER — TIRZEPATIDE 12.5 MG/.5ML
INJECTION, SOLUTION SUBCUTANEOUS
Qty: 2 ML | Refills: 0 | Status: SHIPPED | OUTPATIENT
Start: 2025-06-16 | End: 2025-06-18

## 2025-06-18 ENCOUNTER — OFFICE VISIT (OUTPATIENT)
Dept: FAMILY MEDICINE CLINIC | Age: 55
End: 2025-06-18

## 2025-06-18 VITALS
SYSTOLIC BLOOD PRESSURE: 122 MMHG | WEIGHT: 293 LBS | HEART RATE: 78 BPM | DIASTOLIC BLOOD PRESSURE: 84 MMHG | OXYGEN SATURATION: 94 % | BODY MASS INDEX: 48.87 KG/M2

## 2025-06-18 DIAGNOSIS — L98.9 SKIN LESION: ICD-10-CM

## 2025-06-18 DIAGNOSIS — G47.61 PERIODIC LIMB MOVEMENT DISORDER: ICD-10-CM

## 2025-06-18 DIAGNOSIS — I10 ESSENTIAL HYPERTENSION: ICD-10-CM

## 2025-06-18 DIAGNOSIS — E11.9 TYPE 2 DIABETES MELLITUS WITHOUT COMPLICATION, WITH LONG-TERM CURRENT USE OF INSULIN (HCC): Primary | ICD-10-CM

## 2025-06-18 DIAGNOSIS — Z79.4 TYPE 2 DIABETES MELLITUS WITHOUT COMPLICATION, WITH LONG-TERM CURRENT USE OF INSULIN (HCC): Primary | ICD-10-CM

## 2025-06-18 RX ORDER — HYDROCHLOROTHIAZIDE 12.5 MG/1
CAPSULE ORAL
Qty: 3 EACH | Refills: 1 | Status: SHIPPED | OUTPATIENT
Start: 2025-06-18

## 2025-06-18 RX ORDER — INSULIN GLARGINE 300 U/ML
INJECTION, SOLUTION SUBCUTANEOUS
Qty: 24 ML | Refills: 3 | Status: SHIPPED | OUTPATIENT
Start: 2025-06-18 | End: 2025-06-18

## 2025-06-18 RX ORDER — INSULIN GLARGINE 300 U/ML
INJECTION, SOLUTION SUBCUTANEOUS
Qty: 24 ML | Refills: 3 | Status: SHIPPED | OUTPATIENT
Start: 2025-06-18

## 2025-06-18 RX ORDER — HYDROCORTISONE 25 MG/G
CREAM TOPICAL
Qty: 28 G | Refills: 2 | Status: SHIPPED | OUTPATIENT
Start: 2025-06-18

## 2025-06-18 RX ORDER — INSULIN ASPART 100 [IU]/ML
26 INJECTION, SOLUTION INTRAVENOUS; SUBCUTANEOUS
Qty: 30 ML | Refills: 0 | Status: SHIPPED | OUTPATIENT
Start: 2025-06-18

## 2025-06-18 NOTE — PROGRESS NOTES
Chief Complaint   Patient presents with    Follow-up     Diabetic check up- states her sugar was 150 this morning but was down to 60 last night          HPI: Eugenia Edwards is a 54 y.o. female who presents for FUV    #DM2  - Last A1c below, due for repeat, is on Farxiga 10 mg daily, atorvastatin 40 mg nightly, NovoLog 30 units 3 times daily, lisinopril 10 mg daily, Mounjaro was increased at last visit 12.5 mg q. Weekly, states blood sugar has been in the 150s in morning night but that at times can get low into the 60s, is also on insulin glargine 74 units daily, needs refill diabetic supplies, BG dips low at night at times, pt states that she is only taking 50 units of Toujeo QD, blood pressure well-controlled today, has area of itchy skin inscalp      Hemoglobin A1C   Date Value Ref Range Status   03/03/2025 7.9 See comment % Final     Comment:     Comment:  Diagnosis of Diabetes: > or = 6.5%  Increased risk of diabetes (Prediabetes): 5.7-6.4%  Glycemic Control: Nonpregnant Adults: <7.0%                    Pregnant: <6.0%            Past Medical History:   Diagnosis Date    Anxiety     Bipolar 1 disorder (HCC)     Chronic gastric ulcer without hemorrhage and without perforation     CTS (carpal tunnel syndrome) 02/10/2015    Depression     Diabetes mellitus (HCC)     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 RELEASE performed by Jg Subramanian MD at [facility] OR    FINGER TRIGGER RELEASE Left 04/01/2022    LEFT LONG FINGER A1 PULLEY RELEASE, LEFT CARPAL TUNNEL RELEASE performed

## 2025-06-19 DIAGNOSIS — G47.61 PERIODIC LIMB MOVEMENT DISORDER: ICD-10-CM

## 2025-06-19 RX ORDER — PRAMIPEXOLE DIHYDROCHLORIDE 0.5 MG/1
0.5 TABLET ORAL NIGHTLY PRN
Qty: 90 TABLET | Refills: 0 | Status: SHIPPED | OUTPATIENT
Start: 2025-06-19

## 2025-06-19 RX ORDER — PRAMIPEXOLE DIHYDROCHLORIDE 0.5 MG/1
1 TABLET ORAL NIGHTLY PRN
Qty: 180 TABLET | Refills: 0 | Status: SHIPPED | OUTPATIENT
Start: 2025-06-19 | End: 2025-06-19 | Stop reason: SDUPTHER

## 2025-06-19 NOTE — TELEPHONE ENCOUNTER
Requested Prescriptions     Pending Prescriptions Disp Refills    pramipexole (MIRAPEX) 0.5 MG tablet 180 tablet 3         Last OV: 6/18/25  Last labs: 4/28/25     F/u: 7/18/25

## 2025-06-30 DIAGNOSIS — J30.1 SEASONAL ALLERGIC RHINITIS DUE TO POLLEN: ICD-10-CM

## 2025-06-30 DIAGNOSIS — E11.9 TYPE 2 DIABETES MELLITUS WITHOUT COMPLICATION (HCC): ICD-10-CM

## 2025-06-30 RX ORDER — GABAPENTIN 300 MG/1
CAPSULE ORAL
Qty: 180 CAPSULE | Refills: 0 | Status: SHIPPED | OUTPATIENT
Start: 2025-06-30 | End: 2025-08-01

## 2025-06-30 RX ORDER — MONTELUKAST SODIUM 10 MG/1
10 TABLET ORAL DAILY
Qty: 90 TABLET | Refills: 0 | Status: SHIPPED | OUTPATIENT
Start: 2025-06-30

## 2025-07-02 ENCOUNTER — TELEMEDICINE (OUTPATIENT)
Dept: FAMILY MEDICINE CLINIC | Age: 55
End: 2025-07-02

## 2025-07-02 DIAGNOSIS — J01.90 ACUTE SINUSITIS, RECURRENCE NOT SPECIFIED, UNSPECIFIED LOCATION: Primary | ICD-10-CM

## 2025-07-02 RX ORDER — LORATADINE 10 MG/1
10 TABLET ORAL DAILY
Qty: 90 TABLET | Refills: 0 | Status: SHIPPED | OUTPATIENT
Start: 2025-07-02 | End: 2025-09-30

## 2025-07-02 RX ORDER — PSEUDOEPHEDRINE HCL 120 MG/1
120 TABLET, FILM COATED, EXTENDED RELEASE ORAL EVERY 12 HOURS
Qty: 14 TABLET | Refills: 0 | Status: SHIPPED | OUTPATIENT
Start: 2025-07-02 | End: 2025-07-09

## 2025-07-02 NOTE — PROGRESS NOTES
Eugenia Edwards, was evaluated through a synchronous (real-time) audio-video encounter. The patient (or guardian if applicable) is aware that this is a billable service, which includes applicable co-pays. This Virtual Visit was conducted with patient's (and/or legal guardian's) consent. Patient identification was verified, and a caregiver was present when appropriate.   The patient was located at Home: [address] [place] [address] [address]  [place] [phone]  Provider was located at Facility (Appt Dept): [address] [place] [address]  [address]  [place] [phone]  Confirm you are appropriately licensed, registered, or certified to deliver care in the state where the patient is located as indicated above. If you are not or unsure, please re-schedule the visit: Yes, I confirm.     Eugenia Edwards (:  1970) is a Established patient, presenting virtually for evaluation of the following:      Below is the assessment and plan developed based on review of pertinent history, physical exam, labs, studies, and medications.     Assessment & Plan  Acute sinusitis, recurrence not specified, unspecified location       Orders:    loratadine (CLARITIN) 10 MG tablet; Take 1 tablet by mouth daily    pseudoephedrine (SUDAFED 12 HR) 120 MG extended release tablet; Take 1 tablet by mouth in the morning and 1 tablet in the evening. Do all this for 7 days.  -No red flag symptoms, physical exam limited due to visit being virtual, likely viral sinusitis, has not tried meds other than Flonase yet, will trial loratadine/Sudafed for further symptomatic relief, return precautions reviewed with patient    Return in about 2 weeks (around 2025) for Follow up Diabetes .       Subjective   HPI    #Sinusitis  - Patient presenting today with sinusitis, is already on Symbicort, twice daily, has had symptoms for 3-4 days, is having sinus congestion, headache, has decreased smell in nose, did not test for COVID, denies cough, denies rhinorrhea, has not

## 2025-07-18 ENCOUNTER — OFFICE VISIT (OUTPATIENT)
Dept: FAMILY MEDICINE CLINIC | Age: 55
End: 2025-07-18

## 2025-07-18 VITALS
TEMPERATURE: 97.3 F | WEIGHT: 293 LBS | HEIGHT: 66 IN | DIASTOLIC BLOOD PRESSURE: 60 MMHG | BODY MASS INDEX: 47.09 KG/M2 | SYSTOLIC BLOOD PRESSURE: 120 MMHG | OXYGEN SATURATION: 98 % | HEART RATE: 85 BPM

## 2025-07-18 DIAGNOSIS — E11.9 TYPE 2 DIABETES MELLITUS WITHOUT COMPLICATION, WITH LONG-TERM CURRENT USE OF INSULIN (HCC): Primary | ICD-10-CM

## 2025-07-18 DIAGNOSIS — I10 ESSENTIAL HYPERTENSION: ICD-10-CM

## 2025-07-18 DIAGNOSIS — Z79.4 TYPE 2 DIABETES MELLITUS WITHOUT COMPLICATION, WITH LONG-TERM CURRENT USE OF INSULIN (HCC): Primary | ICD-10-CM

## 2025-07-18 DIAGNOSIS — Z79.4 TYPE 2 DIABETES MELLITUS WITHOUT COMPLICATION, WITH LONG-TERM CURRENT USE OF INSULIN (HCC): ICD-10-CM

## 2025-07-18 DIAGNOSIS — E11.9 TYPE 2 DIABETES MELLITUS WITHOUT COMPLICATION, WITH LONG-TERM CURRENT USE OF INSULIN (HCC): ICD-10-CM

## 2025-07-18 NOTE — PROGRESS NOTES
Chief Complaint   Patient presents with    1 Month Follow-Up          HPI: Eugenia Edwards is a 55 y.o. female who presents for FUV    #DM2  - Last A1c below, ordered at last visit but not yet filled by patient, patient is on lisinopril 10 mg daily, insulin glargine 45 units daily, NovoLog 26 units 3 times daily, Farxiga 10 mg daily, atorvastatin 40 mg nightly, Mounjaro 15 mg weekly, torsemide, see problems for details, had adjusted Mounjaro up and insulin doses down at last visit, states that fasting BG have improved, have been in the  range fasting, does not need meds refilled , patient had been on gabapentin for neuropathy but then was diagnosed with carpal tunnel after this, will potentially like to trial off of this med    Lab Results   Component Value Date    CREATININE 1.2 (H) 04/28/2025    BUN 23 (H) 04/28/2025     04/28/2025    K 3.9 04/28/2025     04/28/2025    CO2 23 04/28/2025        Hemoglobin A1C   Date Value Ref Range Status   03/03/2025 7.9 See comment % Final     Comment:     Comment:  Diagnosis of Diabetes: > or = 6.5%  Increased risk of diabetes (Prediabetes): 5.7-6.4%  Glycemic Control: Nonpregnant Adults: <7.0%                    Pregnant: <6.0%            Past Medical History:   Diagnosis Date    Anxiety     Bipolar 1 disorder (HCC)     Chronic gastric ulcer without hemorrhage and without perforation     CTS (carpal tunnel syndrome) 02/10/2015    Depression     Diabetes mellitus (HCC)     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

## 2025-07-19 LAB
EST. AVERAGE GLUCOSE BLD GHB EST-MCNC: 142.7 MG/DL
HBA1C MFR BLD: 6.6 %

## 2025-07-24 DIAGNOSIS — E78.00 PURE HYPERCHOLESTEROLEMIA: Chronic | ICD-10-CM

## 2025-07-24 DIAGNOSIS — E11.9 TYPE 2 DIABETES MELLITUS WITHOUT COMPLICATION, WITH LONG-TERM CURRENT USE OF INSULIN (HCC): ICD-10-CM

## 2025-07-24 DIAGNOSIS — Z79.4 TYPE 2 DIABETES MELLITUS WITHOUT COMPLICATION, WITH LONG-TERM CURRENT USE OF INSULIN (HCC): ICD-10-CM

## 2025-07-24 RX ORDER — ATORVASTATIN CALCIUM 40 MG/1
40 TABLET, FILM COATED ORAL DAILY
Qty: 90 TABLET | Refills: 0 | Status: SHIPPED | OUTPATIENT
Start: 2025-07-24

## 2025-07-24 RX ORDER — TIRZEPATIDE 15 MG/.5ML
INJECTION, SOLUTION SUBCUTANEOUS
Qty: 2 ML | Refills: 0 | Status: SHIPPED | OUTPATIENT
Start: 2025-07-24

## 2025-07-24 NOTE — TELEPHONE ENCOUNTER
Requested Prescriptions     Pending Prescriptions Disp Refills    atorvastatin (LIPITOR) 40 MG tablet [Pharmacy Med Name: ATORVASTATIN 40 MG TABLET] 90 tablet 3     Sig: TAKE 1 TABLET BY MOUTH DAILY      Last Visit: 7/18/25    Last Labs: 7/18/25    Next Visit: 10/17/25

## 2025-08-11 RX ORDER — LISINOPRIL 10 MG/1
10 TABLET ORAL DAILY
Qty: 90 TABLET | Refills: 0 | Status: SHIPPED | OUTPATIENT
Start: 2025-08-11

## 2025-08-23 DIAGNOSIS — E11.9 TYPE 2 DIABETES MELLITUS WITHOUT COMPLICATION, WITH LONG-TERM CURRENT USE OF INSULIN (HCC): ICD-10-CM

## 2025-08-23 DIAGNOSIS — Z79.4 TYPE 2 DIABETES MELLITUS WITHOUT COMPLICATION, WITH LONG-TERM CURRENT USE OF INSULIN (HCC): ICD-10-CM

## 2025-08-25 RX ORDER — DAPAGLIFLOZIN 10 MG/1
10 TABLET, FILM COATED ORAL EVERY MORNING
Qty: 90 TABLET | Refills: 0 | Status: SHIPPED | OUTPATIENT
Start: 2025-08-25

## 2025-08-25 RX ORDER — TIRZEPATIDE 15 MG/.5ML
INJECTION, SOLUTION SUBCUTANEOUS
Qty: 2 ML | Refills: 2 | Status: SHIPPED | OUTPATIENT
Start: 2025-08-25

## (undated) DEVICE — PADDING CAST N ADH 4 YDX2 IN HIGHLY ABSORBENT EZ APPL SOFROL

## (undated) DEVICE — GOWN,SIRUS,POLYRNF,BRTHSLV,XLN/XL,20/CS: Brand: MEDLINE

## (undated) DEVICE — SYRINGE CATH TIP 50ML

## (undated) DEVICE — GARMENT,MEDLINE,DVT,INT,CALF,LG, GEN2: Brand: MEDLINE

## (undated) DEVICE — DRAPE,LAP,CHOLE,W/TROUGHS,STERILE: Brand: MEDLINE

## (undated) DEVICE — TROCAR: Brand: KII FIOS FIRST ENTRY

## (undated) DEVICE — SURGICAL SET UP - SURE SET: Brand: MEDLINE INDUSTRIES, INC.

## (undated) DEVICE — OBTURATOR ROBOTIC DIA8MM LNG BLDELSS ENDOSCP DISP DA VINCI

## (undated) DEVICE — SHEET,DRAPE,53X77,STERILE: Brand: MEDLINE

## (undated) DEVICE — COLLECTOR SPEC RAYON LIQ STUART DBL FOR THRT VAG SKIN WND

## (undated) DEVICE — TOWEL,OR,DSP,ST,BLUE,STD,4/PK,20PK/CS: Brand: MEDLINE

## (undated) DEVICE — SPONGE,LAP,18"X18",DLX,XR,ST,5/PK,40/PK: Brand: MEDLINE

## (undated) DEVICE — ELECTRODE PT RET AD L9FT HI MOIST COND ADH HYDRGEL CORDED

## (undated) DEVICE — GLOVE ORTHO 7 1/2   MSG9475

## (undated) DEVICE — DRAPE 70X60IN SPLIT IMPERV ADHES STRIP

## (undated) DEVICE — 3M™ COBAN™ NL STERILE NON-LATEX SELF-ADHERENT WRAP, 2083S, 3 IN X 5 YD (7,5 CM X 4,5 M), 24 ROLLS/CASE: Brand: 3M™ COBAN™

## (undated) DEVICE — BINDER ABD H12IN FOR 62-74IN WAIST UNIV 4 PNL PREM DSGN E

## (undated) DEVICE — SURE SET-DOUBLE BASIN-LF: Brand: MEDLINE INDUSTRIES, INC.

## (undated) DEVICE — BLADE OPHTH 180DEG CUT SURF BLU STR SHRP DBL BVL GRINDLESS

## (undated) DEVICE — 40583 XL ADVANCED TRENDELENBURG POSITIONING KIT: Brand: 40583 XL ADVANCED TRENDELENBURG POSITIONING KIT

## (undated) DEVICE — TROCAR: Brand: KII OPTICAL ACCESS SYSTEM

## (undated) DEVICE — BOWL MED L 32OZ PLAS W/ MOLD GRAD EZ OPN PEEL PCH

## (undated) DEVICE — STERILE POLYISOPRENE POWDER-FREE SURGICAL GLOVES: Brand: PROTEXIS

## (undated) DEVICE — COVER,TABLE,HEAVY DUTY,77"X90",STRL: Brand: MEDLINE

## (undated) DEVICE — SUTURE VCRL SZ 2-0 L27IN ABSRB UD L26MM SH 1/2 CIR J417H

## (undated) DEVICE — KIT DRP 3 ARM ACC DISP ENDOWRIST DA VINCI SI

## (undated) DEVICE — SUTURE ETHLN SZ 5-0 L18IN NONABSORBABLE BLK L19MM PS-2 3/8 1666G

## (undated) DEVICE — UNDERGLOVE SURG SZ 8 BLU LTX FREE SYN POLYISOPRENE POLYMER

## (undated) DEVICE — STOCKINETTE ORTH L4FTXW4IN SYN YARN PRECUT 2 PLY ST COPLEX

## (undated) DEVICE — ANTI-FOG SOLUTION WITH FOAM PAD: Brand: DEVON

## (undated) DEVICE — VESSEL SEALER: Brand: ENDOWRIST;DAVINCI SI

## (undated) DEVICE — PACK PROCEDURE SURG EXTREMITY MFFOP CUST

## (undated) DEVICE — PENCIL ES L3M ROCK SWCH S STL HEX LOK BLDE ELECTRD HOLSTER

## (undated) DEVICE — 1010 S-DRAPE TOWEL DRAPE 10/BX: Brand: STERI-DRAPE™

## (undated) DEVICE — TOTAL TRAY, 16FR 10ML SIL FOLEY, URN: Brand: MEDLINE

## (undated) DEVICE — APPLICATOR MEDICATED 26 CC SOLUTION HI LT ORNG CHLORAPREP

## (undated) DEVICE — DRAPE,U/ SHT,SPLIT,PLAS,STERIL: Brand: MEDLINE

## (undated) DEVICE — SOLUTION ANTIFOG VIS SYS CLEARIFY LAPSCP

## (undated) DEVICE — SUTURE ETHBND EXCEL SZ 0 L30IN NONABSORBABLE GRN L26MM SH X834H

## (undated) DEVICE — STOCKINETTE,DOUBLE PLY,6X48,STERILE: Brand: MEDLINE

## (undated) DEVICE — BANDAGE,GAUZE,CONFORMING,3"X75",STRL,LF: Brand: MEDLINE

## (undated) DEVICE — NEEDLE INSUF L150MM DIA2MM DISP FOR PNEUMOPERI ENDOPATH

## (undated) DEVICE — GLOVE SURG SZ 75 L12IN FNGR THK87MIL DK GRN LTX FREE ISOLEX

## (undated) DEVICE — INTENDED FOR TISSUE SEPARATION, AND OTHER PROCEDURES THAT REQUIRE A SHARP SURGICAL BLADE TO PUNCTURE OR CUT.: Brand: BARD-PARKER ® STAINLESS STEEL BLADES

## (undated) DEVICE — 3M™ WARMING BLANKET, LOWER BODY, 10 PER CASE, 42568: Brand: BAIR HUGGER™

## (undated) DEVICE — SYRINGE MED 10ML TRNSLUC BRL PLUNG BLK MRK POLYPR CTRL

## (undated) DEVICE — SUTURE ETHLN SZ 4-0 L18IN NONABSORBABLE BLK L19MM PS-2 3/8 1667H

## (undated) DEVICE — GAUZE,SPONGE,4"X4",8PLY,STRL,LF,10/TRAY: Brand: MEDLINE

## (undated) DEVICE — CADIERE FORCEPS: Brand: ENDOWRIST;DAVINCI SI

## (undated) DEVICE — SOLUTION IV IRRIG 500ML 0.9% SODIUM CHL 2F7123

## (undated) DEVICE — COVER LT HNDL BLU PLAS

## (undated) DEVICE — TURNOVER KIT RM INF CTRL TECH

## (undated) DEVICE — MEDICINE CUP, GRADUATED, STER: Brand: MEDLINE

## (undated) DEVICE — SKIN AFFIX SURG ADHESIVE 72/CS 0.55ML: Brand: MEDLINE

## (undated) DEVICE — TRAY,IRRIGATION,BULBSYRINGE,60ML,CSR,PVP: Brand: MEDLINE

## (undated) DEVICE — CABLE BPLR L12FT FLYING LD DISPOSABLE

## (undated) DEVICE — DRAPE SURGICAL HAND PROX AURORA

## (undated) DEVICE — SWAB CULT SGL AMIES W/O CHAR FOR THRT VAG SKIN HRT CULTSWAB

## (undated) DEVICE — JEWISH HOSPITAL TURNOVER KIT: Brand: MEDLINE INDUSTRIES, INC.

## (undated) DEVICE — PODIATRY PK

## (undated) DEVICE — GARMENT,MEDLINE,DVT,INT,CALF,MED, GEN2: Brand: MEDLINE

## (undated) DEVICE — AGENT HEMSTAT W2XL4IN OXIDIZED REGENERATED CELOS ABSRB

## (undated) DEVICE — DRAPE,UTILTY,TAPE,15X26, 4EA/PK: Brand: MEDLINE

## (undated) DEVICE — NO USE 18 MONTHS GOWN XL IMPERV REINF ST ECLIPSE DISP

## (undated) DEVICE — LARGE NEEDLE DRIVER: Brand: ENDOWRIST;DAVINCI SI

## (undated) DEVICE — SPONGE,LAP,4"X18",XR,ST,5/PK,40PK/CS: Brand: MEDLINE INDUSTRIES, INC.

## (undated) DEVICE — PMI DISPOSABLE PUNCTURE CLOSURE DEVICE / SUTURE GRASPER: Brand: PMI

## (undated) DEVICE — NO USE 18 MONTHS PACKS BASIC 120194C

## (undated) DEVICE — DRAPE HND W114XL142IN BLU POLYPR W O PCH FEN CRD AND TB HLDR

## (undated) DEVICE — BANDAGE GZ W2XL75IN ST RAYON POLY CNFRM STRTCH LTWT

## (undated) DEVICE — STAPLER SKIN L440MM 32MM LNG 12 FIRING B FRM PWR + GRIPPING

## (undated) DEVICE — [HIGH FLOW INSUFFLATOR,  DO NOT USE IF PACKAGE IS DAMAGED,  KEEP DRY,  KEEP AWAY FROM SUNLIGHT,  PROTECT FROM HEAT AND RADIOACTIVE SOURCES.]: Brand: PNEUMOSURE

## (undated) DEVICE — DRESSING PETRO W3XL3IN OIL EMUL N ADH GZ KNIT IMPREG CELOS

## (undated) DEVICE — SYRINGE MED 10ML POLYPR LUERSLIP TIP FLAT TOP W/O SFTY DISP

## (undated) DEVICE — SOLUTION INJ LR VISIV 1000ML BG

## (undated) DEVICE — GLOVE ORANGE PI 7   MSG9070

## (undated) DEVICE — TRAY PREP DRY W/ PREM GLV 2 APPL 6 SPNG 2 UNDPD 1 OVERWRAP

## (undated) DEVICE — INTENDED FOR TISSUE SEPARATION, AND OTHER PROCEDURES THAT REQUIRE A SHARP SURGICAL BLADE TO PUNCTURE OR CUT.: Brand: BARD-PARKER ® CARBON RIB-BACK BLADES

## (undated) DEVICE — CHLORAPREP 26ML ORANGE

## (undated) DEVICE — STANDARD HYPODERMIC NEEDLE,POLYPROPYLENE HUB: Brand: MONOJECT

## (undated) DEVICE — PUMP SUC IRR TBNG L10FT W/ HNDPC ASSEMB STRYKEFLOW 2

## (undated) DEVICE — STERILE LATEX POWDER-FREE SURGICAL GLOVESWITH NITRILE AND EMOLLIENT COATINGS: Brand: PROTEXIS

## (undated) DEVICE — SYSTEM SMK EVAC LAP TBNG FILTER HSNG BENT STYL PNK SEE CLR

## (undated) DEVICE — TOWEL,STOP FLAG GOLD N-W: Brand: MEDLINE

## (undated) DEVICE — SUTURE VCRL SZ 0 L54IN ABSRB VLT W/O NDL POLYGLACTIN 910 J616H

## (undated) DEVICE — HAND: Brand: MEDLINE INDUSTRIES, INC.

## (undated) DEVICE — SUTURE VCRL SZ 4-0 L18IN ABSRB UD L19MM PS-2 3/8 CIR PRIM J496H

## (undated) DEVICE — LAPAROSCOPIC SCISSORS: Brand: EPIX LAPAROSCOPIC SCISSORS